# Patient Record
Sex: FEMALE | Race: BLACK OR AFRICAN AMERICAN | NOT HISPANIC OR LATINO | Employment: OTHER | URBAN - METROPOLITAN AREA
[De-identification: names, ages, dates, MRNs, and addresses within clinical notes are randomized per-mention and may not be internally consistent; named-entity substitution may affect disease eponyms.]

---

## 2019-01-14 ENCOUNTER — OFFICE VISIT CONVERTED (OUTPATIENT)
Dept: NEUROSURGERY | Facility: CLINIC | Age: 69
End: 2019-01-14
Attending: NEUROLOGICAL SURGERY

## 2019-01-22 ENCOUNTER — HOSPITAL ENCOUNTER (OUTPATIENT)
Dept: RADIATION ONCOLOGY | Facility: HOSPITAL | Age: 69
Setting detail: RECURRING SERIES
Discharge: HOME | End: 2019-01-31
Attending: RADIOLOGY

## 2019-02-01 ENCOUNTER — HOSPITAL ENCOUNTER (OUTPATIENT)
Dept: RADIATION ONCOLOGY | Facility: HOSPITAL | Age: 69
Setting detail: RECURRING SERIES
Discharge: HOME | End: 2019-02-28
Attending: RADIOLOGY

## 2019-02-08 ENCOUNTER — HOSPITAL ENCOUNTER (OUTPATIENT)
Dept: MRI IMAGING | Facility: HOSPITAL | Age: 69
Discharge: HOME | End: 2019-02-08
Attending: RADIOLOGY

## 2019-02-13 ENCOUNTER — OFFICE VISIT CONVERTED (OUTPATIENT)
Dept: NEUROSURGERY | Facility: CLINIC | Age: 69
End: 2019-02-13
Attending: NEUROLOGICAL SURGERY

## 2019-02-20 ENCOUNTER — OFFICE VISIT CONVERTED (OUTPATIENT)
Dept: NEUROSURGERY | Facility: CLINIC | Age: 69
End: 2019-02-20
Attending: NEUROLOGICAL SURGERY

## 2019-03-01 ENCOUNTER — HOSPITAL ENCOUNTER (OUTPATIENT)
Dept: PET IMAGING | Facility: HOSPITAL | Age: 69
Discharge: HOME | End: 2019-03-01
Attending: INTERNAL MEDICINE

## 2019-03-04 ENCOUNTER — HOSPITAL ENCOUNTER (OUTPATIENT)
Dept: GENERAL RADIOLOGY | Facility: HOSPITAL | Age: 69
Discharge: HOME | End: 2019-03-04
Attending: NEUROLOGICAL SURGERY

## 2019-03-20 ENCOUNTER — OFFICE VISIT CONVERTED (OUTPATIENT)
Dept: NEUROSURGERY | Facility: CLINIC | Age: 69
End: 2019-03-20
Attending: NEUROLOGICAL SURGERY

## 2019-03-20 ENCOUNTER — CONVERSION ENCOUNTER (OUTPATIENT)
Dept: NEUROLOGY | Facility: CLINIC | Age: 69
End: 2019-03-20

## 2019-03-22 ENCOUNTER — OFFICE VISIT CONVERTED (OUTPATIENT)
Dept: OTOLARYNGOLOGY | Facility: CLINIC | Age: 69
End: 2019-03-22
Attending: OTOLARYNGOLOGY

## 2019-03-29 ENCOUNTER — HOSPITAL ENCOUNTER (OUTPATIENT)
Dept: CT IMAGING | Facility: HOSPITAL | Age: 69
Discharge: HOME | End: 2019-03-29
Attending: NEUROLOGICAL SURGERY

## 2019-03-29 LAB
CREAT BLD-MCNC: 0.4 MG/DL (ref 0.6–1.4)
GFR SERPLBLD BASED ON 1.73 SQ M-ARVRAT: >60 ML/MIN/{1.73_M2}

## 2019-04-03 ENCOUNTER — OFFICE VISIT CONVERTED (OUTPATIENT)
Dept: NEUROSURGERY | Facility: CLINIC | Age: 69
End: 2019-04-03
Attending: NEUROLOGICAL SURGERY

## 2019-04-03 ENCOUNTER — CONVERSION ENCOUNTER (OUTPATIENT)
Dept: NEUROLOGY | Facility: CLINIC | Age: 69
End: 2019-04-03

## 2019-05-08 ENCOUNTER — OFFICE VISIT CONVERTED (OUTPATIENT)
Dept: NEUROSURGERY | Facility: CLINIC | Age: 69
End: 2019-05-08
Attending: NEUROLOGICAL SURGERY

## 2019-06-10 ENCOUNTER — HOSPITAL ENCOUNTER (OUTPATIENT)
Dept: CT IMAGING | Facility: HOSPITAL | Age: 69
Discharge: HOME | End: 2019-06-10
Attending: NEUROLOGICAL SURGERY

## 2019-09-11 ENCOUNTER — HOSPITAL ENCOUNTER (OUTPATIENT)
Dept: RADIATION ONCOLOGY | Facility: HOSPITAL | Age: 69
Discharge: HOME | End: 2019-09-11
Attending: RADIOLOGY

## 2020-03-02 ENCOUNTER — HOSPITAL ENCOUNTER (OUTPATIENT)
Dept: MRI IMAGING | Facility: HOSPITAL | Age: 70
Discharge: HOME | End: 2020-03-02
Attending: INTERNAL MEDICINE

## 2020-06-02 ENCOUNTER — OFFICE VISIT CONVERTED (OUTPATIENT)
Dept: SURGERY | Facility: CLINIC | Age: 70
End: 2020-06-02
Attending: SURGERY

## 2020-06-02 ENCOUNTER — CONVERSION ENCOUNTER (OUTPATIENT)
Dept: SURGERY | Facility: CLINIC | Age: 70
End: 2020-06-02

## 2020-06-10 ENCOUNTER — HOSPITAL ENCOUNTER (OUTPATIENT)
Dept: RADIATION ONCOLOGY | Facility: HOSPITAL | Age: 70
Discharge: HOME | End: 2020-06-10
Attending: RADIOLOGY

## 2020-07-13 ENCOUNTER — HOSPITAL ENCOUNTER (OUTPATIENT)
Dept: GENERAL RADIOLOGY | Facility: HOSPITAL | Age: 70
Discharge: HOME | End: 2020-07-13
Attending: NEUROLOGICAL SURGERY

## 2020-08-05 ENCOUNTER — HOSPITAL ENCOUNTER (OUTPATIENT)
Dept: MRI IMAGING | Facility: HOSPITAL | Age: 70
Discharge: HOME | End: 2020-08-05
Attending: INTERNAL MEDICINE

## 2020-08-18 ENCOUNTER — OFFICE VISIT CONVERTED (OUTPATIENT)
Dept: NEUROSURGERY | Facility: CLINIC | Age: 70
End: 2020-08-18
Attending: NEUROLOGICAL SURGERY

## 2020-10-01 ENCOUNTER — OFFICE VISIT CONVERTED (OUTPATIENT)
Dept: NEUROLOGY | Facility: CLINIC | Age: 70
End: 2020-10-01
Attending: PSYCHIATRY & NEUROLOGY

## 2020-10-13 ENCOUNTER — OFFICE VISIT CONVERTED (OUTPATIENT)
Dept: NEUROSURGERY | Facility: CLINIC | Age: 70
End: 2020-10-13
Attending: NEUROLOGICAL SURGERY

## 2021-03-09 ENCOUNTER — HOSPITAL ENCOUNTER (OUTPATIENT)
Dept: VACCINE CLINIC | Facility: HOSPITAL | Age: 71
Discharge: HOME | End: 2021-03-09
Attending: INTERNAL MEDICINE

## 2021-03-30 ENCOUNTER — HOSPITAL ENCOUNTER (OUTPATIENT)
Dept: VACCINE CLINIC | Facility: HOSPITAL | Age: 71
Discharge: HOME | End: 2021-03-30
Attending: INTERNAL MEDICINE

## 2021-05-10 NOTE — H&P
History and Physical      Patient Name: Evangelina Sutton   Patient ID: [id number]   Sex: Female   YOB: 1950    Primary Care Provider: Gilberto Coleman MD    Visit Date: October 1, 2020    Provider: Phoenix Ivory MD   Location: [facility]   Location Address: [address] [place] [address]  [id number]   Location Phone: [phone]          Chief Complaint     BLE numbness tingling pain and weakness       History Of Present Illness  Evangelina Sutton is a 70 year old /Black female who presents today to [facility] today referred from REFERRING CARE PROVIDER NAME.      70-year-old woman evaluated for nerve conduction study.  She states that she has been having numbness and tingling from the knees below for the last 2 months.  She has a history of multiple myeloma and is taking Revlimid for the past year.  She states that she is being followed by a hematologist/oncologist for multiple myeloma.  She has no history of diabetes.  She does not drink alcohol.  She has tingling and numbness in her legs and in the last 2 months she has been using a walker because she has difficulty in feeling the floor.  She is off balance.       Past Medical History  Arthritis; Cervical radiculopathy; Cervicalgia; Degeneration of lumbar intervertebral disc; Lumbar Spinal Stenosis; Pathologic fracture of vertebrae         Past Surgical History  Back surgery; Back Surgery, Lumbar; Cholecystecomy; Cholecystectomy; Hysterectomy         Medication List  acyclovir 400 mg oral tablet; Ambien 5 mg oral tablet; Aspir-81 81 mg oral tablet,delayed release (DR/EC); Cymbalta 30 mg oral capsule,delayed release(DR/EC); gabapentin 300 mg oral capsule; morphine 30 mg oral capsule, ER multiphase 24 hr; ondansetron HCl 8 mg oral tablet; pantoprazole 20 mg oral tablet,delayed release (DR/EC); potassium chloride 20 mEq oral tablet extended release; Revlimid 25 mg oral capsule  "        Allergy List  NO KNOWN DRUG ALLERGIES         Family Medical History  Cancer, Unspecified; Family history of cancer         Social History  Alcohol (Never); lives with children; Retired; Tobacco (Never);          Review of Systems  · Constitutional  o Denies  o : chills, excessive sweating, fatigue, fever, sycope/passing out, weight gain, weight loss  · Eyes  o Denies  o : changes in vision, blurry vision, double vision  · HENT  o Denies  o : loss of hearing, ringing in the ears, ear aches, sore throat, nasal congestion, sinus pain, nose bleeds, seasonal allergies  · Cardiovascular  o Admits  o : swollen legs  o Denies  o : blood clots, anemia, easy burising or bleeding, transfusions  · Respiratory  o Denies  o : shortness of breath, dry cough, productive cough, pneumonia, COPD  · Gastrointestinal  o Denies  o : difficulty swallowing, reflux  · Genitourinary  o Denies  o : incontinence  · Neurologic  o Admits  o : numbness/tingling/paresthesia   o Denies  o : headache, seizure, stroke, tremor, loss of balance, falls, dizziness/vertigo, difficulty with sleep, difficulty with coordination, difficulty with dexterity, weakness  · Musculoskeletal  o Admits  o : pain radiating in leg  o Denies  o : neck stiffness/pain, swollen lymph nodes, muscle aches, joint pain, weakness, spasms, sciatica, pain radiating in arm, low back pain  · Endocrine  o Denies  o : diabetes, thyroid disorder  · Psychiatric  o Denies  o : anxiety, depression      Vitals  Date Time BP Position Site L\R Cuff Size HR RR TEMP (F) WT  HT  BMI kg/m2 BSA m2 O2 Sat FR L/min FiO2        10/01/2020 01:15 /48 Sitting    110 - R  96.7 135lbs 0oz 5'  5\" 22.46 1.68             Physical Examination     He is alert, fluent, phasic, follows commands well.  There is no weakness of the upper or lower extremities individual muscle testing proximally and distally.  Sensation is decreased to pinprick in a stocking distribution to the mid leg.  " Cold sensation is decreased to the mid leg.  Vibration is decreased higher than the ankles.  She has difficulty getting up on the examining table and getting up on the footstool.  Station and gait she has difficulty keeping her balance and has to use a rolling walker.           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  · Peripheral neuropathy     356.9/G62.9  She may have drug-induced peripheral neuropathy from Revlimid which is known to cause 10% neuropathy. It also can cause paresthesias. She also has multiple myeloma which can cause neuropathy as well. I told her to follow-up with her hematologist oncologist to discuss these issues.    20 minutes was spent for the straightforward complexity encounter more than half the time was spent face-to-face with the patient examination, counseling, planning and recommendations.      Plan  · Orders  o Nerve conduction studies; 9-10 studies (01173) - 356.9/G62.9, 782.0/R20.0, 782.0/R20.2 - 10/01/2020  · Medications  o Medications have been Reconciled  o Transition of Care or Provider Policy  · Instructions  o Encouraged to follow-up with Primary Care Provider for preventative care.            Electronically Signed by: Phoenix Ivory MD -Author on October 1, 2020 02:54:44 PM

## 2021-05-13 NOTE — PROGRESS NOTES
Progress Note      Patient Name: Evangelina Sutton   Patient ID: [id number]   Sex: Female   YOB: 1950    Primary Care Provider: Gilberto Coleman MD    Visit Date: October 13, 2020    Provider: Mundo Aldana MD   Location: [facility]   Location Address: [address] [place] [address]  [id number]   Location Phone: [phone]          Chief Complaint     Here to discuss EMG results.       History Of Present Illness     She has severe polyneuropathy on EMG.       Past Medical History  Arthritis; Cervical radiculopathy; Cervicalgia; Degeneration of lumbar intervertebral disc; Lumbar Spinal Stenosis; Pathologic fracture of vertebrae         Past Surgical History  Back surgery; Back Surgery, Lumbar; Cholecystecomy; Cholecystectomy; Hysterectomy         Medication List  acyclovir 400 mg oral tablet; Ambien 5 mg oral tablet; Aspir-81 81 mg oral tablet,delayed release (DR/EC); Cymbalta 30 mg oral capsule,delayed release(DR/EC); gabapentin 300 mg oral capsule; morphine 30 mg oral capsule, ER multiphase 24 hr; ondansetron HCl 8 mg oral tablet; pantoprazole 20 mg oral tablet,delayed release (DR/EC); potassium chloride 20 mEq oral tablet extended release; Revlimid 25 mg oral capsule         Allergy List  NO KNOWN DRUG ALLERGIES       Allergies Reconciled  Family Medical History  Cancer, Unspecified; Family history of cancer         Social History  Alcohol (Never); lives with children; Retired; Tobacco (Never);          Review of Systems  · Constitutional  o Denies  o : chills, excessive sweating, fatigue, fever, sycope/passing out, weight gain, weight loss  · Eyes  o Denies  o : changes in vision, blurry vision, double vision  · HENT  o Denies  o : loss of hearing, ringing in the ears, ear aches, sore throat, nasal congestion, sinus pain, nose bleeds, seasonal allergies  · Cardiovascular  o Denies  o : blood clots, swollen legs, anemia, easy burising or bleeding,  "transfusions  · Respiratory  o Denies  o : shortness of breath, dry cough, productive cough, pneumonia, COPD  · Gastrointestinal  o Denies  o : difficulty swallowing, reflux  · Genitourinary  o Denies  o : incontinence  · Neurologic  o Admits  o : weakness  o Denies  o : headache, seizure, stroke, tremor, loss of balance, falls, dizziness/vertigo, difficulty with sleep, numbness/tingling/paresthesia , difficulty with coordination, difficulty with dexterity  · Musculoskeletal  o Admits  o : neck stiffness/pain, weakness  o Denies  o : swollen lymph nodes, muscle aches, joint pain, spasms, sciatica, pain radiating in arm, pain radiating in leg, low back pain  · Endocrine  o Denies  o : diabetes, thyroid disorder  · Psychiatric  o Denies  o : anxiety, depression  · All Others Negative      Vitals  Date Time BP Position Site L\R Cuff Size HR RR TEMP (F) WT  HT  BMI kg/m2 BSA m2 O2 Sat FR L/min FiO2 HC       10/13/2020 02:32 PM        96.9 135lbs 0oz 5'  5\" 22.46 1.68                     Assessment  · Lumbar spinal stenosis     724.02/M48.061  Moderate at L3-4 and L4-5  · Multiple myeloma     203.00/C90.00  With multiple lumbar compression fractures  · Neuropathy     355.9/G62.9  Severe      Plan  · Medications  o Medications have been Reconciled  o Transition of Care or Provider Policy  · Instructions  o Surgery is unlikely to help as she has severe polyneuropathy.             Electronically Signed by: Mundo Aldana MD -Author on October 13, 2020 03:07:27 PM  "

## 2021-05-13 NOTE — PROGRESS NOTES
Progress Note      Patient Name: Evangelina Sutton   Patient ID: [id number]   Sex: Female   YOB: 1950    Primary Care Provider: Gilberto Coleman MD    Visit Date: August 18, 2020    Provider: Mundo Aldana MD   Location: [facility]   Location Address: [address] [place] [address]  [id number]   Location Phone: [phone]          Chief Complaint     Here for follow up.       History Of Present Illness     She has pain in the legs and right arm. She has multiple myeloma. She had a C7 corpectomy about a year ago. She has more leg than back pain. The leg pain is present all the time. She walks short distances with a walker. She has been taking gabapentin with no notable relief. She does have a history of lumbar laminectomy by Dr. St several years ago (L3-4 and L4-5 for foraminal stenosis) in 2013.       Past Medical History  Arthritis; Cervical radiculopathy; Cervicalgia; Degeneration of lumbar intervertebral disc; Lumbar Spinal Stenosis; Pathologic fracture of vertebrae         Past Surgical History  Back surgery; Back Surgery, Lumbar; Cholecystecomy; Cholecystectomy; Hysterectomy         Medication List  acyclovir 400 mg oral tablet; Ambien 5 mg oral tablet; Aspir-81 81 mg oral tablet,delayed release (DR/EC); gabapentin 300 mg oral capsule; morphine 30 mg oral capsule, ER multiphase 24 hr; ondansetron HCl 8 mg oral tablet; pantoprazole 20 mg oral tablet,delayed release (DR/EC); potassium chloride 20 mEq oral tablet extended release; Revlimid 25 mg oral capsule         Allergy List  NO KNOWN DRUG ALLERGIES       Allergies Reconciled  Family Medical History  Cancer, Unspecified; Family history of cancer         Social History  Alcohol (Never); lives with children; Retired; Tobacco (Never);          Review of Systems  · Constitutional  o Denies  o : chills, excessive sweating, fatigue, fever, sycope/passing out, weight gain, weight loss  · Eyes  o Denies  o : changes in  "vision, blurry vision, double vision  · HENT  o Denies  o : loss of hearing, ringing in the ears, ear aches, sore throat, nasal congestion, sinus pain, nose bleeds, seasonal allergies  · Cardiovascular  o Denies  o : blood clots, swollen legs, anemia, easy burising or bleeding, transfusions  · Respiratory  o Denies  o : shortness of breath, dry cough, productive cough, pneumonia, COPD  · Gastrointestinal  o Denies  o : difficulty swallowing, reflux  · Genitourinary  o Denies  o : incontinence  · Neurologic  o Denies  o : headache, seizure, stroke, tremor, loss of balance, falls, dizziness/vertigo, difficulty with sleep, numbness/tingling/paresthesia , difficulty with coordination, difficulty with dexterity, weakness  · Musculoskeletal  o Denies  o : neck stiffness/pain, swollen lymph nodes, muscle aches, joint pain, weakness, spasms, sciatica, pain radiating in arm, pain radiating in leg, low back pain  · Endocrine  o Denies  o : diabetes, thyroid disorder  · Psychiatric  o Denies  o : anxiety, depression  · All Others Negative      Vitals  Date Time BP Position Site L\R Cuff Size HR RR TEMP (F) WT  HT  BMI kg/m2 BSA m2 O2 Sat HC       08/18/2020 02:29 PM        97.1  5'  5\"   99 %          Physical Examination     She has a longer midline lumbar and a small right lumbar incision   Decreased sensation to the knee bilaterally  Strength distally fairly good/symmetric               Assessment  · Lumbar spinal stenosis     724.02/M48.061  Moderate at L3-4 and L4-5  · Multiple myeloma     203.00/C90.00  With multiple lumbar compression fractures  · Neuropathy     355.9/G62.9      Plan  · Orders  o EMG/NCV of Lower Extremities Bilaterally (67391) - - 08/18/2020  · Medications  o Cymbalta 30 mg oral capsule,delayed release(DR/EC)   SIG: take 1 capsule (30 mg) by oral route once daily   DISP: (30) capsules with 0 refills  Prescribed on 08/18/2020     o Medications have been Reconciled  o Transition of Care or Provider " Policy  · Instructions  o If the leg numbness/pain is from neuropathy, surgery would be unlikely to help.   o We will obtain an EMG/NCV to evaluate and f/u after.             Electronically Signed by: Mundo Aldana MD -Author on August 18, 2020 03:30:36 PM

## 2021-05-13 NOTE — PROGRESS NOTES
"   Progress Note      Patient Name: Evangelina Sutton   Patient ID: [id number]   Sex: Female   YOB: 1950    Primary Care Provider: Mauro Ocampo MD   Referring Provider: Tu Caro MD    Visit Date: June 2, 2020    Provider: Peter Eng MD   Location: [facility]   Location Address: [address] [place] [address]  [id number]   Location Phone: [phone]          Chief Complaint  · Status Post Surgery      History Of Present Illness     Ms. Sutton is seen in follow-up status post laparoscopic cholecystectomy.       Past Medical History  Arthritis; Cervical radiculopathy; Cervicalgia; Degeneration of lumbar intervertebral disc; Lumbar Spinal Stenosis; Pathologic fracture of vertebrae         Past Surgical History  Back surgery; Back Surgery, Lumbar; Cholecystectomy; Hysterectomy         Medication List  Neurontin 300 mg oral capsule         Allergy List  NO KNOWN DRUG ALLERGIES       Allergies Reconciled  Family Medical History  Cancer, Unspecified         Social History  Alcohol (Never); lives with children; Retired; Tobacco (Former);          Review of Systems  · Cardiovascular  o Denies  o : chest pain on exertion, shortness of breath, lower extremity swelling  · Respiratory  o Denies  o : wheezing, chronic cough, coughing up blood  · Gastrointestinal  o Denies  o : diarrhea, chronic abdominal pain, reflux symptoms      Vitals  Date Time BP Position Site L\R Cuff Size HR RR TEMP (F) WT  HT  BMI kg/m2 BSA m2 O2 Sat HC       06/02/2020 10:08 AM       12  140lbs 0oz 5'  5\" 23.3 1.71           Physical Examination     Her incisions  are healing. Her sutures were removed. She appears to be doing well.           Assessment  · Postoperative Exam Following Surgery     V67.00  · Status post cholecystectomy     V45.79/Z90.49      Plan  · Medications  o Medications have been Reconciled  o Transition of Care or Provider Policy  · Instructions  o PLAN:  o F/U PRN            Electronically " Signed by: Oanh Caro-, -Author on June 4, 2020 10:44:33 AM  Electronically Co-signed by: Peter Eng MD -Reviewer on Teetee 15, 2020 09:58:19 AM

## 2021-05-14 VITALS
HEART RATE: 110 BPM | BODY MASS INDEX: 22.49 KG/M2 | SYSTOLIC BLOOD PRESSURE: 131 MMHG | HEIGHT: 65 IN | TEMPERATURE: 96.7 F | DIASTOLIC BLOOD PRESSURE: 48 MMHG | WEIGHT: 135 LBS

## 2021-05-14 VITALS — HEIGHT: 65 IN | WEIGHT: 135 LBS | TEMPERATURE: 96.9 F | BODY MASS INDEX: 22.49 KG/M2

## 2021-05-15 VITALS
HEIGHT: 65 IN | WEIGHT: 186.44 LBS | BODY MASS INDEX: 31.06 KG/M2 | DIASTOLIC BLOOD PRESSURE: 90 MMHG | SYSTOLIC BLOOD PRESSURE: 142 MMHG

## 2021-05-15 VITALS
BODY MASS INDEX: 31.03 KG/M2 | TEMPERATURE: 98.7 F | HEIGHT: 65 IN | SYSTOLIC BLOOD PRESSURE: 132 MMHG | RESPIRATION RATE: 20 BRPM | DIASTOLIC BLOOD PRESSURE: 74 MMHG | WEIGHT: 186.25 LBS | HEART RATE: 99 BPM | OXYGEN SATURATION: 97 %

## 2021-05-15 VITALS — WEIGHT: 140 LBS | RESPIRATION RATE: 12 BRPM | BODY MASS INDEX: 23.32 KG/M2 | HEIGHT: 65 IN

## 2021-05-15 VITALS
BODY MASS INDEX: 31.05 KG/M2 | WEIGHT: 186.37 LBS | SYSTOLIC BLOOD PRESSURE: 142 MMHG | DIASTOLIC BLOOD PRESSURE: 87 MMHG | HEIGHT: 65 IN

## 2021-05-15 VITALS
HEIGHT: 65 IN | DIASTOLIC BLOOD PRESSURE: 91 MMHG | SYSTOLIC BLOOD PRESSURE: 182 MMHG | BODY MASS INDEX: 31.01 KG/M2 | WEIGHT: 186.12 LBS

## 2021-05-15 VITALS — HEIGHT: 65 IN | TEMPERATURE: 97.1 F | OXYGEN SATURATION: 99 %

## 2021-05-16 VITALS
BODY MASS INDEX: 33.07 KG/M2 | WEIGHT: 198.5 LBS | DIASTOLIC BLOOD PRESSURE: 90 MMHG | HEIGHT: 65 IN | SYSTOLIC BLOOD PRESSURE: 160 MMHG

## 2021-05-16 VITALS
BODY MASS INDEX: 28.75 KG/M2 | DIASTOLIC BLOOD PRESSURE: 87 MMHG | WEIGHT: 172.56 LBS | HEIGHT: 65 IN | SYSTOLIC BLOOD PRESSURE: 123 MMHG

## 2021-05-16 VITALS
HEIGHT: 65 IN | SYSTOLIC BLOOD PRESSURE: 116 MMHG | DIASTOLIC BLOOD PRESSURE: 63 MMHG | WEIGHT: 177.06 LBS | BODY MASS INDEX: 29.5 KG/M2

## 2021-09-05 ENCOUNTER — APPOINTMENT (OUTPATIENT)
Dept: GENERAL RADIOLOGY | Facility: HOSPITAL | Age: 71
End: 2021-09-05

## 2021-09-05 ENCOUNTER — HOSPITAL ENCOUNTER (INPATIENT)
Facility: HOSPITAL | Age: 71
LOS: 2 days | Discharge: HOME | End: 2021-09-07
Attending: EMERGENCY MEDICINE | Admitting: INTERNAL MEDICINE

## 2021-09-05 DIAGNOSIS — E86.0 DEHYDRATION: ICD-10-CM

## 2021-09-05 DIAGNOSIS — R11.2 NAUSEA AND VOMITING, INTRACTABILITY OF VOMITING NOT SPECIFIED, UNSPECIFIED VOMITING TYPE: Primary | ICD-10-CM

## 2021-09-05 LAB
ALBUMIN SERPL-MCNC: 4.4 G/DL (ref 3.5–5.2)
ALBUMIN/GLOB SERPL: 1.2 G/DL
ALP SERPL-CCNC: 51 U/L (ref 39–117)
ALT SERPL W P-5'-P-CCNC: 7 U/L (ref 1–33)
ANION GAP SERPL CALCULATED.3IONS-SCNC: 13.5 MMOL/L (ref 5–15)
AST SERPL-CCNC: 21 U/L (ref 1–32)
BACTERIA UR QL AUTO: ABNORMAL /HPF
BASOPHILS # BLD AUTO: 0.05 10*3/MM3 (ref 0–0.2)
BASOPHILS NFR BLD AUTO: 1.7 % (ref 0–1.5)
BILIRUB SERPL-MCNC: 0.6 MG/DL (ref 0–1.2)
BILIRUB UR QL STRIP: NEGATIVE
BUN SERPL-MCNC: 7 MG/DL (ref 8–23)
BUN/CREAT SERPL: 14 (ref 7–25)
CALCIUM SPEC-SCNC: 9.5 MG/DL (ref 8.6–10.5)
CHLORIDE SERPL-SCNC: 96 MMOL/L (ref 98–107)
CLARITY UR: CLEAR
CO2 SERPL-SCNC: 25.5 MMOL/L (ref 22–29)
COLOR UR: YELLOW
CREAT SERPL-MCNC: 0.5 MG/DL (ref 0.57–1)
DEPRECATED RDW RBC AUTO: 51.9 FL (ref 37–54)
EOSINOPHIL # BLD AUTO: 0 10*3/MM3 (ref 0–0.4)
EOSINOPHIL NFR BLD AUTO: 0 % (ref 0.3–6.2)
ERYTHROCYTE [DISTWIDTH] IN BLOOD BY AUTOMATED COUNT: 17.4 % (ref 12.3–15.4)
GFR SERPL CREATININE-BSD FRML MDRD: 147 ML/MIN/1.73
GLOBULIN UR ELPH-MCNC: 3.6 GM/DL
GLUCOSE SERPL-MCNC: 129 MG/DL (ref 65–99)
GLUCOSE UR STRIP-MCNC: NEGATIVE MG/DL
HCT VFR BLD AUTO: 40.7 % (ref 34–46.6)
HGB BLD-MCNC: 13 G/DL (ref 12–15.9)
HGB UR QL STRIP.AUTO: ABNORMAL
HOLD SPECIMEN: NORMAL
HOLD SPECIMEN: NORMAL
HYALINE CASTS UR QL AUTO: ABNORMAL /LPF
IMM GRANULOCYTES # BLD AUTO: 0 10*3/MM3 (ref 0–0.05)
IMM GRANULOCYTES NFR BLD AUTO: 0 % (ref 0–0.5)
KETONES UR QL STRIP: ABNORMAL
LEUKOCYTE ESTERASE UR QL STRIP.AUTO: NEGATIVE
LIPASE SERPL-CCNC: 12 U/L (ref 13–60)
LYMPHOCYTES # BLD AUTO: 0.59 10*3/MM3 (ref 0.7–3.1)
LYMPHOCYTES NFR BLD AUTO: 20.4 % (ref 19.6–45.3)
MAGNESIUM SERPL-MCNC: 1.7 MG/DL (ref 1.6–2.4)
MAGNESIUM SERPL-MCNC: 1.9 MG/DL (ref 1.6–2.4)
MCH RBC QN AUTO: 26.2 PG (ref 26.6–33)
MCHC RBC AUTO-ENTMCNC: 31.9 G/DL (ref 31.5–35.7)
MCV RBC AUTO: 82.1 FL (ref 79–97)
MONOCYTES # BLD AUTO: 0.29 10*3/MM3 (ref 0.1–0.9)
MONOCYTES NFR BLD AUTO: 10 % (ref 5–12)
NEUTROPHILS NFR BLD AUTO: 1.96 10*3/MM3 (ref 1.7–7)
NEUTROPHILS NFR BLD AUTO: 67.9 % (ref 42.7–76)
NITRITE UR QL STRIP: NEGATIVE
NRBC BLD AUTO-RTO: 0 /100 WBC (ref 0–0.2)
PH UR STRIP.AUTO: 7.5 [PH] (ref 5–8)
PLATELET # BLD AUTO: 252 10*3/MM3 (ref 140–450)
PMV BLD AUTO: 9.3 FL (ref 6–12)
POTASSIUM SERPL-SCNC: 3 MMOL/L (ref 3.5–5.2)
PROT SERPL-MCNC: 8 G/DL (ref 6–8.5)
PROT UR QL STRIP: ABNORMAL
RBC # BLD AUTO: 4.96 10*6/MM3 (ref 3.77–5.28)
RBC # UR: ABNORMAL /HPF
REF LAB TEST METHOD: ABNORMAL
SODIUM SERPL-SCNC: 135 MMOL/L (ref 136–145)
SP GR UR STRIP: 1.02 (ref 1–1.03)
SQUAMOUS #/AREA URNS HPF: ABNORMAL /HPF
UROBILINOGEN UR QL STRIP: ABNORMAL
WBC # BLD AUTO: 2.89 10*3/MM3 (ref 3.4–10.8)
WBC UR QL AUTO: ABNORMAL /HPF
WHOLE BLOOD HOLD SPECIMEN: NORMAL
WHOLE BLOOD HOLD SPECIMEN: NORMAL

## 2021-09-05 PROCEDURE — 74019 RADEX ABDOMEN 2 VIEWS: CPT

## 2021-09-05 PROCEDURE — 83735 ASSAY OF MAGNESIUM: CPT | Performed by: EMERGENCY MEDICINE

## 2021-09-05 PROCEDURE — 87086 URINE CULTURE/COLONY COUNT: CPT | Performed by: INTERNAL MEDICINE

## 2021-09-05 PROCEDURE — 80053 COMPREHEN METABOLIC PANEL: CPT | Performed by: INTERNAL MEDICINE

## 2021-09-05 PROCEDURE — 99285 EMERGENCY DEPT VISIT HI MDM: CPT

## 2021-09-05 PROCEDURE — 25010000002 ONDANSETRON PER 1 MG: Performed by: EMERGENCY MEDICINE

## 2021-09-05 PROCEDURE — 25010000002 HYDROMORPHONE 1 MG/ML SOLUTION: Performed by: EMERGENCY MEDICINE

## 2021-09-05 PROCEDURE — 85025 COMPLETE CBC W/AUTO DIFF WBC: CPT | Performed by: EMERGENCY MEDICINE

## 2021-09-05 PROCEDURE — 87040 BLOOD CULTURE FOR BACTERIA: CPT | Performed by: INTERNAL MEDICINE

## 2021-09-05 PROCEDURE — 83690 ASSAY OF LIPASE: CPT | Performed by: EMERGENCY MEDICINE

## 2021-09-05 PROCEDURE — 36415 COLL VENOUS BLD VENIPUNCTURE: CPT | Performed by: INTERNAL MEDICINE

## 2021-09-05 PROCEDURE — 81001 URINALYSIS AUTO W/SCOPE: CPT | Performed by: EMERGENCY MEDICINE

## 2021-09-05 PROCEDURE — 80053 COMPREHEN METABOLIC PANEL: CPT | Performed by: EMERGENCY MEDICINE

## 2021-09-05 PROCEDURE — 71045 X-RAY EXAM CHEST 1 VIEW: CPT

## 2021-09-05 RX ORDER — SODIUM CHLORIDE 0.9 % (FLUSH) 0.9 %
10 SYRINGE (ML) INJECTION EVERY 12 HOURS SCHEDULED
Status: DISCONTINUED | OUTPATIENT
Start: 2021-09-05 | End: 2021-09-07 | Stop reason: HOSPADM

## 2021-09-05 RX ORDER — SODIUM CHLORIDE 0.9 % (FLUSH) 0.9 %
10 SYRINGE (ML) INJECTION AS NEEDED
Status: DISCONTINUED | OUTPATIENT
Start: 2021-09-05 | End: 2021-09-07 | Stop reason: HOSPADM

## 2021-09-05 RX ORDER — MORPHINE SULFATE 30 MG/1
30 CAPSULE, EXTENDED RELEASE ORAL DAILY
Status: ON HOLD | COMMUNITY
End: 2021-09-05

## 2021-09-05 RX ORDER — MORPHINE SULFATE 100 MG/1
100 TABLET ORAL EVERY 12 HOURS SCHEDULED
Status: DISCONTINUED | OUTPATIENT
Start: 2021-09-05 | End: 2021-09-05

## 2021-09-05 RX ORDER — LENALIDOMIDE 25 MG/1
15 CAPSULE ORAL
Status: ON HOLD | COMMUNITY
End: 2021-09-05

## 2021-09-05 RX ORDER — SPIRONOLACTONE 25 MG/1
25 TABLET ORAL DAILY
COMMUNITY

## 2021-09-05 RX ORDER — ZOLPIDEM TARTRATE 5 MG/1
5 TABLET ORAL NIGHTLY
Status: DISCONTINUED | OUTPATIENT
Start: 2021-09-05 | End: 2021-09-07 | Stop reason: HOSPADM

## 2021-09-05 RX ORDER — OXYCODONE AND ACETAMINOPHEN 10; 325 MG/1; MG/1
1 TABLET ORAL EVERY 6 HOURS PRN
Status: DISCONTINUED | OUTPATIENT
Start: 2021-09-05 | End: 2021-09-07 | Stop reason: HOSPADM

## 2021-09-05 RX ORDER — SPIRONOLACTONE 25 MG/1
25 TABLET ORAL DAILY
Status: DISCONTINUED | OUTPATIENT
Start: 2021-09-06 | End: 2021-09-07 | Stop reason: HOSPADM

## 2021-09-05 RX ORDER — ACYCLOVIR 800 MG/1
400 TABLET ORAL
Status: DISCONTINUED | OUTPATIENT
Start: 2021-09-06 | End: 2021-09-07 | Stop reason: HOSPADM

## 2021-09-05 RX ORDER — ACETAMINOPHEN 325 MG/1
650 TABLET ORAL EVERY 4 HOURS PRN
Status: DISCONTINUED | OUTPATIENT
Start: 2021-09-05 | End: 2021-09-07 | Stop reason: HOSPADM

## 2021-09-05 RX ORDER — LOPERAMIDE HYDROCHLORIDE 2 MG/1
4 CAPSULE ORAL 4 TIMES DAILY PRN
Status: DISCONTINUED | OUTPATIENT
Start: 2021-09-05 | End: 2021-09-07 | Stop reason: HOSPADM

## 2021-09-05 RX ORDER — MORPHINE SULFATE 100 MG/1
100 TABLET ORAL EVERY 12 HOURS SCHEDULED
Status: DISCONTINUED | OUTPATIENT
Start: 2021-09-05 | End: 2021-09-07 | Stop reason: HOSPADM

## 2021-09-05 RX ORDER — POTASSIUM CHLORIDE 750 MG/1
10 TABLET, FILM COATED, EXTENDED RELEASE ORAL 2 TIMES DAILY
COMMUNITY
End: 2023-03-24 | Stop reason: HOSPADM

## 2021-09-05 RX ORDER — POTASSIUM CHLORIDE 750 MG/1
20 CAPSULE, EXTENDED RELEASE ORAL 2 TIMES DAILY WITH MEALS
Status: DISCONTINUED | OUTPATIENT
Start: 2021-09-05 | End: 2021-09-07 | Stop reason: HOSPADM

## 2021-09-05 RX ORDER — PREGABALIN 100 MG/1
150 CAPSULE ORAL 2 TIMES DAILY
COMMUNITY
End: 2023-02-21

## 2021-09-05 RX ORDER — ASPIRIN 81 MG/1
81 TABLET ORAL DAILY
Status: DISCONTINUED | OUTPATIENT
Start: 2021-09-06 | End: 2021-09-07 | Stop reason: HOSPADM

## 2021-09-05 RX ORDER — ONDANSETRON 2 MG/ML
4 INJECTION INTRAMUSCULAR; INTRAVENOUS ONCE
Status: COMPLETED | OUTPATIENT
Start: 2021-09-05 | End: 2021-09-05

## 2021-09-05 RX ORDER — OXYCODONE AND ACETAMINOPHEN 10; 325 MG/1; MG/1
1 TABLET ORAL EVERY 6 HOURS PRN
Status: ON HOLD | COMMUNITY
End: 2022-04-25

## 2021-09-05 RX ORDER — POTASSIUM CHLORIDE 20 MEQ/1
TABLET, EXTENDED RELEASE ORAL
Status: ON HOLD | COMMUNITY
End: 2021-09-05

## 2021-09-05 RX ORDER — ASPIRIN 81 MG/1
81 TABLET ORAL EVERY MORNING
COMMUNITY
End: 2022-04-26 | Stop reason: HOSPADM

## 2021-09-05 RX ORDER — FAMOTIDINE 10 MG/ML
20 INJECTION, SOLUTION INTRAVENOUS ONCE
Status: COMPLETED | OUTPATIENT
Start: 2021-09-05 | End: 2021-09-05

## 2021-09-05 RX ORDER — PREGABALIN 75 MG/1
150 CAPSULE ORAL 2 TIMES DAILY
Status: DISCONTINUED | OUTPATIENT
Start: 2021-09-05 | End: 2021-09-07 | Stop reason: HOSPADM

## 2021-09-05 RX ORDER — ACYCLOVIR 400 MG/1
400 TABLET ORAL 2 TIMES DAILY
COMMUNITY

## 2021-09-05 RX ORDER — OXYCODONE HYDROCHLORIDE 5 MG/1
10 TABLET ORAL EVERY 6 HOURS PRN
Status: DISCONTINUED | OUTPATIENT
Start: 2021-09-05 | End: 2021-09-07 | Stop reason: HOSPADM

## 2021-09-05 RX ORDER — ONDANSETRON 2 MG/ML
4 INJECTION INTRAMUSCULAR; INTRAVENOUS EVERY 6 HOURS PRN
Status: DISCONTINUED | OUTPATIENT
Start: 2021-09-05 | End: 2021-09-07 | Stop reason: HOSPADM

## 2021-09-05 RX ORDER — ZOLPIDEM TARTRATE 5 MG/1
5 TABLET ORAL NIGHTLY PRN
Status: DISCONTINUED | OUTPATIENT
Start: 2021-09-05 | End: 2021-09-07 | Stop reason: HOSPADM

## 2021-09-05 RX ORDER — MORPHINE SULFATE 100 MG/1
100 TABLET ORAL 2 TIMES DAILY
COMMUNITY
End: 2023-03-24 | Stop reason: HOSPADM

## 2021-09-05 RX ORDER — ALUMINA, MAGNESIA, AND SIMETHICONE 2400; 2400; 240 MG/30ML; MG/30ML; MG/30ML
15 SUSPENSION ORAL EVERY 6 HOURS PRN
Status: DISCONTINUED | OUTPATIENT
Start: 2021-09-05 | End: 2021-09-07 | Stop reason: HOSPADM

## 2021-09-05 RX ORDER — PREGABALIN 75 MG/1
150 CAPSULE ORAL EVERY 12 HOURS SCHEDULED
Status: DISCONTINUED | OUTPATIENT
Start: 2021-09-05 | End: 2021-09-05

## 2021-09-05 RX ORDER — ZOLPIDEM TARTRATE 5 MG/1
5 TABLET ORAL NIGHTLY PRN
COMMUNITY

## 2021-09-05 RX ORDER — SODIUM CHLORIDE AND POTASSIUM CHLORIDE 150; 900 MG/100ML; MG/100ML
100 INJECTION, SOLUTION INTRAVENOUS CONTINUOUS
Status: DISCONTINUED | OUTPATIENT
Start: 2021-09-05 | End: 2021-09-07 | Stop reason: HOSPADM

## 2021-09-05 RX ADMIN — SODIUM CHLORIDE 1000 ML: 9 INJECTION, SOLUTION INTRAVENOUS at 16:22

## 2021-09-05 RX ADMIN — SODIUM CHLORIDE, PRESERVATIVE FREE 10 ML: 5 INJECTION INTRAVENOUS at 23:05

## 2021-09-05 RX ADMIN — POTASSIUM CHLORIDE 20 MEQ: 10 CAPSULE, COATED, EXTENDED RELEASE ORAL at 23:04

## 2021-09-05 RX ADMIN — ONDANSETRON 4 MG: 2 INJECTION INTRAMUSCULAR; INTRAVENOUS at 16:22

## 2021-09-05 RX ADMIN — PREGABALIN 150 MG: 75 CAPSULE ORAL at 23:04

## 2021-09-05 RX ADMIN — SODIUM CHLORIDE 500 ML: 9 INJECTION, SOLUTION INTRAVENOUS at 23:04

## 2021-09-05 RX ADMIN — SODIUM CHLORIDE, PRESERVATIVE FREE 10 ML: 5 INJECTION INTRAVENOUS at 23:06

## 2021-09-05 RX ADMIN — SODIUM CHLORIDE, PRESERVATIVE FREE 10 ML: 5 INJECTION INTRAVENOUS at 17:26

## 2021-09-05 RX ADMIN — FAMOTIDINE 20 MG: 10 INJECTION INTRAVENOUS at 16:22

## 2021-09-05 RX ADMIN — HYDROMORPHONE HYDROCHLORIDE 1 MG: 1 INJECTION, SOLUTION INTRAMUSCULAR; INTRAVENOUS; SUBCUTANEOUS at 17:26

## 2021-09-05 RX ADMIN — ZOLPIDEM TARTRATE 5 MG: 5 TABLET ORAL at 23:06

## 2021-09-05 NOTE — ED PROVIDER NOTES
"Time: 2:56 PM EDT  Arrived by: ambulance  Chief Complaint: ABDOMINAL PAIN   History provided by: pt  History is limited by: N/A     History of Present Illness:  Patient is a 71 y.o.  female that presents to the emergency department with ABDOMINAL PAIN. This started yesterday and is still present and constant. It is moderate in severity. Nothing improves or worsens symptoms.     The abdominal pain is located to the epigastrium and is described as \"pain\". It does not radiate. Pt c/o nausea, vomiting, and diarrhea.     Pt is a CA pt.     History provided by:  Patient  Abdominal Pain  Pain location:  Epigastric  Pain quality comment:  \"pain\"  Pain radiates to:  Does not radiate  Pain severity:  Moderate  Duration:  1 day  Timing:  Constant  Progression:  Unchanged  Relieved by:  Nothing  Worsened by:  Nothing  Associated symptoms: diarrhea, nausea and vomiting    Associated symptoms: no chest pain, no chills, no cough, no dysuria, no fever, no shortness of breath and no sore throat        Similar Symptoms Previously: no  Recently seen: Pt was last evaluated in this ED on 4/5/21 for abdominal pain.     Patient Care Team  Primary Care Provider: Josey Pierre Known    Past Medical History:     No Known Allergies  Past Medical History:   Diagnosis Date   • Cancer (CMS/HCC)    • Neuropathy      Past Surgical History:   Procedure Laterality Date   • CHOLECYSTECTOMY     • HYSTERECTOMY       History reviewed. No pertinent family history.    Home Medications:  Prior to Admission medications    Medication Sig Start Date End Date Taking? Authorizing Provider   oxyCODONE-acetaminophen (PERCOCET)  MG per tablet Take 1 tablet by mouth Every 6 (Six) Hours As Needed for Moderate Pain .   Yes Heather Pierre MD   pregabalin (LYRICA) 100 MG capsule Take 150 mg by mouth 2 (Two) Times a Day.   Yes Heather Pierre MD   spironolactone (ALDACTONE) 25 MG tablet Take 25 mg by mouth Daily.   Yes Heather Pierre MD "   acyclovir (ZOVIRAX) 400 MG tablet acyclovir 400 mg oral tablet take 1 tablet (400 mg) by oral route 2 times per day   Active    ProviderHeather MD   aspirin (aspirin) 81 MG EC tablet Aspir-81 81 mg oral tablet,delayed release (DR/EC) take 1 tablet (81 mg) by oral route once daily   Active    ProviderHeather MD   lenalidomide (Revlimid) 25 MG capsule Revlimid 25 mg oral capsule take 1 capsule (25 mg) by oral route once daily on days 1 through 21 of a 28 day treatment cycle   Active    Provider, MD Heather   Morphine (AVINza) 30 MG 24 hr capsule morphine 30 mg oral capsule, ER multiphase 24 hr take 1 capsule (30 mg) by oral route once daily   Active    ProviderHeather MD   potassium chloride (K-DUR,KLOR-CON) 20 MEQ CR tablet potassium chloride 20 mEq oral tablet extended release take 1 tablet (20 meq) by oral route once daily with food   Active    ProviderHeather MD   zolpidem (Ambien) 5 MG tablet Ambien 5 mg oral tablet take 1 tablet (5 mg) by oral route once daily at bedtime   Active    Provider, MD Heather        Social History:   Social History     Tobacco Use   • Smoking status: Former Smoker   Substance Use Topics   • Alcohol use: Never   • Drug use: Not on file     Recent travel: no     Review of Systems:  Review of Systems   Constitutional: Negative for chills and fever.   HENT: Negative for congestion, rhinorrhea and sore throat.    Eyes: Negative for pain and visual disturbance.   Respiratory: Negative for apnea, cough, chest tightness and shortness of breath.    Cardiovascular: Negative for chest pain and palpitations.   Gastrointestinal: Positive for abdominal pain, diarrhea, nausea and vomiting.   Genitourinary: Negative for difficulty urinating and dysuria.   Musculoskeletal: Negative for joint swelling and myalgias.   Skin: Negative for color change.   Neurological: Negative for seizures and headaches.   Psychiatric/Behavioral: Negative.    All other systems reviewed  "and are negative.       Physical Exam:  /99   Pulse 82   Temp 99.4 °F (37.4 °C) (Oral)   Resp 16   Ht 172.7 cm (68\")   Wt 68 kg (149 lb 14.6 oz)   SpO2 90%   Breastfeeding No   BMI 22.79 kg/m²     Physical Exam  Vitals and nursing note reviewed.   Constitutional:       General: She is not in acute distress.     Appearance: Normal appearance. She is not toxic-appearing.   HENT:      Head: Normocephalic and atraumatic.      Jaw: There is normal jaw occlusion.   Eyes:      General: Lids are normal.      Extraocular Movements: Extraocular movements intact.      Conjunctiva/sclera: Conjunctivae normal.      Pupils: Pupils are equal, round, and reactive to light.   Cardiovascular:      Rate and Rhythm: Normal rate and regular rhythm.      Pulses: Normal pulses.      Heart sounds: Normal heart sounds.   Pulmonary:      Effort: Pulmonary effort is normal. No respiratory distress.      Breath sounds: Normal breath sounds. No wheezing or rhonchi.   Abdominal:      General: Abdomen is flat.      Palpations: Abdomen is soft.      Tenderness: There is abdominal tenderness (mild) in the epigastric area. There is no guarding or rebound.   Musculoskeletal:         General: Normal range of motion.      Cervical back: Normal range of motion and neck supple.      Right lower leg: No edema.      Left lower leg: No edema.   Skin:     General: Skin is warm and dry.   Neurological:      Mental Status: She is alert and oriented to person, place, and time. Mental status is at baseline.   Psychiatric:         Mood and Affect: Mood normal.                Medications in the Emergency Department:  Medications   sodium chloride 0.9 % flush 10 mL (10 mL Intravenous Given 9/5/21 1726)   sodium chloride 0.9 % bolus 1,000 mL (0 mL Intravenous Stopped 9/5/21 1727)   famotidine (PEPCID) injection 20 mg (20 mg Intravenous Given 9/5/21 1622)   ondansetron (ZOFRAN) injection 4 mg (4 mg Intravenous Given 9/5/21 1622)   HYDROmorphone " (DILAUDID) injection 1 mg (1 mg Intravenous Given 9/5/21 1726)        Labs  Lab Results (last 24 hours)     Procedure Component Value Units Date/Time    CBC & Differential [[id number]]  (Abnormal) Collected: 09/05/21 1508    Specimen: Blood Updated: 09/05/21 1519    Narrative:      The following orders were created for panel order CBC & Differential.  Procedure                               Abnormality         Status                     ---------                               -----------         ------                     CBC Auto Differential[[id number]]        Abnormal            Final result                 Please view results for these tests on the individual orders.    Comprehensive Metabolic Panel [[id number]]  (Abnormal) Collected: 09/05/21 1508    Specimen: Blood Updated: 09/05/21 1550     Glucose 129 mg/dL      BUN 7 mg/dL      Creatinine 0.50 mg/dL      Sodium 135 mmol/L      Potassium 3.0 mmol/L      Chloride 96 mmol/L      CO2 25.5 mmol/L      Calcium 9.5 mg/dL      Total Protein 8.0 g/dL      Albumin 4.40 g/dL      ALT (SGPT) 7 U/L      AST (SGOT) 21 U/L      Alkaline Phosphatase 51 U/L      Total Bilirubin 0.6 mg/dL      eGFR  African Amer 147 mL/min/1.73      Globulin 3.6 gm/dL      A/G Ratio 1.2 g/dL      BUN/Creatinine Ratio 14.0     Anion Gap 13.5 mmol/L     Narrative:      GFR Normal >60  Chronic Kidney Disease <60  Kidney Failure <15      Lipase [[id number]]  (Abnormal) Collected: 09/05/21 1508    Specimen: Blood Updated: 09/05/21 1550     Lipase 12 U/L     CBC Auto Differential [[id number]]  (Abnormal) Collected: 09/05/21 1508    Specimen: Blood Updated: 09/05/21 1519     WBC 2.89 10*3/mm3      RBC 4.96 10*6/mm3      Hemoglobin 13.0 g/dL      Hematocrit 40.7 %      MCV 82.1 fL      MCH 26.2 pg      MCHC 31.9 g/dL      RDW 17.4 %      RDW-SD 51.9 fl      MPV 9.3 fL      Platelets 252 10*3/mm3      Neutrophil % 67.9 %      Lymphocyte % 20.4 %      Monocyte % 10.0 %      Eosinophil % 0.0 %      Basophil  % 1.7 %      Immature Grans % 0.0 %      Neutrophils, Absolute 1.96 10*3/mm3      Lymphocytes, Absolute 0.59 10*3/mm3      Monocytes, Absolute 0.29 10*3/mm3      Eosinophils, Absolute 0.00 10*3/mm3      Basophils, Absolute 0.05 10*3/mm3      Immature Grans, Absolute 0.00 10*3/mm3      nRBC 0.0 /100 WBC     Urinalysis With Microscopic If Indicated (No Culture) - Urine, Clean Catch [[id number]]  (Abnormal) Collected: 09/05/21 1606    Specimen: Urine, Clean Catch Updated: 09/05/21 1626     Color, UA Yellow     Appearance, UA Clear     pH, UA 7.5     Specific Gravity, UA 1.019     Glucose, UA Negative     Ketones, UA 15 mg/dL (1+)     Bilirubin, UA Negative     Blood, UA Small (1+)     Protein, UA 30 mg/dL (1+)     Leuk Esterase, UA Negative     Nitrite, UA Negative     Urobilinogen, UA 1.0 E.U./dL    Urinalysis, Microscopic Only - Urine, Clean Catch [[id number]]  (Abnormal) Collected: 09/05/21 1606    Specimen: Urine, Clean Catch Updated: 09/05/21 1626     RBC, UA 21-30 /HPF      WBC, UA 0-2 /HPF      Bacteria, UA None Seen /HPF      Squamous Epithelial Cells, UA 3-6 /HPF      Hyaline Casts, UA 3-6 /LPF      Methodology Manual Light Microscopy           Imaging:  XR Abdomen Flat & Upright    Result Date: 9/5/2021  PROCEDURE: XR ABDOMEN FLAT AND UPRIGHT  COMPARISON: None  INDICATIONS: EPIGASTRIC ABDOMINAL PAIN, NAUSEA WITH VOMITING.  FINDINGS:  Visualized lung bases are grossly clear.  No free intraperitoneal air identified.  The abdominal bowel gas pattern is within normal limits.  No abnormal calcifications seen overlying the abdomen or pelvis.  There are surgical clips over the right upper quadrant from prior cholecystectomy.  There are multiple old bilateral rib fractures.  Bones overall have a somewhat stippled appearance compatible the patient's history of multiple myeloma.  No definite acute fracture identified.  CONCLUSION:  1. 1. No definite free intraperitoneal air.  No evidence of bowel obstruction.  2.  Multiple old rib fractures and abnormal appearance of the bony structures compatible the patient's history of multiple myeloma.     JOCELYN MCKEON MD       Electronically Signed and Approved By: JOCELYN MCKEON MD on 9/05/2021 at 18:16               Procedures:  Procedures    Progress                            Medical Decision Making:  MDM  Number of Diagnoses or Management Options  Dehydration  Nausea and vomiting, intractability of vomiting not specified, unspecified vomiting type  Diagnosis management comments: The patient´s CBC was reviewed and shows no abnormalities of critical concern. Of note, there is no anemia requiring a blood transfusion and the platelet count is acceptable.  The patient´s CMP was reviewed and shows no abnormalities of critical concern. Of note, the patient´s sodium and potassium are acceptable. The patient´s liver enzymes are unremarkable. The patient´s renal function (creatinine) is preserved. The patient has a normal anion gap.  Urinalysis shows 15 ketonuria.  X-ray is negative for air-fluid levels and dilated bowel.  Patient was given Zofran in the emergency department patient was also given 1 L normal saline bolus.  Case was discussed with Dr. Murray who agrees with admission as the patient has significant weakness and does not feel like she can go home.       Amount and/or Complexity of Data Reviewed  Clinical lab tests: reviewed  Tests in the radiology section of CPT®: reviewed    Risk of Complications, Morbidity, and/or Mortality  Presenting problems: moderate  Management options: moderate    Patient Progress  Patient progress: stable       Final diagnoses:   Nausea and vomiting, intractability of vomiting not specified, unspecified vomiting type   Dehydration        Disposition:  ED Disposition     ED Disposition Condition Comment    Decision to Admit            This medical record created using voice recognition software and may contain unintended errors.    Documentation  assistance provided by Jenna Aldana acting as scribe for Tiffanie Howard MD. Information recorded by the scribe was done at my direction and has been verified and validated by me.         Jenna Aldana  09/05/21 1502       Tiffanie Howard MD  09/05/21 5038       Tiffanie Howard MD  09/05/21 1902

## 2021-09-05 NOTE — ED NOTES
Spoke with lab who stated they will add on magnesium to existing sample.     Hoda Obregon, RN  09/05/21 2563

## 2021-09-05 NOTE — ED NOTES
Pt is currently under going cancer treatment and has been having weakness and loss of appetite for the last 2 days     Hoda bOregon, GERTRUDE  09/05/21 9298

## 2021-09-06 LAB
ALBUMIN SERPL-MCNC: 3.8 G/DL (ref 3.5–5.2)
ALBUMIN/GLOB SERPL: 0.9 G/DL
ALP SERPL-CCNC: 44 U/L (ref 39–117)
ALT SERPL W P-5'-P-CCNC: 6 U/L (ref 1–33)
ANION GAP SERPL CALCULATED.3IONS-SCNC: 15.6 MMOL/L (ref 5–15)
AST SERPL-CCNC: 24 U/L (ref 1–32)
BILIRUB SERPL-MCNC: 0.6 MG/DL (ref 0–1.2)
BUN SERPL-MCNC: 6 MG/DL (ref 8–23)
BUN/CREAT SERPL: 12.5 (ref 7–25)
CALCIUM SPEC-SCNC: 9.2 MG/DL (ref 8.6–10.5)
CHLORIDE SERPL-SCNC: 96 MMOL/L (ref 98–107)
CO2 SERPL-SCNC: 20.4 MMOL/L (ref 22–29)
CREAT SERPL-MCNC: 0.48 MG/DL (ref 0.57–1)
DEPRECATED RDW RBC AUTO: 52.2 FL (ref 37–54)
ERYTHROCYTE [DISTWIDTH] IN BLOOD BY AUTOMATED COUNT: 17.2 % (ref 12.3–15.4)
GFR SERPL CREATININE-BSD FRML MDRD: >150 ML/MIN/1.73
GLOBULIN UR ELPH-MCNC: 4.1 GM/DL
GLUCOSE SERPL-MCNC: 135 MG/DL (ref 65–99)
HCT VFR BLD AUTO: 36.6 % (ref 34–46.6)
HGB BLD-MCNC: 11.7 G/DL (ref 12–15.9)
INR PPP: 1.04 (ref 2–3)
MCH RBC QN AUTO: 26.3 PG (ref 26.6–33)
MCHC RBC AUTO-ENTMCNC: 32 G/DL (ref 31.5–35.7)
MCV RBC AUTO: 82.2 FL (ref 79–97)
PLATELET # BLD AUTO: 217 10*3/MM3 (ref 140–450)
PMV BLD AUTO: 9.7 FL (ref 6–12)
POTASSIUM SERPL-SCNC: 3.3 MMOL/L (ref 3.5–5.2)
PROT SERPL-MCNC: 7.9 G/DL (ref 6–8.5)
PROTHROMBIN TIME: 11.4 SECONDS (ref 9.4–12)
RBC # BLD AUTO: 4.45 10*6/MM3 (ref 3.77–5.28)
SODIUM SERPL-SCNC: 132 MMOL/L (ref 136–145)
WBC # BLD AUTO: 3.78 10*3/MM3 (ref 3.4–10.8)

## 2021-09-06 PROCEDURE — 85027 COMPLETE CBC AUTOMATED: CPT | Performed by: INTERNAL MEDICINE

## 2021-09-06 PROCEDURE — 25010000003 POTASSIUM CHLORIDE 10 MEQ/100ML SOLUTION: Performed by: INTERNAL MEDICINE

## 2021-09-06 PROCEDURE — 25010000002 SODIUM CHLORIDE 0.9 % WITH KCL 20 MEQ 20-0.9 MEQ/L-% SOLUTION: Performed by: INTERNAL MEDICINE

## 2021-09-06 PROCEDURE — 94799 UNLISTED PULMONARY SVC/PX: CPT

## 2021-09-06 PROCEDURE — 25010000002 ONDANSETRON PER 1 MG: Performed by: INTERNAL MEDICINE

## 2021-09-06 PROCEDURE — 85610 PROTHROMBIN TIME: CPT | Performed by: INTERNAL MEDICINE

## 2021-09-06 RX ORDER — POTASSIUM CHLORIDE 7.45 MG/ML
10 INJECTION INTRAVENOUS
Status: COMPLETED | OUTPATIENT
Start: 2021-09-06 | End: 2021-09-06

## 2021-09-06 RX ADMIN — SPIRONOLACTONE 25 MG: 25 TABLET ORAL at 08:17

## 2021-09-06 RX ADMIN — POTASSIUM CHLORIDE 10 MEQ: 7.46 INJECTION, SOLUTION INTRAVENOUS at 13:35

## 2021-09-06 RX ADMIN — MORPHINE SULFATE 100 MG: 100 TABLET, FILM COATED, EXTENDED RELEASE ORAL at 00:16

## 2021-09-06 RX ADMIN — MORPHINE SULFATE 100 MG: 100 TABLET, FILM COATED, EXTENDED RELEASE ORAL at 08:17

## 2021-09-06 RX ADMIN — ONDANSETRON 4 MG: 2 INJECTION INTRAMUSCULAR; INTRAVENOUS at 22:08

## 2021-09-06 RX ADMIN — ZOLPIDEM TARTRATE 5 MG: 5 TABLET ORAL at 22:02

## 2021-09-06 RX ADMIN — POTASSIUM CHLORIDE AND SODIUM CHLORIDE 100 ML/HR: 900; 150 INJECTION, SOLUTION INTRAVENOUS at 18:30

## 2021-09-06 RX ADMIN — SODIUM CHLORIDE, PRESERVATIVE FREE 10 ML: 5 INJECTION INTRAVENOUS at 08:16

## 2021-09-06 RX ADMIN — ACYCLOVIR 400 MG: 800 TABLET ORAL at 08:41

## 2021-09-06 RX ADMIN — POTASSIUM CHLORIDE AND SODIUM CHLORIDE 100 ML/HR: 900; 150 INJECTION, SOLUTION INTRAVENOUS at 00:01

## 2021-09-06 RX ADMIN — PREGABALIN 150 MG: 75 CAPSULE ORAL at 08:17

## 2021-09-06 RX ADMIN — POTASSIUM CHLORIDE 10 MEQ: 7.46 INJECTION, SOLUTION INTRAVENOUS at 12:29

## 2021-09-06 RX ADMIN — POTASSIUM CHLORIDE AND SODIUM CHLORIDE 100 ML/HR: 900; 150 INJECTION, SOLUTION INTRAVENOUS at 08:20

## 2021-09-06 RX ADMIN — ASPIRIN 81 MG: 81 TABLET, COATED ORAL at 08:17

## 2021-09-06 RX ADMIN — POTASSIUM CHLORIDE 10 MEQ: 7.46 INJECTION, SOLUTION INTRAVENOUS at 11:07

## 2021-09-06 RX ADMIN — PREGABALIN 150 MG: 75 CAPSULE ORAL at 22:02

## 2021-09-06 RX ADMIN — POTASSIUM CHLORIDE 20 MEQ: 10 CAPSULE, COATED, EXTENDED RELEASE ORAL at 17:09

## 2021-09-06 RX ADMIN — SODIUM CHLORIDE, PRESERVATIVE FREE 10 ML: 5 INJECTION INTRAVENOUS at 22:03

## 2021-09-06 RX ADMIN — POTASSIUM CHLORIDE 20 MEQ: 10 CAPSULE, COATED, EXTENDED RELEASE ORAL at 08:17

## 2021-09-06 RX ADMIN — MORPHINE SULFATE 100 MG: 100 TABLET, FILM COATED, EXTENDED RELEASE ORAL at 22:01

## 2021-09-06 NOTE — H&P
[facility]   HISTORY AND PHYSICAL    Patient Name: Evangelina Sutton  : 1950  MRN: [id number]  Primary Care Physician:  Provider, No Known  Date of admission: 2021    Subjective   Subjective     Chief Complaint: nausea vomitting diarrhea multiple myeloma      Evangelina Sutton is a 71 y.o. female  C/o diarrhea nausea vomittin pt has multiple myeloma    Review of Systems   All systems were reviewed and negative except for: revieved    Personal History     Past Medical History:   Diagnosis Date   • Cancer (CMS/HCC)    • Neuropathy        Past Surgical History:   Procedure Laterality Date   • BACK SURGERY     • CHOLECYSTECTOMY     • HYSTERECTOMY         Family History: family history is not on file. Otherwise pertinent FHx was reviewed and not pertinent to current issue.    Social History:  reports that she has quit smoking. She has never used smokeless tobacco. She reports that she does not drink alcohol and does not use drugs.    Home Medications:  Morphine, acyclovir, aspirin, lenalidomide, oxyCODONE-acetaminophen, potassium chloride, pregabalin, spironolactone, and zolpidem      Allergies:  No Known Allergies    Objective   Objective     Vitals:   Temp:  [99.4 °F (37.4 °C)-100 °F (37.8 °C)] 100 °F (37.8 °C)  Heart Rate:  [64-82] 66  Resp:  [13-16] 16  BP: (151-168)/() 164/82  Physical Exam    Constitutional: Awake, alert   Eyes: PERRLA, sclerae anicteric, no conjunctival injection   HENT: NCAT, mucous membranes moist   Neck: Supple, no thyromegaly, no lymphadenopathy, trachea midline   Respiratory: Clear to auscultation bilaterally, nonlabored respirations    Cardiovascular: RRR, no murmurs, rubs, or gallops, palpable pedal pulses bilaterally   Gastrointestinal: Positive bowel sounds, soft, nontender, nondistended   Musculoskeletal: No bilateral ankle edema, no clubbing or cyanosis to extremities   Psychiatric: Appropriate affect, cooperative   Neurologic: Oriented x 3, strength symmetric in  all extremities, Cranial Nerves grossly intact to confrontation, speech clear   Skin: No rashes   Pt not examined discussed with pt and er physician  Result Review    Result Review:  I have personally reviewed the results from the time of this admission to 9/5/2021 22:32 EDT and agree with these findings:  [x]  Laboratory  []  Microbiology  []  Radiology  []  EKG/Telemetry   []  Cardiology/Vascular   []  Pathology  []  Old records  []  Other:revievedMost notable findings include: anemia    Assessment/Plan   Assessment / Plan     Brief Patient Summary:  Evangelina Sutton is a 71 y.o. female who diarrhea nausea vomitting    Active Hospital Problems:  Active Hospital Problems    Diagnosis    • Nausea and vomiting        Plan:   Iv hydration chech c diff pain management    DVT prophylaxis:  Mechanical DVT prophylaxis orders are present.    CODE STATUS:    Level Of Support Discussed With: Patient  Code Status: CPR  Medical Interventions (Level of Support Prior to Arrest): Full      Admission Status:  I believe this patient meets in pt status.    Electronically signed by Abbe Zamudio MD, 09/05/21, 10:32 PM EDT.

## 2021-09-06 NOTE — PROGRESS NOTES
[facility]     Progress Note    Patient Name: Evangelina Sutton  : 1950  MRN: [id number]  Primary Care Physician:  Provider, No Known  Date of admission: 2021    Subjective   Subjective     Chief Complaint: Patient admitted with nausea vomiting has hypokalemia is known to have multiple myeloma for which she has been taking Revlimid maintenance treatment currently in remission patient states that she ate something outside and it probably caused her to have gastroenteritis we will continue the management with antiemetics antidiarrhea medications will also give her potassium supplements because of 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 magnesium levels are normal    HPI:  Patient Reports came in because of abdominal discomfort nausea vomiting she is better but she also had low-grade temperature and we have done cultures we are waiting for the results    Review of Systems   All systems were reviewed and negative except for: Reviewed    Objective   Objective     Vitals:   Temp:  [98.7 °F (37.1 °C)-100 °F (37.8 °C)] 98.7 °F (37.1 °C)  Heart Rate:  [64-91] 81  Resp:  [13-16] 16  BP: (107-168)/() 107/55    Physical Exam    Constitutional: Awake, alert   Eyes: PERRLA, sclerae anicteric, no conjunctival injection   HENT: NCAT, mucous membranes moist   Neck: Supple, no thyromegaly, no lymphadenopathy, trachea midline   Respiratory: Clear to auscultation bilaterally, nonlabored respirations    Cardiovascular: RRR, no murmurs, rubs, or gallops, palpable pedal pulses bilaterally   Gastrointestinal: Positive bowel sounds, soft, nontender, nondistended   Musculoskeletal: No bilateral ankle edema, no clubbing or cyanosis to extremities   Psychiatric: Appropriate affect, cooperative   Neurologic: Oriented x 3, strength symmetric in all extremities, Cranial Nerves grossly intact to confrontation, speech clear   Skin: No rashes     Result Review    Result Review:  I have personally reviewed the results from the time of this  admission to 9/6/2021 10:14 EDT and agree with these findings:  [x]  Laboratory  []  Microbiology  [x]  Radiology  []  EKG/Telemetry   []  Cardiology/Vascular   []  Pathology  []  Old records  []  Other: Reviewed  Most notable findings include: Hypokalemia    Assessment/Plan   Assessment / Plan     Brief Patient Summary:  Evangelina Sutton is a 71 y.o. female who patient has nausea vomiting abdominal discomfort with history of multiple myeloma    Active Hospital Problems:  Active Hospital Problems    Diagnosis    • Nausea and vomiting        Plan:   Continue the IV hydration we will give the potassium supplement    DVT prophylaxis:  Mechanical DVT prophylaxis orders are present.    CODE STATUS:   Level Of Support Discussed With: Patient  Code Status: CPR  Medical Interventions (Level of Support Prior to Arrest): Full    Disposition:  I expect patient to be discharged when patient is stable.    Electronically signed by Abbe Zamuido MD, 09/06/21, 10:14 AM EDT.

## 2021-09-06 NOTE — PLAN OF CARE
Goal Outcome Evaluation:  Plan of Care Reviewed With: patient        Progress: improving  Outcome Summary: No compaints of nausea/vomiting. Pt states pain is tolerable. VSS. Will continue to monitor  SG MCGEE RN     breastfeeding exclusively

## 2021-09-06 NOTE — SIGNIFICANT NOTE
09/06/21 1050   Plan   Plan Patient reports lives with her brother Noel.  Son, Aaliyah, provides transportation to [facility].  Patient reports plans to return home with no needs.

## 2021-09-07 ENCOUNTER — READMISSION MANAGEMENT (OUTPATIENT)
Dept: CALL CENTER | Facility: HOSPITAL | Age: 71
End: 2021-09-07

## 2021-09-07 VITALS
RESPIRATION RATE: 16 BRPM | WEIGHT: 143.52 LBS | HEIGHT: 68 IN | HEART RATE: 69 BPM | DIASTOLIC BLOOD PRESSURE: 51 MMHG | SYSTOLIC BLOOD PRESSURE: 103 MMHG | OXYGEN SATURATION: 96 % | TEMPERATURE: 97.9 F | BODY MASS INDEX: 21.75 KG/M2

## 2021-09-07 LAB
ANION GAP SERPL CALCULATED.3IONS-SCNC: 4.8 MMOL/L (ref 5–15)
BACTERIA SPEC AEROBE CULT: NORMAL
BUN SERPL-MCNC: 11 MG/DL (ref 8–23)
BUN/CREAT SERPL: 22 (ref 7–25)
CALCIUM SPEC-SCNC: 8.1 MG/DL (ref 8.6–10.5)
CHLORIDE SERPL-SCNC: 110 MMOL/L (ref 98–107)
CO2 SERPL-SCNC: 23.2 MMOL/L (ref 22–29)
CREAT SERPL-MCNC: 0.5 MG/DL (ref 0.57–1)
GFR SERPL CREATININE-BSD FRML MDRD: 147 ML/MIN/1.73
GLUCOSE SERPL-MCNC: 93 MG/DL (ref 65–99)
POTASSIUM SERPL-SCNC: 4.5 MMOL/L (ref 3.5–5.2)
SODIUM SERPL-SCNC: 138 MMOL/L (ref 136–145)

## 2021-09-07 PROCEDURE — 25010000002 SODIUM CHLORIDE 0.9 % WITH KCL 20 MEQ 20-0.9 MEQ/L-% SOLUTION: Performed by: INTERNAL MEDICINE

## 2021-09-07 PROCEDURE — 80048 BASIC METABOLIC PNL TOTAL CA: CPT | Performed by: INTERNAL MEDICINE

## 2021-09-07 RX ADMIN — POTASSIUM CHLORIDE AND SODIUM CHLORIDE 100 ML/HR: 900; 150 INJECTION, SOLUTION INTRAVENOUS at 04:40

## 2021-09-07 RX ADMIN — SODIUM CHLORIDE, PRESERVATIVE FREE 10 ML: 5 INJECTION INTRAVENOUS at 08:59

## 2021-09-07 RX ADMIN — ASPIRIN 81 MG: 81 TABLET, COATED ORAL at 08:57

## 2021-09-07 RX ADMIN — ACYCLOVIR 400 MG: 800 TABLET ORAL at 08:57

## 2021-09-07 RX ADMIN — SPIRONOLACTONE 25 MG: 25 TABLET ORAL at 08:56

## 2021-09-07 RX ADMIN — MORPHINE SULFATE 100 MG: 100 TABLET, FILM COATED, EXTENDED RELEASE ORAL at 08:56

## 2021-09-07 RX ADMIN — POTASSIUM CHLORIDE 20 MEQ: 10 CAPSULE, COATED, EXTENDED RELEASE ORAL at 08:57

## 2021-09-07 RX ADMIN — PREGABALIN 150 MG: 75 CAPSULE ORAL at 08:56

## 2021-09-07 NOTE — PLAN OF CARE
Goal Outcome Evaluation:           Progress: improving  Outcome Summary: ONLY ONE EPISODE OF SLIGHT NAUSEA, NO NEW COMPLAINTS, PT EAGER TO GO HOME.Kaylee Sharma RN

## 2021-09-07 NOTE — DISCHARGE SUMMARY
[facility]         DISCHARGE SUMMARY    Patient Name: Evangelina Sutton  : 1950  MRN: [id number]    Date of Admission: 2021  Date of Discharge: 2021  Primary Care Physician: Gilberto Coleman MD    Consults     No orders found from 2021 to 2021.          Presenting Problem:   Dehydration [E86.0]  Nausea and vomiting, intractability of vomiting not specified, unspecified vomiting type [R11.2]    Active and Resolved Hospital Problems:  Active Hospital Problems    Diagnosis POA   • Nausea and vomiting [R11.2] Yes      Resolved Hospital Problems   No resolved problems to display.         Hospital Course     Hospital Course:  Evangelina Sutton is a 71 y.o. female patient was admitted with severe nausea vomiting and dehydration is noted to have multiple myeloma she stated she ate somewhere outside and had developed nausea vomiting and was found to be dehydrated patient also had hypokalemia which has been corrected she was given IV fluids and IV hydration and is feeling much better she has not had any diarrhea nausea or vomiting at this time      DISCHARGE Follow Up Recommendations for labs and diagnostics: Nausea vomiting with acute gastroenteritis patient with history of multiple myeloma      Pertinent  and/or Most Recent Results     LAB RESULTS:      Lab 21  05021  1508   WBC 3.78 2.89*   HEMOGLOBIN 11.7* 13.0   HEMATOCRIT 36.6 40.7   PLATELETS 217 252   NEUTROS ABS  --  1.96   IMMATURE GRANS (ABS)  --  0.00   LYMPHS ABS  --  0.59*   MONOS ABS  --  0.29   EOS ABS  --  0.00   MCV 82.2 82.1   PROTIME 11.4  --          Lab 21  0507 21  1508   SODIUM 138 132* 135*   POTASSIUM 4.5 3.3* 3.0*   CHLORIDE 110* 96* 96*   CO2 23.2 20.4* 25.5   ANION GAP 4.8* 15.6* 13.5   BUN 11 6* 7*   CREATININE 0.50* 0.48* 0.50*   GLUCOSE 93 135* 129*   CALCIUM 8.1* 9.2 9.5   MAGNESIUM  --  1.7 1.9         Lab 21  1508   TOTAL PROTEIN 7.9 8.0    ALBUMIN 3.80 4.40   GLOBULIN 4.1 3.6   ALT (SGPT) 6 7   AST (SGOT) 24 21   BILIRUBIN 0.6 0.6   ALK PHOS 44 51   LIPASE  --  12*         Lab 09/06/21  0500   PROTIME 11.4   INR 1.04*                 Brief Urine Lab Results  (Last result in the past 365 days)      Color   Clarity   Blood   Leuk Est   Nitrite   Protein   CREAT   Urine HCG        09/05/21 1606 Yellow Clear Small (1+) Negative Negative 30 mg/dL (1+)             Microbiology Results (last 10 days)     Procedure Component Value - Date/Time    Blood Culture - Blood, Arm, Right [[id number]] Collected: 09/05/21 2322    Lab Status: Preliminary result Specimen: Blood from Arm, Right Updated: 09/06/21 2331     Blood Culture No growth at 24 hours    Blood Culture - Blood, Arm, Left [[id number]] Collected: 09/05/21 2322    Lab Status: Preliminary result Specimen: Blood from Arm, Left Updated: 09/06/21 2331     Blood Culture No growth at 24 hours                           Labs Pending at Discharge:  Pending Labs     Order Current Status    Urine Culture - Urine, Urine, Clean Catch In process    Blood Culture - Blood, Arm, Left Preliminary result    Blood Culture - Blood, Arm, Right Preliminary result            Discharge Details        Discharge Medications      Continue These Medications      Instructions Start Date   acyclovir 400 MG tablet  Commonly known as: ZOVIRAX   acyclovir 400 mg oral tablet take 1 tablet (400 mg) by oral route 2 times per day   Active      Ambien 5 MG tablet  Generic drug: zolpidem   5 mg, Oral, Nightly PRN      aspirin 81 MG EC tablet   Aspir-81 81 mg oral tablet,delayed release (DR/EC) take 1 tablet (81 mg) by oral route once daily   Active      Morphine 100 MG 12 hr tablet  Commonly known as: MS CONTIN   100 mg, Oral, 2 Times Daily      oxyCODONE-acetaminophen  MG per tablet  Commonly known as: PERCOCET   1 tablet, Oral, Every 6 Hours PRN      potassium chloride 10 MEQ CR tablet   10 mEq, Oral, 3 Times Daily      pregabalin 100  MG capsule  Commonly known as: LYRICA   150 mg, Oral, 2 Times Daily      spironolactone 25 MG tablet  Commonly known as: ALDACTONE   25 mg, Oral, Daily             No Known Allergies      Discharge Disposition:  Home or Self Care    Diet:  Hospital:  Diet Order   Procedures   • Diet Regular         Discharge Activity:   Activity Instructions     Activity as Tolerated              CODE STATUS:  Code Status and Medical Interventions:   Ordered at: 09/05/21 2682     Level Of Support Discussed With:    Patient     Code Status:    CPR     Medical Interventions (Level of Support Prior to Arrest):    Full         No future appointments.    Additional Instructions for the Follow-ups that You Need to Schedule     Discharge Follow-up with Specified Provider: Dr. Zamudio; 2 Weeks   As directed      To: Dr. Zamudio    Follow Up: 2 Weeks               Time spent on Discharge including face to face service:  40 minutes    Electronically signed by Abbe Zamudio MD, 09/07/21, 8:16 AM EDT.

## 2021-09-07 NOTE — PLAN OF CARE
Goal Outcome Evaluation:  Plan of Care Reviewed With: patient        Progress: improving  Outcome Summary: patient has no c/o nausea or vomiting, adequate for DC per MD

## 2021-09-07 NOTE — OUTREACH NOTE
Prep Survey      Responses   [facility] patient discharged from?  [facility]   Is LACE score < 7 ?  Yes   Emergency Room discharge w/ pulse ox?  No   Eligibility  Not Eligible   What are the reasons patient is not eligible?  Other   Does the patient have one of the following disease processes/diagnoses(primary or secondary)?  Other   Prep survey completed?  Yes          Maria Esther Curry RN

## 2021-09-10 LAB
BACTERIA SPEC AEROBE CULT: NORMAL
BACTERIA SPEC AEROBE CULT: NORMAL

## 2022-03-03 ENCOUNTER — DOCUMENTATION (OUTPATIENT)
Dept: SURGERY | Facility: CLINIC | Age: 72
End: 2022-03-03

## 2022-03-03 ENCOUNTER — TELEPHONE (OUTPATIENT)
Dept: SURGERY | Facility: CLINIC | Age: 72
End: 2022-03-03

## 2022-03-03 ENCOUNTER — PREP FOR SURGERY (OUTPATIENT)
Dept: OTHER | Facility: HOSPITAL | Age: 72
End: 2022-03-03

## 2022-03-03 DIAGNOSIS — C90.00 MULTIPLE MYELOMA: Primary | ICD-10-CM

## 2022-03-03 RX ORDER — CEFAZOLIN SODIUM 2 G/100ML
2 INJECTION, SOLUTION INTRAVENOUS ONCE
Status: CANCELLED | OUTPATIENT
Start: 2022-03-03 | End: 2022-03-03

## 2022-03-03 NOTE — H&P (VIEW-ONLY)
Chief Complaint:  No chief complaint on file.    Primary Care Provider: Gilberto Coleman MD    Referring Provider:  Dr. Zamudio    History of Present Illness  Evangelina Sutton is a 71 y.o. female referred by Dr. Zamudio to have a portacatheter placed to use for treatment for multiple myeloma.    Allergies: Patient has no known allergies.    Home Medicines:  Acyclovir 400 mg  Revlimid 25 mg  Potassium 10 MEQ  Pregabalin 100mg   Oxycodone 10mg  Morphine 100mg  Ambien 5mg  Vit D3 50,000 units  Aspirin 81mg    Past Medical History:   • Cancer (CMS/HCC)   • Neuropathy        Past Surgical History:   • BACK SURGERY   • CHOLECYSTECTOMY   • HYSTERECTOMY     Family History: No family history on file.     Social History:  Social History     Tobacco Use   • Smoking status: Former Smoker   • Smokeless tobacco: Never Used   Substance Use Topics   • Alcohol use: Never       Objective     Vital Signs:  There were no vitals taken for this visit.  • Respiratory:  breathing not labored, respiratory effort appears normal  • Cardiovascular:  heart regular rate  • Musculoskeletal: moving all extremities symmetrically and purposefully  • Neurologic:  no obvious motor or sensory deficits, alert & oriented x 3, speech clear  Psychiatric:  judgment and insight intact    Assessment:  Multiple myeloma    Plan:  Port-a-catheter placement    Discussion: Indications, options, risk, benefits, and expected outcomes of planned surgery were discussed with the patient and she agrees to proceed.    Cj Rossi MD  03/03/2022    Electronically signed by Cj Rossi MD, 03/03/22, 3:52 PM EST.

## 2022-03-03 NOTE — TELEPHONE ENCOUNTER
I called and spoke to pt son, Aaliyah Sutton. Phone number is [phone].  He said his mom has been receiving treatment for years. He said 3-17-22 (Thursday) was ok. She will not need a covid test, she is vaccinated. Pt son is going to call me back with the medication list.

## 2022-03-03 NOTE — TELEPHONE ENCOUNTER
Med List:  Acyclovir 400 mg  Revlimid 25 mg  Potassium 10 MEQ  Pregabalin 100mg   Oxycodone 10mg  Morphine 100mg  Ambien 5mg  Vit D3 50,000 units  Aspirin 81mg  Patient is being treated for Multiple Myeloma  NKDA

## 2022-03-04 ENCOUNTER — TRANSCRIBE ORDERS (OUTPATIENT)
Dept: ADMINISTRATIVE | Facility: HOSPITAL | Age: 72
End: 2022-03-04

## 2022-03-04 ENCOUNTER — HOSPITAL ENCOUNTER (OUTPATIENT)
Dept: GENERAL RADIOLOGY | Facility: HOSPITAL | Age: 72
Discharge: HOME | End: 2022-03-04
Admitting: INTERNAL MEDICINE

## 2022-03-04 DIAGNOSIS — E85.9 MYELOMA ASSOCIATED AMYLOIDOSIS: ICD-10-CM

## 2022-03-04 DIAGNOSIS — C90.00 MYELOMA ASSOCIATED AMYLOIDOSIS: ICD-10-CM

## 2022-03-04 DIAGNOSIS — C80.1 MALIGNANT NEOPLASM: Primary | ICD-10-CM

## 2022-03-04 DIAGNOSIS — C80.1 MALIGNANT NEOPLASM: ICD-10-CM

## 2022-03-04 PROCEDURE — 73521 X-RAY EXAM HIPS BI 2 VIEWS: CPT

## 2022-03-04 PROCEDURE — 72050 X-RAY EXAM NECK SPINE 4/5VWS: CPT

## 2022-03-11 ENCOUNTER — TRANSCRIBE ORDERS (OUTPATIENT)
Dept: ADMINISTRATIVE | Facility: HOSPITAL | Age: 72
End: 2022-03-11

## 2022-03-11 DIAGNOSIS — C90.00 MULTIPLE MYELOMA, REMISSION STATUS UNSPECIFIED: Primary | ICD-10-CM

## 2022-03-11 NOTE — PRE-PROCEDURE INSTRUCTIONS
PRE-OP INSTRUCTIONS REVIEWED WITH PATIENT: FASTING/BATHING/ARRIVAL PROCEDURES.  INSTRUCTED TO TAKE A.M. DAY OF SURGERY: ACYCLOVIR, ASA 81 MG, LYRICA, PRN: OXY/MS   UNDERSTANDING VERBALIZED.

## 2022-03-14 ENCOUNTER — HOSPITAL ENCOUNTER (OUTPATIENT)
Dept: PET IMAGING | Facility: HOSPITAL | Age: 72
Discharge: HOME | End: 2022-03-14

## 2022-03-14 DIAGNOSIS — C90.00 MULTIPLE MYELOMA, REMISSION STATUS UNSPECIFIED: ICD-10-CM

## 2022-03-14 PROCEDURE — 0 FLUDEOXYGLUCOSE F18 SOLUTION: Performed by: INTERNAL MEDICINE

## 2022-03-14 PROCEDURE — A9552 F18 FDG: HCPCS | Performed by: INTERNAL MEDICINE

## 2022-03-14 PROCEDURE — 78816 PET IMAGE W/CT FULL BODY: CPT

## 2022-03-14 RX ADMIN — FLUDEOXYGLUCOSE F18 1 DOSE: 300 INJECTION INTRAVENOUS at 15:10

## 2022-03-17 ENCOUNTER — APPOINTMENT (OUTPATIENT)
Dept: GENERAL RADIOLOGY | Facility: HOSPITAL | Age: 72
End: 2022-03-17

## 2022-03-17 ENCOUNTER — ANESTHESIA EVENT (OUTPATIENT)
Dept: PERIOP | Facility: HOSPITAL | Age: 72
End: 2022-03-17

## 2022-03-17 ENCOUNTER — ANESTHESIA (OUTPATIENT)
Dept: PERIOP | Facility: HOSPITAL | Age: 72
End: 2022-03-17

## 2022-03-17 ENCOUNTER — HOSPITAL ENCOUNTER (OUTPATIENT)
Facility: HOSPITAL | Age: 72
Setting detail: HOSPITAL OUTPATIENT SURGERY
Discharge: HOME | End: 2022-03-17
Attending: SURGERY | Admitting: SURGERY

## 2022-03-17 VITALS
BODY MASS INDEX: 23.91 KG/M2 | SYSTOLIC BLOOD PRESSURE: 136 MMHG | HEIGHT: 65 IN | OXYGEN SATURATION: 99 % | WEIGHT: 143.52 LBS | TEMPERATURE: 98.9 F | HEART RATE: 68 BPM | DIASTOLIC BLOOD PRESSURE: 63 MMHG | RESPIRATION RATE: 16 BRPM

## 2022-03-17 DIAGNOSIS — C90.00 MULTIPLE MYELOMA: ICD-10-CM

## 2022-03-17 LAB
BASOPHILS # BLD AUTO: 0.01 10*3/MM3 (ref 0–0.2)
BASOPHILS NFR BLD AUTO: 0.5 % (ref 0–1.5)
DEPRECATED RDW RBC AUTO: 59.9 FL (ref 37–54)
EOSINOPHIL # BLD AUTO: 0.29 10*3/MM3 (ref 0–0.4)
EOSINOPHIL NFR BLD AUTO: 13.7 % (ref 0.3–6.2)
ERYTHROCYTE [DISTWIDTH] IN BLOOD BY AUTOMATED COUNT: 18.5 % (ref 12.3–15.4)
HCT VFR BLD AUTO: 32.3 % (ref 34–46.6)
HGB BLD-MCNC: 10.2 G/DL (ref 12–15.9)
IMM GRANULOCYTES # BLD AUTO: 0.01 10*3/MM3 (ref 0–0.05)
IMM GRANULOCYTES NFR BLD AUTO: 0.5 % (ref 0–0.5)
LYMPHOCYTES # BLD AUTO: 0.3 10*3/MM3 (ref 0.7–3.1)
LYMPHOCYTES NFR BLD AUTO: 14.2 % (ref 19.6–45.3)
MCH RBC QN AUTO: 27.9 PG (ref 26.6–33)
MCHC RBC AUTO-ENTMCNC: 31.6 G/DL (ref 31.5–35.7)
MCV RBC AUTO: 88.5 FL (ref 79–97)
MONOCYTES # BLD AUTO: 0.27 10*3/MM3 (ref 0.1–0.9)
MONOCYTES NFR BLD AUTO: 12.8 % (ref 5–12)
NEUTROPHILS NFR BLD AUTO: 1.23 10*3/MM3 (ref 1.7–7)
NEUTROPHILS NFR BLD AUTO: 58.3 % (ref 42.7–76)
NRBC BLD AUTO-RTO: 0 /100 WBC (ref 0–0.2)
PLATELET # BLD AUTO: 238 10*3/MM3 (ref 140–450)
PMV BLD AUTO: 10.3 FL (ref 6–12)
RBC # BLD AUTO: 3.65 10*6/MM3 (ref 3.77–5.28)
WBC NRBC COR # BLD: 2.11 10*3/MM3 (ref 3.4–10.8)

## 2022-03-17 PROCEDURE — 25010000002 PROPOFOL 10 MG/ML EMULSION: Performed by: NURSE ANESTHETIST, CERTIFIED REGISTERED

## 2022-03-17 PROCEDURE — 36561 INSERT TUNNELED CV CATH: CPT | Performed by: SURGERY

## 2022-03-17 PROCEDURE — 25010000002 CEFAZOLIN IN DEXTROSE 2-4 GM/100ML-% SOLUTION: Performed by: SURGERY

## 2022-03-17 PROCEDURE — 25010000002 ONDANSETRON PER 1 MG: Performed by: NURSE ANESTHETIST, CERTIFIED REGISTERED

## 2022-03-17 PROCEDURE — C1788 PORT, INDWELLING, IMP: HCPCS | Performed by: SURGERY

## 2022-03-17 PROCEDURE — 85025 COMPLETE CBC W/AUTO DIFF WBC: CPT | Performed by: SURGERY

## 2022-03-17 PROCEDURE — 76000 FLUOROSCOPY <1 HR PHYS/QHP: CPT

## 2022-03-17 PROCEDURE — 77001 FLUOROGUIDE FOR VEIN DEVICE: CPT

## 2022-03-17 PROCEDURE — 76937 US GUIDE VASCULAR ACCESS: CPT | Performed by: SURGERY

## 2022-03-17 PROCEDURE — 71045 X-RAY EXAM CHEST 1 VIEW: CPT

## 2022-03-17 PROCEDURE — 25010000002 DEXAMETHASONE PER 1 MG: Performed by: NURSE ANESTHETIST, CERTIFIED REGISTERED

## 2022-03-17 PROCEDURE — 25010000002 MIDAZOLAM PER 1 MG: Performed by: ANESTHESIOLOGY

## 2022-03-17 PROCEDURE — 25010000002 HEPARIN (PORCINE) PER 1000 UNITS: Performed by: SURGERY

## 2022-03-17 PROCEDURE — 77001 FLUOROGUIDE FOR VEIN DEVICE: CPT | Performed by: SURGERY

## 2022-03-17 DEVICE — PRT INTRO VASC/INTERV VORTEX FILL/HL DETACH/POLYURET/CATH 8F: Type: IMPLANTABLE DEVICE | Site: CHEST | Status: FUNCTIONAL

## 2022-03-17 RX ORDER — BUPIVACAINE HYDROCHLORIDE 2.5 MG/ML
INJECTION, SOLUTION EPIDURAL; INFILTRATION; INTRACAUDAL AS NEEDED
Status: DISCONTINUED | OUTPATIENT
Start: 2022-03-17 | End: 2022-03-17 | Stop reason: HOSPADM

## 2022-03-17 RX ORDER — PROMETHAZINE HYDROCHLORIDE 25 MG/1
25 SUPPOSITORY RECTAL ONCE AS NEEDED
Status: DISCONTINUED | OUTPATIENT
Start: 2022-03-17 | End: 2022-03-17 | Stop reason: HOSPADM

## 2022-03-17 RX ORDER — DEXAMETHASONE SODIUM PHOSPHATE 4 MG/ML
INJECTION, SOLUTION INTRA-ARTICULAR; INTRALESIONAL; INTRAMUSCULAR; INTRAVENOUS; SOFT TISSUE AS NEEDED
Status: DISCONTINUED | OUTPATIENT
Start: 2022-03-17 | End: 2022-03-17 | Stop reason: SURG

## 2022-03-17 RX ORDER — PROPOFOL 10 MG/ML
VIAL (ML) INTRAVENOUS AS NEEDED
Status: DISCONTINUED | OUTPATIENT
Start: 2022-03-17 | End: 2022-03-17 | Stop reason: SURG

## 2022-03-17 RX ORDER — ONDANSETRON 2 MG/ML
4 INJECTION INTRAMUSCULAR; INTRAVENOUS ONCE AS NEEDED
Status: DISCONTINUED | OUTPATIENT
Start: 2022-03-17 | End: 2022-03-17 | Stop reason: HOSPADM

## 2022-03-17 RX ORDER — SODIUM CHLORIDE, SODIUM LACTATE, POTASSIUM CHLORIDE, CALCIUM CHLORIDE 600; 310; 30; 20 MG/100ML; MG/100ML; MG/100ML; MG/100ML
9 INJECTION, SOLUTION INTRAVENOUS CONTINUOUS PRN
Status: DISCONTINUED | OUTPATIENT
Start: 2022-03-17 | End: 2022-03-17 | Stop reason: HOSPADM

## 2022-03-17 RX ORDER — HYDROCODONE BITARTRATE AND ACETAMINOPHEN 5; 325 MG/1; MG/1
1-2 TABLET ORAL EVERY 4 HOURS PRN
Qty: 8 TABLET | Refills: 0 | Status: ON HOLD | OUTPATIENT
Start: 2022-03-17 | End: 2022-04-25

## 2022-03-17 RX ORDER — MAGNESIUM HYDROXIDE 1200 MG/15ML
LIQUID ORAL AS NEEDED
Status: DISCONTINUED | OUTPATIENT
Start: 2022-03-17 | End: 2022-03-17 | Stop reason: HOSPADM

## 2022-03-17 RX ORDER — PROMETHAZINE HYDROCHLORIDE 12.5 MG/1
25 TABLET ORAL ONCE AS NEEDED
Status: DISCONTINUED | OUTPATIENT
Start: 2022-03-17 | End: 2022-03-17 | Stop reason: HOSPADM

## 2022-03-17 RX ORDER — LIDOCAINE HYDROCHLORIDE 20 MG/ML
INJECTION, SOLUTION INFILTRATION; PERINEURAL AS NEEDED
Status: DISCONTINUED | OUTPATIENT
Start: 2022-03-17 | End: 2022-03-17 | Stop reason: SURG

## 2022-03-17 RX ORDER — ONDANSETRON 2 MG/ML
INJECTION INTRAMUSCULAR; INTRAVENOUS AS NEEDED
Status: DISCONTINUED | OUTPATIENT
Start: 2022-03-17 | End: 2022-03-17 | Stop reason: SURG

## 2022-03-17 RX ORDER — MIDAZOLAM HYDROCHLORIDE 1 MG/ML
1 INJECTION INTRAMUSCULAR; INTRAVENOUS ONCE
Status: COMPLETED | OUTPATIENT
Start: 2022-03-17 | End: 2022-03-17

## 2022-03-17 RX ORDER — MEPERIDINE HYDROCHLORIDE 25 MG/ML
12.5 INJECTION INTRAMUSCULAR; INTRAVENOUS; SUBCUTANEOUS
Status: DISCONTINUED | OUTPATIENT
Start: 2022-03-17 | End: 2022-03-17 | Stop reason: HOSPADM

## 2022-03-17 RX ORDER — CEFAZOLIN SODIUM 2 G/100ML
2 INJECTION, SOLUTION INTRAVENOUS ONCE
Status: COMPLETED | OUTPATIENT
Start: 2022-03-17 | End: 2022-03-17

## 2022-03-17 RX ORDER — OXYCODONE HYDROCHLORIDE 5 MG/1
5 TABLET ORAL
Status: DISCONTINUED | OUTPATIENT
Start: 2022-03-17 | End: 2022-03-17 | Stop reason: HOSPADM

## 2022-03-17 RX ADMIN — ONDANSETRON 4 MG: 2 INJECTION INTRAMUSCULAR; INTRAVENOUS at 10:47

## 2022-03-17 RX ADMIN — PROPOFOL 25 MCG/KG/MIN: 10 INJECTION, EMULSION INTRAVENOUS at 10:40

## 2022-03-17 RX ADMIN — DEXAMETHASONE SODIUM PHOSPHATE 4 MG: 4 INJECTION INTRA-ARTICULAR; INTRALESIONAL; INTRAMUSCULAR; INTRAVENOUS; SOFT TISSUE at 10:47

## 2022-03-17 RX ADMIN — CEFAZOLIN SODIUM 2 G: 2 INJECTION, SOLUTION INTRAVENOUS at 10:24

## 2022-03-17 RX ADMIN — LIDOCAINE HYDROCHLORIDE 100 MG: 20 INJECTION, SOLUTION INFILTRATION; PERINEURAL at 10:25

## 2022-03-17 RX ADMIN — MIDAZOLAM HYDROCHLORIDE 1 MG: 1 INJECTION, SOLUTION INTRAMUSCULAR; INTRAVENOUS at 10:15

## 2022-03-17 RX ADMIN — PROPOFOL 200 MG: 10 INJECTION, EMULSION INTRAVENOUS at 10:27

## 2022-03-17 RX ADMIN — SODIUM CHLORIDE, POTASSIUM CHLORIDE, SODIUM LACTATE AND CALCIUM CHLORIDE 9 ML/HR: 600; 310; 30; 20 INJECTION, SOLUTION INTRAVENOUS at 09:14

## 2022-03-17 NOTE — DISCHARGE INSTRUCTIONS
DISCHARGE INSTRUCTIONS  INSERTION OF   PORT-A-CATH      For your surgery you had:  General anesthesia (you may have a sore throat for the first 24 hours)  IV sedation  Local anesthesia  Monitored Anesthesia Care  You received a medicated patch for nausea prevention today (behind your ear). It is recommended that you remove it 24-48 hours post-operatively. It must be removed within 72 hours.   You received an anesthesia medication today that can cause hormonal forms of birth control to be ineffective. You should use a different form of birth control (to prevent pregnancy) for 7 days.   You may experience dizziness, drowsiness, or light-headedness for several hours following surgery/procedure.  Do not stay alone today or tonight.  Limit your activity for 24 hours.  You should not drive, operate machinery, drink alcohol, or sign legally binding documents for 24 hours or while you are taking pain medication.  Resume your diet slowly.  Follow whatever special dietary instructions you may have been given by your doctor.  NOTIFY YOUR DOCTOR IF YOU EXPERIENCE ANY OF THE FOLLOWING:  Temperature greater than 101 degrees Fahrenheit  Shaking Chills  Redness or excessive drainage from incision  Nausea, vomiting and/opr pain that is not controlled by prescribed medications  Increase in bleeding or bleeding that is excessive  Unable to urinate in 6 hours after surgery  If unable to reach your doctor, please go to the closest Emergency Room [facility]  You may remove your dressing on for 2 days.  You may shower  in two days.  Apply an ice pack intermittently for 20 minutes for a total of 24-48 hours.  Do not apply directly to the skin.       Port should be flushed/heparinized once a month (even when not being used).  Keep Port-A-Cath ID Card in your purse of wallet.  Medications per physician instructions as indicated on Discharge Medication Information Sheet.    SPECIAL INSTRUCTIONS:                           Last dose of pain  medication was given at:     .               Dr. Rossi's Instructions          DIET  Resume your regular diet.     ACTIVITY  Do not lift anything greater than 25 pounds with the arm on the same side the port was placed for one week.  After one week, you have no activity restrictions and may gradually increase your activities using common sense.     WOUND CARE & SHOWERING/BATHING  Remove the dressing in 2 days but leave the strips of tape (steri-strips) underneath the dressing in place.  Let the steri-strips fall off of your incision by themselves or gently pull them off if they haven´t fallen off by 10 days.  You have sutures in your incision but they are placed below the level of the skin and they will slowly dissolve (they do not need to be removed).  The skin around your incision will likely have some bruising.  This is normal.  You can shower beginning 2 days after your surgery but try not to get the steri-strips very wet for the first week after surgery.  Wait two weeks after your surgery before taking any tub baths.     HOME MEDICATIONS  Resume all your normal home medications.     PAIN CONTROL  You will receive a narcotic pain medicine prescription before you are discharged home.  Be sure to take the narcotic pain medication with some food so as not to upset your stomach.  Do not drive while you are taking the prescription pain medication.  Take Tylenol or Motrin for mild pain.     BOWEL MOVEMENTS  It is not unusual for narcotic pain medications to cause constipation.  Also, the medications and anesthesia you received for your surgery can have a constipating effect.  To help avoid constipation, drink at least four eight-ounce glasses of water each day and use over-the-counter laxatives/stool softeners (dulcolax, milk of magnesia, senokot, etc.).  I recommend drinking the aforementioned amount of water daily and taking 30 ml of milk of magnesia two times each day while you are using the prescribed narcotic  pain medication.  If your bowel movements become too loose or too frequent, then simply stop following these recommendations unless you start to feel constipated.     FOLLOW-UP VISIT & QUESTIONS/CONCERNS  Call Dr. Solis office at [phone] and schedule a follow-up appointment for 10 to 14 days after your surgery date.  However, if you are doing fine and don´t have any concerns then simply cancel the follow-up appointment.  Should you have any questions or concerns, have a temperature over 101 degrees, have increasing redness or swelling at the site of the incision, or have any other problems you think need medical attention, please call Dr. Solis office or go to the emergency room.

## 2022-03-17 NOTE — ANESTHESIA PREPROCEDURE EVALUATION
Anesthesia Evaluation     Patient summary reviewed and Nursing notes reviewed   NPO Solid Status: > 6 hours  NPO Liquid Status: > 4 hours           Airway   Mallampati: I  TM distance: >3 FB  Dental      Pulmonary - negative pulmonary ROS and normal exam   Cardiovascular - normal exam  Exercise tolerance: poor (<4 METS)        Neuro/Psych  GI/Hepatic/Renal/Endo - negative ROS     Musculoskeletal     Abdominal    Substance History      OB/GYN          Other                        Anesthesia Plan    ASA 3     general and MAC     intravenous induction     Anesthetic plan, all risks, benefits, and alternatives have been provided, discussed and informed consent has been obtained with: patient.        CODE STATUS:

## 2022-03-17 NOTE — OP NOTE
INSERTION OF PORTACATH  Procedure Report    Patient Name:  Evangelina Sutton  YOB: 1950    Date of Surgery:  3/17/2022     Pre-op Diagnosis:   Multiple myeloma (HCC) [C90.00]    Pre-Op Diagnosis Codes:     * Multiple myeloma (HCC) [C90.00]       Post-op Diagnosis:   Post-Op Diagnosis Codes:     * Multiple myeloma (HCC) [C90.00]    Procedure/CPT® Codes:   64041    Procedure(s):  INSERTION OF PORTACATH    Staff:  Surgeon(s):  Cj Rossi MD    Anesthesia: Monitored Anesthesia Care    Estimated Blood Loss: Minimal    Complications:  None    Drains:  None    Packing:  None    Implants:    Implant Name Type Inv. Item Serial No.  Lot No. LRB No. Used Action   PRT INTRO VASC/INTERV VORTEX FILL/HL DETACH/POLYURET/CATH 8F - [id number] Implant PRT INTRO VASC/INTERV VORTEX FILL/HL DETACH/POLYURET/CATH 8F  [employer] [id number] N/A 1 Implanted     Specimen: None    Indications:  Patient has multiple myeloma and needs have a portacatheter placed so she can receive intravenous therapy.     Findings:  [employer] Injectable Port placed via right internal jugular vein.    Description of Procedure: Patient was taken to the operating room placed supine on the operating table.  Timeout verification was performed. MAC anesthesia was administered.  Patient was prepped and draped in usual fashion.  Using ultrasound, the right internal jugular vein was identified and then accessed with a needle.  A guidewire was placed through the needle and the needle was withdrawn.  Fluoroscopy was used to confirm the guidewire was positioned appropriately in the superior vena cava.  A subcutaneous pocket was created at the right upper chest to accommodate the port.  The inner dilator was placed inside of the tear-away sheath and both were placed over the guidewire into the right internal jugular vein.  The inner guidewire and dilator were removed.  The catheter was placed through the tear-away sheath and the sheath  was withdrawn.  The tunneler trocar was used to tunnel the catheter to the subcutaneous pocket.  Then under direct visualization with fluoroscopy, the catheter was pulled back until the tip was positioned appropriately in the superior vena cava.  The catheter was cut to the appropriate length and the locking cap was placed onto the catheter.   The catheter was connected to the port, the locking cap was secured, and the port was placed within the subcutaneous pocket.  I accessed the port with a Alcantar needle.  I could easily aspirate venous blood.  The port was then flushed with heparinized solution.  The wound was closed appropriately with buried absorbable suture followed by appropriate dressings.  No complications.  Sponge needle and instrument counts were correct.  Patient was transported to the recovery area in stable condition.    Assistant: Zandre Jeter CSA was responsible for performing the following activities: Retraction, Suturing, Closing and Placing Dressing.  His skilled assistance was necessary for the success of this case.    Cj Rossi MD     Date: 3/17/2022  Time: 11:11 EDT

## 2022-03-28 ENCOUNTER — TELEPHONE (OUTPATIENT)
Dept: SURGERY | Facility: CLINIC | Age: 72
End: 2022-03-28

## 2022-03-29 ENCOUNTER — OFFICE VISIT (OUTPATIENT)
Dept: ORTHOPEDIC SURGERY | Facility: CLINIC | Age: 72
End: 2022-03-29

## 2022-03-29 VITALS — WEIGHT: 152 LBS | HEIGHT: 65 IN | BODY MASS INDEX: 25.33 KG/M2

## 2022-03-29 DIAGNOSIS — M25.552 LEFT HIP PAIN: Primary | ICD-10-CM

## 2022-03-29 PROCEDURE — 99203 OFFICE O/P NEW LOW 30 MIN: CPT | Performed by: ORTHOPAEDIC SURGERY

## 2022-03-29 NOTE — PROGRESS NOTES
"Chief Complaint  Initial Evaluation of the Left Hip     Subjective      Evangelina Sutton presents to [facility] for an evaluation of left hip. Patient is present today in a wheelchair for ambulation assistance. Patient has pain with raising her leg along the lateral aspect of the left hip. This has been ongoing for 3-4 weeks. She denies any falls. She has a history of multiple myeloma, she is being treated for this.     No Known Allergies     Social History     Socioeconomic History   • Marital status:    Tobacco Use   • Smoking status: Former Smoker   • Smokeless tobacco: Former User     Quit date: 1997   Vaping Use   • Vaping Use: Never used   Substance and Sexual Activity   • Alcohol use: Never   • Drug use: Never   • Sexual activity: Defer        Review of Systems     Objective   Vital Signs:   Ht 165.1 cm (65\")   Wt 68.9 kg (152 lb)   BMI 25.29 kg/m²       Physical Exam  Constitutional:       Appearance: Normal appearance. Patient is well-developed and normal weight.   HENT:      Head: Normocephalic.      Right Ear: Hearing and external ear normal.      Left Ear: Hearing and external ear normal.      Nose: Nose normal.   Eyes:      Conjunctiva/sclera: Conjunctivae normal.   Cardiovascular:      Rate and Rhythm: Normal rate.   Pulmonary:      Effort: Pulmonary effort is normal.      Breath sounds: No wheezing or rales.   Abdominal:      Palpations: Abdomen is soft.      Tenderness: There is no abdominal tenderness.   Musculoskeletal:      Cervical back: Normal range of motion.   Skin:     Findings: No rash.   Neurological:      Mental Status: Patient is alert and oriented to person, place, and time.   Psychiatric:         Mood and Affect: Mood and affect normal.         Judgment: Judgment normal.       Ortho Exam      LEFT HIP: Patient unable to perform leg raise. Pain with assisted leg raise. Ambulation in a wheelchair. Calf supple, non-tender, no signs of DVT. Tender hip " and pelvic muscles. Limited motion of the hip.     Procedures      Imaging Results (Most Recent)     None           Result Review :                XR Hips Bilateral With or Without Pelvis 2 View    Result Date: 3/4/2022  Narrative: PROCEDURE: XR HIPS BILATERAL W OR WO PELVIS 2 VIEW  COMPARISON: [facility], CT, ABDOMEN/PELVIS WITH CONTRAST, 8/27/2020, 19:06.  [facility], MR, LUMBAR SPINE W/WO CONTRAST, 8/05/2020, 14:28.  [facility], CT, ABDOMEN/PELVIS WITH CONTRAST, 4/05/2021, 13:21.  INDICATIONS: left hip pain, no injury  FINDINGS:  Diffuse osteopenia with superimposed scattered small lucencies throughout the osseous structures.  Linear sclerotic changes at each femoral head-neck junction could represent avascular necrosis or age-indeterminate nondisplaced fractures.  Chronic likely developmental bilateral hip dysplasia.  The hip joint spaces are fairly well maintained.  The pubic symphysis is unremarkable.  Mild bilateral SI joint DJD.  Partially imaged lumbosacral spondylosis.  Phleboliths in the pelvis.      Impression:   1. Diffuse osteopenia with scattered lucencies throughout the osseous structures that could reflect the prior history of multiple myeloma. 2. Linear sclerotic changes in each femoral head-neck junction, which could represent avascular necrosis or age-indeterminate nondisplaced fractures. 3. Chronic bilateral hip dysplasia.      NICKOLAS SANTACRUZ MD       Electronically Signed and Approved By: NICKOLAS SANTACRUZ MD on 3/04/2022 at 11:34                   PROCEDURE:  NM PET WHOLE BODY     COMPARISON:  [facility], PET, WHOLE BODY INITIAL, 3/01/2019, 15:30.  INDICATIONS:  C90.00     TECHNIQUE:    After obtaining the patient's consent, F-18 FDG was administered intravenously.  Whole   body PET/CT imaging was performed of the entire body, skull to feet, with multi-planar imaging   without oral or intravenous contrast material, using a dedicated integrated  PET/CT scanner.       RADIONUCLIDE:     11.45 MCI   F18 FDG- I.V.  LABS:                          Blood Glucose 105 mg/dl  UPTAKE TIME:        68 mins  MEDIASTINAL BACKGROUND UPTAKE:  Maximum SUV measured at 2.4 in the right hepatic lobe     FINDINGS:          HEAD/NECK:    Normal.  No pathological FDG activity.  There is bilateral and symmetric uptake   corresponding to the vocal cords likely secondary to phonation during acute may shin of the   radiopharmaceutical.  LUNGS:             Normal.  No pathological FDG activity.  No suspicious nodules on CT imaging.    MEDIASTINUM/FELICE:    Normal.  No pathological FDG activity.    CHEST WALL/AXILLA:  Normal.  No pathological FDG activity.    ABDOMEN:       Normal.  No pathological FDG activity.    PELVIS:             Normal.  No pathological FDG activity.    BONES:             Normal.  No pathological FDG activity.  No suspicious lesions on CT imaging.    LOWER EXTREMITIES:             Normal.  No pathological FDG activity.  No suspicious lesions on CT imaging.    OTHER:             Negative.       IMPRESSION:               No evidence of pathologic FDG uptake within the neck, chest, abdomen or pelvis.     Assessment and Plan     DX: Left hip pain    Discussed treatment plans and diagnosis with the patient. We will set up home therapy to work on her strengthening and motion. In the meantime an Mri is ordered for the left hip to evaluate for AVN vs Multiple Myeloma.     ADDENDUM: Patient's mobility limitation impairs ability to participate in all ADL's. Mobility limitation cannot be resolved by a cane or walker. Patient can safely and independently maneuver the wheelchair. A caregiver is available if patient does not have the strength to self-propel wheelchair.     Call or return if worsening symptoms.    Follow Up     Follow-up after Mri.       Patient was given instructions and counseling regarding her condition or for health maintenance advice. Please see specific  information pulled into the AVS if appropriate.     Scribed for Sridhar Coto MD by Shira Moya.  03/29/22   13:53 EDT    I have personally performed the services described in this document as scribed by the above individual and it is both accurate and complete. Sridhar Coto MD 03/31/22

## 2022-03-30 NOTE — TELEPHONE ENCOUNTER
CALLED PT TO RS APPT FROM 3/28/22, PT STATED PORT HAS BEEN USED W/ NO ISSUES, NO REDNESS OR SWELLING.

## 2022-04-01 DIAGNOSIS — M25.552 LEFT HIP PAIN: Primary | ICD-10-CM

## 2022-04-04 ENCOUNTER — HOSPITAL ENCOUNTER (EMERGENCY)
Facility: HOSPITAL | Age: 72
Discharge: LEFT BEFORE DISCHARGE | End: 2022-04-05

## 2022-04-04 VITALS
SYSTOLIC BLOOD PRESSURE: 125 MMHG | HEART RATE: 116 BPM | RESPIRATION RATE: 16 BRPM | DIASTOLIC BLOOD PRESSURE: 66 MMHG | TEMPERATURE: 98.5 F | OXYGEN SATURATION: 100 %

## 2022-04-04 PROCEDURE — 99211 OFF/OP EST MAY X REQ PHY/QHP: CPT

## 2022-04-05 LAB
ALBUMIN SERPL-MCNC: 4.3 G/DL (ref 3.5–5.2)
ALBUMIN/GLOB SERPL: 1.2 G/DL
ALP SERPL-CCNC: 113 U/L (ref 39–117)
ALT SERPL W P-5'-P-CCNC: 9 U/L (ref 1–33)
ANION GAP SERPL CALCULATED.3IONS-SCNC: 15.8 MMOL/L (ref 5–15)
AST SERPL-CCNC: 25 U/L (ref 1–32)
BILIRUB SERPL-MCNC: 0.5 MG/DL (ref 0–1.2)
BUN SERPL-MCNC: 14 MG/DL (ref 8–23)
BUN/CREAT SERPL: 20.3 (ref 7–25)
CALCIUM SPEC-SCNC: 9.7 MG/DL (ref 8.6–10.5)
CHLORIDE SERPL-SCNC: 93 MMOL/L (ref 98–107)
CO2 SERPL-SCNC: 27.2 MMOL/L (ref 22–29)
CREAT SERPL-MCNC: 0.69 MG/DL (ref 0.57–1)
EGFRCR SERPLBLD CKD-EPI 2021: 92.9 ML/MIN/1.73
GLOBULIN UR ELPH-MCNC: 3.6 GM/DL
GLUCOSE SERPL-MCNC: 106 MG/DL (ref 65–99)
HOLD SPECIMEN: NORMAL
HOLD SPECIMEN: NORMAL
LIPASE SERPL-CCNC: 20 U/L (ref 13–60)
POTASSIUM SERPL-SCNC: 2.9 MMOL/L (ref 3.5–5.2)
PROT SERPL-MCNC: 7.9 G/DL (ref 6–8.5)
SODIUM SERPL-SCNC: 136 MMOL/L (ref 136–145)
WHOLE BLOOD HOLD SPECIMEN: NORMAL
WHOLE BLOOD HOLD SPECIMEN: NORMAL

## 2022-04-05 PROCEDURE — 83690 ASSAY OF LIPASE: CPT

## 2022-04-05 PROCEDURE — 36415 COLL VENOUS BLD VENIPUNCTURE: CPT

## 2022-04-05 PROCEDURE — 80053 COMPREHEN METABOLIC PANEL: CPT

## 2022-04-11 DIAGNOSIS — M25.552 LEFT HIP PAIN: Primary | ICD-10-CM

## 2022-04-15 ENCOUNTER — TELEPHONE (OUTPATIENT)
Dept: ORTHOPEDIC SURGERY | Facility: CLINIC | Age: 72
End: 2022-04-15

## 2022-04-15 NOTE — TELEPHONE ENCOUNTER
Provider: ARAVIND SOUTH MD  Caller: CAMILO  Relationship to Patient: [facility]    Phone Number: [phone]  Reason for Call: PATIENT WILL NOT RECEIVE HOME HEALTH OT TODAY (Friday 04/15/22) BECAUSE THERAPIST IS GOING OUT OF TOWN AND NO OTHER THERAPIST CAN  APPOINTMENT.  PLAN WILL BE CONTINUED ON Monday 04/18/22   FOR YOUR INFORMATION ONLY, NO CALL BACK REQUIRED.  THANK YOU

## 2022-04-20 ENCOUNTER — HOSPITAL ENCOUNTER (OUTPATIENT)
Dept: MRI IMAGING | Facility: HOSPITAL | Age: 72
Discharge: HOME | End: 2022-04-20
Admitting: ORTHOPAEDIC SURGERY

## 2022-04-20 DIAGNOSIS — M25.552 LEFT HIP PAIN: ICD-10-CM

## 2022-04-20 PROCEDURE — 73721 MRI JNT OF LWR EXTRE W/O DYE: CPT

## 2022-04-22 ENCOUNTER — PREP FOR SURGERY (OUTPATIENT)
Dept: OTHER | Facility: HOSPITAL | Age: 72
End: 2022-04-22

## 2022-04-22 ENCOUNTER — OFFICE VISIT (OUTPATIENT)
Dept: ORTHOPEDIC SURGERY | Facility: CLINIC | Age: 72
End: 2022-04-22

## 2022-04-22 ENCOUNTER — TELEPHONE (OUTPATIENT)
Dept: ORTHOPEDIC SURGERY | Facility: CLINIC | Age: 72
End: 2022-04-22

## 2022-04-22 VITALS — BODY MASS INDEX: 25.33 KG/M2 | WEIGHT: 152 LBS | HEIGHT: 65 IN

## 2022-04-22 DIAGNOSIS — S72.002A LEFT DISPLACED FEMORAL NECK FRACTURE: Primary | ICD-10-CM

## 2022-04-22 PROCEDURE — 99214 OFFICE O/P EST MOD 30 MIN: CPT | Performed by: ORTHOPAEDIC SURGERY

## 2022-04-22 RX ORDER — ONDANSETRON 8 MG/1
8 TABLET, ORALLY DISINTEGRATING ORAL EVERY 8 HOURS PRN
COMMUNITY
Start: 2022-04-05

## 2022-04-22 RX ORDER — OXYCODONE HYDROCHLORIDE 10 MG/1
TABLET ORAL
COMMUNITY
Start: 2022-04-05 | End: 2022-04-26 | Stop reason: HOSPADM

## 2022-04-22 RX ORDER — LENALIDOMIDE 15 MG/1
CAPSULE ORAL
COMMUNITY
Start: 2022-01-26 | End: 2022-11-28

## 2022-04-22 RX ORDER — DARATUMUMAB AND HYALURONIDASE-FIHJ (HUMAN RECOMBINANT) 1800; 30000 MG/15ML; U/15ML
INJECTION SUBCUTANEOUS
COMMUNITY
Start: 2022-03-28 | End: 2022-11-28

## 2022-04-22 RX ORDER — PROCHLORPERAZINE 25 MG
25 SUPPOSITORY, RECTAL RECTAL EVERY 8 HOURS PRN
COMMUNITY
Start: 2022-04-11

## 2022-04-22 RX ORDER — DEXAMETHASONE 4 MG/1
TABLET ORAL
COMMUNITY
Start: 2022-04-19 | End: 2022-11-28

## 2022-04-22 NOTE — TELEPHONE ENCOUNTER
PER DR SOUTH, PATIENT IS TO HOLD HER ASPIRIN STARTING TOMORROW UNTIL  AFTER SURGERY.  PATIENT VOICES UNDERSTANDING.

## 2022-04-22 NOTE — PROGRESS NOTES
"Chief Complaint  Follow-up of the Left Hip     Subjective      Evangelina Sutton presents to [facility] for a follow-up of left hip. Patient is present today in a wheelchair for ambulation today. She has a history of multiple myeloma that is being treated by Dr. Zamudio. She has been having increasing pain in the hip for approximately 1 month. She denies any falls.     No Known Allergies     Social History     Socioeconomic History   • Marital status:    Tobacco Use   • Smoking status: Former Smoker   • Smokeless tobacco: Former User     Quit date: 1997   Vaping Use   • Vaping Use: Never used   Substance and Sexual Activity   • Alcohol use: Never   • Drug use: Never   • Sexual activity: Defer        Review of Systems     Objective   Vital Signs:   Ht 165.1 cm (65\")   Wt 68.9 kg (152 lb)   BMI 25.29 kg/m²       Physical Exam  Constitutional:       Appearance: Normal appearance. Patient is well-developed and normal weight.   HENT:      Head: Normocephalic.      Right Ear: Hearing and external ear normal.      Left Ear: Hearing and external ear normal.      Nose: Nose normal.   Eyes:      Conjunctiva/sclera: Conjunctivae normal.   Cardiovascular:      Rate and Rhythm: Normal rate.   Pulmonary:      Effort: Pulmonary effort is normal.      Breath sounds: No wheezing or rales.   Abdominal:      Palpations: Abdomen is soft.      Tenderness: There is no abdominal tenderness.   Musculoskeletal:      Cervical back: Normal range of motion.   Skin:     Findings: No rash.   Neurological:      Mental Status: Patient is alert and oriented to person, place, and time.   Psychiatric:         Mood and Affect: Mood and affect normal.         Judgment: Judgment normal.       Ortho Exam      LEFT HIP: Ambulation assistance with a wheelchair. Limited examination of the left hip due to pain. Calf soft. Tender hip and pelvics muscles. Sensation intact. Non-weight bearing.       Procedures      Imaging " Results (Most Recent)     None           Result Review :         MRI Hip Left Without Contrast    Result Date: 4/20/2022  Narrative: PROCEDURE: MRI HIP LEFT WO CONTRAST  COMPARISON:  [facility], CT, ABDOMEN/PELVIS WITH CONTRAST, 4/05/2021, 13:21.  [facility], CR, XR HIPS BILATERAL W OR WO PELVIS 2 VIEW, 3/04/2022, 10:15.  [facility], PET, NM PET/CT WHOLE BODY, 3/14/2022, 16:00. INDICATIONS: Left hip pain/ LIMITED STUDY  TECHNIQUE: A limited MRI of the pelvis was performed consisting of coronal T1, coronal STIR, and axial T1 sequences with localizers.  The patient could not tolerate the entirety of the procedure due to pain.  FINDINGS:  SOFT TISSUES:  Patchy soft tissue edema about the left hip and proximal left femur. No deep or superficial soft tissue mass. No abnormal bursal fluid collection.  Each sciatic nerve is unremarkable as imaged.  Colonic diverticula.  Status post hysterectomy.  BONES:  Mildly displaced impacted fracture of the left femoral neck with apex lateral angulation and superimposed marrow replacement indicating a pathologic fracture.  Serpiginous linear signal abnormality in the right femoral head involving nearly 100 percent of the subchondral bone, consistent with osteonecrosis without overlying subchondral fracture or cortical collapse.  Small T1 marrow replacing bone lesion in each iliac bone measuring up to 2.6 cm on the right and 1.2 cm on the left, consistent with history of multiple myeloma.  Marrow replacing lesions and endplate deformities partially imaged in the lumbar spine.  JOINTS:  Small left hip joint effusion.  The articular cartilage in each hip joint space is fairly well maintained given the wide field of view sequences utilized. No discrete intra-articular body. The pubic symphysis is unremarkable. The sacroiliac joints are unremarkable.  Partially imaged lumbosacral spondylosis..  MUSCLES AND TENDONS: The anterior muscles and tendons  are intact.  The gluteal tendons are intact.  Mild to moderate diffuse muscular fatty atrophy.  Mild edema in the muscles about the left hip, likely mild muscle strain.  Mild nonspecific edema at each proximal inferior gluteus jef muscle, which could represent mild muscle strain or pressure edema.  The proximal hamstring tendons are intact.  CONTINUED ON NEXT PAGE...       Impression:   1. Pathologic displaced impacted fracture of the left femoral neck with a superimposed marrow replacing lesion consistent with a history of multiple myeloma. 2. Additional abnormal bone marrow lesions in each iliac bone and the lumbar spine, consistent with the history of multiple myeloma. 3. Signal abnormality in right femoral head consistent with osteonecrosis without overlying subchondral fracture or cortical collapse.   Results were discussed over the phone with the ordering physician, Dr. Breana Coto, at 1610 hours on 4/20/2022.    NICKOLAS SANTACRUZ MD       Electronically Signed and Approved By: NICKOLAS SANTACRUZ MD on 4/20/2022 at 16:11                      Assessment and Plan     DX: Displaced impacted fracture of the left femoral neck     Patient has a displaced impacted fracture of the left femoral neck, discussed partial hip replacement risks, benefits and post-operative care with the patient. She understands and wishes to proceed.     Discussed surgery., Risks/benefits discussed with patient including, but not limited to: infection, bleeding, neurovascular damage, malunion, nonunion, aesthetic deformity, need for further surgery, and death., Discussed with patient the implant type being used during surgery and patient understands and desires to proceed. and Surgery pamphlet given.    Follow Up     Post-operatively       Patient was given instructions and counseling regarding her condition or for health maintenance advice. Please see specific information pulled into the AVS if appropriate.     Scribed for Sridhar Coto MD by  Shira Moya.  04/22/22   14:48 EDT    I have personally performed the services described in this document as scribed by the above individual and it is both accurate and complete. Sridhar Coto MD 04/23/22

## 2022-04-24 ENCOUNTER — ANESTHESIA EVENT (OUTPATIENT)
Dept: PERIOP | Facility: HOSPITAL | Age: 72
End: 2022-04-24

## 2022-04-25 ENCOUNTER — APPOINTMENT (OUTPATIENT)
Dept: GENERAL RADIOLOGY | Facility: HOSPITAL | Age: 72
End: 2022-04-25

## 2022-04-25 ENCOUNTER — PREP FOR SURGERY (OUTPATIENT)
Dept: OTHER | Facility: HOSPITAL | Age: 72
End: 2022-04-25

## 2022-04-25 ENCOUNTER — ANESTHESIA (OUTPATIENT)
Dept: PERIOP | Facility: HOSPITAL | Age: 72
End: 2022-04-25

## 2022-04-25 ENCOUNTER — HOSPITAL ENCOUNTER (INPATIENT)
Facility: HOSPITAL | Age: 72
LOS: 1 days | Discharge: HOME HEALTH | End: 2022-04-26
Attending: ORTHOPAEDIC SURGERY | Admitting: ORTHOPAEDIC SURGERY

## 2022-04-25 DIAGNOSIS — R26.2 DIFFICULTY IN WALKING: ICD-10-CM

## 2022-04-25 DIAGNOSIS — S72.002A LEFT DISPLACED FEMORAL NECK FRACTURE: ICD-10-CM

## 2022-04-25 DIAGNOSIS — Z78.9 DECREASED ACTIVITIES OF DAILY LIVING (ADL): Primary | ICD-10-CM

## 2022-04-25 DIAGNOSIS — S72.002A LEFT DISPLACED FEMORAL NECK FRACTURE: Primary | ICD-10-CM

## 2022-04-25 LAB
ALBUMIN SERPL-MCNC: 3.7 G/DL (ref 3.5–5.2)
ALBUMIN/GLOB SERPL: 1.2 G/DL
ALP SERPL-CCNC: 74 U/L (ref 39–117)
ALT SERPL W P-5'-P-CCNC: 13 U/L (ref 1–33)
ANION GAP SERPL CALCULATED.3IONS-SCNC: 11.4 MMOL/L (ref 5–15)
AST SERPL-CCNC: 27 U/L (ref 1–32)
BASOPHILS # BLD AUTO: 0.03 10*3/MM3 (ref 0–0.2)
BASOPHILS NFR BLD AUTO: 1.1 % (ref 0–1.5)
BILIRUB SERPL-MCNC: 0.4 MG/DL (ref 0–1.2)
BUN SERPL-MCNC: 8 MG/DL (ref 8–23)
BUN/CREAT SERPL: 15.7 (ref 7–25)
CALCIUM SPEC-SCNC: 9 MG/DL (ref 8.6–10.5)
CHLORIDE SERPL-SCNC: 100 MMOL/L (ref 98–107)
CO2 SERPL-SCNC: 28.6 MMOL/L (ref 22–29)
CREAT SERPL-MCNC: 0.51 MG/DL (ref 0.57–1)
DEPRECATED RDW RBC AUTO: 56.2 FL (ref 37–54)
EGFRCR SERPLBLD CKD-EPI 2021: 99.9 ML/MIN/1.73
EOSINOPHIL # BLD AUTO: 0.19 10*3/MM3 (ref 0–0.4)
EOSINOPHIL NFR BLD AUTO: 6.7 % (ref 0.3–6.2)
ERYTHROCYTE [DISTWIDTH] IN BLOOD BY AUTOMATED COUNT: 17.6 % (ref 12.3–15.4)
GLOBULIN UR ELPH-MCNC: 3.2 GM/DL
GLUCOSE SERPL-MCNC: 110 MG/DL (ref 65–99)
HCT VFR BLD AUTO: 34.1 % (ref 34–46.6)
HGB BLD-MCNC: 10.6 G/DL (ref 12–15.9)
IMM GRANULOCYTES # BLD AUTO: 0 10*3/MM3 (ref 0–0.05)
IMM GRANULOCYTES NFR BLD AUTO: 0 % (ref 0–0.5)
INR PPP: 1.05 (ref 0.86–1.15)
LYMPHOCYTES # BLD AUTO: 0.96 10*3/MM3 (ref 0.7–3.1)
LYMPHOCYTES NFR BLD AUTO: 33.7 % (ref 19.6–45.3)
MCH RBC QN AUTO: 27.5 PG (ref 26.6–33)
MCHC RBC AUTO-ENTMCNC: 31.1 G/DL (ref 31.5–35.7)
MCV RBC AUTO: 88.3 FL (ref 79–97)
MONOCYTES # BLD AUTO: 0.21 10*3/MM3 (ref 0.1–0.9)
MONOCYTES NFR BLD AUTO: 7.4 % (ref 5–12)
NEUTROPHILS NFR BLD AUTO: 1.46 10*3/MM3 (ref 1.7–7)
NEUTROPHILS NFR BLD AUTO: 51.1 % (ref 42.7–76)
NRBC BLD AUTO-RTO: 0 /100 WBC (ref 0–0.2)
PLATELET # BLD AUTO: 321 10*3/MM3 (ref 140–450)
PMV BLD AUTO: 11.4 FL (ref 6–12)
POTASSIUM SERPL-SCNC: 3.7 MMOL/L (ref 3.5–5.2)
PROT SERPL-MCNC: 6.9 G/DL (ref 6–8.5)
PROTHROMBIN TIME: 13.9 SECONDS (ref 11.8–14.9)
RBC # BLD AUTO: 3.86 10*6/MM3 (ref 3.77–5.28)
SODIUM SERPL-SCNC: 140 MMOL/L (ref 136–145)
WBC NRBC COR # BLD: 2.85 10*3/MM3 (ref 3.4–10.8)

## 2022-04-25 PROCEDURE — A9270 NON-COVERED ITEM OR SERVICE: HCPCS | Performed by: ORTHOPAEDIC SURGERY

## 2022-04-25 PROCEDURE — 63710000001 CELECOXIB 100 MG CAPSULE: Performed by: ANESTHESIOLOGY

## 2022-04-25 PROCEDURE — A9270 NON-COVERED ITEM OR SERVICE: HCPCS | Performed by: ANESTHESIOLOGY

## 2022-04-25 PROCEDURE — 25010000002 MORPHINE SULFATE (PF) 10 MG/ML SOLUTION: Performed by: ORTHOPAEDIC SURGERY

## 2022-04-25 PROCEDURE — 88311 DECALCIFY TISSUE: CPT | Performed by: ORTHOPAEDIC SURGERY

## 2022-04-25 PROCEDURE — 25010000002 CEFAZOLIN IN DEXTROSE 2-4 GM/100ML-% SOLUTION: Performed by: ORTHOPAEDIC SURGERY

## 2022-04-25 PROCEDURE — 73501 X-RAY EXAM HIP UNI 1 VIEW: CPT

## 2022-04-25 PROCEDURE — 85610 PROTHROMBIN TIME: CPT | Performed by: ORTHOPAEDIC SURGERY

## 2022-04-25 PROCEDURE — 63710000001 SENNOSIDES-DOCUSATE 8.6-50 MG TABLET: Performed by: ORTHOPAEDIC SURGERY

## 2022-04-25 PROCEDURE — 25010000002 HYDROMORPHONE 1 MG/ML SOLUTION: Performed by: NURSE ANESTHETIST, CERTIFIED REGISTERED

## 2022-04-25 PROCEDURE — 88341 IMHCHEM/IMCYTCHM EA ADD ANTB: CPT | Performed by: ORTHOPAEDIC SURGERY

## 2022-04-25 PROCEDURE — 63710000001 OXYCODONE 5 MG TABLET: Performed by: NURSE ANESTHETIST, CERTIFIED REGISTERED

## 2022-04-25 PROCEDURE — C1776 JOINT DEVICE (IMPLANTABLE): HCPCS | Performed by: ORTHOPAEDIC SURGERY

## 2022-04-25 PROCEDURE — 63710000001 OXYCODONE 5 MG TABLET: Performed by: HOSPITALIST

## 2022-04-25 PROCEDURE — 27236 TREAT THIGH FRACTURE: CPT | Performed by: ORTHOPAEDIC SURGERY

## 2022-04-25 PROCEDURE — 0SRS0J9 REPLACEMENT OF LEFT HIP JOINT, FEMORAL SURFACE WITH SYNTHETIC SUBSTITUTE, CEMENTED, OPEN APPROACH: ICD-10-PCS | Performed by: ORTHOPAEDIC SURGERY

## 2022-04-25 PROCEDURE — 25010000002 ROPIVACAINE PER 1 MG: Performed by: ORTHOPAEDIC SURGERY

## 2022-04-25 PROCEDURE — 63710000001 ACETAMINOPHEN 500 MG TABLET: Performed by: ANESTHESIOLOGY

## 2022-04-25 PROCEDURE — 76000 FLUOROSCOPY <1 HR PHYS/QHP: CPT

## 2022-04-25 PROCEDURE — 25010000002 KETOROLAC TROMETHAMINE PER 15 MG: Performed by: ORTHOPAEDIC SURGERY

## 2022-04-25 PROCEDURE — C1713 ANCHOR/SCREW BN/BN,TIS/BN: HCPCS | Performed by: ORTHOPAEDIC SURGERY

## 2022-04-25 PROCEDURE — 94799 UNLISTED PULMONARY SVC/PX: CPT

## 2022-04-25 PROCEDURE — 88304 TISSUE EXAM BY PATHOLOGIST: CPT | Performed by: ORTHOPAEDIC SURGERY

## 2022-04-25 PROCEDURE — 93010 ELECTROCARDIOGRAM REPORT: CPT | Performed by: INTERNAL MEDICINE

## 2022-04-25 PROCEDURE — 25010000002 EPINEPHRINE 1 MG/ML SOLUTION: Performed by: ORTHOPAEDIC SURGERY

## 2022-04-25 PROCEDURE — 99223 1ST HOSP IP/OBS HIGH 75: CPT | Performed by: HOSPITALIST

## 2022-04-25 PROCEDURE — A9270 NON-COVERED ITEM OR SERVICE: HCPCS | Performed by: NURSE ANESTHETIST, CERTIFIED REGISTERED

## 2022-04-25 PROCEDURE — A9270 NON-COVERED ITEM OR SERVICE: HCPCS | Performed by: HOSPITALIST

## 2022-04-25 PROCEDURE — 88342 IMHCHEM/IMCYTCHM 1ST ANTB: CPT | Performed by: ORTHOPAEDIC SURGERY

## 2022-04-25 PROCEDURE — 63710000001 ZOLPIDEM 5 MG TABLET: Performed by: HOSPITALIST

## 2022-04-25 PROCEDURE — 63710000001 ACETAMINOPHEN 500 MG TABLET: Performed by: ORTHOPAEDIC SURGERY

## 2022-04-25 PROCEDURE — 25010000002 MIDAZOLAM PER 1 MG: Performed by: ANESTHESIOLOGY

## 2022-04-25 PROCEDURE — 25010000002 ONDANSETRON PER 1 MG: Performed by: NURSE ANESTHETIST, CERTIFIED REGISTERED

## 2022-04-25 PROCEDURE — 80053 COMPREHEN METABOLIC PANEL: CPT | Performed by: ORTHOPAEDIC SURGERY

## 2022-04-25 PROCEDURE — 85025 COMPLETE CBC W/AUTO DIFF WBC: CPT | Performed by: ORTHOPAEDIC SURGERY

## 2022-04-25 PROCEDURE — 93005 ELECTROCARDIOGRAM TRACING: CPT | Performed by: ORTHOPAEDIC SURGERY

## 2022-04-25 PROCEDURE — 25010000002 DEXAMETHASONE PER 1 MG: Performed by: NURSE ANESTHETIST, CERTIFIED REGISTERED

## 2022-04-25 PROCEDURE — 25010000002 FENTANYL CITRATE (PF) 50 MCG/ML SOLUTION: Performed by: NURSE ANESTHETIST, CERTIFIED REGISTERED

## 2022-04-25 PROCEDURE — 25010000002 PROPOFOL 10 MG/ML EMULSION: Performed by: NURSE ANESTHETIST, CERTIFIED REGISTERED

## 2022-04-25 PROCEDURE — 73502 X-RAY EXAM HIP UNI 2-3 VIEWS: CPT

## 2022-04-25 DEVICE — AVENIR® STANDARD STEM CEMENTED 3
Type: IMPLANTABLE DEVICE | Site: HIP | Status: FUNCTIONAL
Brand: AVENIR®

## 2022-04-25 DEVICE — CUP ACET RINGLOC BIPOL 28MM 45MM: Type: IMPLANTABLE DEVICE | Site: HIP | Status: FUNCTIONAL

## 2022-04-25 DEVICE — CMT BONE PALACOS R HI/VISC 1X40: Type: IMPLANTABLE DEVICE | Site: HIP | Status: FUNCTIONAL

## 2022-04-25 DEVICE — CAP PRT HIP BIPOL: Type: IMPLANTABLE DEVICE | Site: HIP | Status: FUNCTIONAL

## 2022-04-25 DEVICE — BONE PREPARATION KIT
Type: IMPLANTABLE DEVICE | Site: HIP | Status: FUNCTIONAL
Brand: BIOPREP

## 2022-04-25 DEVICE — HD FEM/HIP UNIV COCR 12/14TPR 28MM MIN3.5: Type: IMPLANTABLE DEVICE | Site: HIP | Status: FUNCTIONAL

## 2022-04-25 RX ORDER — PROMETHAZINE HYDROCHLORIDE 12.5 MG/1
25 TABLET ORAL ONCE AS NEEDED
Status: DISCONTINUED | OUTPATIENT
Start: 2022-04-25 | End: 2022-04-25 | Stop reason: HOSPADM

## 2022-04-25 RX ORDER — CEFAZOLIN SODIUM IN 0.9 % NACL 3 G/100 ML
3 INTRAVENOUS SOLUTION, PIGGYBACK (ML) INTRAVENOUS ONCE
Status: CANCELLED | OUTPATIENT
Start: 2022-04-25 | End: 2022-04-25

## 2022-04-25 RX ORDER — GLYCOPYRROLATE 0.2 MG/ML
INJECTION INTRAMUSCULAR; INTRAVENOUS AS NEEDED
Status: DISCONTINUED | OUTPATIENT
Start: 2022-04-25 | End: 2022-04-25 | Stop reason: SURG

## 2022-04-25 RX ORDER — GLYCOPYRROLATE 0.2 MG/ML
0.2 INJECTION INTRAMUSCULAR; INTRAVENOUS
Status: COMPLETED | OUTPATIENT
Start: 2022-04-25 | End: 2022-04-25

## 2022-04-25 RX ORDER — PROPOFOL 10 MG/ML
VIAL (ML) INTRAVENOUS AS NEEDED
Status: DISCONTINUED | OUTPATIENT
Start: 2022-04-25 | End: 2022-04-25 | Stop reason: SURG

## 2022-04-25 RX ORDER — LIDOCAINE HYDROCHLORIDE 20 MG/ML
INJECTION, SOLUTION EPIDURAL; INFILTRATION; INTRACAUDAL; PERINEURAL AS NEEDED
Status: DISCONTINUED | OUTPATIENT
Start: 2022-04-25 | End: 2022-04-25 | Stop reason: SURG

## 2022-04-25 RX ORDER — ONDANSETRON 2 MG/ML
4 INJECTION INTRAMUSCULAR; INTRAVENOUS ONCE AS NEEDED
Status: DISCONTINUED | OUTPATIENT
Start: 2022-04-25 | End: 2022-04-25 | Stop reason: HOSPADM

## 2022-04-25 RX ORDER — NALOXONE HCL 0.4 MG/ML
0.4 VIAL (ML) INJECTION
Status: DISCONTINUED | OUTPATIENT
Start: 2022-04-25 | End: 2022-04-26 | Stop reason: HOSPADM

## 2022-04-25 RX ORDER — MAGNESIUM HYDROXIDE 1200 MG/15ML
LIQUID ORAL AS NEEDED
Status: DISCONTINUED | OUTPATIENT
Start: 2022-04-25 | End: 2022-04-25 | Stop reason: HOSPADM

## 2022-04-25 RX ORDER — ONDANSETRON 2 MG/ML
INJECTION INTRAMUSCULAR; INTRAVENOUS AS NEEDED
Status: DISCONTINUED | OUTPATIENT
Start: 2022-04-25 | End: 2022-04-25 | Stop reason: SURG

## 2022-04-25 RX ORDER — PROMETHAZINE HYDROCHLORIDE 25 MG/1
25 SUPPOSITORY RECTAL ONCE AS NEEDED
Status: DISCONTINUED | OUTPATIENT
Start: 2022-04-25 | End: 2022-04-25 | Stop reason: HOSPADM

## 2022-04-25 RX ORDER — ONDANSETRON 2 MG/ML
4 INJECTION INTRAMUSCULAR; INTRAVENOUS EVERY 6 HOURS PRN
Status: DISCONTINUED | OUTPATIENT
Start: 2022-04-25 | End: 2022-04-26 | Stop reason: HOSPADM

## 2022-04-25 RX ORDER — OXYCODONE HYDROCHLORIDE 5 MG/1
5 TABLET ORAL EVERY 6 HOURS PRN
Status: DISCONTINUED | OUTPATIENT
Start: 2022-04-25 | End: 2022-04-26 | Stop reason: HOSPADM

## 2022-04-25 RX ORDER — CEFAZOLIN SODIUM 2 G/100ML
2 INJECTION, SOLUTION INTRAVENOUS ONCE
Status: COMPLETED | OUTPATIENT
Start: 2022-04-25 | End: 2022-04-25

## 2022-04-25 RX ORDER — CEFAZOLIN SODIUM 2 G/100ML
2 INJECTION, SOLUTION INTRAVENOUS ONCE
Status: CANCELLED | OUTPATIENT
Start: 2022-04-25 | End: 2022-04-25

## 2022-04-25 RX ORDER — PROMETHAZINE HYDROCHLORIDE 12.5 MG/1
12.5 SUPPOSITORY RECTAL EVERY 6 HOURS PRN
Status: DISCONTINUED | OUTPATIENT
Start: 2022-04-25 | End: 2022-04-26 | Stop reason: HOSPADM

## 2022-04-25 RX ORDER — MORPHINE SULFATE 100 MG/1
100 TABLET ORAL EVERY 12 HOURS SCHEDULED
Status: DISCONTINUED | OUTPATIENT
Start: 2022-04-25 | End: 2022-04-26 | Stop reason: HOSPADM

## 2022-04-25 RX ORDER — PHENYLEPHRINE HCL IN 0.9% NACL 1 MG/10 ML
SYRINGE (ML) INTRAVENOUS AS NEEDED
Status: DISCONTINUED | OUTPATIENT
Start: 2022-04-25 | End: 2022-04-25 | Stop reason: SURG

## 2022-04-25 RX ORDER — AMOXICILLIN 250 MG
2 CAPSULE ORAL 2 TIMES DAILY
Status: DISCONTINUED | OUTPATIENT
Start: 2022-04-25 | End: 2022-04-26 | Stop reason: HOSPADM

## 2022-04-25 RX ORDER — ACETAMINOPHEN 500 MG
1000 TABLET ORAL EVERY 8 HOURS
Status: DISCONTINUED | OUTPATIENT
Start: 2022-04-25 | End: 2022-04-26 | Stop reason: HOSPADM

## 2022-04-25 RX ORDER — DEXMEDETOMIDINE HYDROCHLORIDE 100 UG/ML
INJECTION, SOLUTION INTRAVENOUS AS NEEDED
Status: DISCONTINUED | OUTPATIENT
Start: 2022-04-25 | End: 2022-04-25 | Stop reason: SURG

## 2022-04-25 RX ORDER — HYDROCODONE BITARTRATE AND ACETAMINOPHEN 7.5; 325 MG/1; MG/1
1 TABLET ORAL EVERY 4 HOURS PRN
Status: DISCONTINUED | OUTPATIENT
Start: 2022-04-25 | End: 2022-04-26 | Stop reason: HOSPADM

## 2022-04-25 RX ORDER — OXYCODONE HYDROCHLORIDE 5 MG/1
5 TABLET ORAL
Status: COMPLETED | OUTPATIENT
Start: 2022-04-25 | End: 2022-04-25

## 2022-04-25 RX ORDER — HYDROCODONE BITARTRATE AND ACETAMINOPHEN 7.5; 325 MG/1; MG/1
2 TABLET ORAL EVERY 4 HOURS PRN
Status: DISCONTINUED | OUTPATIENT
Start: 2022-04-25 | End: 2022-04-26 | Stop reason: HOSPADM

## 2022-04-25 RX ORDER — SODIUM CHLORIDE 0.9 % (FLUSH) 0.9 %
10 SYRINGE (ML) INJECTION AS NEEDED
Status: DISCONTINUED | OUTPATIENT
Start: 2022-04-25 | End: 2022-04-26 | Stop reason: HOSPADM

## 2022-04-25 RX ORDER — SODIUM CHLORIDE, SODIUM LACTATE, POTASSIUM CHLORIDE, CALCIUM CHLORIDE 600; 310; 30; 20 MG/100ML; MG/100ML; MG/100ML; MG/100ML
100 INJECTION, SOLUTION INTRAVENOUS CONTINUOUS
Status: DISCONTINUED | OUTPATIENT
Start: 2022-04-25 | End: 2022-04-26 | Stop reason: HOSPADM

## 2022-04-25 RX ORDER — KETAMINE HCL IN NACL, ISO-OSM 100MG/10ML
SYRINGE (ML) INJECTION AS NEEDED
Status: DISCONTINUED | OUTPATIENT
Start: 2022-04-25 | End: 2022-04-25 | Stop reason: SURG

## 2022-04-25 RX ORDER — PROMETHAZINE HYDROCHLORIDE 12.5 MG/1
12.5 TABLET ORAL EVERY 6 HOURS PRN
Status: DISCONTINUED | OUTPATIENT
Start: 2022-04-25 | End: 2022-04-26 | Stop reason: HOSPADM

## 2022-04-25 RX ORDER — ACYCLOVIR 800 MG/1
400 TABLET ORAL 2 TIMES DAILY
Status: DISCONTINUED | OUTPATIENT
Start: 2022-04-26 | End: 2022-04-26 | Stop reason: HOSPADM

## 2022-04-25 RX ORDER — ROCURONIUM BROMIDE 10 MG/ML
INJECTION, SOLUTION INTRAVENOUS AS NEEDED
Status: DISCONTINUED | OUTPATIENT
Start: 2022-04-25 | End: 2022-04-25 | Stop reason: SURG

## 2022-04-25 RX ORDER — SODIUM CHLORIDE 0.9 % (FLUSH) 0.9 %
10 SYRINGE (ML) INJECTION EVERY 12 HOURS SCHEDULED
Status: DISCONTINUED | OUTPATIENT
Start: 2022-04-25 | End: 2022-04-26 | Stop reason: HOSPADM

## 2022-04-25 RX ORDER — CEFAZOLIN SODIUM IN 0.9 % NACL 3 G/100 ML
3 INTRAVENOUS SOLUTION, PIGGYBACK (ML) INTRAVENOUS ONCE
Status: DISCONTINUED | OUTPATIENT
Start: 2022-04-25 | End: 2022-04-25 | Stop reason: ALTCHOICE

## 2022-04-25 RX ORDER — KETOROLAC TROMETHAMINE 15 MG/ML
15 INJECTION, SOLUTION INTRAMUSCULAR; INTRAVENOUS EVERY 6 HOURS SCHEDULED
Status: DISCONTINUED | OUTPATIENT
Start: 2022-04-26 | End: 2022-04-26 | Stop reason: HOSPADM

## 2022-04-25 RX ORDER — SODIUM CHLORIDE, SODIUM LACTATE, POTASSIUM CHLORIDE, CALCIUM CHLORIDE 600; 310; 30; 20 MG/100ML; MG/100ML; MG/100ML; MG/100ML
9 INJECTION, SOLUTION INTRAVENOUS CONTINUOUS PRN
Status: DISCONTINUED | OUTPATIENT
Start: 2022-04-25 | End: 2022-04-26 | Stop reason: HOSPADM

## 2022-04-25 RX ORDER — CEFAZOLIN SODIUM 2 G/100ML
2 INJECTION, SOLUTION INTRAVENOUS EVERY 8 HOURS
Status: COMPLETED | OUTPATIENT
Start: 2022-04-25 | End: 2022-04-26

## 2022-04-25 RX ORDER — CELECOXIB 100 MG/1
200 CAPSULE ORAL ONCE
Status: COMPLETED | OUTPATIENT
Start: 2022-04-25 | End: 2022-04-25

## 2022-04-25 RX ORDER — SODIUM CHLORIDE 0.9 % (FLUSH) 0.9 %
10 SYRINGE (ML) INJECTION AS NEEDED
Status: DISCONTINUED | OUTPATIENT
Start: 2022-04-25 | End: 2022-04-25 | Stop reason: HOSPADM

## 2022-04-25 RX ORDER — MEPERIDINE HYDROCHLORIDE 25 MG/ML
12.5 INJECTION INTRAMUSCULAR; INTRAVENOUS; SUBCUTANEOUS
Status: DISCONTINUED | OUTPATIENT
Start: 2022-04-25 | End: 2022-04-25 | Stop reason: HOSPADM

## 2022-04-25 RX ORDER — ONDANSETRON 4 MG/1
4 TABLET, FILM COATED ORAL EVERY 6 HOURS PRN
Status: DISCONTINUED | OUTPATIENT
Start: 2022-04-25 | End: 2022-04-26 | Stop reason: HOSPADM

## 2022-04-25 RX ORDER — BISACODYL 10 MG
10 SUPPOSITORY, RECTAL RECTAL DAILY PRN
Status: DISCONTINUED | OUTPATIENT
Start: 2022-04-25 | End: 2022-04-26 | Stop reason: HOSPADM

## 2022-04-25 RX ORDER — MIDAZOLAM HYDROCHLORIDE 1 MG/ML
2 INJECTION INTRAMUSCULAR; INTRAVENOUS ONCE
Status: COMPLETED | OUTPATIENT
Start: 2022-04-25 | End: 2022-04-25

## 2022-04-25 RX ORDER — ACETAMINOPHEN 500 MG
1000 TABLET ORAL ONCE
Status: COMPLETED | OUTPATIENT
Start: 2022-04-25 | End: 2022-04-25

## 2022-04-25 RX ORDER — ACETAMINOPHEN 325 MG/1
325 TABLET ORAL EVERY 4 HOURS PRN
Status: DISCONTINUED | OUTPATIENT
Start: 2022-04-25 | End: 2022-04-26 | Stop reason: HOSPADM

## 2022-04-25 RX ORDER — ZOLPIDEM TARTRATE 5 MG/1
5 TABLET ORAL NIGHTLY PRN
Status: DISCONTINUED | OUTPATIENT
Start: 2022-04-25 | End: 2022-04-26 | Stop reason: HOSPADM

## 2022-04-25 RX ORDER — FENTANYL CITRATE 50 UG/ML
INJECTION, SOLUTION INTRAMUSCULAR; INTRAVENOUS AS NEEDED
Status: DISCONTINUED | OUTPATIENT
Start: 2022-04-25 | End: 2022-04-25 | Stop reason: SURG

## 2022-04-25 RX ORDER — FAMOTIDINE 20 MG/1
40 TABLET, FILM COATED ORAL DAILY
Status: DISCONTINUED | OUTPATIENT
Start: 2022-04-26 | End: 2022-04-26 | Stop reason: HOSPADM

## 2022-04-25 RX ORDER — DEXAMETHASONE SODIUM PHOSPHATE 4 MG/ML
INJECTION, SOLUTION INTRA-ARTICULAR; INTRALESIONAL; INTRAMUSCULAR; INTRAVENOUS; SOFT TISSUE AS NEEDED
Status: DISCONTINUED | OUTPATIENT
Start: 2022-04-25 | End: 2022-04-25 | Stop reason: SURG

## 2022-04-25 RX ADMIN — ZOLPIDEM TARTRATE 5 MG: 5 TABLET ORAL at 22:39

## 2022-04-25 RX ADMIN — OXYCODONE HYDROCHLORIDE 5 MG: 5 TABLET ORAL at 19:43

## 2022-04-25 RX ADMIN — ACETAMINOPHEN 1000 MG: 500 TABLET ORAL at 21:41

## 2022-04-25 RX ADMIN — GLYCOPYRROLATE 0.2 MG: 0.2 INJECTION INTRAMUSCULAR; INTRAVENOUS at 16:12

## 2022-04-25 RX ADMIN — Medication 30 MG: at 16:42

## 2022-04-25 RX ADMIN — FENTANYL CITRATE 100 MCG: 50 INJECTION, SOLUTION INTRAMUSCULAR; INTRAVENOUS at 16:42

## 2022-04-25 RX ADMIN — Medication 100 MCG: at 16:29

## 2022-04-25 RX ADMIN — ROCURONIUM BROMIDE 20 MG: 10 INJECTION INTRAVENOUS at 17:25

## 2022-04-25 RX ADMIN — HYDROMORPHONE HYDROCHLORIDE 0.5 MG: 1 INJECTION, SOLUTION INTRAMUSCULAR; INTRAVENOUS; SUBCUTANEOUS at 18:06

## 2022-04-25 RX ADMIN — SENNOSIDES AND DOCUSATE SODIUM 2 TABLET: 50; 8.6 TABLET ORAL at 21:41

## 2022-04-25 RX ADMIN — SODIUM CHLORIDE, POTASSIUM CHLORIDE, SODIUM LACTATE AND CALCIUM CHLORIDE 9 ML/HR: 600; 310; 30; 20 INJECTION, SOLUTION INTRAVENOUS at 21:41

## 2022-04-25 RX ADMIN — SODIUM CHLORIDE, POTASSIUM CHLORIDE, SODIUM LACTATE AND CALCIUM CHLORIDE 9 ML/HR: 600; 310; 30; 20 INJECTION, SOLUTION INTRAVENOUS at 14:07

## 2022-04-25 RX ADMIN — MIDAZOLAM HYDROCHLORIDE 2 MG: 1 INJECTION, SOLUTION INTRAMUSCULAR; INTRAVENOUS at 15:33

## 2022-04-25 RX ADMIN — ROCURONIUM BROMIDE 50 MG: 10 INJECTION INTRAVENOUS at 16:15

## 2022-04-25 RX ADMIN — CEFAZOLIN SODIUM 2 G: 2 INJECTION, SOLUTION INTRAVENOUS at 16:22

## 2022-04-25 RX ADMIN — Medication 20 MG: at 16:12

## 2022-04-25 RX ADMIN — OXYCODONE HYDROCHLORIDE 5 MG: 5 TABLET ORAL at 21:41

## 2022-04-25 RX ADMIN — ACETAMINOPHEN 1000 MG: 500 TABLET ORAL at 14:07

## 2022-04-25 RX ADMIN — ROCURONIUM BROMIDE 20 MG: 10 INJECTION INTRAVENOUS at 16:58

## 2022-04-25 RX ADMIN — OXYCODONE HYDROCHLORIDE 5 MG: 5 TABLET ORAL at 18:23

## 2022-04-25 RX ADMIN — PROPOFOL 100 MG: 10 INJECTION, EMULSION INTRAVENOUS at 16:15

## 2022-04-25 RX ADMIN — ONDANSETRON 4 MG: 2 INJECTION INTRAMUSCULAR; INTRAVENOUS at 16:18

## 2022-04-25 RX ADMIN — DEXMEDETOMIDINE HYDROCHLORIDE 20 MCG: 100 INJECTION, SOLUTION, CONCENTRATE INTRAVENOUS at 16:12

## 2022-04-25 RX ADMIN — CEFAZOLIN SODIUM 2 G: 2 INJECTION, SOLUTION INTRAVENOUS at 21:41

## 2022-04-25 RX ADMIN — GLYCOPYRROLATE 0.2 MG: 0.2 INJECTION INTRAMUSCULAR; INTRAVENOUS at 15:33

## 2022-04-25 RX ADMIN — DEXAMETHASONE SODIUM PHOSPHATE 8 MG: 4 INJECTION, SOLUTION INTRA-ARTICULAR; INTRALESIONAL; INTRAMUSCULAR; INTRAVENOUS; SOFT TISSUE at 16:18

## 2022-04-25 RX ADMIN — LIDOCAINE HYDROCHLORIDE 100 MG: 20 INJECTION, SOLUTION EPIDURAL; INFILTRATION; INTRACAUDAL; PERINEURAL at 16:15

## 2022-04-25 RX ADMIN — SUGAMMADEX 300 MG: 100 INJECTION, SOLUTION INTRAVENOUS at 17:44

## 2022-04-25 RX ADMIN — CELECOXIB 200 MG: 100 CAPSULE ORAL at 14:07

## 2022-04-25 RX ADMIN — SODIUM CHLORIDE, POTASSIUM CHLORIDE, SODIUM LACTATE AND CALCIUM CHLORIDE 100 ML/HR: 600; 310; 30; 20 INJECTION, SOLUTION INTRAVENOUS at 21:41

## 2022-04-25 NOTE — ANESTHESIA PREPROCEDURE EVALUATION
Anesthesia Evaluation     Patient summary reviewed and Nursing notes reviewed   NPO Solid Status: > 6 hours  NPO Liquid Status: > 6 hours           Airway   Mallampati: II  TM distance: >3 FB  Dental      Pulmonary - negative pulmonary ROS and normal exam   Cardiovascular - negative cardio ROS and normal exam  Exercise tolerance: good (4-7 METS)        Neuro/Psych  GI/Hepatic/Renal/Endo - negative ROS     Musculoskeletal     Abdominal    Substance History      OB/GYN          Other                        Anesthesia Plan    ASA 3     general and ERAS Protocol   (Pt declines SAB for procedure)  intravenous induction     Anesthetic plan, all risks, benefits, and alternatives have been provided, discussed and informed consent has been obtained with: patient.        CODE STATUS:

## 2022-04-25 NOTE — ANESTHESIA POSTPROCEDURE EVALUATION
Patient: Evangelina Sutton    Procedure Summary     Date: 04/25/22 Room / Location: [facility] OR 03 / [facility] MAIN OR    Anesthesia Start: 1612 Anesthesia Stop: 1802    Procedure: LEFT HIP ARTHROPLASTY ANTERIOR (Left Hip) Diagnosis:       Left displaced femoral neck fracture (HCC)      (Left displaced femoral neck fracture (HCC) [S72.002A])    Surgeons: Sridhar Coto MD Provider: Mitch Roth MD    Anesthesia Type: general, ERAS Protocol ASA Status: 3          Anesthesia Type: general, ERAS Protocol    Vitals  Vitals Value Taken Time   /65 04/25/22 1807   Temp     Pulse 100 04/25/22 1809   Resp     SpO2 96 % 04/25/22 1809   Vitals shown include unvalidated device data.        Post Anesthesia Care and Evaluation    Patient location during evaluation: bedside  Patient participation: complete - patient participated  Level of consciousness: awake and alert  Pain management: adequate  Airway patency: patent  Anesthetic complications: No anesthetic complications  PONV Status: none  Cardiovascular status: acceptable  Respiratory status: acceptable  Hydration status: acceptable    Comments: An Anesthesiologist personally participated in the most demanding procedures (including induction and emergence if applicable) in the anesthesia plan, monitored the course of anesthesia administration at frequent intervals and remained physically present and available for immediate diagnosis and treatment of emergencies.

## 2022-04-26 VITALS
TEMPERATURE: 98.9 F | HEIGHT: 65 IN | RESPIRATION RATE: 18 BRPM | WEIGHT: 151 LBS | BODY MASS INDEX: 25.16 KG/M2 | SYSTOLIC BLOOD PRESSURE: 124 MMHG | OXYGEN SATURATION: 95 % | DIASTOLIC BLOOD PRESSURE: 67 MMHG | HEART RATE: 80 BPM

## 2022-04-26 PROBLEM — Z78.9 DECREASED ACTIVITIES OF DAILY LIVING (ADL): Status: ACTIVE | Noted: 2022-04-26

## 2022-04-26 LAB
ANION GAP SERPL CALCULATED.3IONS-SCNC: 8.3 MMOL/L (ref 5–15)
BUN SERPL-MCNC: 10 MG/DL (ref 8–23)
BUN/CREAT SERPL: 18.2 (ref 7–25)
CALCIUM SPEC-SCNC: 8.9 MG/DL (ref 8.6–10.5)
CHLORIDE SERPL-SCNC: 100 MMOL/L (ref 98–107)
CO2 SERPL-SCNC: 29.7 MMOL/L (ref 22–29)
CREAT SERPL-MCNC: 0.55 MG/DL (ref 0.57–1)
EGFRCR SERPLBLD CKD-EPI 2021: 98.1 ML/MIN/1.73
GLUCOSE SERPL-MCNC: 118 MG/DL (ref 65–99)
HCT VFR BLD AUTO: 30.8 % (ref 34–46.6)
HGB BLD-MCNC: 9.3 G/DL (ref 12–15.9)
POTASSIUM SERPL-SCNC: 4.3 MMOL/L (ref 3.5–5.2)
QT INTERVAL: 370 MS
SODIUM SERPL-SCNC: 138 MMOL/L (ref 136–145)

## 2022-04-26 PROCEDURE — 25010000002 HEPARIN LOCK FLUSH PER 10 UNITS: Performed by: INTERNAL MEDICINE

## 2022-04-26 PROCEDURE — A9270 NON-COVERED ITEM OR SERVICE: HCPCS | Performed by: HOSPITALIST

## 2022-04-26 PROCEDURE — 63710000001 HYDROCODONE-ACETAMINOPHEN 7.5-325 MG TABLET: Performed by: ORTHOPAEDIC SURGERY

## 2022-04-26 PROCEDURE — 99239 HOSP IP/OBS DSCHRG MGMT >30: CPT | Performed by: INTERNAL MEDICINE

## 2022-04-26 PROCEDURE — A9270 NON-COVERED ITEM OR SERVICE: HCPCS | Performed by: ORTHOPAEDIC SURGERY

## 2022-04-26 PROCEDURE — 63710000001: Performed by: HOSPITALIST

## 2022-04-26 PROCEDURE — 25010000002 ENOXAPARIN PER 10 MG: Performed by: ORTHOPAEDIC SURGERY

## 2022-04-26 PROCEDURE — 63710000001 FAMOTIDINE 20 MG TABLET: Performed by: ORTHOPAEDIC SURGERY

## 2022-04-26 PROCEDURE — 25010000002 KETOROLAC TROMETHAMINE PER 15 MG: Performed by: ORTHOPAEDIC SURGERY

## 2022-04-26 PROCEDURE — 63710000001 PREGABALIN 75 MG CAPSULE: Performed by: INTERNAL MEDICINE

## 2022-04-26 PROCEDURE — 80048 BASIC METABOLIC PNL TOTAL CA: CPT | Performed by: ORTHOPAEDIC SURGERY

## 2022-04-26 PROCEDURE — 97150 GROUP THERAPEUTIC PROCEDURES: CPT

## 2022-04-26 PROCEDURE — 97535 SELF CARE MNGMENT TRAINING: CPT

## 2022-04-26 PROCEDURE — 63710000001 ACYCLOVIR 800 MG TABLET: Performed by: HOSPITALIST

## 2022-04-26 PROCEDURE — 97530 THERAPEUTIC ACTIVITIES: CPT

## 2022-04-26 PROCEDURE — 97116 GAIT TRAINING THERAPY: CPT

## 2022-04-26 PROCEDURE — 85018 HEMOGLOBIN: CPT | Performed by: ORTHOPAEDIC SURGERY

## 2022-04-26 PROCEDURE — 97165 OT EVAL LOW COMPLEX 30 MIN: CPT

## 2022-04-26 PROCEDURE — 97161 PT EVAL LOW COMPLEX 20 MIN: CPT

## 2022-04-26 PROCEDURE — 25010000002 CEFAZOLIN IN DEXTROSE 2-4 GM/100ML-% SOLUTION: Performed by: ORTHOPAEDIC SURGERY

## 2022-04-26 PROCEDURE — 85014 HEMATOCRIT: CPT | Performed by: ORTHOPAEDIC SURGERY

## 2022-04-26 PROCEDURE — A9270 NON-COVERED ITEM OR SERVICE: HCPCS | Performed by: INTERNAL MEDICINE

## 2022-04-26 RX ORDER — OXYCODONE AND ACETAMINOPHEN 7.5; 325 MG/1; MG/1
1 TABLET ORAL EVERY 4 HOURS PRN
Qty: 30 TABLET | Refills: 0 | Status: SHIPPED | OUTPATIENT
Start: 2022-04-26 | End: 2022-11-28

## 2022-04-26 RX ORDER — HEPARIN SODIUM (PORCINE) LOCK FLUSH IV SOLN 100 UNIT/ML 100 UNIT/ML
500 SOLUTION INTRAVENOUS ONCE
Status: COMPLETED | OUTPATIENT
Start: 2022-04-26 | End: 2022-04-26

## 2022-04-26 RX ORDER — AMOXICILLIN 250 MG
2 CAPSULE ORAL 2 TIMES DAILY
Qty: 60 TABLET | Refills: 0 | Status: SHIPPED | OUTPATIENT
Start: 2022-04-26

## 2022-04-26 RX ORDER — PREGABALIN 75 MG/1
150 CAPSULE ORAL 2 TIMES DAILY
Status: DISCONTINUED | OUTPATIENT
Start: 2022-04-26 | End: 2022-04-26 | Stop reason: HOSPADM

## 2022-04-26 RX ADMIN — ACYCLOVIR 400 MG: 800 TABLET ORAL at 08:50

## 2022-04-26 RX ADMIN — ENOXAPARIN SODIUM 30 MG: 100 INJECTION SUBCUTANEOUS at 08:50

## 2022-04-26 RX ADMIN — HYDROCODONE BITARTRATE AND ACETAMINOPHEN 2 TABLET: 7.5; 325 TABLET ORAL at 06:06

## 2022-04-26 RX ADMIN — KETOROLAC TROMETHAMINE 15 MG: 15 INJECTION, SOLUTION INTRAMUSCULAR; INTRAVENOUS at 06:06

## 2022-04-26 RX ADMIN — KETOROLAC TROMETHAMINE 15 MG: 15 INJECTION, SOLUTION INTRAMUSCULAR; INTRAVENOUS at 01:25

## 2022-04-26 RX ADMIN — KETOROLAC TROMETHAMINE 15 MG: 15 INJECTION, SOLUTION INTRAMUSCULAR; INTRAVENOUS at 11:47

## 2022-04-26 RX ADMIN — Medication 10 ML: at 08:50

## 2022-04-26 RX ADMIN — FAMOTIDINE 40 MG: 20 TABLET ORAL at 08:50

## 2022-04-26 RX ADMIN — PREGABALIN 150 MG: 75 CAPSULE ORAL at 08:49

## 2022-04-26 RX ADMIN — CEFAZOLIN SODIUM 2 G: 2 INJECTION, SOLUTION INTRAVENOUS at 06:36

## 2022-04-26 RX ADMIN — MORPHINE SULFATE 100 MG: 100 TABLET, FILM COATED, EXTENDED RELEASE ORAL at 08:50

## 2022-04-26 RX ADMIN — HEPARIN SODIUM (PORCINE) LOCK FLUSH IV SOLN 100 UNIT/ML 500 UNITS: 100 SOLUTION at 15:10

## 2022-04-27 ENCOUNTER — READMISSION MANAGEMENT (OUTPATIENT)
Dept: CALL CENTER | Facility: HOSPITAL | Age: 72
End: 2022-04-27

## 2022-04-27 NOTE — OUTREACH NOTE
Prep Survey    Flowsheet Row Responses   [facility] [place], operated by [facility] facility patient discharged from? [facility]   Is LACE score < 7 ? Yes   Emergency Room discharge w/ pulse ox? No   Eligibility Not Eligible   What are the reasons patient is not eligible? Other  [Low risk for readmission]   Does the patient have one of the following disease processes/diagnoses(primary or secondary)? Other   Prep survey completed? Yes          MIKE TOM - Registered Nurse

## 2022-04-29 ENCOUNTER — TELEPHONE (OUTPATIENT)
Dept: ORTHOPEDIC SURGERY | Facility: CLINIC | Age: 72
End: 2022-04-29

## 2022-04-29 LAB
CYTO UR: NORMAL
LAB AP CASE REPORT: NORMAL
LAB AP CLINICAL INFORMATION: NORMAL
LAB AP SPECIAL STAINS: NORMAL
PATH REPORT.FINAL DX SPEC: NORMAL
PATH REPORT.GROSS SPEC: NORMAL

## 2022-04-29 NOTE — TELEPHONE ENCOUNTER
[facility] WILL BE SEEING PATIENT FOR PT 2 TIMES A WEEK FOR 4 WEEKS AND ALSO OT CONSULT

## 2022-05-02 ENCOUNTER — TELEPHONE (OUTPATIENT)
Dept: ORTHOPEDIC SURGERY | Facility: CLINIC | Age: 72
End: 2022-05-02

## 2022-05-02 NOTE — TELEPHONE ENCOUNTER
RECEIVED CALL FROM NOEMÍ URRUTIA FOR [facility] THAT THEY WILL SEE KYLE FOR 2 TIMES A WEEK FOR 4 WEEKS.

## 2022-05-10 ENCOUNTER — OFFICE VISIT (OUTPATIENT)
Dept: ORTHOPEDIC SURGERY | Facility: CLINIC | Age: 72
End: 2022-05-10

## 2022-05-10 VITALS — HEART RATE: 95 BPM | WEIGHT: 151 LBS | BODY MASS INDEX: 25.16 KG/M2 | HEIGHT: 65 IN | OXYGEN SATURATION: 94 %

## 2022-05-10 DIAGNOSIS — M25.552 LEFT HIP PAIN: Primary | ICD-10-CM

## 2022-05-10 DIAGNOSIS — Z47.89 AFTERCARE FOLLOWING SURGERY OF THE MUSCULOSKELETAL SYSTEM: ICD-10-CM

## 2022-05-10 PROCEDURE — 99024 POSTOP FOLLOW-UP VISIT: CPT | Performed by: PHYSICIAN ASSISTANT

## 2022-05-10 NOTE — PATIENT INSTRUCTIONS
Keep the incision clean and dry. Leave open to air. Remove steri-strips after 7 to 10 days. Continue use of ice and elevation for associated swelling. Continue physical therapy, progress weightbearing status with PT. Call with any changes or concerns.          Follow up in 4 weeks, no imaging at this time.

## 2022-05-10 NOTE — PROGRESS NOTES
"Chief Complaint  Follow-up of the Left Hip    Subjective          Evangelina Sutton presents to [facility] for s/p left hip hemiarthroplasty anterior approach performed on 04/25/2022 with Dr. Coto.  Patient states home health physical therapy is coming to work with her twice weekly.  She is here today using walker for ambulation assistance, but states she used a walker prior to injuring her left hip.  She states pain has been manageable.  She denies fever, chills, numbness or tingling.    Objective   No Known Allergies    Vital Signs:   Pulse 95   Ht 165.1 cm (65\")   Wt 68.5 kg (151 lb)   SpO2 94%   BMI 25.13 kg/m²       Physical Exam  Constitutional:       Appearance: Normal appearance. Patient is well-developed and normal weight.   HENT:      Head: Normocephalic.      Right Ear: Hearing and external ear normal.      Left Ear: Hearing and external ear normal.      Nose: Nose normal.   Eyes:      Conjunctiva/sclera: Conjunctivae normal.   Cardiovascular:      Rate and Rhythm: Normal rate.   Pulmonary:      Effort: Pulmonary effort is normal.      Breath sounds: No wheezing or rales.   Abdominal:      Palpations: Abdomen is soft.      Tenderness: There is no abdominal tenderness.   Musculoskeletal:      Cervical back: Normal range of motion.   Skin:     Findings: No rash.   Neurological:      Mental Status: Patient is alert and oriented to person, place, and time.   Psychiatric:         Mood and Affect: Mood and affect normal.         Judgment: Judgment normal.     Ortho Exam  Left hip: Surgical incision is well-healing, staples removed, no surrounding erythema or dehiscence.  Skin dry and intact.  No discoloration.  Active hip flexion in the seated position near full.  Full knee flexion and extension.  Full plantarflexion and dorsiflexion of the ankle.  Patient uses walker for ambulation assistance.  Sensation and pulses intact. Neurovascular intact distally.     Result Review : "   The following data was reviewed by: SHUBHAM Mora on 05/10/2022:         Imaging Results (Most Recent)     Procedure Component Value Units Date/Time    XR Hip With or Without Pelvis 2 - 3 View Left [[id number]] Resulted: 05/10/22 1338     Updated: 05/10/22 1339    Narrative:      X-Ray Report:  Study: X-rays ordered, taken in the office, and reviewed today  Site: left hip Xray  Indication: left hip hemiarthroplasty   View: AP and Lateral view(s)  Findings: Left hip hemiarthroplasty is intact. No evidence of hardware   complication.   Prior studies available for comparison: yes                   Assessment and Plan    Problem List Items Addressed This Visit        Musculoskeletal and Injuries    Aftercare following left hip hemiarthroplasty       Other Visit Diagnoses     Left hip pain    -  Primary    Relevant Orders    XR Hip With or Without Pelvis 2 - 3 View Left (Completed)          Follow Up   Return in about 4 weeks (around 6/7/2022).  Patient Instructions   Keep the incision clean and dry. Leave open to air. Remove steri-strips after 7 to 10 days. Continue use of ice and elevation for associated swelling. Continue physical therapy, progress weightbearing status with PT. Call with any changes or concerns.          Follow up in 4 weeks, no imaging at this time.     Patient was given instructions and counseling regarding her condition or for health maintenance advice. Please see specific information pulled into the AVS if appropriate.

## 2022-06-07 ENCOUNTER — OFFICE VISIT (OUTPATIENT)
Dept: ORTHOPEDIC SURGERY | Facility: CLINIC | Age: 72
End: 2022-06-07

## 2022-06-07 VITALS — OXYGEN SATURATION: 97 % | BODY MASS INDEX: 25.16 KG/M2 | HEIGHT: 65 IN | WEIGHT: 151 LBS | HEART RATE: 94 BPM

## 2022-06-07 DIAGNOSIS — Z47.89 AFTERCARE FOLLOWING SURGERY OF THE MUSCULOSKELETAL SYSTEM: Primary | ICD-10-CM

## 2022-06-07 PROCEDURE — 99024 POSTOP FOLLOW-UP VISIT: CPT | Performed by: PHYSICIAN ASSISTANT

## 2022-06-07 NOTE — PATIENT INSTRUCTIONS
Continue with therapy to progress hip motion, strength and progress weightbearing status. Recommend compression hose to help with swelling.   Call with any changes or concerns.       Follow up in 6 weeks. Repeat x-ray.

## 2022-06-07 NOTE — PROGRESS NOTES
"Chief Complaint  Follow-up and Pain of the Left Hip    Subjective          Evangelina Sutton presents to [facility] for s/p left hip hemiarthroplasty anterior approach performed on 04/25/2022 by Dr. Coto.  Patient states she has home health physical therapy coming to work with her twice weekly.  She is here today using walker for ambulation assistance, but states she is now using a cane at her residence.  She states she is making progress with range of motion.  She has no other additional complaints today.  She is accompanied today by her son.    Objective   No Known Allergies    Vital Signs:   Pulse 94   Ht 165.1 cm (65\")   Wt 68.5 kg (151 lb)   SpO2 97%   BMI 25.13 kg/m²       Physical Exam  Constitutional:       Appearance: Normal appearance. Patient is well-developed and normal weight.   HENT:      Head: Normocephalic.      Right Ear: Hearing and external ear normal.      Left Ear: Hearing and external ear normal.      Nose: Nose normal.   Eyes:      Conjunctiva/sclera: Conjunctivae normal.   Cardiovascular:      Rate and Rhythm: Normal rate.   Pulmonary:      Effort: Pulmonary effort is normal.      Breath sounds: No wheezing or rales.   Abdominal:      Palpations: Abdomen is soft.      Tenderness: There is no abdominal tenderness.   Musculoskeletal:      Cervical back: Normal range of motion.   Skin:     Findings: No rash.   Neurological:      Mental Status: Patient is alert and oriented to person, place, and time.   Psychiatric:         Mood and Affect: Mood and affect normal.         Judgment: Judgment normal.     Ortho Exam  Left hip: Well-healed surgical incision, skin dry and intact without tenderness to palpation or swelling.  Good passive IR/ER of the hip in the seated position without groin pain elicited.  Good strength of the hip flexors, extensors, abductors and adductor's.  Good motion of the knee and the ankle.  Patient using walker for ambulation assistance.  " Sensation and pulses are intact.  Neurovascular intact distally.    Result Review :   The following data was reviewed by: SHUBHAM Mora on 06/07/2022:         Imaging Results (Most Recent)     None                Assessment and Plan    Problem List Items Addressed This Visit        Musculoskeletal and Injuries    Aftercare following left hip hemiarthroplasty  - Primary          Follow Up   Return in about 6 weeks (around 7/19/2022).  Patient Instructions   Continue with therapy to progress hip motion, strength and progress weightbearing status. Recommend compression hose to help with swelling.   Call with any changes or concerns.       Follow up in 6 weeks. Repeat x-ray.     Patient was given instructions and counseling regarding her condition or for health maintenance advice. Please see specific information pulled into the AVS if appropriate.

## 2022-07-19 ENCOUNTER — OFFICE VISIT (OUTPATIENT)
Dept: ORTHOPEDIC SURGERY | Facility: CLINIC | Age: 72
End: 2022-07-19

## 2022-07-19 VITALS — BODY MASS INDEX: 25.16 KG/M2 | OXYGEN SATURATION: 95 % | WEIGHT: 151 LBS | HEIGHT: 65 IN | HEART RATE: 85 BPM

## 2022-07-19 DIAGNOSIS — Z47.89 AFTERCARE FOLLOWING SURGERY OF THE MUSCULOSKELETAL SYSTEM: ICD-10-CM

## 2022-07-19 DIAGNOSIS — M25.552 LEFT HIP PAIN: Primary | ICD-10-CM

## 2022-07-19 PROCEDURE — 99024 POSTOP FOLLOW-UP VISIT: CPT | Performed by: PHYSICIAN ASSISTANT

## 2022-07-19 NOTE — PATIENT INSTRUCTIONS
Continue HEP to progress strength and weightbearing status.     Follow up in 9 months. X-rays needed.

## 2022-07-19 NOTE — PROGRESS NOTES
"Chief Complaint  Pain and Follow-up of the Left Hip    Subjective           Evangelina Sutton presents to [facility] for s/p left hip hemiarthroplasty anterior approach performed on 04/25/2022 by Dr. Coto.  Patient states she was discharged from home health physical therapy as of 1 month ago.  She states she is doing well and denies having any pain of the hip.  She is here today using walker for ambulation assistance.  She states she uses a cane at her home.  She has no other new complaints.  She is accompanied by son today.    Objective   No Known Allergies    Vital Signs:   Pulse 85   Ht 165.1 cm (65\")   Wt 68.5 kg (151 lb)   SpO2 95%   BMI 25.13 kg/m²       Physical Exam  Constitutional:       Appearance: Normal appearance. Patient is well-developed and normal weight.   HENT:      Head: Normocephalic.      Right Ear: Hearing and external ear normal.      Left Ear: Hearing and external ear normal.      Nose: Nose normal.   Eyes:      Conjunctiva/sclera: Conjunctivae normal.   Cardiovascular:      Rate and Rhythm: Normal rate.   Pulmonary:      Effort: Pulmonary effort is normal.      Breath sounds: No wheezing or rales.   Abdominal:      Palpations: Abdomen is soft.      Tenderness: There is no abdominal tenderness.   Musculoskeletal:      Cervical back: Normal range of motion.   Skin:     Findings: No rash.   Neurological:      Mental Status: Patient is alert and oriented to person, place, and time.   Psychiatric:         Mood and Affect: Mood and affect normal.         Judgment: Judgment normal.     Ortho Exam  Left hip: Well-healed surgical incision.  No swelling or discoloration.  Skin dry and intact.  Negative logroll.  Good muscle tone of the hip flexors, extensors, abductors and adductor's.  Sensation and pulses are intact.  Neurovascular intact distally.    Result Review :            Imaging Results (Most Recent)     Procedure Component Value Units Date/Time    XR Hip With " or Without Pelvis 2 - 3 View Left [[id number]] Resulted: 07/19/22 1339     Updated: 07/19/22 1341    Narrative:      X-Ray Report:  Study: X-rays ordered, taken in the office, and reviewed today  Site: left hip/pelvis Xray  Indication: left hip hemiarthroplasty  View: AP and Lateral view(s)  Findings: Evidence of intact left hip hemiarthroplasty without hardware   malfunction or loosening.   Prior studies available for comparison: yes                   Assessment and Plan    Problem List Items Addressed This Visit        Musculoskeletal and Injuries    Aftercare following left hip hemiarthroplasty       Other Visit Diagnoses     Left hip pain    -  Primary    Relevant Orders    XR Hip With or Without Pelvis 2 - 3 View Left (Completed)          Follow Up   Return in about 9 months (around 4/19/2023).  Patient Instructions   Continue HEP to progress strength and weightbearing status.     Follow up in 9 months. X-rays needed.     Patient was given instructions and counseling regarding her condition or for health maintenance advice. Please see specific information pulled into the AVS if appropriate.

## 2022-11-22 ENCOUNTER — OFFICE VISIT (OUTPATIENT)
Dept: ORTHOPEDIC SURGERY | Facility: CLINIC | Age: 72
End: 2022-11-22

## 2022-11-22 VITALS — OXYGEN SATURATION: 93 % | BODY MASS INDEX: 25.16 KG/M2 | WEIGHT: 151 LBS | HEIGHT: 65 IN | HEART RATE: 109 BPM

## 2022-11-22 DIAGNOSIS — Z47.89 AFTERCARE FOLLOWING SURGERY OF THE MUSCULOSKELETAL SYSTEM: ICD-10-CM

## 2022-11-22 DIAGNOSIS — M25.552 LEFT HIP PAIN: Primary | ICD-10-CM

## 2022-11-22 PROCEDURE — 99213 OFFICE O/P EST LOW 20 MIN: CPT | Performed by: PHYSICIAN ASSISTANT

## 2022-11-22 NOTE — PATIENT INSTRUCTIONS
x-rays taken and reviewed with Dr. Coto today, recommend continuing using AD-walker preferred. Continue with current pain management regimen. If symptoms regress or fail to improve return sooner than follow up scheduled for April 2023.

## 2022-11-22 NOTE — PROGRESS NOTES
"Chief Complaint  Pain and Follow-up of the Left Hip    Subjective      Evangelina Sutton presents to [facility] for s/p left hip hemiarthroplasty anterior approach performed on 04/25/2022 by Dr. Coto.  Patient is present with son today.  She reports pain of the left hip and in the groin over the last 3 weeks.  Denies fall or injury.  She is present in wheelchair today but states that she uses walker for ambulation assistance.  She reports having multiple myeloma that she seeks treatment for.  Current pain management regimen is oxycodone with morphine.    Objective   No Known Allergies    Vital Signs:   Pulse 109   Ht 165.1 cm (65\")   Wt 68.5 kg (151 lb)   SpO2 93%   BMI 25.13 kg/m²       Physical Exam    Constitutional: Awake, alert. Well nourished appearance.    Integumentary: Warm, dry, intact. No obvious rashes.    HENT: Atraumatic, normocephalic.   Respiratory: Non labored respirations .   Cardiovascular: Intact peripheral pulses.    Psychiatric: Normal mood and affect. A&O X3    Ortho Exam  Left hip: Tenderness at the greater trochanter, skin dry and intact, nontender pelvic or gluteal muscles, nontender at the sacrum.  Good muscle tone of the hip flexors, extensors, abductors and adductor's.  Able to forward flex the hip in the seated position.  Full plantarflexion and dorsiflexion of the ankle.  Sensation is intact.  Neurovascular intact distally.    Imaging Results (Most Recent)     Procedure Component Value Units Date/Time    XR Hip With or Without Pelvis 2 - 3 View Left [[id number]] Resulted: 11/22/22 1030     Updated: 11/22/22 1030    Narrative:      X-Ray Report:  Study: X-rays ordered, taken in the office, and reviewed today  Site: left hip Xray  Indication: Filipe arthroplasty, pain x 3 weeks  View: AP/Lateral view(s)  Findings: Left hip filipe-arthroplasty is intact, no evidence of acute   fracture.   Prior studies available for comparison: yes                    "   Assessment and Plan   Problem List Items Addressed This Visit        Musculoskeletal and Injuries    Aftercare following left hip hemiarthroplasty    Other Visit Diagnoses     Left hip pain    -  Primary      x-rays taken and reviewed with Dr. Coto today, recommend continuing using AD-walker preferred. Continue with current pain management regimen. If symptoms regress or fail to improve return sooner than follow up scheduled for April 2023.     Follow Up   Return if symptoms worsen or fail to improve.      Patient Instructions   x-rays taken and reviewed with Dr. Coto today, recommend continuing using AD-walker preferred. Continue with current pain management regimen. If symptoms regress or fail to improve return sooner than follow up scheduled for April 2023.     Patient was given instructions and counseling regarding her condition or for health maintenance advice. Please see specific information pulled into the AVS if appropriate.

## 2022-11-28 ENCOUNTER — APPOINTMENT (OUTPATIENT)
Dept: GENERAL RADIOLOGY | Facility: HOSPITAL | Age: 72
End: 2022-11-28

## 2022-11-28 ENCOUNTER — HOSPITAL ENCOUNTER (INPATIENT)
Facility: HOSPITAL | Age: 72
LOS: 6 days | Discharge: HOME | End: 2022-12-04
Attending: EMERGENCY MEDICINE | Admitting: INTERNAL MEDICINE

## 2022-11-28 DIAGNOSIS — R26.2 DIFFICULTY WALKING: ICD-10-CM

## 2022-11-28 DIAGNOSIS — N39.0 ACUTE UTI: Primary | ICD-10-CM

## 2022-11-28 LAB
ALBUMIN SERPL-MCNC: 3.4 G/DL (ref 3.5–5.2)
ALBUMIN/GLOB SERPL: 0.7 G/DL
ALP SERPL-CCNC: 52 U/L (ref 39–117)
ALT SERPL W P-5'-P-CCNC: 11 U/L (ref 1–33)
ANION GAP SERPL CALCULATED.3IONS-SCNC: 13.4 MMOL/L (ref 5–15)
AST SERPL-CCNC: 39 U/L (ref 1–32)
BACTERIA UR QL AUTO: ABNORMAL /HPF
BASOPHILS # BLD AUTO: 0.01 10*3/MM3 (ref 0–0.2)
BASOPHILS NFR BLD AUTO: 0.3 % (ref 0–1.5)
BILIRUB SERPL-MCNC: 0.9 MG/DL (ref 0–1.2)
BILIRUB UR QL STRIP: NEGATIVE
BUN SERPL-MCNC: 32 MG/DL (ref 8–23)
BUN/CREAT SERPL: 51.6 (ref 7–25)
CALCIUM SPEC-SCNC: 9.5 MG/DL (ref 8.6–10.5)
CHLORIDE SERPL-SCNC: 100 MMOL/L (ref 98–107)
CLARITY UR: CLEAR
CO2 SERPL-SCNC: 25.6 MMOL/L (ref 22–29)
COLOR UR: YELLOW
CREAT SERPL-MCNC: 0.62 MG/DL (ref 0.57–1)
D-LACTATE SERPL-SCNC: 1 MMOL/L (ref 0.5–2)
DEPRECATED RDW RBC AUTO: 65.2 FL (ref 37–54)
EGFRCR SERPLBLD CKD-EPI 2021: 94.8 ML/MIN/1.73
EOSINOPHIL # BLD AUTO: 0 10*3/MM3 (ref 0–0.4)
EOSINOPHIL NFR BLD AUTO: 0 % (ref 0.3–6.2)
ERYTHROCYTE [DISTWIDTH] IN BLOOD BY AUTOMATED COUNT: 21.5 % (ref 12.3–15.4)
FLUAV AG NPH QL: NEGATIVE
FLUBV AG NPH QL IA: NEGATIVE
GLOBULIN UR ELPH-MCNC: 4.8 GM/DL
GLUCOSE SERPL-MCNC: 123 MG/DL (ref 65–99)
GLUCOSE UR STRIP-MCNC: NEGATIVE MG/DL
HCT VFR BLD AUTO: 31.7 % (ref 34–46.6)
HGB BLD-MCNC: 9.8 G/DL (ref 12–15.9)
HGB UR QL STRIP.AUTO: ABNORMAL
HOLD SPECIMEN: NORMAL
HOLD SPECIMEN: NORMAL
HYALINE CASTS UR QL AUTO: ABNORMAL /LPF
IMM GRANULOCYTES # BLD AUTO: 0.02 10*3/MM3 (ref 0–0.05)
IMM GRANULOCYTES NFR BLD AUTO: 0.7 % (ref 0–0.5)
KETONES UR QL STRIP: ABNORMAL
LEUKOCYTE ESTERASE UR QL STRIP.AUTO: ABNORMAL
LYMPHOCYTES # BLD AUTO: 0.5 10*3/MM3 (ref 0.7–3.1)
LYMPHOCYTES NFR BLD AUTO: 16.7 % (ref 19.6–45.3)
MAGNESIUM SERPL-MCNC: 2.2 MG/DL (ref 1.6–2.4)
MCH RBC QN AUTO: 26 PG (ref 26.6–33)
MCHC RBC AUTO-ENTMCNC: 30.9 G/DL (ref 31.5–35.7)
MCV RBC AUTO: 84.1 FL (ref 79–97)
MONOCYTES # BLD AUTO: 0.47 10*3/MM3 (ref 0.1–0.9)
MONOCYTES NFR BLD AUTO: 15.7 % (ref 5–12)
NEUTROPHILS NFR BLD AUTO: 2 10*3/MM3 (ref 1.7–7)
NEUTROPHILS NFR BLD AUTO: 66.6 % (ref 42.7–76)
NITRITE UR QL STRIP: NEGATIVE
NRBC BLD AUTO-RTO: 0 /100 WBC (ref 0–0.2)
PH UR STRIP.AUTO: 7 [PH] (ref 5–8)
PLATELET # BLD AUTO: 189 10*3/MM3 (ref 140–450)
PMV BLD AUTO: 10.1 FL (ref 6–12)
POTASSIUM SERPL-SCNC: 3.3 MMOL/L (ref 3.5–5.2)
PROT SERPL-MCNC: 8.2 G/DL (ref 6–8.5)
PROT UR QL STRIP: ABNORMAL
RBC # BLD AUTO: 3.77 10*6/MM3 (ref 3.77–5.28)
RBC # UR STRIP: ABNORMAL /HPF
REF LAB TEST METHOD: ABNORMAL
SARS-COV-2 RNA PNL SPEC NAA+PROBE: NOT DETECTED
SODIUM SERPL-SCNC: 139 MMOL/L (ref 136–145)
SP GR UR STRIP: 1.02 (ref 1–1.03)
SQUAMOUS #/AREA URNS HPF: ABNORMAL /HPF
TROPONIN T SERPL-MCNC: 0.01 NG/ML (ref 0–0.03)
UROBILINOGEN UR QL STRIP: ABNORMAL
WBC # UR STRIP: ABNORMAL /HPF
WBC NRBC COR # BLD: 3 10*3/MM3 (ref 3.4–10.8)
WHOLE BLOOD HOLD COAG: NORMAL
WHOLE BLOOD HOLD SPECIMEN: NORMAL
YEAST URNS QL MICRO: ABNORMAL /HPF

## 2022-11-28 PROCEDURE — 83605 ASSAY OF LACTIC ACID: CPT | Performed by: EMERGENCY MEDICINE

## 2022-11-28 PROCEDURE — 25010000002 HALOPERIDOL LACTATE PER 5 MG: Performed by: EMERGENCY MEDICINE

## 2022-11-28 PROCEDURE — U0005 INFEC AGEN DETEC AMPLI PROBE: HCPCS

## 2022-11-28 PROCEDURE — 81001 URINALYSIS AUTO W/SCOPE: CPT | Performed by: EMERGENCY MEDICINE

## 2022-11-28 PROCEDURE — 87040 BLOOD CULTURE FOR BACTERIA: CPT | Performed by: EMERGENCY MEDICINE

## 2022-11-28 PROCEDURE — 87804 INFLUENZA ASSAY W/OPTIC: CPT

## 2022-11-28 PROCEDURE — 87147 CULTURE TYPE IMMUNOLOGIC: CPT | Performed by: EMERGENCY MEDICINE

## 2022-11-28 PROCEDURE — 87186 SC STD MICRODIL/AGAR DIL: CPT | Performed by: EMERGENCY MEDICINE

## 2022-11-28 PROCEDURE — 80053 COMPREHEN METABOLIC PANEL: CPT

## 2022-11-28 PROCEDURE — 93010 ELECTROCARDIOGRAM REPORT: CPT | Performed by: INTERNAL MEDICINE

## 2022-11-28 PROCEDURE — 71045 X-RAY EXAM CHEST 1 VIEW: CPT

## 2022-11-28 PROCEDURE — 99284 EMERGENCY DEPT VISIT MOD MDM: CPT

## 2022-11-28 PROCEDURE — 87150 DNA/RNA AMPLIFIED PROBE: CPT | Performed by: EMERGENCY MEDICINE

## 2022-11-28 PROCEDURE — 25010000002 HYDROMORPHONE 1 MG/ML SOLUTION: Performed by: EMERGENCY MEDICINE

## 2022-11-28 PROCEDURE — 88312 SPECIAL STAINS GROUP 1: CPT | Performed by: EMERGENCY MEDICINE

## 2022-11-28 PROCEDURE — U0004 COV-19 TEST NON-CDC HGH THRU: HCPCS

## 2022-11-28 PROCEDURE — 93005 ELECTROCARDIOGRAM TRACING: CPT | Performed by: EMERGENCY MEDICINE

## 2022-11-28 PROCEDURE — 83735 ASSAY OF MAGNESIUM: CPT

## 2022-11-28 PROCEDURE — 93005 ELECTROCARDIOGRAM TRACING: CPT

## 2022-11-28 PROCEDURE — 84484 ASSAY OF TROPONIN QUANT: CPT

## 2022-11-28 PROCEDURE — 85025 COMPLETE CBC W/AUTO DIFF WBC: CPT

## 2022-11-28 PROCEDURE — 25010000002 KETOROLAC TROMETHAMINE PER 15 MG: Performed by: EMERGENCY MEDICINE

## 2022-11-28 RX ORDER — OXYCODONE AND ACETAMINOPHEN 7.5; 325 MG/1; MG/1
1 TABLET ORAL EVERY 4 HOURS PRN
Status: DISCONTINUED | OUTPATIENT
Start: 2022-11-28 | End: 2022-12-04 | Stop reason: HOSPADM

## 2022-11-28 RX ORDER — ZOLPIDEM TARTRATE 5 MG/1
5 TABLET ORAL NIGHTLY PRN
Status: DISCONTINUED | OUTPATIENT
Start: 2022-11-28 | End: 2022-12-04 | Stop reason: HOSPADM

## 2022-11-28 RX ORDER — PROCHLORPERAZINE 25 MG
25 SUPPOSITORY, RECTAL RECTAL EVERY 12 HOURS PRN
Status: DISCONTINUED | OUTPATIENT
Start: 2022-11-28 | End: 2022-12-04 | Stop reason: HOSPADM

## 2022-11-28 RX ORDER — POTASSIUM CHLORIDE 750 MG/1
20 CAPSULE, EXTENDED RELEASE ORAL 2 TIMES DAILY WITH MEALS
Status: DISCONTINUED | OUTPATIENT
Start: 2022-11-28 | End: 2022-12-04 | Stop reason: HOSPADM

## 2022-11-28 RX ORDER — KETOROLAC TROMETHAMINE 15 MG/ML
15 INJECTION, SOLUTION INTRAMUSCULAR; INTRAVENOUS ONCE
Status: COMPLETED | OUTPATIENT
Start: 2022-11-28 | End: 2022-11-28

## 2022-11-28 RX ORDER — TRAMADOL HYDROCHLORIDE 50 MG/1
25 TABLET ORAL ONCE
Status: COMPLETED | OUTPATIENT
Start: 2022-11-28 | End: 2022-11-28

## 2022-11-28 RX ORDER — HALOPERIDOL 5 MG/ML
1 INJECTION INTRAMUSCULAR ONCE
Status: COMPLETED | OUTPATIENT
Start: 2022-11-28 | End: 2022-11-28

## 2022-11-28 RX ORDER — CEFTRIAXONE SODIUM 1 G/50ML
1 INJECTION, SOLUTION INTRAVENOUS EVERY 24 HOURS
Status: DISCONTINUED | OUTPATIENT
Start: 2022-11-28 | End: 2022-11-29

## 2022-11-28 RX ORDER — ACYCLOVIR 800 MG/1
400 TABLET ORAL 2 TIMES DAILY
Status: DISCONTINUED | OUTPATIENT
Start: 2022-11-28 | End: 2022-12-04 | Stop reason: HOSPADM

## 2022-11-28 RX ORDER — ONDANSETRON 4 MG/1
8 TABLET, ORALLY DISINTEGRATING ORAL EVERY 6 HOURS PRN
Status: DISCONTINUED | OUTPATIENT
Start: 2022-11-28 | End: 2022-12-04 | Stop reason: HOSPADM

## 2022-11-28 RX ORDER — CEFTRIAXONE 1 G/1
1 INJECTION, POWDER, FOR SOLUTION INTRAMUSCULAR; INTRAVENOUS EVERY 24 HOURS
Status: DISCONTINUED | OUTPATIENT
Start: 2022-11-28 | End: 2022-11-28

## 2022-11-28 RX ORDER — SODIUM CHLORIDE 0.9 % (FLUSH) 0.9 %
10 SYRINGE (ML) INJECTION AS NEEDED
Status: DISCONTINUED | OUTPATIENT
Start: 2022-11-28 | End: 2022-12-04 | Stop reason: HOSPADM

## 2022-11-28 RX ORDER — CEFTRIAXONE SODIUM 1 G/50ML
1 INJECTION, SOLUTION INTRAVENOUS ONCE
Status: DISCONTINUED | OUTPATIENT
Start: 2022-11-28 | End: 2022-11-28

## 2022-11-28 RX ORDER — POMALIDOMIDE 4 MG/1
4 CAPSULE ORAL
COMMUNITY
Start: 2022-11-08 | End: 2022-12-04 | Stop reason: HOSPADM

## 2022-11-28 RX ORDER — PREGABALIN 75 MG/1
150 CAPSULE ORAL 2 TIMES DAILY
Status: DISCONTINUED | OUTPATIENT
Start: 2022-11-28 | End: 2022-12-04 | Stop reason: HOSPADM

## 2022-11-28 RX ORDER — MORPHINE SULFATE 100 MG/1
100 TABLET ORAL EVERY 12 HOURS SCHEDULED
Status: COMPLETED | OUTPATIENT
Start: 2022-11-28 | End: 2022-12-03

## 2022-11-28 RX ORDER — OXYCODONE HCL 10 MG/1
10 TABLET, FILM COATED, EXTENDED RELEASE ORAL EVERY 6 HOURS PRN
COMMUNITY
End: 2023-02-21

## 2022-11-28 RX ORDER — AMOXICILLIN 250 MG
2 CAPSULE ORAL 2 TIMES DAILY
Status: DISCONTINUED | OUTPATIENT
Start: 2022-11-28 | End: 2022-12-04 | Stop reason: HOSPADM

## 2022-11-28 RX ADMIN — KETOROLAC TROMETHAMINE 15 MG: 15 INJECTION, SOLUTION INTRAMUSCULAR; INTRAVENOUS at 19:56

## 2022-11-28 RX ADMIN — HYDROMORPHONE HYDROCHLORIDE 1 MG: 1 INJECTION, SOLUTION INTRAMUSCULAR; INTRAVENOUS; SUBCUTANEOUS at 19:56

## 2022-11-28 RX ADMIN — TRAMADOL HYDROCHLORIDE 25 MG: 50 TABLET ORAL at 13:47

## 2022-11-28 RX ADMIN — POTASSIUM CHLORIDE 20 MEQ: 10 CAPSULE, COATED, EXTENDED RELEASE ORAL at 23:59

## 2022-11-28 RX ADMIN — HALOPERIDOL LACTATE 1 MG: 5 INJECTION, SOLUTION INTRAMUSCULAR at 20:44

## 2022-11-28 RX ADMIN — MORPHINE SULFATE 100 MG: 100 TABLET, FILM COATED, EXTENDED RELEASE ORAL at 23:59

## 2022-11-29 LAB
ALBUMIN SERPL-MCNC: 2.8 G/DL (ref 3.5–5.2)
ALBUMIN/GLOB SERPL: 0.7 G/DL
ALP SERPL-CCNC: 43 U/L (ref 39–117)
ALT SERPL W P-5'-P-CCNC: 9 U/L (ref 1–33)
ANION GAP SERPL CALCULATED.3IONS-SCNC: 11.9 MMOL/L (ref 5–15)
AST SERPL-CCNC: 28 U/L (ref 1–32)
BACTERIA BLD CULT: ABNORMAL
BASOPHILS # BLD AUTO: 0 10*3/MM3 (ref 0–0.2)
BASOPHILS NFR BLD AUTO: 0 % (ref 0–1.5)
BILIRUB SERPL-MCNC: 0.5 MG/DL (ref 0–1.2)
BUN SERPL-MCNC: 23 MG/DL (ref 8–23)
BUN/CREAT SERPL: 39 (ref 7–25)
CALCIUM SPEC-SCNC: 8.8 MG/DL (ref 8.6–10.5)
CHLORIDE SERPL-SCNC: 103 MMOL/L (ref 98–107)
CO2 SERPL-SCNC: 25.1 MMOL/L (ref 22–29)
CREAT SERPL-MCNC: 0.59 MG/DL (ref 0.57–1)
DEPRECATED RDW RBC AUTO: 65.1 FL (ref 37–54)
EGFRCR SERPLBLD CKD-EPI 2021: 95.9 ML/MIN/1.73
EOSINOPHIL # BLD AUTO: 0.01 10*3/MM3 (ref 0–0.4)
EOSINOPHIL NFR BLD AUTO: 0.4 % (ref 0.3–6.2)
ERYTHROCYTE [DISTWIDTH] IN BLOOD BY AUTOMATED COUNT: 21 % (ref 12.3–15.4)
GLOBULIN UR ELPH-MCNC: 4.2 GM/DL
GLUCOSE SERPL-MCNC: 84 MG/DL (ref 65–99)
HCT VFR BLD AUTO: 29 % (ref 34–46.6)
HGB BLD-MCNC: 9.1 G/DL (ref 12–15.9)
IMM GRANULOCYTES # BLD AUTO: 0.01 10*3/MM3 (ref 0–0.05)
IMM GRANULOCYTES NFR BLD AUTO: 0.4 % (ref 0–0.5)
LYMPHOCYTES # BLD AUTO: 0.73 10*3/MM3 (ref 0.7–3.1)
LYMPHOCYTES NFR BLD AUTO: 32.3 % (ref 19.6–45.3)
MCH RBC QN AUTO: 26.6 PG (ref 26.6–33)
MCHC RBC AUTO-ENTMCNC: 31.4 G/DL (ref 31.5–35.7)
MCV RBC AUTO: 84.8 FL (ref 79–97)
MONOCYTES # BLD AUTO: 0.43 10*3/MM3 (ref 0.1–0.9)
MONOCYTES NFR BLD AUTO: 19 % (ref 5–12)
NEUTROPHILS NFR BLD AUTO: 1.08 10*3/MM3 (ref 1.7–7)
NEUTROPHILS NFR BLD AUTO: 47.9 % (ref 42.7–76)
NRBC BLD AUTO-RTO: 0 /100 WBC (ref 0–0.2)
PLATELET # BLD AUTO: 156 10*3/MM3 (ref 140–450)
PMV BLD AUTO: 9.9 FL (ref 6–12)
POTASSIUM SERPL-SCNC: 3 MMOL/L (ref 3.5–5.2)
PROT SERPL-MCNC: 7 G/DL (ref 6–8.5)
RBC # BLD AUTO: 3.42 10*6/MM3 (ref 3.77–5.28)
SODIUM SERPL-SCNC: 140 MMOL/L (ref 136–145)
WBC NRBC COR # BLD: 2.26 10*3/MM3 (ref 3.4–10.8)

## 2022-11-29 PROCEDURE — 94760 N-INVAS EAR/PLS OXIMETRY 1: CPT

## 2022-11-29 PROCEDURE — 25010000002 CEFTRIAXONE PER 250 MG: Performed by: INTERNAL MEDICINE

## 2022-11-29 PROCEDURE — 87077 CULTURE AEROBIC IDENTIFY: CPT | Performed by: INTERNAL MEDICINE

## 2022-11-29 PROCEDURE — 94799 UNLISTED PULMONARY SVC/PX: CPT

## 2022-11-29 PROCEDURE — 25010000002 AZITHROMYCIN PER 500 MG: Performed by: EMERGENCY MEDICINE

## 2022-11-29 PROCEDURE — 97161 PT EVAL LOW COMPLEX 20 MIN: CPT

## 2022-11-29 PROCEDURE — 86738 MYCOPLASMA ANTIBODY: CPT | Performed by: INTERNAL MEDICINE

## 2022-11-29 PROCEDURE — 80053 COMPREHEN METABOLIC PANEL: CPT | Performed by: INTERNAL MEDICINE

## 2022-11-29 PROCEDURE — 87205 SMEAR GRAM STAIN: CPT | Performed by: INTERNAL MEDICINE

## 2022-11-29 PROCEDURE — 25010000002 CEFTRIAXONE PER 250 MG: Performed by: STUDENT IN AN ORGANIZED HEALTH CARE EDUCATION/TRAINING PROGRAM

## 2022-11-29 PROCEDURE — 36415 COLL VENOUS BLD VENIPUNCTURE: CPT | Performed by: INTERNAL MEDICINE

## 2022-11-29 PROCEDURE — 87070 CULTURE OTHR SPECIMN AEROBIC: CPT | Performed by: INTERNAL MEDICINE

## 2022-11-29 PROCEDURE — 87185 SC STD ENZYME DETCJ PER NZM: CPT | Performed by: INTERNAL MEDICINE

## 2022-11-29 PROCEDURE — 85025 COMPLETE CBC W/AUTO DIFF WBC: CPT | Performed by: INTERNAL MEDICINE

## 2022-11-29 RX ORDER — CEFTRIAXONE SODIUM 1 G/50ML
1 INJECTION, SOLUTION INTRAVENOUS ONCE
Status: COMPLETED | OUTPATIENT
Start: 2022-11-29 | End: 2022-11-29

## 2022-11-29 RX ORDER — ONDANSETRON 2 MG/ML
4 INJECTION INTRAMUSCULAR; INTRAVENOUS EVERY 6 HOURS PRN
Status: DISCONTINUED | OUTPATIENT
Start: 2022-11-29 | End: 2022-12-04 | Stop reason: HOSPADM

## 2022-11-29 RX ORDER — ALUMINA, MAGNESIA, AND SIMETHICONE 2400; 2400; 240 MG/30ML; MG/30ML; MG/30ML
15 SUSPENSION ORAL EVERY 6 HOURS PRN
Status: DISCONTINUED | OUTPATIENT
Start: 2022-11-29 | End: 2022-12-04 | Stop reason: HOSPADM

## 2022-11-29 RX ORDER — CEFTRIAXONE SODIUM 2 G/50ML
2 INJECTION, SOLUTION INTRAVENOUS EVERY 24 HOURS
Status: DISCONTINUED | OUTPATIENT
Start: 2022-11-30 | End: 2022-12-02

## 2022-11-29 RX ORDER — SODIUM CHLORIDE 9 MG/ML
40 INJECTION, SOLUTION INTRAVENOUS AS NEEDED
Status: DISCONTINUED | OUTPATIENT
Start: 2022-11-29 | End: 2022-12-04 | Stop reason: HOSPADM

## 2022-11-29 RX ORDER — SODIUM CHLORIDE 0.9 % (FLUSH) 0.9 %
10 SYRINGE (ML) INJECTION EVERY 12 HOURS SCHEDULED
Status: DISCONTINUED | OUTPATIENT
Start: 2022-11-29 | End: 2022-12-04 | Stop reason: HOSPADM

## 2022-11-29 RX ORDER — SODIUM CHLORIDE 0.9 % (FLUSH) 0.9 %
10 SYRINGE (ML) INJECTION AS NEEDED
Status: DISCONTINUED | OUTPATIENT
Start: 2022-11-29 | End: 2022-12-04 | Stop reason: HOSPADM

## 2022-11-29 RX ORDER — CEFTRIAXONE SODIUM 1 G/50ML
1 INJECTION, SOLUTION INTRAVENOUS EVERY 24 HOURS
Status: DISCONTINUED | OUTPATIENT
Start: 2022-11-29 | End: 2022-11-29 | Stop reason: DRUGHIGH

## 2022-11-29 RX ORDER — ACETAMINOPHEN 325 MG/1
650 TABLET ORAL EVERY 4 HOURS PRN
Status: DISCONTINUED | OUTPATIENT
Start: 2022-11-29 | End: 2022-12-04 | Stop reason: HOSPADM

## 2022-11-29 RX ADMIN — ACYCLOVIR 400 MG: 800 TABLET ORAL at 00:00

## 2022-11-29 RX ADMIN — ZOLPIDEM TARTRATE 5 MG: 5 TABLET ORAL at 00:18

## 2022-11-29 RX ADMIN — Medication 10 ML: at 20:54

## 2022-11-29 RX ADMIN — AZITHROMYCIN 500 MG: 500 INJECTION, POWDER, LYOPHILIZED, FOR SOLUTION INTRAVENOUS at 00:00

## 2022-11-29 RX ADMIN — PREGABALIN 150 MG: 75 CAPSULE ORAL at 20:54

## 2022-11-29 RX ADMIN — APIXABAN 2.5 MG: 2.5 TABLET, FILM COATED ORAL at 00:00

## 2022-11-29 RX ADMIN — PREGABALIN 150 MG: 75 CAPSULE ORAL at 00:00

## 2022-11-29 RX ADMIN — APIXABAN 2.5 MG: 2.5 TABLET, FILM COATED ORAL at 09:40

## 2022-11-29 RX ADMIN — MORPHINE SULFATE 100 MG: 100 TABLET, FILM COATED, EXTENDED RELEASE ORAL at 09:39

## 2022-11-29 RX ADMIN — CEFTRIAXONE SODIUM 1 G: 1 INJECTION, SOLUTION INTRAVENOUS at 20:59

## 2022-11-29 RX ADMIN — SENNOSIDES AND DOCUSATE SODIUM 2 TABLET: 8.6; 5 TABLET ORAL at 20:53

## 2022-11-29 RX ADMIN — MORPHINE SULFATE 100 MG: 100 TABLET, FILM COATED, EXTENDED RELEASE ORAL at 20:54

## 2022-11-29 RX ADMIN — PREGABALIN 150 MG: 75 CAPSULE ORAL at 09:40

## 2022-11-29 RX ADMIN — ACYCLOVIR 400 MG: 800 TABLET ORAL at 20:53

## 2022-11-29 RX ADMIN — POTASSIUM CHLORIDE 20 MEQ: 10 CAPSULE, COATED, EXTENDED RELEASE ORAL at 09:39

## 2022-11-29 RX ADMIN — ACYCLOVIR 400 MG: 800 TABLET ORAL at 09:39

## 2022-11-29 RX ADMIN — POTASSIUM CHLORIDE 20 MEQ: 10 CAPSULE, COATED, EXTENDED RELEASE ORAL at 17:14

## 2022-11-29 RX ADMIN — APIXABAN 2.5 MG: 2.5 TABLET, FILM COATED ORAL at 20:54

## 2022-11-29 RX ADMIN — CEFTRIAXONE SODIUM 1 G: 1 INJECTION, SOLUTION INTRAVENOUS at 00:00

## 2022-11-29 RX ADMIN — CEFTRIAXONE SODIUM 1 G: 1 INJECTION, SOLUTION INTRAVENOUS at 17:14

## 2022-11-29 RX ADMIN — Medication 10 ML: at 09:39

## 2022-11-30 LAB
ANION GAP SERPL CALCULATED.3IONS-SCNC: 5.9 MMOL/L (ref 5–15)
BUN SERPL-MCNC: 15 MG/DL (ref 8–23)
BUN/CREAT SERPL: 27.8 (ref 7–25)
CALCIUM SPEC-SCNC: 8.6 MG/DL (ref 8.6–10.5)
CHLORIDE SERPL-SCNC: 106 MMOL/L (ref 98–107)
CO2 SERPL-SCNC: 29.1 MMOL/L (ref 22–29)
CREAT SERPL-MCNC: 0.54 MG/DL (ref 0.57–1)
EGFRCR SERPLBLD CKD-EPI 2021: 98 ML/MIN/1.73
GLUCOSE SERPL-MCNC: 90 MG/DL (ref 65–99)
M PNEUMO IGG SER IA-ACNC: <100 U/ML (ref 0–99)
M PNEUMO IGM SER IA-ACNC: <770 U/ML (ref 0–769)
POTASSIUM SERPL-SCNC: 3.7 MMOL/L (ref 3.5–5.2)
SODIUM SERPL-SCNC: 141 MMOL/L (ref 136–145)
WHOLE BLOOD HOLD SPECIMEN: 11

## 2022-11-30 PROCEDURE — 25010000002 AZITHROMYCIN PER 500 MG: Performed by: INTERNAL MEDICINE

## 2022-11-30 PROCEDURE — 97530 THERAPEUTIC ACTIVITIES: CPT

## 2022-11-30 PROCEDURE — 25010000002 VANCOMYCIN 5 G RECONSTITUTED SOLUTION: Performed by: INTERNAL MEDICINE

## 2022-11-30 PROCEDURE — 97110 THERAPEUTIC EXERCISES: CPT

## 2022-11-30 PROCEDURE — 0 CEFTRIAXONE PER 250 MG: Performed by: STUDENT IN AN ORGANIZED HEALTH CARE EDUCATION/TRAINING PROGRAM

## 2022-11-30 PROCEDURE — 94799 UNLISTED PULMONARY SVC/PX: CPT

## 2022-11-30 PROCEDURE — 87040 BLOOD CULTURE FOR BACTERIA: CPT | Performed by: INTERNAL MEDICINE

## 2022-11-30 PROCEDURE — 80048 BASIC METABOLIC PNL TOTAL CA: CPT | Performed by: INTERNAL MEDICINE

## 2022-11-30 RX ORDER — AZITHROMYCIN 250 MG/1
500 TABLET, FILM COATED ORAL EVERY 24 HOURS
Status: DISCONTINUED | OUTPATIENT
Start: 2022-11-30 | End: 2022-12-02

## 2022-11-30 RX ORDER — AZITHROMYCIN 250 MG/1
500 TABLET, FILM COATED ORAL EVERY 24 HOURS
Status: DISCONTINUED | OUTPATIENT
Start: 2022-11-30 | End: 2022-11-30

## 2022-11-30 RX ADMIN — AZITHROMYCIN 500 MG: 500 INJECTION, POWDER, LYOPHILIZED, FOR SOLUTION INTRAVENOUS at 00:34

## 2022-11-30 RX ADMIN — POTASSIUM CHLORIDE 20 MEQ: 10 CAPSULE, COATED, EXTENDED RELEASE ORAL at 17:16

## 2022-11-30 RX ADMIN — APIXABAN 2.5 MG: 2.5 TABLET, FILM COATED ORAL at 08:09

## 2022-11-30 RX ADMIN — MORPHINE SULFATE 100 MG: 100 TABLET, FILM COATED, EXTENDED RELEASE ORAL at 20:15

## 2022-11-30 RX ADMIN — ACYCLOVIR 400 MG: 800 TABLET ORAL at 20:16

## 2022-11-30 RX ADMIN — VANCOMYCIN HYDROCHLORIDE 1000 MG: 5 INJECTION, POWDER, LYOPHILIZED, FOR SOLUTION INTRAVENOUS at 13:47

## 2022-11-30 RX ADMIN — MORPHINE SULFATE 100 MG: 100 TABLET, FILM COATED, EXTENDED RELEASE ORAL at 08:09

## 2022-11-30 RX ADMIN — POTASSIUM CHLORIDE 20 MEQ: 10 CAPSULE, COATED, EXTENDED RELEASE ORAL at 08:09

## 2022-11-30 RX ADMIN — APIXABAN 2.5 MG: 2.5 TABLET, FILM COATED ORAL at 20:15

## 2022-11-30 RX ADMIN — CEFTRIAXONE SODIUM 2 G: 2 INJECTION, SOLUTION INTRAVENOUS at 20:14

## 2022-11-30 RX ADMIN — PREGABALIN 150 MG: 75 CAPSULE ORAL at 20:15

## 2022-11-30 RX ADMIN — ACYCLOVIR 400 MG: 800 TABLET ORAL at 08:09

## 2022-11-30 RX ADMIN — PREGABALIN 150 MG: 75 CAPSULE ORAL at 08:09

## 2022-11-30 RX ADMIN — Medication 10 ML: at 08:09

## 2022-11-30 RX ADMIN — Medication 10 ML: at 20:16

## 2022-11-30 RX ADMIN — AZITHROMYCIN MONOHYDRATE 500 MG: 250 TABLET ORAL at 20:15

## 2022-12-01 LAB
ANION GAP SERPL CALCULATED.3IONS-SCNC: 4.6 MMOL/L (ref 5–15)
BACTERIA SPEC RESP CULT: ABNORMAL
BACTERIA SPEC RESP CULT: ABNORMAL
BUN SERPL-MCNC: 12 MG/DL (ref 8–23)
BUN/CREAT SERPL: 23.1 (ref 7–25)
CALCIUM SPEC-SCNC: 8.8 MG/DL (ref 8.6–10.5)
CHLORIDE SERPL-SCNC: 107 MMOL/L (ref 98–107)
CO2 SERPL-SCNC: 30.4 MMOL/L (ref 22–29)
CREAT SERPL-MCNC: 0.52 MG/DL (ref 0.57–1)
EGFRCR SERPLBLD CKD-EPI 2021: 98.9 ML/MIN/1.73
GLUCOSE SERPL-MCNC: 103 MG/DL (ref 65–99)
GRAM STN SPEC: ABNORMAL
POTASSIUM SERPL-SCNC: 4 MMOL/L (ref 3.5–5.2)
QT INTERVAL: 372 MS
SODIUM SERPL-SCNC: 142 MMOL/L (ref 136–145)
VANCOMYCIN TROUGH SERPL-MCNC: 13.56 MCG/ML (ref 5–20)

## 2022-12-01 PROCEDURE — 80202 ASSAY OF VANCOMYCIN: CPT | Performed by: INTERNAL MEDICINE

## 2022-12-01 PROCEDURE — 99232 SBSQ HOSP IP/OBS MODERATE 35: CPT | Performed by: ORTHOPAEDIC SURGERY

## 2022-12-01 PROCEDURE — 94799 UNLISTED PULMONARY SVC/PX: CPT

## 2022-12-01 PROCEDURE — 80048 BASIC METABOLIC PNL TOTAL CA: CPT | Performed by: INTERNAL MEDICINE

## 2022-12-01 PROCEDURE — 97116 GAIT TRAINING THERAPY: CPT

## 2022-12-01 PROCEDURE — 97110 THERAPEUTIC EXERCISES: CPT

## 2022-12-01 PROCEDURE — 25010000002 VANCOMYCIN 5 G RECONSTITUTED SOLUTION: Performed by: INTERNAL MEDICINE

## 2022-12-01 PROCEDURE — 0 CEFTRIAXONE PER 250 MG: Performed by: STUDENT IN AN ORGANIZED HEALTH CARE EDUCATION/TRAINING PROGRAM

## 2022-12-01 RX ADMIN — POTASSIUM CHLORIDE 20 MEQ: 10 CAPSULE, COATED, EXTENDED RELEASE ORAL at 17:38

## 2022-12-01 RX ADMIN — PREGABALIN 150 MG: 75 CAPSULE ORAL at 20:45

## 2022-12-01 RX ADMIN — POTASSIUM CHLORIDE 20 MEQ: 10 CAPSULE, COATED, EXTENDED RELEASE ORAL at 09:29

## 2022-12-01 RX ADMIN — AZITHROMYCIN MONOHYDRATE 500 MG: 250 TABLET ORAL at 20:44

## 2022-12-01 RX ADMIN — MORPHINE SULFATE 100 MG: 100 TABLET, FILM COATED, EXTENDED RELEASE ORAL at 20:45

## 2022-12-01 RX ADMIN — CEFTRIAXONE SODIUM 2 G: 2 INJECTION, SOLUTION INTRAVENOUS at 21:10

## 2022-12-01 RX ADMIN — APIXABAN 2.5 MG: 2.5 TABLET, FILM COATED ORAL at 09:28

## 2022-12-01 RX ADMIN — ACYCLOVIR 400 MG: 800 TABLET ORAL at 20:45

## 2022-12-01 RX ADMIN — VANCOMYCIN HYDROCHLORIDE 1000 MG: 5 INJECTION, POWDER, LYOPHILIZED, FOR SOLUTION INTRAVENOUS at 14:11

## 2022-12-01 RX ADMIN — VANCOMYCIN HYDROCHLORIDE 1000 MG: 5 INJECTION, POWDER, LYOPHILIZED, FOR SOLUTION INTRAVENOUS at 02:31

## 2022-12-01 RX ADMIN — ACYCLOVIR 400 MG: 800 TABLET ORAL at 09:29

## 2022-12-01 RX ADMIN — MORPHINE SULFATE 100 MG: 100 TABLET, FILM COATED, EXTENDED RELEASE ORAL at 09:29

## 2022-12-01 RX ADMIN — APIXABAN 2.5 MG: 2.5 TABLET, FILM COATED ORAL at 20:45

## 2022-12-01 RX ADMIN — PREGABALIN 150 MG: 75 CAPSULE ORAL at 09:28

## 2022-12-01 RX ADMIN — Medication 10 ML: at 20:45

## 2022-12-01 RX ADMIN — Medication 10 ML: at 09:29

## 2022-12-01 NOTE — PLAN OF CARE
Goal Outcome Evaluation:              Outcome Evaluation: Aox4. Up to bsc and in chair throughout the day, x1 assist. Denies any pain or discomfort. Educated patient on importance of turning and repositioning.

## 2022-12-01 NOTE — CONSULTS
[facility]   Consult Note    Patient Name: Evangelina Sutton  : 1950  MRN: [id number]  Primary Care Physician:  Gilberto Coleman MD  Referring Physician: No ref. provider found  Date of admission: 2022    Consults  Subjective   Subjective     Reason for Consult/ Chief Complaint: Admitted for UTI, some concerns about her hip    History of Present Illness  Evangelina Sutton is a 72 y.o. female who had a hip hemiarthroplasty approximately 7 months ago.  She does have a history of multiple myeloma.  Upon visiting with her yesterday, she is with her son and is up in a chair eating lunch.  She denies any significant hip pain at this time.  She has been participating with physical therapy    Review of Systems     Personal History     Past Medical History:   Diagnosis Date   • Cancer (HCC)     BONE CANCER/TAKING CHEMO SHOT WEEKLY   • DDD (degenerative disc disease), cervical    • Neuropathy     LEGS AND ARMS   • PONV (postoperative nausea and vomiting)        Past Surgical History:   Procedure Laterality Date   • BACK SURGERY      DISCECTOMY W/FUSION, NECK ? LEVEL   • CHOLECYSTECTOMY     • HYSTERECTOMY     • PORTACATH PLACEMENT N/A 3/17/2022    Procedure: INSERTION OF PORTACATH;  Surgeon: Cj Rossi MD;  Location: [facility];  Service: General;  Laterality: N/A;   • TOTAL HIP ARTHROPLASTY Left 2022    Procedure: LEFT HIP ARTHROPLASTY ANTERIOR;  Surgeon: Sridhar Coto MD;  Location: [facility];  Service: Orthopedics;  Laterality: Left;       Family History: Her family history is not on file.     Social History: She  reports that she quit smoking about 25 years ago. Her smoking use included cigarettes. She smoked an average of 1 pack per day. She has never used smokeless tobacco. She reports that she does not drink alcohol and does not use drugs.    Home Medications:  Morphine, Vitamin D3, acyclovir, ondansetron ODT, oxyCODONE, pomalidomide, potassium chloride, pregabalin,  prochlorperazine, sennosides-docusate, spironolactone, and zolpidem    Allergies:  She has No Known Allergies.    Objective    Objective     Vitals:    Temp:  [97.7 °F (36.5 °C)-98.8 °F (37.1 °C)] 98.2 °F (36.8 °C)  Heart Rate:  [] 105  Resp:  [16-18] 18  BP: ()/(50-69) 105/53  Flow (L/min):  [2] 2    Physical Exam  Awake and alert, denies significant hip pain, incisions healed with no redness no drainage, no significant swelling around her hip.  Result Review    Result Review:  I have personally reviewed the results from the time of this admission to 12/1/2022 07:48 EST and agree with these findings:  [x]  Laboratory list / accordion  [x]  Microbiology   [x]  Radiology  []  EKG/Telemetry   []  Cardiology/Vascular   []  Pathology  []  Old records  []  Other:  Most notable findings include: Working well physical therapy      Assessment & Plan   Assessment / Plan     Brief Patient Summary:  Evangelina Sutton is a 72 y.o. female previous hip fracture with hemiarthroplasty about 7 months ago    Active Hospital Problems:  Active Hospital Problems    Diagnosis    • **Acute UTI        Plan:   Continue physical and occupational therapy, weight-bear as tolerated, fall precautions.    Sridhar Coto MD

## 2022-12-01 NOTE — THERAPY TREATMENT NOTE
Acute Care - Physical Therapy Treatment Note  [facility]     Patient Name: Evangelina Sutton  : 1950  MRN: [id number]  Today's Date: 2022      Visit Dx:     ICD-10-CM ICD-9-CM   1. Acute UTI  N39.0 599.0   2. Difficulty walking  R26.2 719.7     Patient Active Problem List   Diagnosis   • Nausea and vomiting   • Multiple myeloma (HCC)   • Left displaced femoral neck fracture (HCC)   • Decreased activities of daily living (ADL)   • Aftercare following left hip hemiarthroplasty    • Acute UTI     Past Medical History:   Diagnosis Date   • Cancer (HCC)     BONE CANCER/TAKING CHEMO SHOT WEEKLY   • DDD (degenerative disc disease), cervical    • Neuropathy     LEGS AND ARMS   • PONV (postoperative nausea and vomiting)      Past Surgical History:   Procedure Laterality Date   • BACK SURGERY      DISCECTOMY W/FUSION, NECK ? LEVEL   • CHOLECYSTECTOMY     • HYSTERECTOMY     • PORTACATH PLACEMENT N/A 3/17/2022    Procedure: INSERTION OF PORTACATH;  Surgeon: Cj Rossi MD;  Location: [facility] MAIN OR;  Service: General;  Laterality: N/A;   • TOTAL HIP ARTHROPLASTY Left 2022    Procedure: LEFT HIP ARTHROPLASTY ANTERIOR;  Surgeon: Sridhar Coto MD;  Location: [facility] MAIN OR;  Service: Orthopedics;  Laterality: Left;     PT Assessment (last 12 hours)     PT Evaluation and Treatment     Row Name 22 0849          Physical Therapy Time and Intention    Subjective Information no complaints  -DK     Document Type therapy note (daily note)  -DK     Mode of Treatment individual therapy;physical therapy  -DK     Patient Effort good  -DK     Symptoms Noted During/After Treatment none  -DK     Row Name 22 0849          Pain    Pretreatment Pain Rating 0/10 - no pain  -DK     Posttreatment Pain Rating 0/10 - no pain  -DK     Row Name 22 0849          Cognition    Affect/Mental Status (Cognition) WFL  -DK     Orientation Status (Cognition) oriented x 4  -DK     Follows Commands (Cognition) WFL  -DK      Cognitive Function WFL  -DK     Personal Safety Interventions gait belt;nonskid shoes/slippers when out of bed;supervised activity  -DK     Row Name 12/01/22 0849          Bed Mobility    Bed Mobility supine-sit  -DK     All Activities, [place] (Bed Mobility) standby assist  -DK     Supine-Sit [place] (Bed Mobility) standby assist  -DK     Assistive Device (Bed Mobility) bed rails  -DK     Row Name 12/01/22 0849          Transfers    Transfers sit-stand transfer;stand-sit transfer  -     Row Name 12/01/22 0849          Sit-Stand Transfer    Sit-Stand [place] (Transfers) standby assist;contact guard;1 person assist  -DK     Assistive Device (Sit-Stand Transfers) walker, front-wheeled  -DK     Row Name 12/01/22 0849          Stand-Sit Transfer    Stand-Sit [place] (Transfers) standby assist;contact guard;1 person assist  -DK     Assistive Device (Stand-Sit Transfers) walker, front-wheeled  -DK     Row Name 12/01/22 0849          Gait/Stairs (Locomotion)    Gait/Stairs Locomotion gait/ambulation independence;gait/ambulation assistive device;distance ambulated;gait pattern  -DK     [place] Level (Gait) standby assist;contact guard;1 person assist  -DK     Assistive Device (Gait) walker, front-wheeled  -DK     Distance in Feet (Gait) 65  -DK     Pattern (Gait) step-through  -DK     Deviations/Abnormal Patterns (Gait) festinating/shuffling  -DK     Bilateral Gait Deviations forward flexed posture  -DK     Comment, (Gait/Stairs) Pt ambulated with O2 and a rolling walker.  She was left in the recliner on alert post treatment.  -     Row Name 12/01/22 0849          Safety Issues, Functional Mobility    Impairments Affecting Function (Mobility) endurance/activity tolerance;strength;shortness of breath  -     Row Name 12/01/22 0849          Balance    Balance Assessment sitting static balance;sitting dynamic balance;standing static balance;standing dynamic balance  -     Static Sitting Balance  standby assist  -DK     Dynamic Sitting Balance standby assist  -DK     Position, Sitting Balance unsupported;sitting edge of bed;sitting in chair  -DK     Static Standing Balance standby assist;contact guard;1-person assist  -DK     Dynamic Standing Balance standby assist;contact guard;1-person assist  -DK     Position/Device Used, Standing Balance walker, front-wheeled  -DK     Balance Interventions standing;dynamic;tandem gait  -DK     Row Name 12/01/22 0849          Motor Skills    Motor Skills --  therapeutic exercises  -DK     Therapeutic Exercise hip;knee;ankle  -     Row Name 12/01/22 0849          Hip (Therapeutic Exercise)    Hip (Therapeutic Exercise) AAROM (active assistive range of motion)  -DK     Hip AAROM (Therapeutic Exercise) bilateral;flexion;extension;aBduction;aDduction;supine;10 repetitions;2 sets  -     Row Name 12/01/22 0849          Knee (Therapeutic Exercise)    Knee (Therapeutic Exercise) AAROM (active assistive range of motion)  -DK     Knee AAROM (Therapeutic Exercise) bilateral;flexion;extension;supine;10 repetitions;2 sets  -     Row Name 12/01/22 0849          Ankle (Therapeutic Exercise)    Ankle (Therapeutic Exercise) AAROM (active assistive range of motion)  -DK     Ankle AAROM (Therapeutic Exercise) bilateral;dorsiflexion;plantarflexion;supine;10 repetitions;2 sets  -     Row Name 12/01/22 0849          Plan of Care Review    Plan of Care Reviewed With patient  -DK     Progress improving  -     Row Name 12/01/22 0849          Positioning and Restraints    Pre-Treatment Position in bed  -DK     Post Treatment Position chair  -DK     In Chair reclined;call light within reach;encouraged to call for assist;exit alarm on;legs elevated;heels elevated  -     Row Name 12/01/22 0849          Therapy Assessment/Plan (PT)    Rehab Potential (PT) good, to achieve stated therapy goals  -DK     Criteria for Skilled Interventions Met (PT) skilled treatment is necessary  -DK      Therapy Frequency (PT) daily  -DK     Problem List (PT) problems related to;balance;mobility;strength  breathing issues  -DK     Activity Limitations Related to Problem List (PT) unable to ambulate safely  -DK     Row Name 12/01/22 0849          Progress Summary (PT)    Progress Toward Functional Goals (PT) progress toward functional goals is good  -DK           User Key  (r) = Recorded By, (t) = Taken By, (c) = Cosigned By    Initials Name Provider Type    DK Tavia Mckeon PTA Physical Therapist Assistant                Physical Therapy Education     Title: PT OT SLP Therapies (Done)     Topic: Physical Therapy (Done)     Point: Mobility training (Done)     Learning Progress Summary           Patient Acceptance, E, VU,DU by  at 11/30/2022 0815    Acceptance, E, VU by [facility] at 11/29/2022 1023                               User Key     Initials Effective Dates Name Provider Type Discipline     01/19/22 -  Willard Lopez, RN Registered Nurse Nurse     10/18/22 -  Winston Portillo, DIANE Student PT Student PT              PT Recommendation and Plan  Planned Therapy Interventions (PT): balance training, bed mobility training, gait training, strengthening, transfer training  Therapy Frequency (PT): daily  Progress Summary (PT)  Progress Toward Functional Goals (PT): progress toward functional goals is good  Plan of Care Reviewed With: patient  Progress: improving   Outcome Measures     Row Name 12/01/22 0849 11/30/22 0908 11/29/22 1000       How much help from another person do you currently need...    Turning from your back to your side while in flat bed without using bedrails? 4  -DK 4  -DK 4  -AV (r) JG (t) AV (c)    Moving from lying on back to sitting on the side of a flat bed without bedrails? 4  -DK 4  -DK 4  -AV (r) JG (t) AV (c)    Moving to and from a bed to a chair (including a wheelchair)? 3  -DK 3  -DK 3  -AV (r) JG (t) AV (c)    Standing up from a chair using your arms (e.g., wheelchair, bedside chair)? 3  -DK  3  -DK 3  -AV (r) JG (t) AV (c)    Climbing 3-5 steps with a railing? 3  -DK 3  -DK 3  -AV (r) JG (t) AV (c)    To walk in hospital room? 3  -DK 3  -DK 3  -AV (r) JG (t) AV (c)    AM-PAC 6 Clicks Score (PT) 20  -DK 20  -DK 20  -AV (r) JG (t)       Functional Assessment    Outcome Measure Options AM-PAC 6 Clicks Basic Mobility (PT)  -DK AM-PAC 6 Clicks Basic Mobility (PT)  -DK AM-PAC 6 Clicks Basic Mobility (PT)  -AV (r) JG (t) AV (c)          User Key  (r) = Recorded By, (t) = Taken By, (c) = Cosigned By    Initials Name Provider Type    Tavia Fairbanks PTA Physical Therapist Assistant    AV Hector Ortega, PT Physical Therapist    Winston Pepe, PT Student PT Student                 Time Calculation:    PT Charges     Row Name 12/01/22 0852             Time Calculation    PT Received On 12/01/22  -DK      PT Goal Re-Cert Due Date 12/08/22  -DK         Timed Charges    36637 - PT Therapeutic Exercise Minutes 14  -DK      14101 - Gait Training Minutes  6  -DK      79866 - PT Therapeutic Activity Minutes 6  -DK         Total Minutes    Timed Charges Total Minutes 26  -DK       Total Minutes 26  -DK            User Key  (r) = Recorded By, (t) = Taken By, (c) = Cosigned By    Initials Name Provider Type    Tavia Fairbanks PTA Physical Therapist Assistant              Therapy Charges for Today     Code Description Service Date Service Provider Modifiers Qty    27536600956 HC PT THER PROC EA 15 MIN 11/30/2022 Tavia Mckeon, PTA GP 1    03094340338 HC PT THERAPEUTIC ACT EA 15 MIN 11/30/2022 Tavia Mckeon, PTA GP 1    51823977141 HC PT THER PROC EA 15 MIN 12/1/2022 Tavia Mckeon, PTA GP 1    83506506453 HC GAIT TRAINING EA 15 MIN 12/1/2022 Tavia Mckeon, PTA GP 1          PT G-Codes  Outcome Measure Options: AM-PAC 6 Clicks Basic Mobility (PT)  AM-PAC 6 Clicks Score (PT): 20    Tavia Mckeon PTA  12/1/2022

## 2022-12-01 NOTE — PLAN OF CARE
Goal Outcome Evaluation:  Plan of Care Reviewed With: patient        Progress: improving  Outcome Evaluation: AOx4. Up to BSC X1 assist. VS WDL. Denies pain or discomfort. No changes in patient status at this time.Will continue to monitor.

## 2022-12-01 NOTE — PROGRESS NOTES
"[facility] Clinical Pharmacy Services: Vancomycin Monitoring Note    Evangelina Sutton is a 72 y.o. female who is on day  of pharmacy to dose vancomycin for Bacteremia.    Previous Vancomycin Dose:   1000 mg IV every  12  hours  Imaging Reviewed?: Yes  Updated Cultures and Sensitivities:    Blood cultures- coag negative staph x2   respiratory culture- heavy growth coccobacillus   repeat blood cultures- no growth @ 24 hours    Vitals/Labs  Ht: 165.1 cm (65\"); Wt: 65.4 kg (144 lb 2.9 oz)   Temp (24hrs), Av.3 °F (36.8 °C), Min:97.7 °F (36.5 °C), Max:98.6 °F (37 °C)   Estimated Creatinine Clearance: 101 mL/min (A) (by C-G formula based on SCr of 0.52 mg/dL (L)).       Results from last 7 days   Lab Units 22  1125 22  0524 22  0516 22  0602 22  1333   VANCOMYCIN TR mcg/mL 13.56  --   --   --   --    CREATININE mg/dL  --  0.52* 0.54* 0.59 0.62   WBC 10*3/mm3  --   --   --  2.26* 3.00*     Assessment/Plan    Current Vancomycin Dose:  1000 mg IV every 12 hours; which provides the following predicted parameters:  AUC24,ss: 554 mg/L.hr  PAUC*: 94 %  Ctrough,ss: 16.8 mg/L  Pconc*: 28 %  Tox.: 12 %  Random level providing AUC in goal range.  Next Vanc Trough ordered for  at 1300  We will continue to monitor patient changes and renal function     Thank you for involving pharmacy in this patient's care. Please contact pharmacy with any questions or concerns.    Radha Lares  Clinical Pharmacist    "

## 2022-12-01 NOTE — PROGRESS NOTES
[facility]     Progress Note    Patient Name: Evangelina Sutton  : 1950  MRN: [id number]  Primary Care Physician:  Gilberto Coleman MD  Date of admission: 2022    Subjective   Subjective     Chief Complaint: Stable and doing well we will continue the current management continue with antibiotics have discussed the case with pharmacist we will continue ceftriaxone and vancomycin    HPI:  Patient reports patient with fever as well as staph epidermidis we do not know for sure if it is actual pathogen or if there is anything to do with just a contaminant but with fever and immunocompromise status we will continue IV antibiotic    Review of Systems   All systems were reviewed and negative except for: Reviewed    Objective   Objective     Vitals:   Temp:  [97.7 °F (36.5 °C)-98.8 °F (37.1 °C)] 98.6 °F (37 °C)  Heart Rate:  [] 92  Resp:  [16-18] 18  BP: ()/(50-69) 102/53  Flow (L/min):  [2] 2    Physical Exam    Constitutional: Awake, alert   Eyes: PERRLA, sclerae anicteric, no conjunctival injection   HENT: NCAT, mucous membranes moist   Neck: Supple, no thyromegaly, no lymphadenopathy, trachea midline   Respiratory: Clear to auscultation bilaterally, nonlabored respirations    Cardiovascular: RRR, no murmurs, rubs, or gallops, palpable pedal pulses bilaterally   Gastrointestinal: Positive bowel sounds, soft, nontender, nondistended   Musculoskeletal: No bilateral ankle edema, no clubbing or cyanosis to extremities   Psychiatric: Appropriate affect, cooperative   Neurologic: Oriented x 3, strength symmetric in all extremities, Cranial Nerves grossly intact to confrontation, speech clear   Skin: No rashes     Result Review    Result Review:  I have personally reviewed the results from the time of this admission to 2022 08:34 EST and agree with these findings:  [x]  Laboratory anemia multiple myeloma  []  Microbiology  []  Radiology  []  EKG/Telemetry   []  Cardiology/Vascular   []   Pathology  []  Old records  []  Other:  Most notable findings include: Anemia multiple myeloma    Assessment & Plan   Assessment / Plan     Brief Patient Summary:  Evangelina Sutton is a 72 y.o. female who anemia and multiple myeloma continue antibiotics    Active Hospital Problems:  Active Hospital Problems    Diagnosis    • **Acute UTI        Plan:   Continue antibiotic    DVT prophylaxis:  Medical and mechanical DVT prophylaxis orders are present.    CODE STATUS:   Level Of Support Discussed With: Patient  Code Status (Patient has no pulse and is not breathing): CPR (Attempt to Resuscitate)  Medical Interventions (Patient has pulse or is breathing): Full Support    Disposition:  I expect patient to be discharged after the patient has been stabilized.    Electronically signed by Abbe Zamudio MD, 12/01/22, 8:34 AM EST.      Part of this note may be an electronic transcription/translation of spoken language to printed text using the Dragon Dictation System.

## 2022-12-02 LAB
ANION GAP SERPL CALCULATED.3IONS-SCNC: 6.2 MMOL/L (ref 5–15)
BASOPHILS # BLD AUTO: 0.03 10*3/MM3 (ref 0–0.2)
BASOPHILS NFR BLD AUTO: 0.8 % (ref 0–1.5)
BUN SERPL-MCNC: 10 MG/DL (ref 8–23)
BUN/CREAT SERPL: 20 (ref 7–25)
CALCIUM SPEC-SCNC: 9.2 MG/DL (ref 8.6–10.5)
CHLORIDE SERPL-SCNC: 107 MMOL/L (ref 98–107)
CO2 SERPL-SCNC: 28.8 MMOL/L (ref 22–29)
CREAT SERPL-MCNC: 0.5 MG/DL (ref 0.57–1)
DEPRECATED RDW RBC AUTO: 70 FL (ref 37–54)
EGFRCR SERPLBLD CKD-EPI 2021: 99.8 ML/MIN/1.73
EOSINOPHIL # BLD AUTO: 0.26 10*3/MM3 (ref 0–0.4)
EOSINOPHIL NFR BLD AUTO: 6.5 % (ref 0.3–6.2)
ERYTHROCYTE [DISTWIDTH] IN BLOOD BY AUTOMATED COUNT: 21.4 % (ref 12.3–15.4)
GLUCOSE SERPL-MCNC: 100 MG/DL (ref 65–99)
HCT VFR BLD AUTO: 29.3 % (ref 34–46.6)
HGB BLD-MCNC: 8.8 G/DL (ref 12–15.9)
IMM GRANULOCYTES # BLD AUTO: 0.01 10*3/MM3 (ref 0–0.05)
IMM GRANULOCYTES NFR BLD AUTO: 0.3 % (ref 0–0.5)
LYMPHOCYTES # BLD AUTO: 1.89 10*3/MM3 (ref 0.7–3.1)
LYMPHOCYTES NFR BLD AUTO: 47.5 % (ref 19.6–45.3)
MCH RBC QN AUTO: 26.6 PG (ref 26.6–33)
MCHC RBC AUTO-ENTMCNC: 30 G/DL (ref 31.5–35.7)
MCV RBC AUTO: 88.5 FL (ref 79–97)
MONOCYTES # BLD AUTO: 0.65 10*3/MM3 (ref 0.1–0.9)
MONOCYTES NFR BLD AUTO: 16.3 % (ref 5–12)
NEUTROPHILS NFR BLD AUTO: 1.14 10*3/MM3 (ref 1.7–7)
NEUTROPHILS NFR BLD AUTO: 28.6 % (ref 42.7–76)
NRBC BLD AUTO-RTO: 0 /100 WBC (ref 0–0.2)
PLATELET # BLD AUTO: 230 10*3/MM3 (ref 140–450)
PMV BLD AUTO: 10.4 FL (ref 6–12)
POTASSIUM SERPL-SCNC: 4.1 MMOL/L (ref 3.5–5.2)
RBC # BLD AUTO: 3.31 10*6/MM3 (ref 3.77–5.28)
SODIUM SERPL-SCNC: 142 MMOL/L (ref 136–145)
VANCOMYCIN TROUGH SERPL-MCNC: 17.3 MCG/ML (ref 5–20)
WBC NRBC COR # BLD: 3.98 10*3/MM3 (ref 3.4–10.8)

## 2022-12-02 PROCEDURE — 80202 ASSAY OF VANCOMYCIN: CPT | Performed by: INTERNAL MEDICINE

## 2022-12-02 PROCEDURE — 87040 BLOOD CULTURE FOR BACTERIA: CPT | Performed by: INTERNAL MEDICINE

## 2022-12-02 PROCEDURE — 25010000002 VANCOMYCIN 5 G RECONSTITUTED SOLUTION: Performed by: INTERNAL MEDICINE

## 2022-12-02 PROCEDURE — 97116 GAIT TRAINING THERAPY: CPT

## 2022-12-02 PROCEDURE — 25010000002 HYDROMORPHONE 1 MG/ML SOLUTION: Performed by: INTERNAL MEDICINE

## 2022-12-02 PROCEDURE — 80048 BASIC METABOLIC PNL TOTAL CA: CPT | Performed by: INTERNAL MEDICINE

## 2022-12-02 PROCEDURE — 94799 UNLISTED PULMONARY SVC/PX: CPT

## 2022-12-02 PROCEDURE — 85025 COMPLETE CBC W/AUTO DIFF WBC: CPT | Performed by: INTERNAL MEDICINE

## 2022-12-02 RX ORDER — AMOXICILLIN 500 MG/1
1000 CAPSULE ORAL EVERY 8 HOURS SCHEDULED
Status: DISCONTINUED | OUTPATIENT
Start: 2022-12-02 | End: 2022-12-04 | Stop reason: HOSPADM

## 2022-12-02 RX ADMIN — ACYCLOVIR 400 MG: 800 TABLET ORAL at 21:15

## 2022-12-02 RX ADMIN — HYDROMORPHONE HYDROCHLORIDE 0.5 MG: 1 INJECTION, SOLUTION INTRAMUSCULAR; INTRAVENOUS; SUBCUTANEOUS at 20:01

## 2022-12-02 RX ADMIN — ZOLPIDEM TARTRATE 5 MG: 5 TABLET ORAL at 21:16

## 2022-12-02 RX ADMIN — ACETAMINOPHEN 650 MG: 325 TABLET ORAL at 17:10

## 2022-12-02 RX ADMIN — VANCOMYCIN HYDROCHLORIDE 1000 MG: 5 INJECTION, POWDER, LYOPHILIZED, FOR SOLUTION INTRAVENOUS at 02:37

## 2022-12-02 RX ADMIN — POTASSIUM CHLORIDE 20 MEQ: 10 CAPSULE, COATED, EXTENDED RELEASE ORAL at 08:00

## 2022-12-02 RX ADMIN — Medication 10 ML: at 21:16

## 2022-12-02 RX ADMIN — MORPHINE SULFATE 100 MG: 100 TABLET, FILM COATED, EXTENDED RELEASE ORAL at 21:15

## 2022-12-02 RX ADMIN — ACYCLOVIR 400 MG: 800 TABLET ORAL at 08:00

## 2022-12-02 RX ADMIN — HYDROMORPHONE HYDROCHLORIDE 0.5 MG: 1 INJECTION, SOLUTION INTRAMUSCULAR; INTRAVENOUS; SUBCUTANEOUS at 18:13

## 2022-12-02 RX ADMIN — MORPHINE SULFATE 100 MG: 100 TABLET, FILM COATED, EXTENDED RELEASE ORAL at 08:01

## 2022-12-02 RX ADMIN — PREGABALIN 150 MG: 75 CAPSULE ORAL at 21:15

## 2022-12-02 RX ADMIN — AMOXICILLIN 1000 MG: 500 CAPSULE ORAL at 22:55

## 2022-12-02 RX ADMIN — VANCOMYCIN HYDROCHLORIDE 1000 MG: 5 INJECTION, POWDER, LYOPHILIZED, FOR SOLUTION INTRAVENOUS at 15:14

## 2022-12-02 RX ADMIN — HYDROMORPHONE HYDROCHLORIDE 0.5 MG: 1 INJECTION, SOLUTION INTRAMUSCULAR; INTRAVENOUS; SUBCUTANEOUS at 22:55

## 2022-12-02 RX ADMIN — Medication 10 ML: at 08:01

## 2022-12-02 RX ADMIN — POTASSIUM CHLORIDE 20 MEQ: 10 CAPSULE, COATED, EXTENDED RELEASE ORAL at 17:07

## 2022-12-02 RX ADMIN — AMOXICILLIN 1000 MG: 500 CAPSULE ORAL at 15:48

## 2022-12-02 RX ADMIN — APIXABAN 2.5 MG: 2.5 TABLET, FILM COATED ORAL at 08:01

## 2022-12-02 RX ADMIN — APIXABAN 2.5 MG: 2.5 TABLET, FILM COATED ORAL at 21:16

## 2022-12-02 RX ADMIN — PREGABALIN 150 MG: 75 CAPSULE ORAL at 08:00

## 2022-12-02 NOTE — PROGRESS NOTES
"[facility] Clinical Pharmacy Services: Vancomycin Monitoring Note    Evangelina Sutton is a 72 y.o. female who is on day 3/7 of pharmacy to dose vancomycin for Bacteremia.    Previous Vancomycin Dose: 1000 mg IV every  12  hours  Imaging Reviewed?: Yes  Updated Cultures and Sensitivities:    Blood cultures- coag negative staph x2   respiratory culture- Haemophilus influenzae   repeat blood cultures- no growth @ 24 hours   repeat blood cultures - in process   repeat respiratory cultures - Needs to be collected    Vitals/Labs  Ht: 165.1 cm (65\"); Wt: 65.4 kg (144 lb 2.9 oz)   Temp (24hrs), Av.6 °F (37 °C), Min:97.7 °F (36.5 °C), Max:99 °F (37.2 °C)   Estimated Creatinine Clearance: 105 mL/min (A) (by C-G formula based on SCr of 0.5 mg/dL (L)).       Results from last 7 days   Lab Units 22  1326 22  0614 22  1125 22  0524 22  0516 22  0602 22  1333   VANCOMYCIN TR mcg/mL 17.30  --  13.56  --   --   --   --    CREATININE mg/dL  --  0.50*  --  0.52* 0.54* 0.59 0.62   WBC 10*3/mm3  --  3.98  --   --   --  2.26* 3.00*     Assessment/Plan  Will continue current dosing regimen while closely watching kidney function and Vancomycin levels, given age and AUC being at the higher end of the goal range.      Current Vancomycin Dose:  1000 mg IV every 12 hours; which provides the following predicted parameters:  AUC24,ss: 581 mg/L.hr  PAUC*: 99 %  Ctrough,ss: 17.9 mg/L  Pconc*: 32 %  Tox.: 14 %  Random level providing AUC in goal range.  We will continue to monitor patient changes and renal function.      Thank you for involving pharmacy in this patient's care. Please contact pharmacy with any questions or concerns.    Yvan Hannon  Clinical Pharmacist      "

## 2022-12-02 NOTE — PROGRESS NOTES
[facility]     Progress Note    Patient Name: Evangelina Sutton  : 1950  MRN: [id number]  Primary Care Physician:  Gilberto Coleman MD  Date of admission: 2022    Subjective   Subjective     Chief Complaint: Stable and doing better we will repeat the sputum culture as well as the blood cultures again we will continue to take antibiotics have discussed at length with the pharmacist to make the changes of antibiotics patient comfortable and doing well    HPI:  Patient reports comfortable doing well afebrile but still coughing she said she will give her another sputum    Review of Systems   All systems were reviewed and negative except for: Reviewed    Objective   Objective     Vitals:   Temp:  [97.7 °F (36.5 °C)-99 °F (37.2 °C)] 99 °F (37.2 °C)  Heart Rate:  [] 92  Resp:  [16-20] 20  BP: (101-110)/(50-57) 110/55  Flow (L/min):  [1-2] 1    Physical Exam    Constitutional: Awake, alert   Eyes: PERRLA, sclerae anicteric, no conjunctival injection   HENT: NCAT, mucous membranes moist   Neck: Supple, no thyromegaly, no lymphadenopathy, trachea midline   Respiratory: Clear to auscultation bilaterally, nonlabored respirations    Cardiovascular: RRR, no murmurs, rubs, or gallops, palpable pedal pulses bilaterally   Gastrointestinal: Positive bowel sounds, soft, nontender, nondistended   Musculoskeletal: No bilateral ankle edema, no clubbing or cyanosis to extremities   Psychiatric: Appropriate affect, cooperative   Neurologic: Oriented x 3, strength symmetric in all extremities, Cranial Nerves grossly intact to confrontation, speech clear   Skin: No rashes   No change  Result Review    Result Review:  I have personally reviewed the results from the time of this admission to 2022 11:57 EST and agree with these findings:  [x]  Laboratory anemia  []  Microbiology  []  Radiology  []  EKG/Telemetry   []  Cardiology/Vascular   []  Pathology  []  Old records  []  Other:  Most notable findings include:  Anemia and multiple myeloma    Assessment & Plan   Assessment / Plan     Brief Patient Summary:  Evangelina Sutton is a 72 y.o. female who anemia and multiple myeloma admitted with fever at home had blood cultures positive    Active Hospital Problems:  Active Hospital Problems    Diagnosis    • **Acute UTI        Plan:   Renewed antibiotics as has been scheduled by pharmacist    DVT prophylaxis:  Medical and mechanical DVT prophylaxis orders are present.    CODE STATUS:   Level Of Support Discussed With: Patient  Code Status (Patient has no pulse and is not breathing): CPR (Attempt to Resuscitate)  Medical Interventions (Patient has pulse or is breathing): Full Support    Disposition:  I expect patient to be discharged the patient has stabilized.    Electronically signed by Abbe Zamudio MD, 12/02/22, 11:57 AM EST.      Part of this note may be an electronic transcription/translation of spoken language to printed text using the Dragon Dictation System.

## 2022-12-02 NOTE — SIGNIFICANT NOTE
Wound Eval / Progress Noted     Ángela     Patient Name: Evangelina Sutton  : 1950  MRN: [id number]  Today's Date: 2022                 Admit Date: 2022    Visit Dx:    ICD-10-CM ICD-9-CM   1. Acute UTI  N39.0 599.0   2. Difficulty walking  R26.2 719.7       Patient Active Problem List   Diagnosis   • Nausea and vomiting   • Multiple myeloma (HCC)   • Left displaced femoral neck fracture (HCC)   • Decreased activities of daily living (ADL)   • Aftercare following left hip hemiarthroplasty    • Acute UTI        Past Medical History:   Diagnosis Date   • Cancer (HCC)     BONE CANCER/TAKING CHEMO SHOT WEEKLY   • DDD (degenerative disc disease), cervical    • Neuropathy     LEGS AND ARMS   • PONV (postoperative nausea and vomiting)         Past Surgical History:   Procedure Laterality Date   • BACK SURGERY      DISCECTOMY W/FUSION, NECK ? LEVEL   • CHOLECYSTECTOMY     • HYSTERECTOMY     • PORTACATH PLACEMENT N/A 3/17/2022    Procedure: INSERTION OF PORTACATH;  Surgeon: Cj Rosis MD;  Location: [facility] MAIN OR;  Service: General;  Laterality: N/A;   • TOTAL HIP ARTHROPLASTY Left 2022    Procedure: LEFT HIP ARTHROPLASTY ANTERIOR;  Surgeon: Sridhar Coto MD;  Location: [facility] MAIN OR;  Service: Orthopedics;  Laterality: Left;         Physical Assessment:  Wound 22 0806 coccyx Pressure Injury (Active)   Wound Image   22 0808   Pressure Injury Stage 2 22   Dressing Appearance open to air 22   Closure None 22   Base moist;red;non-blanchable 22   Periwound pink;dry;intact 22   Periwound Temperature warm 2240   Periwound Skin Turgor soft 22   Edges open 2240   Wound Length (cm) 0.9 cm 2240   Wound Width (cm) 1 cm 22   Wound Surface Area (cm^2) 0.9 cm^2 2240   Drainage Amount none 2240   Care, Wound cleansed with;sterile normal saline 22   Dressing  Care dressing applied;hydrofiber;silver impregnated;hydrocolloid 12/02/22 7695     Wound Check / Follow-up:  Patient seen today for wound consult. Patient with stage II pressure injuries to bilateral gluteal aspects. Areas are red, moist, and non-blanchable. Cleansed with normal saline and gauze. Applied silver impregnated hydrofiber (aquacel AG) and secured with hydrocolloid dressing (Duoderm). Recommending every other day dressing changes. No other open wounds noted. Implement every two hour turn schedule, offload heels, keep patient free from moisture.      Impression: Stage II pressure injuries.      Short term goals:  Regain skin integrity, moisture prevention, pressure reduction, skin protection, dressing changes.      Betsy Muñoz RN    12/2/2022    13:56 EST

## 2022-12-02 NOTE — PLAN OF CARE
Goal Outcome Evaluation:              Outcome Evaluation: AOx4. Weaned to 1L NC. Continues with frequent productive cough, sputum culture ordered. WOC RN evaluated patient this shift. No c/o pain. Patient anxious and visibly upset, emotional support provided. Blood cultures repeated per MD. Up to bathroom and chair with x1 assist. Continuing plan of care.

## 2022-12-02 NOTE — THERAPY TREATMENT NOTE
Acute Care - Physical Therapy Treatment Note  [facility]     Patient Name: Evangelina Sutton  : 1950  MRN: [id number]  Today's Date: 2022      Visit Dx:     ICD-10-CM ICD-9-CM   1. Acute UTI  N39.0 599.0   2. Difficulty walking  R26.2 719.7     Patient Active Problem List   Diagnosis   • Nausea and vomiting   • Multiple myeloma (HCC)   • Left displaced femoral neck fracture (HCC)   • Decreased activities of daily living (ADL)   • Aftercare following left hip hemiarthroplasty    • Acute UTI     Past Medical History:   Diagnosis Date   • Cancer (HCC)     BONE CANCER/TAKING CHEMO SHOT WEEKLY   • DDD (degenerative disc disease), cervical    • Neuropathy     LEGS AND ARMS   • PONV (postoperative nausea and vomiting)      Past Surgical History:   Procedure Laterality Date   • BACK SURGERY      DISCECTOMY W/FUSION, NECK ? LEVEL   • CHOLECYSTECTOMY     • HYSTERECTOMY     • PORTACATH PLACEMENT N/A 3/17/2022    Procedure: INSERTION OF PORTACATH;  Surgeon: Cj Rossi MD;  Location: [facility] MAIN OR;  Service: General;  Laterality: N/A;   • TOTAL HIP ARTHROPLASTY Left 2022    Procedure: LEFT HIP ARTHROPLASTY ANTERIOR;  Surgeon: Sridhar Coto MD;  Location: [facility] MAIN OR;  Service: Orthopedics;  Laterality: Left;     PT Assessment (last 12 hours)     PT Evaluation and Treatment     Row Name 22 1100          Physical Therapy Time and Intention    Subjective Information no complaints (P)   -AD     Document Type therapy note (daily note) (P)   -AD     Mode of Treatment individual therapy;physical therapy (P)   -AD     Patient Effort good (P)   -AD     Symptoms Noted During/After Treatment none (P)   -AD     Row Name 22 1100          Transfers    Transfers sit-stand transfer;stand-sit transfer (P)   -AD     Row Name 22 1100          Sit-Stand Transfer    Sit-Stand [place] (Transfers) standby assist (P)   -AD     Assistive Device (Sit-Stand Transfers) walker, front-wheeled (P)   -AD      Row Name 12/02/22 1100          Stand-Sit Transfer    Stand-Sit [place] (Transfers) standby assist (P)   -AD     Assistive Device (Stand-Sit Transfers) walker, front-wheeled (P)   -AD     Row Name 12/02/22 1100          Gait/Stairs (Locomotion)    Gait/Stairs Locomotion gait/ambulation independence;gait/ambulation assistive device;distance ambulated;gait pattern (P)   -AD     [place] Level (Gait) standby assist (P)   -AD     Assistive Device (Gait) walker, front-wheeled (P)   -AD     Distance in Feet (Gait) 100 (P)   -AD     Pattern (Gait) step-through (P)   -AD     Row Name 12/02/22 1100          Balance    Balance Assessment standing dynamic balance (P)   -AD     Dynamic Standing Balance standby assist (P)   -AD     Row Name             Wound 12/02/22 0806 coccyx Pressure Injury    Wound - Properties Group Placement Date: 12/02/22  -RF Placement Time: 0806  -RF Present on Hospital Admission: N  -RF Location: coccyx  -RF Primary Wound Type: Pressure inj  -RF    Retired Wound - Properties Group Placement Date: 12/02/22  -RF Placement Time: 0806  -RF Present on Hospital Admission: N  -RF Location: coccyx  -RF Primary Wound Type: Pressure inj  -RF    Retired Wound - Properties Group Date first assessed: 12/02/22  -RF Time first assessed: 0806  -RF Present on Hospital Admission: N  -RF Location: coccyx  -RF Primary Wound Type: Pressure inj  -RF    Row Name             [REMOVED] Wound 04/25/22 1641 Left anterior hip Incision    Wound - Properties Group Placement Date: 04/25/22  -JG Placement Time: 1641  -JG Present on Hospital Admission: N  -JG Side: Left  -JG Orientation: anterior  -JG Location: hip  -JG Primary Wound Type: Incision  -JG Removal Date: 12/02/22  -RF Removal Time: 0814 -RF Wound Outcome: Healed  -RF    Retired Wound - Properties Group Placement Date: 04/25/22  -JG Placement Time: 1641  -JG Present on Hospital Admission: N  -JG Side: Left  -JG Orientation: anterior  -JG Location: hip  -JG  Primary Wound Type: Incision  -JG Removal Date: 12/02/22  -RF Removal Time: 0814 -RF Wound Outcome: Healed  -RF    Retired Wound - Properties Group Date first assessed: 04/25/22  -JG Time first assessed: 1641  -JG Present on Hospital Admission: N  -JG Side: Left  -JG Location: hip  -JG Primary Wound Type: Incision  -JG Resolution Date: 12/02/22  -RF Resolution Time: 0814 -RF Wound Outcome: Healed  -RF    Row Name 12/02/22 1100          Progress Summary (PT)    Progress Toward Functional Goals (PT) progress toward functional goals is good (P)   -AD     Daily Progress Summary (PT) Pt ambulated 100 with no significant increase in symptoms. Pt demonstrated saef/ effective transfers and efficient use of assisted device during ambulation. Progressing well. continue current treatment plan. (P)   -AD           User Key  (r) = Recorded By, (t) = Taken By, (c) = Cosigned By    Initials Name Provider Type    Susan Hanson RN Registered Nurse    Willard Bauman RN Registered Nurse    Roman Landa, PT Student PT Student                Physical Therapy Education     Title: PT OT SLP Therapies (Done)     Topic: Physical Therapy (Done)     Point: Mobility training (Done)     Learning Progress Summary           Patient Acceptance, E, VU,DU by  at 12/2/2022 0759    Acceptance, E, VU,DU by  at 12/1/2022 0929    Acceptance, E, VU,DU by  at 11/30/2022 0815    Acceptance, E, VU by  at 11/29/2022 1023                               User Key     Initials Effective Dates Name Provider Type [facility] 01/19/22 -  Willard Lopez, RN Registered Nurse Nurse     10/18/22 -  Winston Portillo, PT Student PT Student PT              PT Recommendation and Plan     Progress Summary (PT)  Progress Toward Functional Goals (PT): (P) progress toward functional goals is good  Daily Progress Summary (PT): (P) Pt ambulated 100 with no significant increase in symptoms. Pt demonstrated saef/ effective transfers and efficient use of  assisted device during ambulation. Progressing well. continue current treatment plan.   Outcome Measures     Row Name 12/02/22 1100 12/01/22 0849 11/30/22 0908       How much help from another person do you currently need...    Turning from your back to your side while in flat bed without using bedrails? 4 (P)   -AD 4  -DK 4  -DK    Moving from lying on back to sitting on the side of a flat bed without bedrails? 4 (P)   -AD 4  -DK 4  -DK    Moving to and from a bed to a chair (including a wheelchair)? 4 (P)   -AD 3  -DK 3  -DK    Standing up from a chair using your arms (e.g., wheelchair, bedside chair)? 4 (P)   -AD 3  -DK 3  -DK    Climbing 3-5 steps with a railing? 2 (P)   -AD 3  -DK 3  -DK    To walk in hospital room? 3 (P)   -AD 3  -DK 3  -DK    AM-PAC 6 Clicks Score (PT) 21 (P)   -AD 20  -DK 20  -DK       Functional Assessment    Outcome Measure Options AM-PAC 6 Clicks Basic Mobility (PT) (P)   -AD AM-PAC 6 Clicks Basic Mobility (PT)  -DK AM-PAC 6 Clicks Basic Mobility (PT)  -DK          User Key  (r) = Recorded By, (t) = Taken By, (c) = Cosigned By    Initials Name Provider Type    Tavia Fairbanks, PTA Physical Therapist Assistant    Roman Landa, PT Student PT Student                 Time Calculation:    PT Charges     Row Name 12/02/22 1124             Time Calculation    PT Received On 12/02/22 (P)   -AD         Timed Charges    14806 - Gait Training Minutes  10 (P)   -AD      39336 - PT Therapeutic Activity Minutes 5 (P)   -AD         Total Minutes    Timed Charges Total Minutes 15 (P)   -AD       Total Minutes 15 (P)   -AD            User Key  (r) = Recorded By, (t) = Taken By, (c) = Cosigned By    Initials Name Provider Type    Roman Landa, PT Student PT Student              Therapy Charges for Today     Code Description Service Date Service Provider Modifiers Qty    [id number] HC GAIT TRAINING EA 15 MIN 12/2/2022 Roman Umana, PT Student GP 1          PT G-Codes  Outcome Measure Options:  (P) AM-PAC 6 Clicks Basic Mobility (PT)  AM-PAC 6 Clicks Score (PT): (P) 21    Roman Umana, PT Student  12/2/2022

## 2022-12-02 NOTE — PLAN OF CARE
Goal Outcome Evaluation:  Plan of Care Reviewed With: patient        Progress: improving  Outcome Evaluation: AOx4. No complains of pain or nausea this shift. Continued infrequent productive cough with scant sputum. MD aware. No changes in patient's status.

## 2022-12-03 ENCOUNTER — APPOINTMENT (OUTPATIENT)
Dept: GENERAL RADIOLOGY | Facility: HOSPITAL | Age: 72
End: 2022-12-03

## 2022-12-03 LAB
ANION GAP SERPL CALCULATED.3IONS-SCNC: 5.4 MMOL/L (ref 5–15)
BACTERIA SPEC AEROBE CULT: ABNORMAL
BACTERIA UR QL AUTO: ABNORMAL /HPF
BASOPHILS # BLD AUTO: 0.02 10*3/MM3 (ref 0–0.2)
BASOPHILS NFR BLD AUTO: 0.7 % (ref 0–1.5)
BILIRUB UR QL STRIP: NEGATIVE
BUN SERPL-MCNC: 8 MG/DL (ref 8–23)
BUN/CREAT SERPL: 18.2 (ref 7–25)
CALCIUM SPEC-SCNC: 9.1 MG/DL (ref 8.6–10.5)
CHLORIDE SERPL-SCNC: 102 MMOL/L (ref 98–107)
CLARITY UR: CLEAR
CO2 SERPL-SCNC: 29.6 MMOL/L (ref 22–29)
COLOR UR: YELLOW
CREAT SERPL-MCNC: 0.44 MG/DL (ref 0.57–1)
DEPRECATED RDW RBC AUTO: 66 FL (ref 37–54)
EGFRCR SERPLBLD CKD-EPI 2021: 102.9 ML/MIN/1.73
EOSINOPHIL # BLD AUTO: 0.11 10*3/MM3 (ref 0–0.4)
EOSINOPHIL NFR BLD AUTO: 4 % (ref 0.3–6.2)
ERYTHROCYTE [DISTWIDTH] IN BLOOD BY AUTOMATED COUNT: 21 % (ref 12.3–15.4)
GLUCOSE SERPL-MCNC: 89 MG/DL (ref 65–99)
GLUCOSE UR STRIP-MCNC: NEGATIVE MG/DL
GRAM STN SPEC: ABNORMAL
HCT VFR BLD AUTO: 28.5 % (ref 34–46.6)
HGB BLD-MCNC: 8.9 G/DL (ref 12–15.9)
HGB UR QL STRIP.AUTO: NEGATIVE
HYALINE CASTS UR QL AUTO: ABNORMAL /LPF
IMM GRANULOCYTES # BLD AUTO: 0.02 10*3/MM3 (ref 0–0.05)
IMM GRANULOCYTES NFR BLD AUTO: 0.7 % (ref 0–0.5)
ISOLATED FROM: ABNORMAL
KETONES UR QL STRIP: NEGATIVE
LEUKOCYTE ESTERASE UR QL STRIP.AUTO: NEGATIVE
LYMPHOCYTES # BLD AUTO: 1.35 10*3/MM3 (ref 0.7–3.1)
LYMPHOCYTES NFR BLD AUTO: 49.1 % (ref 19.6–45.3)
MCH RBC QN AUTO: 26.6 PG (ref 26.6–33)
MCHC RBC AUTO-ENTMCNC: 31.2 G/DL (ref 31.5–35.7)
MCV RBC AUTO: 85.1 FL (ref 79–97)
MONOCYTES # BLD AUTO: 0.55 10*3/MM3 (ref 0.1–0.9)
MONOCYTES NFR BLD AUTO: 20 % (ref 5–12)
NEUTROPHILS NFR BLD AUTO: 0.7 10*3/MM3 (ref 1.7–7)
NEUTROPHILS NFR BLD AUTO: 25.5 % (ref 42.7–76)
NITRITE UR QL STRIP: NEGATIVE
NRBC BLD AUTO-RTO: 0 /100 WBC (ref 0–0.2)
PH UR STRIP.AUTO: 6 [PH] (ref 5–8)
PLATELET # BLD AUTO: 220 10*3/MM3 (ref 140–450)
PMV BLD AUTO: 9.9 FL (ref 6–12)
POTASSIUM SERPL-SCNC: 3.6 MMOL/L (ref 3.5–5.2)
PROT UR QL STRIP: ABNORMAL
RBC # BLD AUTO: 3.35 10*6/MM3 (ref 3.77–5.28)
RBC # UR STRIP: ABNORMAL /HPF
REF LAB TEST METHOD: ABNORMAL
SODIUM SERPL-SCNC: 137 MMOL/L (ref 136–145)
SP GR UR STRIP: 1.02 (ref 1–1.03)
SQUAMOUS #/AREA URNS HPF: ABNORMAL /HPF
UROBILINOGEN UR QL STRIP: ABNORMAL
WBC # UR STRIP: ABNORMAL /HPF
WBC NRBC COR # BLD: 2.75 10*3/MM3 (ref 3.4–10.8)
YEAST URNS QL MICRO: ABNORMAL /HPF

## 2022-12-03 PROCEDURE — 81001 URINALYSIS AUTO W/SCOPE: CPT | Performed by: INTERNAL MEDICINE

## 2022-12-03 PROCEDURE — 94640 AIRWAY INHALATION TREATMENT: CPT

## 2022-12-03 PROCEDURE — 25010000002 HYDROMORPHONE 1 MG/ML SOLUTION: Performed by: INTERNAL MEDICINE

## 2022-12-03 PROCEDURE — 25010000002 VANCOMYCIN 5 G RECONSTITUTED SOLUTION: Performed by: INTERNAL MEDICINE

## 2022-12-03 PROCEDURE — 94799 UNLISTED PULMONARY SVC/PX: CPT

## 2022-12-03 PROCEDURE — 94760 N-INVAS EAR/PLS OXIMETRY 1: CPT

## 2022-12-03 PROCEDURE — 80048 BASIC METABOLIC PNL TOTAL CA: CPT | Performed by: INTERNAL MEDICINE

## 2022-12-03 PROCEDURE — 85025 COMPLETE CBC W/AUTO DIFF WBC: CPT | Performed by: INTERNAL MEDICINE

## 2022-12-03 PROCEDURE — 71045 X-RAY EXAM CHEST 1 VIEW: CPT

## 2022-12-03 RX ORDER — IPRATROPIUM BROMIDE AND ALBUTEROL SULFATE 2.5; .5 MG/3ML; MG/3ML
3 SOLUTION RESPIRATORY (INHALATION)
Status: DISCONTINUED | OUTPATIENT
Start: 2022-12-03 | End: 2022-12-04 | Stop reason: HOSPADM

## 2022-12-03 RX ORDER — GUAIFENESIN 200 MG/10ML
200 LIQUID ORAL EVERY 4 HOURS PRN
Status: DISCONTINUED | OUTPATIENT
Start: 2022-12-03 | End: 2022-12-04 | Stop reason: HOSPADM

## 2022-12-03 RX ADMIN — ACYCLOVIR 400 MG: 800 TABLET ORAL at 20:46

## 2022-12-03 RX ADMIN — IPRATROPIUM BROMIDE AND ALBUTEROL SULFATE 3 ML: .5; 3 SOLUTION RESPIRATORY (INHALATION) at 21:30

## 2022-12-03 RX ADMIN — Medication 10 ML: at 08:44

## 2022-12-03 RX ADMIN — OXYCODONE HYDROCHLORIDE AND ACETAMINOPHEN 1 TABLET: 7.5; 325 TABLET ORAL at 20:58

## 2022-12-03 RX ADMIN — HYDROMORPHONE HYDROCHLORIDE 0.5 MG: 1 INJECTION, SOLUTION INTRAMUSCULAR; INTRAVENOUS; SUBCUTANEOUS at 10:37

## 2022-12-03 RX ADMIN — MORPHINE SULFATE 100 MG: 100 TABLET, FILM COATED, EXTENDED RELEASE ORAL at 08:43

## 2022-12-03 RX ADMIN — GUAIFENESIN 200 MG: 200 SOLUTION ORAL at 18:47

## 2022-12-03 RX ADMIN — POTASSIUM CHLORIDE 20 MEQ: 10 CAPSULE, COATED, EXTENDED RELEASE ORAL at 08:44

## 2022-12-03 RX ADMIN — AMOXICILLIN 1000 MG: 500 CAPSULE ORAL at 05:01

## 2022-12-03 RX ADMIN — PREGABALIN 150 MG: 75 CAPSULE ORAL at 08:44

## 2022-12-03 RX ADMIN — IPRATROPIUM BROMIDE AND ALBUTEROL SULFATE 3 ML: .5; 3 SOLUTION RESPIRATORY (INHALATION) at 12:02

## 2022-12-03 RX ADMIN — ZOLPIDEM TARTRATE 5 MG: 5 TABLET ORAL at 23:42

## 2022-12-03 RX ADMIN — VANCOMYCIN HYDROCHLORIDE 1000 MG: 5 INJECTION, POWDER, LYOPHILIZED, FOR SOLUTION INTRAVENOUS at 02:42

## 2022-12-03 RX ADMIN — AMOXICILLIN 1000 MG: 500 CAPSULE ORAL at 20:48

## 2022-12-03 RX ADMIN — HYDROMORPHONE HYDROCHLORIDE 0.5 MG: 1 INJECTION, SOLUTION INTRAMUSCULAR; INTRAVENOUS; SUBCUTANEOUS at 05:01

## 2022-12-03 RX ADMIN — APIXABAN 2.5 MG: 2.5 TABLET, FILM COATED ORAL at 08:43

## 2022-12-03 RX ADMIN — APIXABAN 2.5 MG: 2.5 TABLET, FILM COATED ORAL at 20:47

## 2022-12-03 RX ADMIN — GUAIFENESIN 200 MG: 200 SOLUTION ORAL at 13:42

## 2022-12-03 RX ADMIN — Medication 10 ML: at 20:47

## 2022-12-03 RX ADMIN — PREGABALIN 150 MG: 75 CAPSULE ORAL at 20:47

## 2022-12-03 RX ADMIN — ACYCLOVIR 400 MG: 800 TABLET ORAL at 08:43

## 2022-12-03 RX ADMIN — AMOXICILLIN 1000 MG: 500 CAPSULE ORAL at 13:44

## 2022-12-03 RX ADMIN — POTASSIUM CHLORIDE 20 MEQ: 10 CAPSULE, COATED, EXTENDED RELEASE ORAL at 17:21

## 2022-12-03 RX ADMIN — HYDROMORPHONE HYDROCHLORIDE 0.5 MG: 1 INJECTION, SOLUTION INTRAMUSCULAR; INTRAVENOUS; SUBCUTANEOUS at 17:21

## 2022-12-03 NOTE — PROGRESS NOTES
[facility]     Progress Note    Patient Name: Evangelina Sutton  : 1950  MRN: [id number]  Primary Care Physician:  Gilberto Coleman MD  Date of admission: 2022    Subjective   Subjective     Chief Complaint: Complaining of some cough and congestion we will repeat the chest x-ray and will try to get the sputum culture we will continue antibiotics we will stop vancomycin because patient is afebrile and the cultures repeat have been negative she does have pulmonary infiltrates which are improving  HPI:  Patient reports she with multiple myeloma status postchemotherapy    Review of Systems   All systems were reviewed and negative except for: Reviewed    Objective   Objective     Vitals:   Temp:  [98.2 °F (36.8 °C)-99.1 °F (37.3 °C)] 98.6 °F (37 °C)  Heart Rate:  [87-99] 90  Resp:  [16-20] 18  BP: (110-130)/(55-70) 111/62  Flow (L/min):  [1] 1    Physical Exam    Constitutional: Awake, alert   Eyes: PERRLA, sclerae anicteric, no conjunctival injection   HENT: NCAT, mucous membranes moist   Neck: Supple, no thyromegaly, no lymphadenopathy, trachea midline   Respiratory: Clear to auscultation bilaterally, nonlabored respirations    Cardiovascular: RRR, no murmurs, rubs, or gallops, palpable pedal pulses bilaterally   Gastrointestinal: Positive bowel sounds, soft, nontender, nondistended   Musculoskeletal: No bilateral ankle edema, no clubbing or cyanosis to extremities   Psychiatric: Appropriate affect, cooperative   Neurologic: Oriented x 3, strength symmetric in all extremities, Cranial Nerves grossly intact to confrontation, speech clear   Skin: No rashes   No change  Result Review    Result Review:  I have personally reviewed the results from the time of this admission to 12/3/2022 11:00 EST and agree with these findings:  [x]  Laboratory anemia and neutropenia postchemotherapy for multiple myeloma  []  Microbiology  []  Radiology  []  EKG/Telemetry   []  Cardiology/Vascular   []  Pathology  []  Old  records  []  Other:  Most notable findings include: Anemia and neutropenia from multiple mild    Assessment & Plan   Assessment / Plan     Brief Patient Summary:  Evangelina Sutton is a 72 y.o. female who getting antibiotics has scheduled    Active Hospital Problems:  Active Hospital Problems    Diagnosis    • **Acute UTI        Plan:   The antibiotics to be continued and repeat the culture    DVT prophylaxis:  Medical and mechanical DVT prophylaxis orders are present.    CODE STATUS:   Level Of Support Discussed With: Patient  Code Status (Patient has no pulse and is not breathing): CPR (Attempt to Resuscitate)  Medical Interventions (Patient has pulse or is breathing): Full Support    Disposition:  I expect patient to be discharged stable discharge tomorrow if patient comfortable.    Electronically signed by Abbe Zamudio MD, 12/03/22, 11:00 AM EST.      Part of this note may be an electronic transcription/translation of spoken language to printed text using the Dragon Dictation System.

## 2022-12-03 NOTE — CONSULTS
"Nutrition Services    Patient Name: Evangelina Sutton  YOB: 1950  MRN: [id number]  Admission date: 11/28/2022      CLINICAL NUTRITION ASSESSMENT      Reason for Assessment  Nonhealing wound or pressure ulcer     H&P:    Past Medical History:   Diagnosis Date   • Cancer (HCC)     BONE CANCER/TAKING CHEMO SHOT WEEKLY   • DDD (degenerative disc disease), cervical    • Neuropathy     LEGS AND ARMS   • PONV (postoperative nausea and vomiting)         Current Problems:   Active Hospital Problems    Diagnosis    • **Acute UTI         Nutrition/Diet History         Narrative     Patient followed for pressure injury. Stage 2 on coccyx not indicated for [place]. Patient is consuming adequate protein while eating % of meals provided on current diet. No nutrition intervention indicated at this time. RD will CTM per protocol.      Anthropometrics        Current Height, Weight Height: 165.1 cm (65\")  Weight: 65.4 kg (144 lb 2.9 oz)   Current BMI Body mass index is 23.99 kg/m².       Weight Hx  Wt Readings from Last 30 Encounters:   11/28/22 2241 65.4 kg (144 lb 2.9 oz)   11/28/22 1212 68.5 kg (151 lb 0.2 oz)   11/22/22 0936 68.5 kg (151 lb)   07/19/22 1325 68.5 kg (151 lb)   06/07/22 1409 68.5 kg (151 lb)   05/10/22 1251 68.5 kg (151 lb)   04/25/22 2115 68.5 kg (151 lb)   04/25/22 1217 66.1 kg (145 lb 11.6 oz)   04/22/22 1428 68.9 kg (152 lb)   03/29/22 1333 68.9 kg (152 lb)   03/17/22 0837 65.1 kg (143 lb 8.3 oz)   03/11/22 1328 68 kg (150 lb)   09/05/21 2227 65.1 kg (143 lb 8.3 oz)   09/05/21 2133 65.1 kg (143 lb 8.3 oz)   09/05/21 1444 68 kg (149 lb 14.6 oz)   10/13/20 0000 61.2 kg (135 lb)   10/01/20 0000 61.2 kg (135 lb)   06/02/20 0000 63.5 kg (140 lb)   05/08/19 0000 84.4 kg (186 lb 2 oz)   04/03/19 0000 84.5 kg (186 lb 6 oz)   03/22/19 0000 84.5 kg (186 lb 4 oz)   03/20/19 0000 84.6 kg (186 lb 7 oz)   02/20/19 0000 80.3 kg (177 lb 1 oz)   02/13/19 0000 78.3 kg (172 lb 9 oz)   01/14/19 0000 90 kg (198 lb " 8 oz)            Wt Change Observation Wt stable x 1 year     Estimated/Assessed Needs       Energy Requirements 25-30 kcal/kg    EST Needs (kcal/day) 1635-1962       Protein Requirements 1.2-1.5 g/kg    EST Daily Needs (g/day) 78-98       Fluid Requirements 1 ml/kcal     Estimated Needs (mL/day) 1635-1962     Labs/Medications         Pertinent Labs Reviewed.   Results from last 7 days   Lab Units 12/03/22  0501 12/02/22  0614 12/01/22  0524 11/30/22  0516 11/29/22  0602 11/28/22  1333   SODIUM mmol/L 137 142 142   < > 140 139   POTASSIUM mmol/L 3.6 4.1 4.0   < > 3.0* 3.3*   CHLORIDE mmol/L 102 107 107   < > 103 100   CO2 mmol/L 29.6* 28.8 30.4*   < > 25.1 25.6   BUN mg/dL 8 10 12   < > 23 32*   CREATININE mg/dL 0.44* 0.50* 0.52*   < > 0.59 0.62   CALCIUM mg/dL 9.1 9.2 8.8   < > 8.8 9.5   BILIRUBIN mg/dL  --   --   --   --  0.5 0.9   ALK PHOS U/L  --   --   --   --  43 52   ALT (SGPT) U/L  --   --   --   --  9 11   AST (SGOT) U/L  --   --   --   --  28 39*   GLUCOSE mg/dL 89 100* 103*   < > 84 123*    < > = values in this interval not displayed.     Results from last 7 days   Lab Units 12/03/22  0501 11/29/22  0602 11/28/22  1333   MAGNESIUM mg/dL  --   --  2.2   HEMOGLOBIN g/dL 8.9*   < > 9.8*   HEMATOCRIT % 28.5*   < > 31.7*    < > = values in this interval not displayed.     COVID19   Date Value Ref Range Status   11/28/2022 Not Detected Not Detected - Ref. Range Final     No results found for: HGBA1C      Pertinent Medications Reviewed.     Current Nutrition Orders & Evaluation of Intake       Oral Nutrition     Current PO Diet Diet: Regular/House Diet; Texture: Regular Texture (IDDSI 7); Fluid Consistency: Thin (IDDSI 0)   Supplement No active supplement orders       Malnutrition Severity Assessment                Nutrition Diagnosis         Nutrition Dx Problem 1 Nutrition appropriate for condition at this time.       Nutrition Intervention         No nutrition intervention indicated at this time.      Medical  Nutrition Therapy/Nutrition Education          Learner     Readiness Patient  Education not indicated at this time     Method     Response N/A  N/A     Monitor/Evaluation        Monitor Per protocol, PO intake, Skin status       Nutrition Discharge Plan         To be determined       Electronically signed by:  Mehran Cardenas RD  12/03/22 10:39 EST

## 2022-12-04 VITALS
TEMPERATURE: 99.5 F | HEIGHT: 65 IN | SYSTOLIC BLOOD PRESSURE: 132 MMHG | DIASTOLIC BLOOD PRESSURE: 65 MMHG | BODY MASS INDEX: 24.02 KG/M2 | OXYGEN SATURATION: 95 % | WEIGHT: 144.18 LBS | RESPIRATION RATE: 20 BRPM | HEART RATE: 101 BPM

## 2022-12-04 PROCEDURE — 87205 SMEAR GRAM STAIN: CPT | Performed by: INTERNAL MEDICINE

## 2022-12-04 PROCEDURE — 94799 UNLISTED PULMONARY SVC/PX: CPT

## 2022-12-04 PROCEDURE — 25010000002 HEPARIN LOCK FLUSH PER 10 UNITS: Performed by: INTERNAL MEDICINE

## 2022-12-04 PROCEDURE — 25010000002 HYDROMORPHONE 1 MG/ML SOLUTION: Performed by: INTERNAL MEDICINE

## 2022-12-04 PROCEDURE — 94760 N-INVAS EAR/PLS OXIMETRY 1: CPT

## 2022-12-04 PROCEDURE — 87070 CULTURE OTHR SPECIMN AEROBIC: CPT | Performed by: INTERNAL MEDICINE

## 2022-12-04 PROCEDURE — 97110 THERAPEUTIC EXERCISES: CPT

## 2022-12-04 RX ORDER — HEPARIN SODIUM (PORCINE) LOCK FLUSH IV SOLN 100 UNIT/ML 100 UNIT/ML
5 SOLUTION INTRAVENOUS AS NEEDED
Status: DISCONTINUED | OUTPATIENT
Start: 2022-12-04 | End: 2022-12-04 | Stop reason: HOSPADM

## 2022-12-04 RX ORDER — ALBUTEROL SULFATE 90 UG/1
2 AEROSOL, METERED RESPIRATORY (INHALATION) EVERY 4 HOURS PRN
Status: DISCONTINUED | OUTPATIENT
Start: 2022-12-04 | End: 2022-12-04 | Stop reason: HOSPADM

## 2022-12-04 RX ORDER — SODIUM CHLORIDE 0.9 % (FLUSH) 0.9 %
20 SYRINGE (ML) INJECTION AS NEEDED
Status: DISCONTINUED | OUTPATIENT
Start: 2022-12-04 | End: 2022-12-04 | Stop reason: HOSPADM

## 2022-12-04 RX ORDER — AMOXICILLIN 500 MG/1
1000 CAPSULE ORAL EVERY 8 HOURS SCHEDULED
Qty: 6 CAPSULE | Refills: 0 | Status: SHIPPED | OUTPATIENT
Start: 2022-12-04 | End: 2022-12-05

## 2022-12-04 RX ORDER — MORPHINE SULFATE 100 MG/1
100 TABLET ORAL EVERY 12 HOURS SCHEDULED
Status: DISCONTINUED | OUTPATIENT
Start: 2022-12-04 | End: 2022-12-04 | Stop reason: HOSPADM

## 2022-12-04 RX ORDER — GUAIFENESIN 200 MG/10ML
200 LIQUID ORAL EVERY 4 HOURS PRN
Qty: 20 ML | Refills: 1 | Status: SHIPPED | OUTPATIENT
Start: 2022-12-04 | End: 2022-12-18

## 2022-12-04 RX ORDER — SODIUM CHLORIDE 0.9 % (FLUSH) 0.9 %
10 SYRINGE (ML) INJECTION AS NEEDED
Status: DISCONTINUED | OUTPATIENT
Start: 2022-12-04 | End: 2022-12-04 | Stop reason: HOSPADM

## 2022-12-04 RX ORDER — SODIUM CHLORIDE 0.9 % (FLUSH) 0.9 %
10 SYRINGE (ML) INJECTION EVERY 12 HOURS SCHEDULED
Status: DISCONTINUED | OUTPATIENT
Start: 2022-12-04 | End: 2022-12-04 | Stop reason: HOSPADM

## 2022-12-04 RX ADMIN — HYDROMORPHONE HYDROCHLORIDE 0.5 MG: 1 INJECTION, SOLUTION INTRAMUSCULAR; INTRAVENOUS; SUBCUTANEOUS at 08:56

## 2022-12-04 RX ADMIN — PREGABALIN 150 MG: 75 CAPSULE ORAL at 08:56

## 2022-12-04 RX ADMIN — MORPHINE SULFATE 100 MG: 100 TABLET, FILM COATED, EXTENDED RELEASE ORAL at 12:30

## 2022-12-04 RX ADMIN — ACYCLOVIR 400 MG: 800 TABLET ORAL at 08:56

## 2022-12-04 RX ADMIN — AMOXICILLIN 1000 MG: 500 CAPSULE ORAL at 05:22

## 2022-12-04 RX ADMIN — Medication 10 ML: at 08:56

## 2022-12-04 RX ADMIN — HEPARIN 500 UNITS: 100 SYRINGE at 14:45

## 2022-12-04 RX ADMIN — ACETAMINOPHEN 650 MG: 325 TABLET ORAL at 12:29

## 2022-12-04 RX ADMIN — POTASSIUM CHLORIDE 20 MEQ: 10 CAPSULE, COATED, EXTENDED RELEASE ORAL at 08:55

## 2022-12-04 RX ADMIN — AMOXICILLIN 1000 MG: 500 CAPSULE ORAL at 14:45

## 2022-12-04 RX ADMIN — APIXABAN 2.5 MG: 2.5 TABLET, FILM COATED ORAL at 08:56

## 2022-12-04 RX ADMIN — IPRATROPIUM BROMIDE AND ALBUTEROL SULFATE 3 ML: .5; 3 SOLUTION RESPIRATORY (INHALATION) at 07:39

## 2022-12-04 RX ADMIN — IPRATROPIUM BROMIDE AND ALBUTEROL SULFATE 3 ML: .5; 3 SOLUTION RESPIRATORY (INHALATION) at 12:32

## 2022-12-04 NOTE — PROGRESS NOTES
[facility]     Progress Note    Patient Name: Evangelina Sutton  : 1950  MRN: [id number]  Primary Care Physician:  Gilberto Coleman MD  Date of admission: 2022    Subjective   Subjective     Chief Complaint: Patient wants to go home she was still having some coughing and chills but she is adamant about leaving the hospital we did explain to her that she needs to be here for another 24 hours but we will get a sputum culture repeated she did have H. influenzae which has been treated with IV and oral antibiotic    HPI:  Patient reports patient with H. influenzae pneumonia with multiple myeloma but adamant on going home so be able to discharge    Review of Systems   All systems were reviewed and negative except for: Reviewed    Objective   Objective     Vitals:   Temp:  [98.2 °F (36.8 °C)-100 °F (37.8 °C)] 99.7 °F (37.6 °C)  Heart Rate:  [] 109  Resp:  [16-22] 18  BP: (100-143)/(50-81) 132/65    Physical Exam    Constitutional: Awake, alert   Eyes: PERRLA, sclerae anicteric, no conjunctival injection   HENT: NCAT, mucous membranes moist   Neck: Supple, no thyromegaly, no lymphadenopathy, trachea midline   Respiratory: Clear to auscultation bilaterally, nonlabored respirations    Cardiovascular: RRR, no murmurs, rubs, or gallops, palpable pedal pulses bilaterally   Gastrointestinal: Positive bowel sounds, soft, nontender, nondistended   Musculoskeletal: No bilateral ankle edema, no clubbing or cyanosis to extremities   Psychiatric: Appropriate affect, cooperative   Neurologic: Oriented x 3, strength symmetric in all extremities, Cranial Nerves grossly intact to confrontation, speech clear   Skin: No rashes   No change  Result Review    Result Review:  I have personally reviewed the results from the time of this admission to 2022 12:00 EST and agree with these findings:  [x]  Laboratory anemia  []  Microbiology  []  Radiology  []  EKG/Telemetry   []  Cardiology/Vascular   []  Pathology  []   Old records  []  Other:  Most notable findings include: Anemia with multiple myeloma    Assessment & Plan   Assessment / Plan     Brief Patient Summary:  Evangelina Sutton is a 72 y.o. female who patient with anemia multiple myeloma and H. influenzae pneumonia has recovered but still appears to be having some cough but wants to go home    Active Hospital Problems:  Active Hospital Problems    Diagnosis    • **Acute UTI        Plan:   Discharge as per her wishes and will see her follow-up in 1 week    DVT prophylaxis:  Medical and mechanical DVT prophylaxis orders are present.    CODE STATUS:   Level Of Support Discussed With: Patient  Code Status (Patient has no pulse and is not breathing): CPR (Attempt to Resuscitate)  Medical Interventions (Patient has pulse or is breathing): Full Support    Disposition:  I expect patient to be discharged today as patient does not want to stay in the hospital.    Electronically signed by Abbe Zamudio MD, 12/04/22, 12:00 PM EST.      Part of this note may be an electronic transcription/translation of spoken language to printed text using the Dragon Dictation System.

## 2022-12-04 NOTE — PLAN OF CARE
Goal Outcome Evaluation:           Progress: no change  Outcome Evaluation: alert and oriented x 4. medicated for pain per orders. pt up in the chair some today. pt up with x 1 assistance/walker. sputum specimen obtained prior to discharge. pt eager for discharge.

## 2022-12-04 NOTE — DISCHARGE SUMMARY
[facility]         DISCHARGE SUMMARY    Patient Name: Evangelina Sutton  : 1950  MRN: [id number]    Date of Admission: 2022  Date of Discharge: 2022  Primary Care Physician: Gilberto Coleman MD    Consults     Date and Time Order Name Status Description    2022  9:01 AM Inpatient Orthopedic Surgery Consult Completed     2022  8:08 PM General MD Inpatient Consult            Presenting Problem:   Acute UTI [N39.0]    Active and Resolved Hospital Problems:  Active Hospital Problems    Diagnosis POA   • **Acute UTI [N39.0] Yes      Resolved Hospital Problems   No resolved problems to display.         Hospital Course     Hospital Course:  Evangelina Sutton is a 72 y.o. female was admitted with story of multiple myeloma with very severe pain cough and had H. influenzae pneumonia she also had positive blood cultures but that was staph coag negative to most likely contaminant she has remained afebrile sputum cultures grew H. influenzae has been treated with amoxicillin is received adequate IV and oral antibiotics she is adamant of going home although she was complaining of some chills today and cough chest x-ray has cleared we will discharge her as per her wishes and will follow her next week in the office      DISCHARGE Follow Up Recommendations for labs and diagnostics: Patient will be seen next week in the office she has sputum cultures which are still pending      Pertinent  and/or Most Recent Results     LAB RESULTS:      Lab 22  0501 22  0614 22  0602 22  2038 22  1333   WBC 2.75* 3.98 2.26*  --  3.00*   HEMOGLOBIN 8.9* 8.8* 9.1*  --  9.8*   HEMATOCRIT 28.5* 29.3* 29.0*  --  31.7*   PLATELETS 220 230 156  --  189   NEUTROS ABS 0.70* 1.14* 1.08*  --  2.00   IMMATURE GRANS (ABS) 0.02 0.01 0.01  --  0.02   LYMPHS ABS 1.35 1.89 0.73  --  0.50*   MONOS ABS 0.55 0.65 0.43  --  0.47   EOS ABS 0.11 0.26 0.01  --  0.00   MCV 85.1 88.5 84.8  --   84.1   LACTATE  --   --   --  1.0  --          Lab 12/03/22  0501 12/02/22  0614 12/01/22  0524 11/30/22  0516 11/29/22  0602 11/28/22  1333   SODIUM 137 142 142 141 140 139   POTASSIUM 3.6 4.1 4.0 3.7 3.0* 3.3*   CHLORIDE 102 107 107 106 103 100   CO2 29.6* 28.8 30.4* 29.1* 25.1 25.6   ANION GAP 5.4 6.2 4.6* 5.9 11.9 13.4   BUN 8 10 12 15 23 32*   CREATININE 0.44* 0.50* 0.52* 0.54* 0.59 0.62   EGFR 102.9 99.8 98.9 98.0 95.9 94.8   GLUCOSE 89 100* 103* 90 84 123*   CALCIUM 9.1 9.2 8.8 8.6 8.8 9.5   MAGNESIUM  --   --   --   --   --  2.2         Lab 11/29/22  0602 11/28/22  1333   TOTAL PROTEIN 7.0 8.2   ALBUMIN 2.80* 3.40*   GLOBULIN 4.2 4.8   ALT (SGPT) 9 11   AST (SGOT) 28 39*   BILIRUBIN 0.5 0.9   ALK PHOS 43 52         Lab 11/28/22  1333   TROPONIN T 0.015                 Brief Urine Lab Results  (Last result in the past 365 days)      Color   Clarity   Blood   Leuk Est   Nitrite   Protein   CREAT   Urine HCG        12/03/22 1514 Yellow   Clear   Negative   Negative   Negative   30 mg/dL (1+)               Microbiology Results (last 10 days)     Procedure Component Value - Date/Time    Blood Culture - Blood, Arm, Left [[id number]]  (Normal) Collected: 12/02/22 1444    Lab Status: Preliminary result Specimen: Blood from Arm, Left Updated: 12/03/22 1501     Blood Culture No growth at 24 hours    Blood Culture - Blood, Blood, Central Line [[id number]]  (Normal) Collected: 12/02/22 1444    Lab Status: Preliminary result Specimen: Blood, Central Line Updated: 12/03/22 1515     Blood Culture No growth at 24 hours    Blood Culture - Blood, Hand, Left [[id number]]  (Normal) Collected: 11/30/22 0916    Lab Status: Preliminary result Specimen: Blood from Hand, Left Updated: 12/04/22 0930     Blood Culture No growth at 4 days    Blood Culture - Blood, Blood, Central Line [[id number]]  (Normal) Collected: 11/30/22 0906    Lab Status: Preliminary result Specimen: Blood, Central Line Updated: 12/04/22 0930     Blood Culture No  growth at 4 days    Respiratory Culture - Sputum, Cough [[id number]]  (Abnormal) Collected: 11/29/22 0256    Lab Status: Final result Specimen: Sputum from Cough Updated: 12/01/22 1322     Respiratory Culture Heavy growth (4+) Haemophilus influenzae     Comment: This isolate is beta-lactamase NEGATIVE and are routinely susceptible to ampicillin and other beta-lactams.           Light growth (2+) Normal Respiratory Linda     Gram Stain Occasional Gram negative bacilli    Blood Culture - Blood, Arm, Left [[id number]]  (Abnormal) Collected: 11/28/22 2038    Lab Status: Final result Specimen: Blood from Arm, Left Updated: 12/03/22 0709     Blood Culture Staphylococcus hominis ssp hominis     Isolated from Aerobic and Anaerobic Bottles     Gram Stain Aerobic Bottle Gram positive cocci in clusters      Anaerobic Bottle Gram positive cocci in clusters    Narrative:      Refer to previous blood culture collected on 11/28/2022 2038 for MICs    Blood Culture - Blood, Arm, Left [[id number]]  (Abnormal)  (Susceptibility) Collected: 11/28/22 2038    Lab Status: Final result Specimen: Blood from Arm, Left Updated: 12/03/22 0709     Blood Culture Staphylococcus hominis ssp hominis     Isolated from Aerobic and Anaerobic Bottles     Blood Culture Staphylococcus capitis     Isolated from Aerobic and Anaerobic Bottles     Gram Stain Aerobic Bottle Gram positive cocci in clusters      Anaerobic Bottle Gram positive cocci in clusters    Narrative:      Staphylococcus capitis: Probable contaminant requires clinical correlation, susceptibility not performed unless requested by physician.      Susceptibility      Staphylococcus hominis ssp hominis      MILLA      Oxacillin Susceptible      Vancomycin Susceptible                       Susceptibility Comments     Staphylococcus hominis ssp hominis    This isolate does not demonstrate inducible clindamycin resistance in vitro.               Blood Culture ID, PCR - Blood, Arm, Left [[id number]]   (Abnormal) Collected: 11/28/22 2038    Lab Status: Final result Specimen: Blood from Arm, Left Updated: 11/29/22 1813     BCID, PCR Staph spp, not aureus or lugdunensis. Identification by BCID2 PCR.    Influenza Antigen, Rapid - Swab, Nasopharynx [[id number]]  (Normal) Collected: 11/28/22 1231    Lab Status: Final result Specimen: Swab from Nasopharynx Updated: 11/28/22 1323     Influenza A Ag, EIA Negative     Influenza B Ag, EIA Negative    COVID-19,APTIMA PANTHER(SRIRAM),[facility] JAMAR/[facility] JUAN JOSÉ, NP/OP SWAB IN UTM/VTM/SALINE TRANSPORT MEDIA,24 HR TAT - Swab, Nasopharynx [[id number]]  (Normal) Collected: 11/28/22 1231    Lab Status: Final result Specimen: Swab from Nasopharynx Updated: 11/28/22 1642     COVID19 Not Detected    Narrative:      Fact sheet for providers: https://www.fda.gov/media/550578/download     Fact sheet for patients: https://www.fda.gov/media/435265/download    Test performed by RT PCR.          XR Chest 1 View    Result Date: 11/28/2022  Impression:   1. Right basilar opacity has slightly improved, which could represent atelectasis or pneumonia. 2. Numerous lytic osseous lesions redemonstrated, compatible with known multiple myeloma.       JERRY JUAN MD       Electronically Signed and Approved By: JERRY JUAN MD on 11/28/2022 at 21:09                           Labs Pending at Discharge:  Pending Labs     Order Current Status    Respiratory Culture - Sputum, Cough In process    Blood Culture - Blood, Arm, Left Preliminary result    Blood Culture - Blood, Blood, Central Line Preliminary result    Blood Culture - Blood, Blood, Central Line Preliminary result    Blood Culture - Blood, Hand, Left Preliminary result            Discharge Details        Discharge Medications      New Medications      Instructions Start Date   amoxicillin 500 MG capsule  Commonly known as: AMOXIL   1,000 mg, Oral, Every 8 Hours Scheduled      guaifenesin 100 MG/5ML liquid  Commonly known as: ROBITUSSIN   200 mg, Oral,  Every 4 Hours PRN         Continue These Medications      Instructions Start Date   acyclovir 400 MG tablet  Commonly known as: ZOVIRAX   400 mg, Oral, 2 Times Daily, TAKES TO PREVENT SHINGLES      Morphine 100 MG 12 hr tablet  Commonly known as: MS CONTIN   100 mg, Oral, 2 Times Daily      ondansetron ODT 8 MG disintegrating tablet  Commonly known as: ZOFRAN-ODT   8 mg, Translingual, Every 8 Hours PRN      oxyCODONE 10 MG 12 hr tablet  Commonly known as: oxyCONTIN   10 mg, Oral, Every 6 Hours PRN      potassium chloride 10 MEQ CR tablet   10 mEq, Oral, 2 Times Daily, INST PER ANESTHESIA PROTOCOL      pregabalin 100 MG capsule  Commonly known as: LYRICA   150 mg, Oral, 2 Times Daily      prochlorperazine 25 MG suppository  Commonly known as: COMPAZINE   25 mg, Rectal, Every 8 Hours PRN      spironolactone 25 MG tablet  Commonly known as: ALDACTONE   25 mg, Oral, Daily, INST PER ANESTHESIA PROTOCOL      Stimulant Laxative 8.6-50 MG per tablet  Generic drug: sennosides-docusate   2 tablets, Oral, 2 Times Daily      Vitamin D3 1.25 MG (57161 UT) tablet   1 tablet/day, Oral, Every 7 Days, TAKES ON Monday. INST PER ANESTHESIA PROTOCOL      zolpidem 5 MG tablet  Commonly known as: AMBIEN   5 mg, Oral, Nightly PRN         Stop These Medications    Pomalyst 4 MG chemo capsule  Generic drug: pomalidomide            No Known Allergies      Discharge Disposition:  Home or Self Care    Diet:  Hospital:  Diet Order   Procedures   • Diet: Regular/House Diet; Texture: Regular Texture (IDDSI 7); Fluid Consistency: Thin (IDDSI 0)         Discharge Activity:         CODE STATUS:  Code Status and Medical Interventions:   Ordered at: 11/29/22 4943     Level Of Support Discussed With:    Patient     Code Status (Patient has no pulse and is not breathing):    CPR (Attempt to Resuscitate)     Medical Interventions (Patient has pulse or is breathing):    Full Support         No future appointments.        Time spent on Discharge including  face to face service: 45 minutes    Electronically signed by Abbe Zamudio MD, 12/04/22, 12:02 PM EST.    Part of this note may be an electronic transcription/translation of spoken language to printed text using the Dragon Dictation System.

## 2022-12-04 NOTE — THERAPY TREATMENT NOTE
Acute Care - Physical Therapy Treatment Note   [facility]     Patient Name: Evangelina Sutton  : 1950  MRN: [id number]  Today's Date: 2022      Visit Dx:     ICD-10-CM ICD-9-CM   1. Acute UTI  N39.0 599.0   2. Difficulty walking  R26.2 719.7     Patient Active Problem List   Diagnosis   • Nausea and vomiting   • Multiple myeloma (HCC)   • Left displaced femoral neck fracture (HCC)   • Decreased activities of daily living (ADL)   • Aftercare following left hip hemiarthroplasty    • Acute UTI     Past Medical History:   Diagnosis Date   • Cancer (HCC)     BONE CANCER/TAKING CHEMO SHOT WEEKLY   • DDD (degenerative disc disease), cervical    • Neuropathy     LEGS AND ARMS   • PONV (postoperative nausea and vomiting)      Past Surgical History:   Procedure Laterality Date   • BACK SURGERY      DISCECTOMY W/FUSION, NECK ? LEVEL   • CHOLECYSTECTOMY     • HYSTERECTOMY     • PORTACATH PLACEMENT N/A 3/17/2022    Procedure: INSERTION OF PORTACATH;  Surgeon: Cj Rossi MD;  Location: [facility] MAIN OR;  Service: General;  Laterality: N/A;   • TOTAL HIP ARTHROPLASTY Left 2022    Procedure: LEFT HIP ARTHROPLASTY ANTERIOR;  Surgeon: Sridhar Coto MD;  Location: [facility] MAIN OR;  Service: Orthopedics;  Laterality: Left;     PT Assessment (last 12 hours)     PT Evaluation and Treatment     Row Name 22          Physical Therapy Time and Intention    Subjective Information complains of;weakness;fatigue  -DK     Document Type therapy note (daily note)  -DK     Mode of Treatment individual therapy;physical therapy  -DK     Patient Effort good  -DK     Symptoms Noted During/After Treatment fatigue  -DK     Comment Pt reports not feeling well/tired.  -DK     Row Name 22          Pain    Pretreatment Pain Rating 0/10 - no pain  -DK     Posttreatment Pain Rating 0/10 - no pain  -DK     Row Name 22          Cognition    Affect/Mental Status (Cognition) WFL  -     Orientation Status  (Cognition) oriented x 4  -DK     Follows Commands (Cognition) WFL  -DK     Cognitive Function WFL  -DK     Personal Safety Interventions gait belt;nonskid shoes/slippers when out of bed;supervised activity  -DK     Row Name 12/04/22 0931          Bed Mobility    Bed Mobility supine-sit  -DK     All Activities, [place] (Bed Mobility) standby assist  -DK     Supine-Sit [place] (Bed Mobility) standby assist  -DK     Assistive Device (Bed Mobility) bed rails  -DK     Row Name 12/04/22 0931          Transfers    Transfers sit-stand transfer;stand-sit transfer  -     Row Name 12/04/22 0931          Sit-Stand Transfer    Sit-Stand [place] (Transfers) standby assist;contact guard;1 person assist  -DK     Assistive Device (Sit-Stand Transfers) walker, front-wheeled  -DK     Row Name 12/04/22 0931          Stand-Sit Transfer    Stand-Sit [place] (Transfers) standby assist;contact guard;1 person assist  -DK     Assistive Device (Stand-Sit Transfers) walker, front-wheeled  -DK     Row Name 12/04/22 0931          Gait/Stairs (Locomotion)    Gait/Stairs Locomotion gait/ambulation independence;gait/ambulation assistive device;distance ambulated;gait pattern  -DK     [place] Level (Gait) standby assist;contact guard;1 person assist  -DK     Assistive Device (Gait) walker, front-wheeled  -DK     Pattern (Gait) step-through  -DK     Deviations/Abnormal Patterns (Gait) festinating/shuffling  -DK     Bilateral Gait Deviations forward flexed posture  -     Row Name 12/04/22 0931          Safety Issues, Functional Mobility    Impairments Affecting Function (Mobility) endurance/activity tolerance;strength;shortness of breath  -     Row Name 12/04/22 0931          Motor Skills    Motor Skills --  therapeutic exercises  -     Therapeutic Exercise hip;knee;ankle  -     Additional Documentation --  Pt declined transfers this session.  -     Row Name 12/04/22 0931          Hip (Therapeutic Exercise)    Hip  (Therapeutic Exercise) AAROM (active assistive range of motion)  -DK     Hip AAROM (Therapeutic Exercise) bilateral;flexion;extension;aBduction;aDduction;supine;10 repetitions;2 sets  -DK     Row Name 12/04/22 0931          Knee (Therapeutic Exercise)    Knee (Therapeutic Exercise) AAROM (active assistive range of motion)  -DK     Knee AAROM (Therapeutic Exercise) bilateral;flexion;extension;supine;10 repetitions;2 sets  -DK     Row Name 12/04/22 0931          Ankle (Therapeutic Exercise)    Ankle (Therapeutic Exercise) AAROM (active assistive range of motion)  -DK     Ankle AAROM (Therapeutic Exercise) bilateral;dorsiflexion;plantarflexion;supine;10 repetitions;2 sets  -     Row Name             Wound 12/02/22 0806 coccyx Pressure Injury    Wound - Properties Group Placement Date: 12/02/22  -RF Placement Time: 0806  -RF Present on Hospital Admission: N  -RF Location: coccyx  -RF Primary Wound Type: Pressure inj  -RF    Retired Wound - Properties Group Placement Date: 12/02/22  -RF Placement Time: 0806  -RF Present on Hospital Admission: N  -RF Location: coccyx  -RF Primary Wound Type: Pressure inj  -RF    Retired Wound - Properties Group Date first assessed: 12/02/22  -RF Time first assessed: 0806  -RF Present on Hospital Admission: N  -RF Location: coccyx  -RF Primary Wound Type: Pressure inj  -RF    Row Name 12/04/22 0931          Plan of Care Review    Plan of Care Reviewed With patient  -DK     Progress no change  -     Row Name 12/04/22 0931          Positioning and Restraints    Pre-Treatment Position in bed  -DK     Post Treatment Position bed  -DK     In Bed supine;call light within reach;encouraged to call for assist;exit alarm on;side rails up x2;legs elevated;heels elevated  -     Row Name 12/04/22 0931          Therapy Assessment/Plan (PT)    Rehab Potential (PT) good, to achieve stated therapy goals  -DK     Criteria for Skilled Interventions Met (PT) skilled treatment is necessary  -JAILENE      Therapy Frequency (PT) daily  -DK     Problem List (PT) problems related to;balance;mobility;strength  breathing issues  -DK     Activity Limitations Related to Problem List (PT) unable to ambulate safely  -DK     Row Name 12/04/22 0931          Progress Summary (PT)    Progress Toward Functional Goals (PT) progress toward functional goals is good  -DK           User Key  (r) = Recorded By, (t) = Taken By, (c) = Cosigned By    Initials Name Provider Type    DK Tavia Mckeon, YENI Physical Therapist Assistant     Willard Lopez, RN Registered Nurse                Physical Therapy Education     Title: PT OT SLP Therapies (Done)     Topic: Physical Therapy (Done)     Point: Mobility training (Done)     Learning Progress Summary           Patient Acceptance, E,TB, VU,NR by  at 12/4/2022 0016    Acceptance, E, VU,DU by  at 12/2/2022 0759    Acceptance, E, VU,DU by  at 12/1/2022 0929    Acceptance, E, VU,DU by  at 11/30/2022 0815    Acceptance, E, VU by  at 11/29/2022 1023                               User Key     Initials Effective Dates Name Provider Type Discipline     06/16/21 -  Tosha Cortez, RN Registered Nurse Nurse     01/19/22 -  Willard Lopez, RN Registered Nurse Nurse    [facility] 10/18/22 -  Winston Portillo, DIANE Student PT Student PT              PT Recommendation and Plan  Planned Therapy Interventions (PT): balance training, bed mobility training, gait training, strengthening, transfer training  Therapy Frequency (PT): daily  Progress Summary (PT)  Progress Toward Functional Goals (PT): progress toward functional goals is good  Plan of Care Reviewed With: patient  Progress: no change   Outcome Measures     Row Name 12/04/22 0931 12/02/22 1100          How much help from another person do you currently need...    Turning from your back to your side while in flat bed without using bedrails? 4  -DK 4  -AV (r) AD (t) AV (c)     Moving from lying on back to sitting on the side of a flat bed without  bedrails? 4  -DK 4  -AV (r) AD (t) AV (c)     Moving to and from a bed to a chair (including a wheelchair)? 3  -DK 4  -AV (r) AD (t) AV (c)     Standing up from a chair using your arms (e.g., wheelchair, bedside chair)? 3  -DK 4  -AV (r) AD (t) AV (c)     Climbing 3-5 steps with a railing? 3  -DK 2  -AV (r) AD (t) AV (c)     To walk in hospital room? 3  -DK 3  -AV (r) AD (t) AV (c)     AM-PAC 6 Clicks Score (PT) 20  -DK 21  -AV (r) AD (t)        Functional Assessment    Outcome Measure Options AM-PAC 6 Clicks Basic Mobility (PT)  -DK AM-PAC 6 Clicks Basic Mobility (PT)  -AV (r) AD (t) AV (c)           User Key  (r) = Recorded By, (t) = Taken By, (c) = Cosigned By    Initials Name Provider Type    Tavia Fairbanks PTA Physical Therapist Assistant    Hector Olea, PT Physical Therapist    Roman Landa, PT Student PT Student                 Time Calculation:    PT Charges     Row Name 12/04/22 0934             Time Calculation    PT Received On 12/04/22  -DK      PT Goal Re-Cert Due Date 12/08/22  -DK         Timed Charges    09027 - PT Therapeutic Exercise Minutes 14  -DK      94047 - PT Therapeutic Activity Minutes 3  -DK         Total Minutes    Timed Charges Total Minutes 17  -DK       Total Minutes 17  -DK            User Key  (r) = Recorded By, (t) = Taken By, (c) = Cosigned By    Initials Name Provider Type    Tavia Fairbanks PTA Physical Therapist Assistant              Therapy Charges for Today     Code Description Service Date Service Provider Modifiers Qty    [id number] HC PT THER PROC EA 15 MIN 12/4/2022 Tavia Mckeon PTA GP 1          PT G-Codes  Outcome Measure Options: AM-PAC 6 Clicks Basic Mobility (PT)  AM-PAC 6 Clicks Score (PT): 20    Tavia Mckeon PTA  12/4/2022

## 2022-12-04 NOTE — PLAN OF CARE
Goal Outcome Evaluation:  Plan of Care Reviewed With: patient        Progress: no change  Outcome Evaluation: pt medicated x1 for pain with relief voiced. medicated x 1 for sleep with relief noted. skin care and repositioning done frequently. encouraged pt to stay off back as much as possible. no changes in pt's status.

## 2022-12-04 NOTE — PLAN OF CARE
Goal Outcome Evaluation:           Progress: no change  Outcome Evaluation: alert and oriented x 4. vitals stable throughout shift. pt on room air and tolerating well. medicated for pain per orders. medicated for cough per orders. pt up with x 1 assistance/walker to bathroom this shift. pt encouraged/educated to reposition self in bed. no other complaints at this time.

## 2022-12-05 ENCOUNTER — READMISSION MANAGEMENT (OUTPATIENT)
Dept: CALL CENTER | Facility: HOSPITAL | Age: 72
End: 2022-12-05

## 2022-12-05 LAB
BACTERIA SPEC AEROBE CULT: NORMAL
BACTERIA SPEC AEROBE CULT: NORMAL

## 2022-12-05 NOTE — OUTREACH NOTE
Prep Survey    Flowsheet Row Responses   [facility] facility patient discharged from? [facility]   Is LACE score < 7 ? No   Emergency Room discharge w/ pulse ox? No   Eligibility Readm Mgmt   Discharge diagnosis  H. influenzae pneumonia    Does the patient have one of the following disease processes/diagnoses(primary or secondary)? Pneumonia   Does the patient have Home health ordered? No   Is there a DME ordered? No   Prep survey completed? Yes          ANTOINE QUIROGA - Registered Nurse

## 2022-12-06 LAB
BACTERIA SPEC RESP CULT: NORMAL
GRAM STN SPEC: NORMAL

## 2022-12-07 LAB
BACTERIA SPEC AEROBE CULT: NORMAL
BACTERIA SPEC AEROBE CULT: NORMAL

## 2022-12-09 ENCOUNTER — READMISSION MANAGEMENT (OUTPATIENT)
Dept: CALL CENTER | Facility: HOSPITAL | Age: 72
End: 2022-12-09

## 2022-12-09 NOTE — OUTREACH NOTE
COPD/PN Week 1 Survey    Flowsheet Row Responses   [facility] patient discharged from? [facility]   Does the patient have one of the following disease processes/diagnoses(primary or secondary)? Pneumonia   Week 1 attempt successful? Yes   Call start time 1015   List who call center can speak with pt   Meds reviewed with patient/caregiver? Yes   Is the patient having any side effects they believe may be caused by any medication additions or changes? No   Does the patient have all medications ordered at discharge? Yes   Is the patient taking all medications as directed (includes completed medication regime)? Yes   Comments regarding appointments Pt has follow up appointments scheduled.   Does the patient have a primary care provider?  Yes   Has the patient kept scheduled appointments due by today? N/A   Has home health visited the patient within 72 hours of discharge? N/A   Pulse Ox monitoring None   Psychosocial issues? No   Did the patient receive a copy of their discharge instructions? Yes   Nursing interventions Reviewed instructions with patient   What is the patient's perception of their health status since discharge? Improving   Is the patient/caregiver able to teach back the hierarchy of who to call/visit for symptoms/problems? PCP, Specialist, Home health nurse, Urgent Care, ED, 911 Yes   Week 1 call completed? Yes   Wrap up additional comments Brief call. Pt reports feeling better. Pt has all medications. Pt has all follow up appointments scheduled.          KALEB TOM - Registered Nurse

## 2022-12-13 ENCOUNTER — TRANSCRIBE ORDERS (OUTPATIENT)
Dept: ADMINISTRATIVE | Facility: HOSPITAL | Age: 72
End: 2022-12-13

## 2022-12-13 DIAGNOSIS — Z87.01 HISTORY OF RECENT PNEUMONIA: Primary | ICD-10-CM

## 2023-02-21 ENCOUNTER — APPOINTMENT (OUTPATIENT)
Dept: CT IMAGING | Facility: HOSPITAL | Age: 73
DRG: 871 | End: 2023-02-21
Payer: MEDICARE

## 2023-02-21 ENCOUNTER — APPOINTMENT (OUTPATIENT)
Dept: GENERAL RADIOLOGY | Facility: HOSPITAL | Age: 73
DRG: 871 | End: 2023-02-21
Payer: MEDICARE

## 2023-02-21 ENCOUNTER — HOSPITAL ENCOUNTER (INPATIENT)
Facility: HOSPITAL | Age: 73
LOS: 5 days | Discharge: HOME HEALTH | DRG: 871 | End: 2023-02-26
Attending: EMERGENCY MEDICINE | Admitting: INTERNAL MEDICINE
Payer: MEDICARE

## 2023-02-21 DIAGNOSIS — J18.9 PNEUMONIA DUE TO INFECTIOUS ORGANISM, UNSPECIFIED LATERALITY, UNSPECIFIED PART OF LUNG: Primary | ICD-10-CM

## 2023-02-21 DIAGNOSIS — Z78.9 DECREASED ACTIVITIES OF DAILY LIVING (ADL): ICD-10-CM

## 2023-02-21 DIAGNOSIS — R26.2 DIFFICULTY WALKING: ICD-10-CM

## 2023-02-21 DIAGNOSIS — A41.9 SEPSIS, DUE TO UNSPECIFIED ORGANISM, UNSPECIFIED WHETHER ACUTE ORGAN DYSFUNCTION PRESENT: ICD-10-CM

## 2023-02-21 DIAGNOSIS — R13.12 DYSPHAGIA, OROPHARYNGEAL: ICD-10-CM

## 2023-02-21 LAB
ALBUMIN SERPL-MCNC: 3.4 G/DL (ref 3.5–5.2)
ALBUMIN/GLOB SERPL: 0.6 G/DL
ALP SERPL-CCNC: 47 U/L (ref 39–117)
ALT SERPL W P-5'-P-CCNC: 7 U/L (ref 1–33)
ANION GAP SERPL CALCULATED.3IONS-SCNC: 9.7 MMOL/L (ref 5–15)
AST SERPL-CCNC: 24 U/L (ref 1–32)
BACTERIA UR QL AUTO: ABNORMAL /HPF
BASOPHILS # BLD AUTO: 0 10*3/MM3 (ref 0–0.2)
BASOPHILS NFR BLD AUTO: 0 % (ref 0–1.5)
BILIRUB SERPL-MCNC: 0.5 MG/DL (ref 0–1.2)
BILIRUB UR QL STRIP: NEGATIVE
BUN SERPL-MCNC: 39 MG/DL (ref 8–23)
BUN/CREAT SERPL: 39.8 (ref 7–25)
CALCIUM SPEC-SCNC: 10.5 MG/DL (ref 8.6–10.5)
CHLORIDE SERPL-SCNC: 102 MMOL/L (ref 98–107)
CLARITY UR: ABNORMAL
CO2 SERPL-SCNC: 32.3 MMOL/L (ref 22–29)
COLOR UR: YELLOW
CREAT SERPL-MCNC: 0.98 MG/DL (ref 0.57–1)
D-LACTATE SERPL-SCNC: 1.7 MMOL/L (ref 0.5–2)
D-LACTATE SERPL-SCNC: 2.5 MMOL/L (ref 0.5–2)
D-LACTATE SERPL-SCNC: 3.2 MMOL/L (ref 0.5–2)
DEPRECATED RDW RBC AUTO: 64.3 FL (ref 37–54)
EGFRCR SERPLBLD CKD-EPI 2021: 61.5 ML/MIN/1.73
EOSINOPHIL # BLD AUTO: 0 10*3/MM3 (ref 0–0.4)
EOSINOPHIL NFR BLD AUTO: 0 % (ref 0.3–6.2)
ERYTHROCYTE [DISTWIDTH] IN BLOOD BY AUTOMATED COUNT: 19.9 % (ref 12.3–15.4)
FLUAV AG NPH QL: NEGATIVE
FLUBV AG NPH QL IA: NEGATIVE
GLOBULIN UR ELPH-MCNC: 5.9 GM/DL
GLUCOSE BLDC GLUCOMTR-MCNC: 168 MG/DL (ref 70–99)
GLUCOSE SERPL-MCNC: 188 MG/DL (ref 65–99)
GLUCOSE UR STRIP-MCNC: ABNORMAL MG/DL
HCT VFR BLD AUTO: 37.5 % (ref 34–46.6)
HGB BLD-MCNC: 11.3 G/DL (ref 12–15.9)
HGB UR QL STRIP.AUTO: NEGATIVE
HOLD SPECIMEN: NORMAL
HOLD SPECIMEN: NORMAL
HYALINE CASTS UR QL AUTO: ABNORMAL /LPF
IMM GRANULOCYTES # BLD AUTO: 0.01 10*3/MM3 (ref 0–0.05)
IMM GRANULOCYTES NFR BLD AUTO: 0.4 % (ref 0–0.5)
KETONES UR QL STRIP: NEGATIVE
LEUKOCYTE ESTERASE UR QL STRIP.AUTO: NEGATIVE
LYMPHOCYTES # BLD AUTO: 0.3 10*3/MM3 (ref 0.7–3.1)
LYMPHOCYTES NFR BLD AUTO: 10.7 % (ref 19.6–45.3)
MAGNESIUM SERPL-MCNC: 2 MG/DL (ref 1.6–2.4)
MCH RBC QN AUTO: 26.7 PG (ref 26.6–33)
MCHC RBC AUTO-ENTMCNC: 30.1 G/DL (ref 31.5–35.7)
MCV RBC AUTO: 88.4 FL (ref 79–97)
MONOCYTES # BLD AUTO: 0.36 10*3/MM3 (ref 0.1–0.9)
MONOCYTES NFR BLD AUTO: 12.8 % (ref 5–12)
NEUTROPHILS NFR BLD AUTO: 2.14 10*3/MM3 (ref 1.7–7)
NEUTROPHILS NFR BLD AUTO: 76.1 % (ref 42.7–76)
NITRITE UR QL STRIP: NEGATIVE
NRBC BLD AUTO-RTO: 0 /100 WBC (ref 0–0.2)
PH UR STRIP.AUTO: 6 [PH] (ref 5–8)
PLATELET # BLD AUTO: 272 10*3/MM3 (ref 140–450)
PMV BLD AUTO: 9.9 FL (ref 6–12)
POTASSIUM SERPL-SCNC: 3.7 MMOL/L (ref 3.5–5.2)
PROT SERPL-MCNC: 9.3 G/DL (ref 6–8.5)
PROT UR QL STRIP: ABNORMAL
RBC # BLD AUTO: 4.24 10*6/MM3 (ref 3.77–5.28)
RBC # UR STRIP: ABNORMAL /HPF
REF LAB TEST METHOD: ABNORMAL
SODIUM SERPL-SCNC: 144 MMOL/L (ref 136–145)
SP GR UR STRIP: 1.02 (ref 1–1.03)
SQUAMOUS #/AREA URNS HPF: ABNORMAL /HPF
TROPONIN T SERPL HS-MCNC: 126 NG/L
UROBILINOGEN UR QL STRIP: ABNORMAL
WBC # UR STRIP: ABNORMAL /HPF
WBC NRBC COR # BLD: 2.81 10*3/MM3 (ref 3.4–10.8)
WHOLE BLOOD HOLD COAG: NORMAL
WHOLE BLOOD HOLD SPECIMEN: NORMAL

## 2023-02-21 PROCEDURE — 87804 INFLUENZA ASSAY W/OPTIC: CPT | Performed by: EMERGENCY MEDICINE

## 2023-02-21 PROCEDURE — 99285 EMERGENCY DEPT VISIT HI MDM: CPT

## 2023-02-21 PROCEDURE — 85025 COMPLETE CBC W/AUTO DIFF WBC: CPT | Performed by: EMERGENCY MEDICINE

## 2023-02-21 PROCEDURE — 82962 GLUCOSE BLOOD TEST: CPT

## 2023-02-21 PROCEDURE — 83735 ASSAY OF MAGNESIUM: CPT | Performed by: EMERGENCY MEDICINE

## 2023-02-21 PROCEDURE — 36415 COLL VENOUS BLD VENIPUNCTURE: CPT | Performed by: EMERGENCY MEDICINE

## 2023-02-21 PROCEDURE — 70450 CT HEAD/BRAIN W/O DYE: CPT

## 2023-02-21 PROCEDURE — 25010000002 HYDROMORPHONE 1 MG/ML SOLUTION: Performed by: INTERNAL MEDICINE

## 2023-02-21 PROCEDURE — 80053 COMPREHEN METABOLIC PANEL: CPT | Performed by: EMERGENCY MEDICINE

## 2023-02-21 PROCEDURE — 71045 X-RAY EXAM CHEST 1 VIEW: CPT

## 2023-02-21 PROCEDURE — 83605 ASSAY OF LACTIC ACID: CPT | Performed by: EMERGENCY MEDICINE

## 2023-02-21 PROCEDURE — 84484 ASSAY OF TROPONIN QUANT: CPT | Performed by: EMERGENCY MEDICINE

## 2023-02-21 PROCEDURE — 25010000002 PIPERACILLIN SOD-TAZOBACTAM PER 1 G: Performed by: EMERGENCY MEDICINE

## 2023-02-21 PROCEDURE — P9612 CATHETERIZE FOR URINE SPEC: HCPCS

## 2023-02-21 PROCEDURE — U0004 COV-19 TEST NON-CDC HGH THRU: HCPCS | Performed by: EMERGENCY MEDICINE

## 2023-02-21 PROCEDURE — 87040 BLOOD CULTURE FOR BACTERIA: CPT | Performed by: EMERGENCY MEDICINE

## 2023-02-21 PROCEDURE — 93005 ELECTROCARDIOGRAM TRACING: CPT | Performed by: EMERGENCY MEDICINE

## 2023-02-21 PROCEDURE — 81001 URINALYSIS AUTO W/SCOPE: CPT | Performed by: EMERGENCY MEDICINE

## 2023-02-21 PROCEDURE — U0005 INFEC AGEN DETEC AMPLI PROBE: HCPCS | Performed by: EMERGENCY MEDICINE

## 2023-02-21 RX ORDER — ACETAMINOPHEN 650 MG/1
650 SUPPOSITORY RECTAL ONCE
Status: COMPLETED | OUTPATIENT
Start: 2023-02-21 | End: 2023-02-21

## 2023-02-21 RX ORDER — PREGABALIN 150 MG/1
1 CAPSULE ORAL 2 TIMES DAILY
COMMUNITY
Start: 2023-02-15 | End: 2023-03-24 | Stop reason: HOSPADM

## 2023-02-21 RX ORDER — SODIUM CHLORIDE 0.9 % (FLUSH) 0.9 %
10 SYRINGE (ML) INJECTION AS NEEDED
Status: DISCONTINUED | OUTPATIENT
Start: 2023-02-21 | End: 2023-02-26 | Stop reason: HOSPADM

## 2023-02-21 RX ORDER — POMALIDOMIDE 4 MG/1
4 CAPSULE ORAL TAKE AS DIRECTED
COMMUNITY
Start: 2023-02-11 | End: 2023-03-24 | Stop reason: HOSPADM

## 2023-02-21 RX ORDER — DARATUMUMAB AND HYALURONIDASE-FIHJ (HUMAN RECOMBINANT) 1800; 30000 MG/15ML; U/15ML
1800 INJECTION SUBCUTANEOUS ONCE
COMMUNITY
Start: 2023-02-20 | End: 2023-03-24 | Stop reason: HOSPADM

## 2023-02-21 RX ORDER — OXYCODONE HYDROCHLORIDE 5 MG/1
5 TABLET ORAL EVERY 6 HOURS
COMMUNITY
Start: 2023-02-21

## 2023-02-21 RX ADMIN — HYDROMORPHONE HYDROCHLORIDE 0.5 MG: 1 INJECTION, SOLUTION INTRAMUSCULAR; INTRAVENOUS; SUBCUTANEOUS at 22:08

## 2023-02-21 RX ADMIN — ACETAMINOPHEN 650 MG: 650 SUPPOSITORY RECTAL at 15:25

## 2023-02-21 RX ADMIN — TAZOBACTAM SODIUM AND PIPERACILLIN SODIUM 3.38 G: 375; 3 INJECTION, SOLUTION INTRAVENOUS at 15:16

## 2023-02-21 RX ADMIN — SODIUM CHLORIDE 1000 ML: 9 INJECTION, SOLUTION INTRAVENOUS at 15:15

## 2023-02-22 ENCOUNTER — APPOINTMENT (OUTPATIENT)
Dept: CARDIOLOGY | Facility: HOSPITAL | Age: 73
DRG: 871 | End: 2023-02-22
Payer: MEDICARE

## 2023-02-22 LAB
ALBUMIN SERPL-MCNC: 3.3 G/DL (ref 3.5–5.2)
ALBUMIN/GLOB SERPL: 0.6 G/DL
ALP SERPL-CCNC: 45 U/L (ref 39–117)
ALT SERPL W P-5'-P-CCNC: 6 U/L (ref 1–33)
ANION GAP SERPL CALCULATED.3IONS-SCNC: 8.4 MMOL/L (ref 5–15)
AST SERPL-CCNC: 23 U/L (ref 1–32)
BASOPHILS # BLD AUTO: 0.01 10*3/MM3 (ref 0–0.2)
BASOPHILS NFR BLD AUTO: 0.2 % (ref 0–1.5)
BILIRUB SERPL-MCNC: 0.6 MG/DL (ref 0–1.2)
BUN SERPL-MCNC: 37 MG/DL (ref 8–23)
BUN/CREAT SERPL: 43 (ref 7–25)
CALCIUM SPEC-SCNC: 9.9 MG/DL (ref 8.6–10.5)
CHLORIDE SERPL-SCNC: 105 MMOL/L (ref 98–107)
CO2 SERPL-SCNC: 33.6 MMOL/L (ref 22–29)
CREAT SERPL-MCNC: 0.86 MG/DL (ref 0.57–1)
DEPRECATED RDW RBC AUTO: 61.5 FL (ref 37–54)
EGFRCR SERPLBLD CKD-EPI 2021: 71.9 ML/MIN/1.73
EOSINOPHIL # BLD AUTO: 0 10*3/MM3 (ref 0–0.4)
EOSINOPHIL NFR BLD AUTO: 0 % (ref 0.3–6.2)
ERYTHROCYTE [DISTWIDTH] IN BLOOD BY AUTOMATED COUNT: 19.5 % (ref 12.3–15.4)
GLOBULIN UR ELPH-MCNC: 5.5 GM/DL
GLUCOSE SERPL-MCNC: 139 MG/DL (ref 65–99)
HCT VFR BLD AUTO: 32.6 % (ref 34–46.6)
HGB BLD-MCNC: 10.1 G/DL (ref 12–15.9)
IMM GRANULOCYTES # BLD AUTO: 0.01 10*3/MM3 (ref 0–0.05)
IMM GRANULOCYTES NFR BLD AUTO: 0.2 % (ref 0–0.5)
LYMPHOCYTES # BLD AUTO: 0.38 10*3/MM3 (ref 0.7–3.1)
LYMPHOCYTES NFR BLD AUTO: 8.9 % (ref 19.6–45.3)
MCH RBC QN AUTO: 26.7 PG (ref 26.6–33)
MCHC RBC AUTO-ENTMCNC: 31 G/DL (ref 31.5–35.7)
MCV RBC AUTO: 86.2 FL (ref 79–97)
MONOCYTES # BLD AUTO: 0.39 10*3/MM3 (ref 0.1–0.9)
MONOCYTES NFR BLD AUTO: 9.2 % (ref 5–12)
NEUTROPHILS NFR BLD AUTO: 3.47 10*3/MM3 (ref 1.7–7)
NEUTROPHILS NFR BLD AUTO: 81.5 % (ref 42.7–76)
NRBC BLD AUTO-RTO: 0 /100 WBC (ref 0–0.2)
PLATELET # BLD AUTO: 238 10*3/MM3 (ref 140–450)
PMV BLD AUTO: 9.9 FL (ref 6–12)
POTASSIUM SERPL-SCNC: 3.3 MMOL/L (ref 3.5–5.2)
PROT SERPL-MCNC: 8.8 G/DL (ref 6–8.5)
QT INTERVAL: 346 MS
RBC # BLD AUTO: 3.78 10*6/MM3 (ref 3.77–5.28)
SARS-COV-2 RNA RESP QL NAA+PROBE: NOT DETECTED
SODIUM SERPL-SCNC: 147 MMOL/L (ref 136–145)
TROPONIN T SERPL HS-MCNC: 87 NG/L
WBC NRBC COR # BLD: 4.26 10*3/MM3 (ref 3.4–10.8)

## 2023-02-22 PROCEDURE — 85025 COMPLETE CBC W/AUTO DIFF WBC: CPT | Performed by: INTERNAL MEDICINE

## 2023-02-22 PROCEDURE — 25010000002 PIPERACILLIN SOD-TAZOBACTAM PER 1 G: Performed by: INTERNAL MEDICINE

## 2023-02-22 PROCEDURE — 87040 BLOOD CULTURE FOR BACTERIA: CPT | Performed by: INTERNAL MEDICINE

## 2023-02-22 PROCEDURE — 84484 ASSAY OF TROPONIN QUANT: CPT | Performed by: INTERNAL MEDICINE

## 2023-02-22 PROCEDURE — 92610 EVALUATE SWALLOWING FUNCTION: CPT

## 2023-02-22 PROCEDURE — 25010000002 HYDROMORPHONE 1 MG/ML SOLUTION: Performed by: INTERNAL MEDICINE

## 2023-02-22 PROCEDURE — 80053 COMPREHEN METABOLIC PANEL: CPT | Performed by: INTERNAL MEDICINE

## 2023-02-22 PROCEDURE — 94799 UNLISTED PULMONARY SVC/PX: CPT

## 2023-02-22 RX ORDER — DEXTROSE AND SODIUM CHLORIDE 5; .45 G/100ML; G/100ML
80 INJECTION, SOLUTION INTRAVENOUS CONTINUOUS
Status: DISCONTINUED | OUTPATIENT
Start: 2023-02-22 | End: 2023-02-23

## 2023-02-22 RX ORDER — OXYCODONE HYDROCHLORIDE 5 MG/1
5 TABLET ORAL EVERY 6 HOURS
Status: DISCONTINUED | OUTPATIENT
Start: 2023-02-22 | End: 2023-02-26 | Stop reason: HOSPADM

## 2023-02-22 RX ORDER — AMOXICILLIN 250 MG
2 CAPSULE ORAL 2 TIMES DAILY
Status: DISCONTINUED | OUTPATIENT
Start: 2023-02-22 | End: 2023-02-26 | Stop reason: HOSPADM

## 2023-02-22 RX ORDER — ALUMINA, MAGNESIA, AND SIMETHICONE 2400; 2400; 240 MG/30ML; MG/30ML; MG/30ML
15 SUSPENSION ORAL EVERY 6 HOURS PRN
Status: DISCONTINUED | OUTPATIENT
Start: 2023-02-22 | End: 2023-02-26 | Stop reason: HOSPADM

## 2023-02-22 RX ORDER — NITROGLYCERIN 0.4 MG/1
0.4 TABLET SUBLINGUAL
Status: DISCONTINUED | OUTPATIENT
Start: 2023-02-22 | End: 2023-02-26 | Stop reason: HOSPADM

## 2023-02-22 RX ORDER — MORPHINE SULFATE 100 MG/1
100 TABLET ORAL EVERY 12 HOURS SCHEDULED
Status: DISCONTINUED | OUTPATIENT
Start: 2023-02-22 | End: 2023-02-26 | Stop reason: HOSPADM

## 2023-02-22 RX ORDER — ACYCLOVIR 800 MG/1
400 TABLET ORAL 2 TIMES DAILY
Status: DISPENSED | OUTPATIENT
Start: 2023-02-22 | End: 2023-02-25

## 2023-02-22 RX ORDER — ACETAMINOPHEN 325 MG/1
650 TABLET ORAL EVERY 4 HOURS PRN
Status: DISCONTINUED | OUTPATIENT
Start: 2023-02-22 | End: 2023-02-26 | Stop reason: HOSPADM

## 2023-02-22 RX ORDER — POTASSIUM CHLORIDE 750 MG/1
20 CAPSULE, EXTENDED RELEASE ORAL 2 TIMES DAILY WITH MEALS
Status: DISCONTINUED | OUTPATIENT
Start: 2023-02-22 | End: 2023-02-26 | Stop reason: HOSPADM

## 2023-02-22 RX ORDER — ONDANSETRON 4 MG/1
8 TABLET, ORALLY DISINTEGRATING ORAL EVERY 8 HOURS PRN
Status: DISCONTINUED | OUTPATIENT
Start: 2023-02-22 | End: 2023-02-26 | Stop reason: HOSPADM

## 2023-02-22 RX ORDER — PROCHLORPERAZINE 25 MG
25 SUPPOSITORY, RECTAL RECTAL EVERY 8 HOURS PRN
Status: DISCONTINUED | OUTPATIENT
Start: 2023-02-22 | End: 2023-02-26 | Stop reason: HOSPADM

## 2023-02-22 RX ORDER — ONDANSETRON 2 MG/ML
4 INJECTION INTRAMUSCULAR; INTRAVENOUS EVERY 6 HOURS PRN
Status: DISCONTINUED | OUTPATIENT
Start: 2023-02-22 | End: 2023-02-26 | Stop reason: HOSPADM

## 2023-02-22 RX ORDER — SPIRONOLACTONE 25 MG/1
25 TABLET ORAL DAILY
Status: DISCONTINUED | OUTPATIENT
Start: 2023-02-22 | End: 2023-02-25

## 2023-02-22 RX ORDER — PREGABALIN 75 MG/1
150 CAPSULE ORAL 2 TIMES DAILY
Status: DISCONTINUED | OUTPATIENT
Start: 2023-02-22 | End: 2023-02-26 | Stop reason: HOSPADM

## 2023-02-22 RX ORDER — ZOLPIDEM TARTRATE 5 MG/1
5 TABLET ORAL NIGHTLY PRN
Status: DISCONTINUED | OUTPATIENT
Start: 2023-02-22 | End: 2023-02-26 | Stop reason: HOSPADM

## 2023-02-22 RX ADMIN — TAZOBACTAM SODIUM AND PIPERACILLIN SODIUM 3.38 G: 375; 3 INJECTION, SOLUTION INTRAVENOUS at 20:01

## 2023-02-22 RX ADMIN — HYDROMORPHONE HYDROCHLORIDE 0.5 MG: 1 INJECTION, SOLUTION INTRAMUSCULAR; INTRAVENOUS; SUBCUTANEOUS at 02:16

## 2023-02-22 RX ADMIN — HYDROMORPHONE HYDROCHLORIDE 0.5 MG: 1 INJECTION, SOLUTION INTRAMUSCULAR; INTRAVENOUS; SUBCUTANEOUS at 11:57

## 2023-02-22 RX ADMIN — HYDROMORPHONE HYDROCHLORIDE 1 MG: 1 INJECTION, SOLUTION INTRAMUSCULAR; INTRAVENOUS; SUBCUTANEOUS at 17:07

## 2023-02-22 RX ADMIN — TAZOBACTAM SODIUM AND PIPERACILLIN SODIUM 3.38 G: 375; 3 INJECTION, SOLUTION INTRAVENOUS at 14:50

## 2023-02-22 RX ADMIN — HYDROMORPHONE HYDROCHLORIDE 1 MG: 1 INJECTION, SOLUTION INTRAMUSCULAR; INTRAVENOUS; SUBCUTANEOUS at 21:59

## 2023-02-22 RX ADMIN — HYDROMORPHONE HYDROCHLORIDE 1 MG: 1 INJECTION, SOLUTION INTRAMUSCULAR; INTRAVENOUS; SUBCUTANEOUS at 13:02

## 2023-02-22 RX ADMIN — DEXTROSE AND SODIUM CHLORIDE 80 ML/HR: 5; 450 INJECTION, SOLUTION INTRAVENOUS at 15:28

## 2023-02-22 RX ADMIN — METOPROLOL TARTRATE 5 MG: 1 INJECTION, SOLUTION INTRAVENOUS at 19:55

## 2023-02-22 RX ADMIN — HYDROMORPHONE HYDROCHLORIDE 0.5 MG: 1 INJECTION, SOLUTION INTRAMUSCULAR; INTRAVENOUS; SUBCUTANEOUS at 06:16

## 2023-02-23 ENCOUNTER — APPOINTMENT (OUTPATIENT)
Dept: CARDIOLOGY | Facility: HOSPITAL | Age: 73
DRG: 871 | End: 2023-02-23
Payer: MEDICARE

## 2023-02-23 LAB
ALBUMIN SERPL-MCNC: 2.9 G/DL (ref 3.5–5.2)
ALBUMIN/GLOB SERPL: 0.5 G/DL
ALP SERPL-CCNC: 42 U/L (ref 39–117)
ALT SERPL W P-5'-P-CCNC: 6 U/L (ref 1–33)
ANION GAP SERPL CALCULATED.3IONS-SCNC: 7.4 MMOL/L (ref 5–15)
AST SERPL-CCNC: 22 U/L (ref 1–32)
BASOPHILS # BLD AUTO: 0 10*3/MM3 (ref 0–0.2)
BASOPHILS NFR BLD AUTO: 0 % (ref 0–1.5)
BH CV ECHO MEAS - AO ROOT DIAM: 3 CM
BH CV ECHO MEAS - EDV(MOD-SP2): 77 ML
BH CV ECHO MEAS - EDV(MOD-SP4): 65 ML
BH CV ECHO MEAS - EF(MOD-BP): 41 %
BH CV ECHO MEAS - ESV(MOD-SP2): 42 ML
BH CV ECHO MEAS - ESV(MOD-SP4): 41 ML
BH CV ECHO MEAS - IVSD: 1 CM
BH CV ECHO MEAS - LA DIMENSION: 3.2 CM
BH CV ECHO MEAS - LAT PEAK E' VEL: 6.1 CM/SEC
BH CV ECHO MEAS - LVIDD: 4.3 CM
BH CV ECHO MEAS - LVIDS: 3.8 CM
BH CV ECHO MEAS - LVOT DIAM: 2 CM
BH CV ECHO MEAS - LVPWD: 1.1 CM
BH CV ECHO MEAS - MED PEAK E' VEL: 4.35 CM/SEC
BH CV ECHO MEAS - MV A MAX VEL: 108 CM/SEC
BH CV ECHO MEAS - MV DEC TIME: 288 MSEC
BH CV ECHO MEAS - MV E MAX VEL: 63 CM/SEC
BH CV ECHO MEAS - MV E/A: 0.6
BH CV ECHO MEAS - MV MAX PG: 6 MMHG
BH CV ECHO MEAS - MV MEAN PG: 2 MMHG
BH CV ECHO MEAS - MV P1/2T: 80 MSEC
BH CV ECHO MEAS - MV V2 VTI: 23 CM
BH CV ECHO MEAS - MVA(P1/2T): 2.75 CM2
BH CV ECHO MEAS - RVDD: 2.8 CM
BH CV ECHO MEASUREMENTS AVERAGE E/E' RATIO: 12.06
BILIRUB SERPL-MCNC: 0.8 MG/DL (ref 0–1.2)
BUN SERPL-MCNC: 38 MG/DL (ref 8–23)
BUN/CREAT SERPL: 38.8 (ref 7–25)
CALCIUM SPEC-SCNC: 9.7 MG/DL (ref 8.6–10.5)
CHLORIDE SERPL-SCNC: 108 MMOL/L (ref 98–107)
CO2 SERPL-SCNC: 32.6 MMOL/L (ref 22–29)
CREAT SERPL-MCNC: 0.98 MG/DL (ref 0.57–1)
DEPRECATED RDW RBC AUTO: 60.4 FL (ref 37–54)
EGFRCR SERPLBLD CKD-EPI 2021: 61.5 ML/MIN/1.73
EOSINOPHIL # BLD AUTO: 0 10*3/MM3 (ref 0–0.4)
EOSINOPHIL NFR BLD AUTO: 0 % (ref 0.3–6.2)
ERYTHROCYTE [DISTWIDTH] IN BLOOD BY AUTOMATED COUNT: 19.6 % (ref 12.3–15.4)
GLOBULIN UR ELPH-MCNC: 5.5 GM/DL
GLUCOSE SERPL-MCNC: 174 MG/DL (ref 65–99)
HCT VFR BLD AUTO: 31.8 % (ref 34–46.6)
HGB BLD-MCNC: 9.8 G/DL (ref 12–15.9)
IMM GRANULOCYTES # BLD AUTO: 0.02 10*3/MM3 (ref 0–0.05)
IMM GRANULOCYTES NFR BLD AUTO: 0.5 % (ref 0–0.5)
INR PPP: 1.21 (ref 0.86–1.15)
LEFT ATRIUM VOLUME INDEX: 44.2 ML/M2
LYMPHOCYTES # BLD AUTO: 0.45 10*3/MM3 (ref 0.7–3.1)
LYMPHOCYTES NFR BLD AUTO: 10.8 % (ref 19.6–45.3)
MAGNESIUM SERPL-MCNC: 1.7 MG/DL (ref 1.6–2.4)
MAXIMAL PREDICTED HEART RATE: 148 BPM
MCH RBC QN AUTO: 26.2 PG (ref 26.6–33)
MCHC RBC AUTO-ENTMCNC: 30.8 G/DL (ref 31.5–35.7)
MCV RBC AUTO: 85 FL (ref 79–97)
MONOCYTES # BLD AUTO: 0.32 10*3/MM3 (ref 0.1–0.9)
MONOCYTES NFR BLD AUTO: 7.7 % (ref 5–12)
NEUTROPHILS NFR BLD AUTO: 3.36 10*3/MM3 (ref 1.7–7)
NEUTROPHILS NFR BLD AUTO: 81 % (ref 42.7–76)
NRBC BLD AUTO-RTO: 0.5 /100 WBC (ref 0–0.2)
PLATELET # BLD AUTO: 197 10*3/MM3 (ref 140–450)
PMV BLD AUTO: 9.4 FL (ref 6–12)
POTASSIUM SERPL-SCNC: 2.8 MMOL/L (ref 3.5–5.2)
PROT SERPL-MCNC: 8.4 G/DL (ref 6–8.5)
PROTHROMBIN TIME: 15.5 SECONDS (ref 11.8–14.9)
RBC # BLD AUTO: 3.74 10*6/MM3 (ref 3.77–5.28)
SODIUM SERPL-SCNC: 148 MMOL/L (ref 136–145)
STRESS TARGET HR: 126 BPM
WBC NRBC COR # BLD: 4.15 10*3/MM3 (ref 3.4–10.8)

## 2023-02-23 PROCEDURE — 92526 ORAL FUNCTION THERAPY: CPT

## 2023-02-23 PROCEDURE — 80053 COMPREHEN METABOLIC PANEL: CPT | Performed by: INTERNAL MEDICINE

## 2023-02-23 PROCEDURE — 25010000002 HYDROMORPHONE 1 MG/ML SOLUTION: Performed by: INTERNAL MEDICINE

## 2023-02-23 PROCEDURE — 97166 OT EVAL MOD COMPLEX 45 MIN: CPT

## 2023-02-23 PROCEDURE — 86738 MYCOPLASMA ANTIBODY: CPT | Performed by: INTERNAL MEDICINE

## 2023-02-23 PROCEDURE — 97161 PT EVAL LOW COMPLEX 20 MIN: CPT

## 2023-02-23 PROCEDURE — 83735 ASSAY OF MAGNESIUM: CPT | Performed by: INTERNAL MEDICINE

## 2023-02-23 PROCEDURE — 85025 COMPLETE CBC W/AUTO DIFF WBC: CPT | Performed by: INTERNAL MEDICINE

## 2023-02-23 PROCEDURE — 25010000002 PIPERACILLIN SOD-TAZOBACTAM PER 1 G: Performed by: INTERNAL MEDICINE

## 2023-02-23 PROCEDURE — 94799 UNLISTED PULMONARY SVC/PX: CPT

## 2023-02-23 PROCEDURE — 93306 TTE W/DOPPLER COMPLETE: CPT

## 2023-02-23 PROCEDURE — 85610 PROTHROMBIN TIME: CPT | Performed by: INTERNAL MEDICINE

## 2023-02-23 RX ORDER — METOPROLOL SUCCINATE 25 MG/1
25 TABLET, EXTENDED RELEASE ORAL
Status: DISCONTINUED | OUTPATIENT
Start: 2023-02-23 | End: 2023-02-25

## 2023-02-23 RX ADMIN — TAZOBACTAM SODIUM AND PIPERACILLIN SODIUM 3.38 G: 375; 3 INJECTION, SOLUTION INTRAVENOUS at 05:04

## 2023-02-23 RX ADMIN — HYDROMORPHONE HYDROCHLORIDE 1 MG: 1 INJECTION, SOLUTION INTRAMUSCULAR; INTRAVENOUS; SUBCUTANEOUS at 02:28

## 2023-02-23 RX ADMIN — OXYCODONE 5 MG: 5 TABLET ORAL at 14:35

## 2023-02-23 RX ADMIN — DEXTROSE AND SODIUM CHLORIDE 80 ML/HR: 5; 450 INJECTION, SOLUTION INTRAVENOUS at 02:43

## 2023-02-23 RX ADMIN — TAZOBACTAM SODIUM AND PIPERACILLIN SODIUM 3.38 G: 375; 3 INJECTION, SOLUTION INTRAVENOUS at 21:53

## 2023-02-23 RX ADMIN — POTASSIUM CHLORIDE 20 MEQ: 10 CAPSULE, COATED, EXTENDED RELEASE ORAL at 08:19

## 2023-02-23 RX ADMIN — SPIRONOLACTONE 25 MG: 25 TABLET ORAL at 08:19

## 2023-02-23 RX ADMIN — PREGABALIN 150 MG: 75 CAPSULE ORAL at 21:53

## 2023-02-23 RX ADMIN — ACYCLOVIR 400 MG: 800 TABLET ORAL at 22:01

## 2023-02-23 RX ADMIN — TAZOBACTAM SODIUM AND PIPERACILLIN SODIUM 3.38 G: 375; 3 INJECTION, SOLUTION INTRAVENOUS at 11:17

## 2023-02-23 RX ADMIN — MORPHINE SULFATE 100 MG: 100 TABLET, FILM COATED, EXTENDED RELEASE ORAL at 21:53

## 2023-02-23 RX ADMIN — MORPHINE SULFATE 100 MG: 100 TABLET, FILM COATED, EXTENDED RELEASE ORAL at 08:19

## 2023-02-23 RX ADMIN — POTASSIUM CHLORIDE 20 MEQ: 10 CAPSULE, COATED, EXTENDED RELEASE ORAL at 17:09

## 2023-02-23 RX ADMIN — SENNOSIDES AND DOCUSATE SODIUM 2 TABLET: 8.6; 5 TABLET ORAL at 08:18

## 2023-02-23 RX ADMIN — PREGABALIN 150 MG: 75 CAPSULE ORAL at 08:19

## 2023-02-23 RX ADMIN — METOPROLOL SUCCINATE 25 MG: 25 TABLET, EXTENDED RELEASE ORAL at 11:17

## 2023-02-23 RX ADMIN — SENNOSIDES AND DOCUSATE SODIUM 2 TABLET: 8.6; 5 TABLET ORAL at 21:52

## 2023-02-23 RX ADMIN — OXYCODONE 5 MG: 5 TABLET ORAL at 21:53

## 2023-02-23 RX ADMIN — ACYCLOVIR 400 MG: 800 TABLET ORAL at 08:19

## 2023-02-23 RX ADMIN — METOPROLOL TARTRATE 5 MG: 1 INJECTION, SOLUTION INTRAVENOUS at 04:59

## 2023-02-23 RX ADMIN — HYDROMORPHONE HYDROCHLORIDE 1 MG: 1 INJECTION, SOLUTION INTRAMUSCULAR; INTRAVENOUS; SUBCUTANEOUS at 06:33

## 2023-02-23 RX ADMIN — OXYCODONE 5 MG: 5 TABLET ORAL at 08:30

## 2023-02-24 PROBLEM — E43 SEVERE MALNUTRITION (HCC): Status: ACTIVE | Noted: 2023-02-24

## 2023-02-24 LAB
M PNEUMO IGG SER IA-ACNC: <100 U/ML (ref 0–99)
M PNEUMO IGM SER IA-ACNC: <770 U/ML (ref 0–769)

## 2023-02-24 PROCEDURE — 25010000002 PIPERACILLIN SOD-TAZOBACTAM PER 1 G: Performed by: INTERNAL MEDICINE

## 2023-02-24 PROCEDURE — 94799 UNLISTED PULMONARY SVC/PX: CPT

## 2023-02-24 PROCEDURE — 92526 ORAL FUNCTION THERAPY: CPT

## 2023-02-24 PROCEDURE — 0 POTASSIUM CHLORIDE 10 MEQ/100ML SOLUTION: Performed by: INTERNAL MEDICINE

## 2023-02-24 RX ORDER — POTASSIUM CHLORIDE 7.45 MG/ML
10 INJECTION INTRAVENOUS
Status: COMPLETED | OUTPATIENT
Start: 2023-02-24 | End: 2023-02-24

## 2023-02-24 RX ORDER — DEXTROSE AND SODIUM CHLORIDE 5; .45 G/100ML; G/100ML
80 INJECTION, SOLUTION INTRAVENOUS CONTINUOUS
Status: DISCONTINUED | OUTPATIENT
Start: 2023-02-24 | End: 2023-02-26 | Stop reason: HOSPADM

## 2023-02-24 RX ADMIN — SENNOSIDES AND DOCUSATE SODIUM 2 TABLET: 8.6; 5 TABLET ORAL at 08:30

## 2023-02-24 RX ADMIN — POTASSIUM CHLORIDE 20 MEQ: 10 CAPSULE, COATED, EXTENDED RELEASE ORAL at 17:17

## 2023-02-24 RX ADMIN — TAZOBACTAM SODIUM AND PIPERACILLIN SODIUM 3.38 G: 375; 3 INJECTION, SOLUTION INTRAVENOUS at 13:46

## 2023-02-24 RX ADMIN — MORPHINE SULFATE 100 MG: 100 TABLET, FILM COATED, EXTENDED RELEASE ORAL at 21:57

## 2023-02-24 RX ADMIN — OXYCODONE 5 MG: 5 TABLET ORAL at 03:59

## 2023-02-24 RX ADMIN — PREGABALIN 150 MG: 75 CAPSULE ORAL at 08:30

## 2023-02-24 RX ADMIN — TAZOBACTAM SODIUM AND PIPERACILLIN SODIUM 3.38 G: 375; 3 INJECTION, SOLUTION INTRAVENOUS at 19:49

## 2023-02-24 RX ADMIN — TAZOBACTAM SODIUM AND PIPERACILLIN SODIUM 3.38 G: 375; 3 INJECTION, SOLUTION INTRAVENOUS at 03:59

## 2023-02-24 RX ADMIN — DEXTROSE AND SODIUM CHLORIDE 80 ML/HR: 5; 450 INJECTION, SOLUTION INTRAVENOUS at 19:31

## 2023-02-24 RX ADMIN — ACYCLOVIR 400 MG: 800 TABLET ORAL at 21:57

## 2023-02-24 RX ADMIN — POTASSIUM CHLORIDE 10 MEQ: 7.46 INJECTION, SOLUTION INTRAVENOUS at 11:55

## 2023-02-24 RX ADMIN — PREGABALIN 150 MG: 75 CAPSULE ORAL at 21:57

## 2023-02-24 RX ADMIN — POTASSIUM CHLORIDE 10 MEQ: 7.46 INJECTION, SOLUTION INTRAVENOUS at 10:28

## 2023-02-24 RX ADMIN — OXYCODONE 5 MG: 5 TABLET ORAL at 21:56

## 2023-02-24 RX ADMIN — POTASSIUM CHLORIDE 20 MEQ: 10 CAPSULE, COATED, EXTENDED RELEASE ORAL at 08:29

## 2023-02-24 RX ADMIN — ACYCLOVIR 400 MG: 800 TABLET ORAL at 08:30

## 2023-02-24 RX ADMIN — SPIRONOLACTONE 25 MG: 25 TABLET ORAL at 08:30

## 2023-02-25 ENCOUNTER — APPOINTMENT (OUTPATIENT)
Dept: GENERAL RADIOLOGY | Facility: HOSPITAL | Age: 73
DRG: 871 | End: 2023-02-25
Payer: MEDICARE

## 2023-02-25 LAB
ANION GAP SERPL CALCULATED.3IONS-SCNC: 9.4 MMOL/L (ref 5–15)
BASOPHILS # BLD AUTO: 0.01 10*3/MM3 (ref 0–0.2)
BASOPHILS NFR BLD AUTO: 0.2 % (ref 0–1.5)
BUN SERPL-MCNC: 43 MG/DL (ref 8–23)
BUN/CREAT SERPL: 22.9 (ref 7–25)
CALCIUM SPEC-SCNC: 8.6 MG/DL (ref 8.6–10.5)
CHLORIDE SERPL-SCNC: 102 MMOL/L (ref 98–107)
CO2 SERPL-SCNC: 26.6 MMOL/L (ref 22–29)
CREAT SERPL-MCNC: 1.88 MG/DL (ref 0.57–1)
DEPRECATED RDW RBC AUTO: 63.7 FL (ref 37–54)
EGFRCR SERPLBLD CKD-EPI 2021: 28.1 ML/MIN/1.73
EOSINOPHIL # BLD AUTO: 0.18 10*3/MM3 (ref 0–0.4)
EOSINOPHIL NFR BLD AUTO: 3.2 % (ref 0.3–6.2)
ERYTHROCYTE [DISTWIDTH] IN BLOOD BY AUTOMATED COUNT: 19.8 % (ref 12.3–15.4)
GLUCOSE SERPL-MCNC: 107 MG/DL (ref 65–99)
HCT VFR BLD AUTO: 31.1 % (ref 34–46.6)
HGB BLD-MCNC: 9.4 G/DL (ref 12–15.9)
IMM GRANULOCYTES # BLD AUTO: 0.04 10*3/MM3 (ref 0–0.05)
IMM GRANULOCYTES NFR BLD AUTO: 0.7 % (ref 0–0.5)
LYMPHOCYTES # BLD AUTO: 1.57 10*3/MM3 (ref 0.7–3.1)
LYMPHOCYTES NFR BLD AUTO: 27.5 % (ref 19.6–45.3)
MCH RBC QN AUTO: 26.4 PG (ref 26.6–33)
MCHC RBC AUTO-ENTMCNC: 30.2 G/DL (ref 31.5–35.7)
MCV RBC AUTO: 87.4 FL (ref 79–97)
MONOCYTES # BLD AUTO: 0.32 10*3/MM3 (ref 0.1–0.9)
MONOCYTES NFR BLD AUTO: 5.6 % (ref 5–12)
NEUTROPHILS NFR BLD AUTO: 3.58 10*3/MM3 (ref 1.7–7)
NEUTROPHILS NFR BLD AUTO: 62.8 % (ref 42.7–76)
NRBC BLD AUTO-RTO: 0 /100 WBC (ref 0–0.2)
PLATELET # BLD AUTO: 141 10*3/MM3 (ref 140–450)
PMV BLD AUTO: 10.3 FL (ref 6–12)
POTASSIUM SERPL-SCNC: 4.2 MMOL/L (ref 3.5–5.2)
RBC # BLD AUTO: 3.56 10*6/MM3 (ref 3.77–5.28)
SODIUM SERPL-SCNC: 138 MMOL/L (ref 136–145)
WBC NRBC COR # BLD: 5.7 10*3/MM3 (ref 3.4–10.8)

## 2023-02-25 PROCEDURE — 80048 BASIC METABOLIC PNL TOTAL CA: CPT | Performed by: INTERNAL MEDICINE

## 2023-02-25 PROCEDURE — 25010000002 PIPERACILLIN SOD-TAZOBACTAM PER 1 G: Performed by: INTERNAL MEDICINE

## 2023-02-25 PROCEDURE — 71045 X-RAY EXAM CHEST 1 VIEW: CPT

## 2023-02-25 PROCEDURE — 94799 UNLISTED PULMONARY SVC/PX: CPT

## 2023-02-25 PROCEDURE — 85025 COMPLETE CBC W/AUTO DIFF WBC: CPT | Performed by: INTERNAL MEDICINE

## 2023-02-25 RX ORDER — METOPROLOL SUCCINATE 25 MG/1
12.5 TABLET, EXTENDED RELEASE ORAL
Status: DISCONTINUED | OUTPATIENT
Start: 2023-02-26 | End: 2023-02-26 | Stop reason: HOSPADM

## 2023-02-25 RX ADMIN — POTASSIUM CHLORIDE 20 MEQ: 10 CAPSULE, COATED, EXTENDED RELEASE ORAL at 17:38

## 2023-02-25 RX ADMIN — OXYCODONE 5 MG: 5 TABLET ORAL at 20:58

## 2023-02-25 RX ADMIN — DEXTROSE AND SODIUM CHLORIDE 80 ML/HR: 5; 450 INJECTION, SOLUTION INTRAVENOUS at 21:01

## 2023-02-25 RX ADMIN — SENNOSIDES AND DOCUSATE SODIUM 2 TABLET: 8.6; 5 TABLET ORAL at 08:20

## 2023-02-25 RX ADMIN — PREGABALIN 150 MG: 75 CAPSULE ORAL at 20:59

## 2023-02-25 RX ADMIN — TAZOBACTAM SODIUM AND PIPERACILLIN SODIUM 3.38 G: 375; 3 INJECTION, SOLUTION INTRAVENOUS at 11:48

## 2023-02-25 RX ADMIN — PREGABALIN 150 MG: 75 CAPSULE ORAL at 08:21

## 2023-02-25 RX ADMIN — ACYCLOVIR 400 MG: 800 TABLET ORAL at 08:20

## 2023-02-25 RX ADMIN — TAZOBACTAM SODIUM AND PIPERACILLIN SODIUM 3.38 G: 375; 3 INJECTION, SOLUTION INTRAVENOUS at 04:04

## 2023-02-25 RX ADMIN — TAZOBACTAM SODIUM AND PIPERACILLIN SODIUM 3.38 G: 375; 3 INJECTION, SOLUTION INTRAVENOUS at 20:18

## 2023-02-25 RX ADMIN — OXYCODONE 5 MG: 5 TABLET ORAL at 04:02

## 2023-02-25 RX ADMIN — POTASSIUM CHLORIDE 20 MEQ: 10 CAPSULE, COATED, EXTENDED RELEASE ORAL at 08:20

## 2023-02-26 ENCOUNTER — READMISSION MANAGEMENT (OUTPATIENT)
Dept: CALL CENTER | Facility: HOSPITAL | Age: 73
End: 2023-02-26
Payer: MEDICARE

## 2023-02-26 VITALS
HEART RATE: 88 BPM | HEIGHT: 64 IN | WEIGHT: 117.95 LBS | OXYGEN SATURATION: 97 % | DIASTOLIC BLOOD PRESSURE: 70 MMHG | RESPIRATION RATE: 18 BRPM | TEMPERATURE: 98.6 F | BODY MASS INDEX: 20.14 KG/M2 | SYSTOLIC BLOOD PRESSURE: 112 MMHG

## 2023-02-26 LAB
ANION GAP SERPL CALCULATED.3IONS-SCNC: 8.8 MMOL/L (ref 5–15)
BACTERIA SPEC AEROBE CULT: NORMAL
BACTERIA SPEC AEROBE CULT: NORMAL
BASOPHILS # BLD AUTO: 0.01 10*3/MM3 (ref 0–0.2)
BASOPHILS NFR BLD AUTO: 0.2 % (ref 0–1.5)
BUN SERPL-MCNC: 41 MG/DL (ref 8–23)
BUN/CREAT SERPL: 19.1 (ref 7–25)
CALCIUM SPEC-SCNC: 8.2 MG/DL (ref 8.6–10.5)
CHLORIDE SERPL-SCNC: 103 MMOL/L (ref 98–107)
CO2 SERPL-SCNC: 23.2 MMOL/L (ref 22–29)
CREAT SERPL-MCNC: 2.15 MG/DL (ref 0.57–1)
DEPRECATED RDW RBC AUTO: 63.7 FL (ref 37–54)
EGFRCR SERPLBLD CKD-EPI 2021: 23.9 ML/MIN/1.73
EOSINOPHIL # BLD AUTO: 0.13 10*3/MM3 (ref 0–0.4)
EOSINOPHIL NFR BLD AUTO: 2.6 % (ref 0.3–6.2)
ERYTHROCYTE [DISTWIDTH] IN BLOOD BY AUTOMATED COUNT: 20.1 % (ref 12.3–15.4)
GLUCOSE SERPL-MCNC: 96 MG/DL (ref 65–99)
HCT VFR BLD AUTO: 26.6 % (ref 34–46.6)
HGB BLD-MCNC: 8.1 G/DL (ref 12–15.9)
IMM GRANULOCYTES # BLD AUTO: 0.03 10*3/MM3 (ref 0–0.05)
IMM GRANULOCYTES NFR BLD AUTO: 0.6 % (ref 0–0.5)
LYMPHOCYTES # BLD AUTO: 1.48 10*3/MM3 (ref 0.7–3.1)
LYMPHOCYTES NFR BLD AUTO: 29.5 % (ref 19.6–45.3)
MCH RBC QN AUTO: 26.4 PG (ref 26.6–33)
MCHC RBC AUTO-ENTMCNC: 30.5 G/DL (ref 31.5–35.7)
MCV RBC AUTO: 86.6 FL (ref 79–97)
MONOCYTES # BLD AUTO: 0.31 10*3/MM3 (ref 0.1–0.9)
MONOCYTES NFR BLD AUTO: 6.2 % (ref 5–12)
NEUTROPHILS NFR BLD AUTO: 3.05 10*3/MM3 (ref 1.7–7)
NEUTROPHILS NFR BLD AUTO: 60.9 % (ref 42.7–76)
NRBC BLD AUTO-RTO: 0 /100 WBC (ref 0–0.2)
PLATELET # BLD AUTO: 122 10*3/MM3 (ref 140–450)
PMV BLD AUTO: 10 FL (ref 6–12)
POTASSIUM SERPL-SCNC: 4.7 MMOL/L (ref 3.5–5.2)
RBC # BLD AUTO: 3.07 10*6/MM3 (ref 3.77–5.28)
SODIUM SERPL-SCNC: 135 MMOL/L (ref 136–145)
WBC NRBC COR # BLD: 5.01 10*3/MM3 (ref 3.4–10.8)

## 2023-02-26 PROCEDURE — 94799 UNLISTED PULMONARY SVC/PX: CPT

## 2023-02-26 PROCEDURE — 80048 BASIC METABOLIC PNL TOTAL CA: CPT | Performed by: INTERNAL MEDICINE

## 2023-02-26 PROCEDURE — 25010000002 PIPERACILLIN SOD-TAZOBACTAM PER 1 G: Performed by: INTERNAL MEDICINE

## 2023-02-26 PROCEDURE — 85025 COMPLETE CBC W/AUTO DIFF WBC: CPT | Performed by: INTERNAL MEDICINE

## 2023-02-26 PROCEDURE — 25010000002 HEPARIN LOCK FLUSH PER 10 UNITS: Performed by: INTERNAL MEDICINE

## 2023-02-26 RX ORDER — HEPARIN SODIUM (PORCINE) LOCK FLUSH IV SOLN 100 UNIT/ML 100 UNIT/ML
5 SOLUTION INTRAVENOUS AS NEEDED
Status: DISCONTINUED | OUTPATIENT
Start: 2023-02-26 | End: 2023-02-26 | Stop reason: HOSPADM

## 2023-02-26 RX ORDER — SODIUM CHLORIDE 0.9 % (FLUSH) 0.9 %
10 SYRINGE (ML) INJECTION EVERY 12 HOURS SCHEDULED
Status: DISCONTINUED | OUTPATIENT
Start: 2023-02-26 | End: 2023-02-26 | Stop reason: HOSPADM

## 2023-02-26 RX ORDER — SODIUM CHLORIDE 9 MG/ML
40 INJECTION, SOLUTION INTRAVENOUS AS NEEDED
Status: DISCONTINUED | OUTPATIENT
Start: 2023-02-26 | End: 2023-02-26 | Stop reason: HOSPADM

## 2023-02-26 RX ORDER — SODIUM CHLORIDE 0.9 % (FLUSH) 0.9 %
20 SYRINGE (ML) INJECTION AS NEEDED
Status: DISCONTINUED | OUTPATIENT
Start: 2023-02-26 | End: 2023-02-26 | Stop reason: HOSPADM

## 2023-02-26 RX ORDER — LEVOFLOXACIN 250 MG/1
250 TABLET ORAL DAILY
Qty: 5 TABLET | Refills: 0 | Status: ON HOLD | OUTPATIENT
Start: 2023-02-26 | End: 2023-03-15

## 2023-02-26 RX ORDER — SODIUM CHLORIDE 0.9 % (FLUSH) 0.9 %
10 SYRINGE (ML) INJECTION AS NEEDED
Status: DISCONTINUED | OUTPATIENT
Start: 2023-02-26 | End: 2023-02-26 | Stop reason: HOSPADM

## 2023-02-26 RX ADMIN — DEXTROSE AND SODIUM CHLORIDE 80 ML/HR: 5; 450 INJECTION, SOLUTION INTRAVENOUS at 09:16

## 2023-02-26 RX ADMIN — TAZOBACTAM SODIUM AND PIPERACILLIN SODIUM 3.38 G: 375; 3 INJECTION, SOLUTION INTRAVENOUS at 03:29

## 2023-02-26 RX ADMIN — MORPHINE SULFATE 100 MG: 100 TABLET, FILM COATED, EXTENDED RELEASE ORAL at 08:33

## 2023-02-26 RX ADMIN — METOPROLOL SUCCINATE 12.5 MG: 25 TABLET, EXTENDED RELEASE ORAL at 09:20

## 2023-02-26 RX ADMIN — HEPARIN 500 UNITS: 100 SYRINGE at 12:05

## 2023-02-26 RX ADMIN — TAZOBACTAM SODIUM AND PIPERACILLIN SODIUM 3.38 G: 375; 3 INJECTION, SOLUTION INTRAVENOUS at 11:07

## 2023-02-26 RX ADMIN — POTASSIUM CHLORIDE 20 MEQ: 10 CAPSULE, COATED, EXTENDED RELEASE ORAL at 09:17

## 2023-02-26 RX ADMIN — PREGABALIN 150 MG: 75 CAPSULE ORAL at 08:33

## 2023-02-26 RX ADMIN — OXYCODONE 5 MG: 5 TABLET ORAL at 03:27

## 2023-02-26 RX ADMIN — OXYCODONE 5 MG: 5 TABLET ORAL at 08:33

## 2023-02-27 LAB — BACTERIA SPEC AEROBE CULT: NORMAL

## 2023-02-27 NOTE — OUTREACH NOTE
Prep Survey    Flowsheet Row Responses   [facility] facility patient discharged from? [facility]   Is LACE score < 7 ? No   Eligibility Not Eligible   What are the reasons patient is not eligible? Hospice/Pallative Care   Does the patient have one of the following disease processes/diagnoses(primary or secondary)? Pneumonia   Prep survey completed? Yes          Shira LIVINGSTON - Registered Nurse

## 2023-03-07 NOTE — PROGRESS NOTES
Enter Query Response Below      Query Response: Unable to determine             If applicable, please update the problem list.     Patient: Evangelina Sutton        : 1950  Account: [id number]           Admit Date: 2023        How to Respond to this query:       a. Click New Note     b. Answer query within the yellow box.                c. Update the Problem List, if applicable.      If you have any questions about this query contact me at: [email or web address]    Dr. Zamudio:    Patient with history of multiple myeloma and receiving outpatient chemotherapy was admitted with Sepsis, UTI and Pneumonia.  Treatment included IV Zosyn for 5 days and Levaquin po at discharge.   Blood cultures negative.  Mycoplasma Pneu. IgG / IgM Antibodies within reference range.     Can the type of Pneumonia be further specified as-    > Gram Negative pneumonia (excluding H. Influenzae)  > Unspecified bacterial pneumonia  > Other ___________  > Unable to determine       By submitting this query, we are merely seeking further clarification of documentation to accurately reflect all conditions that you are monitoring, evaluating, treating or that extend the hospitalization or utilize additional resources of care. Please utilize your independent clinical judgment when addressing the question(s) above.     This query and your response, once completed, will be entered into the legal medical record.    Sincerely,  Karime Vuong RN CCDS  Clinical Documentation Integrity Program

## 2023-03-15 ENCOUNTER — PREP FOR SURGERY (OUTPATIENT)
Dept: OTHER | Facility: HOSPITAL | Age: 73
End: 2023-03-15
Payer: MEDICARE

## 2023-03-15 ENCOUNTER — HOSPITAL ENCOUNTER (INPATIENT)
Facility: HOSPITAL | Age: 73
LOS: 7 days | Discharge: HOME HEALTH | DRG: 841 | End: 2023-03-24
Attending: INTERNAL MEDICINE | Admitting: INTERNAL MEDICINE
Payer: MEDICARE

## 2023-03-15 DIAGNOSIS — C90.00 MULTIPLE MYELOMA, REMISSION STATUS UNSPECIFIED: ICD-10-CM

## 2023-03-15 DIAGNOSIS — R26.2 DIFFICULTY WALKING: Primary | ICD-10-CM

## 2023-03-15 PROBLEM — D64.9 ANEMIA: Status: ACTIVE | Noted: 2023-03-15

## 2023-03-15 LAB
ABO GROUP BLD: NORMAL
ABO GROUP BLD: NORMAL
ALBUMIN SERPL-MCNC: 3 G/DL (ref 3.5–5.2)
ALBUMIN/GLOB SERPL: 0.7 G/DL
ALP SERPL-CCNC: 51 U/L (ref 39–117)
ALT SERPL W P-5'-P-CCNC: 6 U/L (ref 1–33)
ANION GAP SERPL CALCULATED.3IONS-SCNC: 7.6 MMOL/L (ref 5–15)
AST SERPL-CCNC: 22 U/L (ref 1–32)
BILIRUB SERPL-MCNC: <0.2 MG/DL (ref 0–1.2)
BLD GP AB SCN SERPL QL: POSITIVE
BUN SERPL-MCNC: 31 MG/DL (ref 8–23)
BUN/CREAT SERPL: 20.1 (ref 7–25)
CALCIUM SPEC-SCNC: 9 MG/DL (ref 8.6–10.5)
CHLORIDE SERPL-SCNC: 108 MMOL/L (ref 98–107)
CO2 SERPL-SCNC: 26.4 MMOL/L (ref 22–29)
CREAT SERPL-MCNC: 1.54 MG/DL (ref 0.57–1)
DEPRECATED RDW RBC AUTO: 65.5 FL (ref 37–54)
EGFRCR SERPLBLD CKD-EPI 2021: 35.7 ML/MIN/1.73
ERYTHROCYTE [DISTWIDTH] IN BLOOD BY AUTOMATED COUNT: 20.4 % (ref 12.3–15.4)
GLOBULIN UR ELPH-MCNC: 4.4 GM/DL
GLUCOSE SERPL-MCNC: 108 MG/DL (ref 65–99)
HCT VFR BLD AUTO: 21 % (ref 34–46.6)
HGB BLD-MCNC: 6.3 G/DL (ref 12–15.9)
MCH RBC QN AUTO: 26.4 PG (ref 26.6–33)
MCHC RBC AUTO-ENTMCNC: 30 G/DL (ref 31.5–35.7)
MCV RBC AUTO: 87.9 FL (ref 79–97)
PLATELET # BLD AUTO: 179 10*3/MM3 (ref 140–450)
PMV BLD AUTO: 10.3 FL (ref 6–12)
POTASSIUM SERPL-SCNC: 4.2 MMOL/L (ref 3.5–5.2)
PROT SERPL-MCNC: 7.4 G/DL (ref 6–8.5)
RBC # BLD AUTO: 2.39 10*6/MM3 (ref 3.77–5.28)
RH BLD: POSITIVE
RH BLD: POSITIVE
SODIUM SERPL-SCNC: 142 MMOL/L (ref 136–145)
T&S EXPIRATION DATE: NORMAL
WBC NRBC COR # BLD: 1.97 10*3/MM3 (ref 3.4–10.8)

## 2023-03-15 PROCEDURE — 86900 BLOOD TYPING SEROLOGIC ABO: CPT

## 2023-03-15 PROCEDURE — 86870 RBC ANTIBODY IDENTIFICATION: CPT | Performed by: INTERNAL MEDICINE

## 2023-03-15 PROCEDURE — 94760 N-INVAS EAR/PLS OXIMETRY 1: CPT

## 2023-03-15 PROCEDURE — G0378 HOSPITAL OBSERVATION PER HR: HCPCS

## 2023-03-15 PROCEDURE — 86850 RBC ANTIBODY SCREEN: CPT | Performed by: INTERNAL MEDICINE

## 2023-03-15 PROCEDURE — 94799 UNLISTED PULMONARY SVC/PX: CPT

## 2023-03-15 PROCEDURE — 85027 COMPLETE CBC AUTOMATED: CPT | Performed by: INTERNAL MEDICINE

## 2023-03-15 PROCEDURE — 86901 BLOOD TYPING SEROLOGIC RH(D): CPT | Performed by: INTERNAL MEDICINE

## 2023-03-15 PROCEDURE — 86900 BLOOD TYPING SEROLOGIC ABO: CPT | Performed by: INTERNAL MEDICINE

## 2023-03-15 PROCEDURE — 86901 BLOOD TYPING SEROLOGIC RH(D): CPT

## 2023-03-15 PROCEDURE — 80053 COMPREHEN METABOLIC PANEL: CPT | Performed by: INTERNAL MEDICINE

## 2023-03-15 RX ORDER — ONDANSETRON 2 MG/ML
4 INJECTION INTRAMUSCULAR; INTRAVENOUS EVERY 6 HOURS PRN
Status: CANCELLED | OUTPATIENT
Start: 2023-03-15

## 2023-03-15 RX ORDER — ZOLPIDEM TARTRATE 5 MG/1
5 TABLET ORAL NIGHTLY PRN
Status: DISCONTINUED | OUTPATIENT
Start: 2023-03-15 | End: 2023-03-24 | Stop reason: HOSPADM

## 2023-03-15 RX ORDER — SODIUM CHLORIDE 0.9 % (FLUSH) 0.9 %
10 SYRINGE (ML) INJECTION AS NEEDED
Status: CANCELLED | OUTPATIENT
Start: 2023-03-15

## 2023-03-15 RX ORDER — PREGABALIN 75 MG/1
150 CAPSULE ORAL EVERY 12 HOURS SCHEDULED
Status: DISCONTINUED | OUTPATIENT
Start: 2023-03-15 | End: 2023-03-22

## 2023-03-15 RX ORDER — ACETAMINOPHEN 325 MG/1
650 TABLET ORAL EVERY 4 HOURS PRN
Status: CANCELLED | OUTPATIENT
Start: 2023-03-15

## 2023-03-15 RX ORDER — POTASSIUM CHLORIDE 750 MG/1
20 CAPSULE, EXTENDED RELEASE ORAL 2 TIMES DAILY WITH MEALS
Status: DISCONTINUED | OUTPATIENT
Start: 2023-03-15 | End: 2023-03-22

## 2023-03-15 RX ORDER — ALUMINA, MAGNESIA, AND SIMETHICONE 2400; 2400; 240 MG/30ML; MG/30ML; MG/30ML
15 SUSPENSION ORAL EVERY 6 HOURS PRN
Status: DISCONTINUED | OUTPATIENT
Start: 2023-03-15 | End: 2023-03-24 | Stop reason: HOSPADM

## 2023-03-15 RX ORDER — SODIUM CHLORIDE 0.9 % (FLUSH) 0.9 %
10 SYRINGE (ML) INJECTION AS NEEDED
Status: DISCONTINUED | OUTPATIENT
Start: 2023-03-15 | End: 2023-03-24 | Stop reason: HOSPADM

## 2023-03-15 RX ORDER — PROCHLORPERAZINE 25 MG
25 SUPPOSITORY, RECTAL RECTAL EVERY 8 HOURS PRN
Status: DISCONTINUED | OUTPATIENT
Start: 2023-03-15 | End: 2023-03-24 | Stop reason: HOSPADM

## 2023-03-15 RX ORDER — SODIUM CHLORIDE 0.9 % (FLUSH) 0.9 %
10 SYRINGE (ML) INJECTION EVERY 12 HOURS SCHEDULED
Status: CANCELLED | OUTPATIENT
Start: 2023-03-15

## 2023-03-15 RX ORDER — SODIUM CHLORIDE 0.9 % (FLUSH) 0.9 %
10 SYRINGE (ML) INJECTION EVERY 12 HOURS SCHEDULED
Status: DISCONTINUED | OUTPATIENT
Start: 2023-03-15 | End: 2023-03-24 | Stop reason: HOSPADM

## 2023-03-15 RX ORDER — DEXTROSE AND SODIUM CHLORIDE 5; .45 G/100ML; G/100ML
100 INJECTION, SOLUTION INTRAVENOUS CONTINUOUS
Status: DISCONTINUED | OUTPATIENT
Start: 2023-03-15 | End: 2023-03-16

## 2023-03-15 RX ORDER — SODIUM CHLORIDE 9 MG/ML
40 INJECTION, SOLUTION INTRAVENOUS AS NEEDED
Status: DISCONTINUED | OUTPATIENT
Start: 2023-03-15 | End: 2023-03-24 | Stop reason: HOSPADM

## 2023-03-15 RX ORDER — ACETAMINOPHEN 325 MG/1
650 TABLET ORAL EVERY 4 HOURS PRN
Status: DISCONTINUED | OUTPATIENT
Start: 2023-03-15 | End: 2023-03-24 | Stop reason: HOSPADM

## 2023-03-15 RX ORDER — DEXTROSE AND SODIUM CHLORIDE 5; .45 G/100ML; G/100ML
100 INJECTION, SOLUTION INTRAVENOUS CONTINUOUS
Status: CANCELLED | OUTPATIENT
Start: 2023-03-15

## 2023-03-15 RX ORDER — HEPARIN SODIUM (PORCINE) LOCK FLUSH IV SOLN 100 UNIT/ML 100 UNIT/ML
5 SOLUTION INTRAVENOUS AS NEEDED
Status: DISCONTINUED | OUTPATIENT
Start: 2023-03-15 | End: 2023-03-24 | Stop reason: HOSPADM

## 2023-03-15 RX ORDER — IXAZOMIB 4 MG/1
1 CAPSULE ORAL TAKE AS DIRECTED
COMMUNITY
Start: 2023-02-27 | End: 2023-03-24 | Stop reason: HOSPADM

## 2023-03-15 RX ORDER — SODIUM CHLORIDE 9 MG/ML
40 INJECTION, SOLUTION INTRAVENOUS AS NEEDED
Status: CANCELLED | OUTPATIENT
Start: 2023-03-15

## 2023-03-15 RX ORDER — AMOXICILLIN 250 MG
2 CAPSULE ORAL 2 TIMES DAILY
Status: DISCONTINUED | OUTPATIENT
Start: 2023-03-15 | End: 2023-03-24 | Stop reason: HOSPADM

## 2023-03-15 RX ORDER — ONDANSETRON 2 MG/ML
4 INJECTION INTRAMUSCULAR; INTRAVENOUS EVERY 6 HOURS PRN
Status: DISCONTINUED | OUTPATIENT
Start: 2023-03-15 | End: 2023-03-24 | Stop reason: HOSPADM

## 2023-03-15 RX ORDER — OXYCODONE HYDROCHLORIDE 5 MG/1
5 TABLET ORAL EVERY 6 HOURS
Status: DISCONTINUED | OUTPATIENT
Start: 2023-03-15 | End: 2023-03-17

## 2023-03-15 RX ORDER — ONDANSETRON 4 MG/1
8 TABLET, ORALLY DISINTEGRATING ORAL EVERY 8 HOURS PRN
Status: DISCONTINUED | OUTPATIENT
Start: 2023-03-15 | End: 2023-03-24 | Stop reason: HOSPADM

## 2023-03-15 RX ORDER — MORPHINE SULFATE 100 MG/1
100 TABLET ORAL EVERY 12 HOURS SCHEDULED
Status: DISCONTINUED | OUTPATIENT
Start: 2023-03-15 | End: 2023-03-17

## 2023-03-15 RX ORDER — ACYCLOVIR 800 MG/1
400 TABLET ORAL DAILY
Status: COMPLETED | OUTPATIENT
Start: 2023-03-15 | End: 2023-03-24

## 2023-03-15 RX ORDER — ALUMINA, MAGNESIA, AND SIMETHICONE 2400; 2400; 240 MG/30ML; MG/30ML; MG/30ML
15 SUSPENSION ORAL EVERY 6 HOURS PRN
Status: CANCELLED | OUTPATIENT
Start: 2023-03-15

## 2023-03-15 RX ORDER — SODIUM CHLORIDE 0.9 % (FLUSH) 0.9 %
20 SYRINGE (ML) INJECTION AS NEEDED
Status: DISCONTINUED | OUTPATIENT
Start: 2023-03-15 | End: 2023-03-24 | Stop reason: HOSPADM

## 2023-03-15 RX ADMIN — DEXTROSE AND SODIUM CHLORIDE 100 ML/HR: 5; 450 INJECTION, SOLUTION INTRAVENOUS at 15:06

## 2023-03-15 RX ADMIN — PREGABALIN 150 MG: 75 CAPSULE ORAL at 20:18

## 2023-03-15 RX ADMIN — OXYCODONE HYDROCHLORIDE 5 MG: 5 TABLET ORAL at 20:18

## 2023-03-15 RX ADMIN — SENNOSIDES AND DOCUSATE SODIUM 2 TABLET: 8.6; 5 TABLET ORAL at 20:18

## 2023-03-15 RX ADMIN — MORPHINE SULFATE 100 MG: 100 TABLET, FILM COATED, EXTENDED RELEASE ORAL at 22:16

## 2023-03-15 RX ADMIN — POTASSIUM CHLORIDE 20 MEQ: 10 CAPSULE, COATED, EXTENDED RELEASE ORAL at 20:18

## 2023-03-15 RX ADMIN — ACYCLOVIR 400 MG: 800 TABLET ORAL at 20:19

## 2023-03-15 NOTE — H&P
Psychiatric   HISTORY AND PHYSICAL    Patient Name: Evangelina Sutton  : 1950  MRN: [id number]  Primary Care Physician:  Gilberto Coleman MD  Date of admission: 3/15/2023    Subjective   Subjective     Chief Complaint: Patient with multiple myeloma has worsening edema feet as well as worsening renal function and pancytopenia probably chemo induced, which may be because of myeloma involvement patient has refused any bone marrow examination she has agreed to get the blood transfusions and we will get a nephrology consultation because of worsening renal function    HPI: Patient with multiple myeloma with worsening renal function and an extensive edema    Evangelina Sutton is a 72 y.o. female multiple myeloma with extensive edema and pancytopenia    Review of Systems   All systems were reviewed and negative except for: Reviewed    Personal History     Past Medical History:   Diagnosis Date   • Cancer (HCC)     BONE CANCER/TAKING CHEMO SHOT WEEKLY   • DDD (degenerative disc disease), cervical    • Neuropathy     LEGS AND ARMS   • PONV (postoperative nausea and vomiting)        Past Surgical History:   Procedure Laterality Date   • BACK SURGERY      DISCECTOMY W/FUSION, NECK ? LEVEL   • CHOLECYSTECTOMY     • HYSTERECTOMY     • PORTACATH PLACEMENT N/A 3/17/2022    Procedure: INSERTION OF PORTACATH;  Surgeon: Cj Rossi MD;  Location: [facility];  Service: General;  Laterality: N/A;   • TOTAL HIP ARTHROPLASTY Left 2022    Procedure: LEFT HIP ARTHROPLASTY ANTERIOR;  Surgeon: Sridhar Coto MD;  Location: [facility];  Service: Orthopedics;  Laterality: Left;       Family History: family history is not on file. Otherwise pertinent FHx was reviewed and not pertinent to current issue.    Social History:  reports that she quit smoking about 26 years ago. Her smoking use included cigarettes. She smoked an average of 1 pack per day. She has never used smokeless tobacco. She reports current drug use.  Frequency: 20.00 times per week. Drugs: Morphine and Oxycodone. She reports that she does not drink alcohol.    Home Medications:  Morphine, Vitamin D3, acyclovir, daratumumab-hyaluronidase-fihj, levoFLOXacin, ondansetron ODT, oxyCODONE, pomalidomide, potassium chloride, pregabalin, prochlorperazine, sennosides-docusate, spironolactone, and zolpidem      Allergies:  No Known Allergies    Objective   Objective     Vitals:   Temp:  [98.5 °F (36.9 °C)] 98.5 °F (36.9 °C)  Heart Rate:  [105] 105  Resp:  [18] 18  BP: (120)/(70) 120/70  Physical Exam    Constitutional: Alert oriented not in acute distress   Eyes: Pupils equal reacting to light and accommodation   HENT: Normal   Neck: Normal   Respiratory: No rales or rhonchi   Cardiovascular:  S1 or S2 normal no S3 or S4   Gastrointestinal: Normal   Musculoskeletal: Has been having some deformities weakness   Neurologic: Patient has weakness of extremity  Skin: No new fine    Result Review    Result Review:  I have personally reviewed the results from the time of this admission to 3/15/2023 19:13 EDT and agree with these findings:  [x]  Laboratory pancytopenia  []  Microbiology  []  Radiology  []  EKG/Telemetry   []  Cardiology/Vascular   []  Pathology  []  Old records  []  Other:  Most notable findings include: Triple myeloma post chemo    Assessment & Plan   Assessment / Plan     Brief Patient Summary:  Evangelina Sutton is a 72 y.o. female who increasing BUN/creatinine worsening pedal edema    Active Hospital Problems:  Active Hospital Problems    Diagnosis    • **Anemia        Plan:   Blood transfusions nephrology consulted    DVT prophylaxis:  Mechanical DVT prophylaxis orders are present.    CODE STATUS:    Medical Intervention Limits: NO intubation (DNI); NO cardioversion; NO dialysis; NO NIPPV (Non-Invasive Positive Pressure Ventilation); NO vasopressors  Level Of Support Discussed With: Patient  Code Status (Patient has no pulse and is not breathing): No CPR (Do  Not Attempt to Resuscitate)  Medical Interventions (Patient has pulse or is breathing): Limited Support  Comments: Multiple myeloma for treatment but no CPR  Release to patient: Routine Release      Admission Status:  I believe this patient meets observation status.    Electronically signed by Abbe Zamudio MD, 03/15/23, 7:13 PM EDT.      Part of this note may be an electronic transcription/translation of spoken language to printed text using the Dragon Dictation System.

## 2023-03-16 PROBLEM — I50.40 COMBINED CONGESTIVE SYSTOLIC AND DIASTOLIC HEART FAILURE: Status: ACTIVE | Noted: 2023-03-16

## 2023-03-16 PROBLEM — N17.9 ACUTE RENAL FAILURE (ARF): Status: ACTIVE | Noted: 2023-03-16

## 2023-03-16 PROBLEM — N08 NEPHROTIC SYNDROME ASSOCIATED WITH AMYLOIDOSIS (HCC): Status: ACTIVE | Noted: 2023-03-16

## 2023-03-16 PROBLEM — D61.810 PANCYTOPENIA DUE TO CHEMOTHERAPY: Status: ACTIVE | Noted: 2023-03-16

## 2023-03-16 PROBLEM — E85.4 NEPHROTIC SYNDROME ASSOCIATED WITH AMYLOIDOSIS: Status: ACTIVE | Noted: 2023-03-16

## 2023-03-16 LAB
ALBUMIN SERPL-MCNC: 2.8 G/DL (ref 3.5–5.2)
ALBUMIN/GLOB SERPL: 0.6 G/DL
ALP SERPL-CCNC: 55 U/L (ref 39–117)
ALT SERPL W P-5'-P-CCNC: 7 U/L (ref 1–33)
ANION GAP SERPL CALCULATED.3IONS-SCNC: 9 MMOL/L (ref 5–15)
AST SERPL-CCNC: 20 U/L (ref 1–32)
BASOPHILS # BLD AUTO: 0 10*3/MM3 (ref 0–0.2)
BASOPHILS NFR BLD AUTO: 0 % (ref 0–1.5)
BILIRUB SERPL-MCNC: 0.2 MG/DL (ref 0–1.2)
BUN SERPL-MCNC: 27 MG/DL (ref 8–23)
BUN/CREAT SERPL: 18.2 (ref 7–25)
CALCIUM SPEC-SCNC: 8.8 MG/DL (ref 8.6–10.5)
CHLORIDE SERPL-SCNC: 108 MMOL/L (ref 98–107)
CO2 SERPL-SCNC: 25 MMOL/L (ref 22–29)
CREAT SERPL-MCNC: 1.48 MG/DL (ref 0.57–1)
DEPRECATED RDW RBC AUTO: 66.9 FL (ref 37–54)
EGFRCR SERPLBLD CKD-EPI 2021: 37.5 ML/MIN/1.73
EOSINOPHIL # BLD AUTO: 0.11 10*3/MM3 (ref 0–0.4)
EOSINOPHIL NFR BLD AUTO: 5.2 % (ref 0.3–6.2)
ERYTHROCYTE [DISTWIDTH] IN BLOOD BY AUTOMATED COUNT: 20.7 % (ref 12.3–15.4)
GLOBULIN UR ELPH-MCNC: 4.5 GM/DL
GLUCOSE SERPL-MCNC: 109 MG/DL (ref 65–99)
HCT VFR BLD AUTO: 21 % (ref 34–46.6)
HGB BLD-MCNC: 6.2 G/DL (ref 12–15.9)
IMM GRANULOCYTES # BLD AUTO: 0 10*3/MM3 (ref 0–0.05)
IMM GRANULOCYTES NFR BLD AUTO: 0 % (ref 0–0.5)
INR PPP: 1.16 (ref 0.86–1.15)
LYMPHOCYTES # BLD AUTO: 0.76 10*3/MM3 (ref 0.7–3.1)
LYMPHOCYTES NFR BLD AUTO: 36 % (ref 19.6–45.3)
MCH RBC QN AUTO: 26.2 PG (ref 26.6–33)
MCHC RBC AUTO-ENTMCNC: 29.5 G/DL (ref 31.5–35.7)
MCV RBC AUTO: 88.6 FL (ref 79–97)
MONOCYTES # BLD AUTO: 0.19 10*3/MM3 (ref 0.1–0.9)
MONOCYTES NFR BLD AUTO: 9 % (ref 5–12)
NEUTROPHILS NFR BLD AUTO: 1.05 10*3/MM3 (ref 1.7–7)
NEUTROPHILS NFR BLD AUTO: 49.8 % (ref 42.7–76)
NRBC BLD AUTO-RTO: 0 /100 WBC (ref 0–0.2)
PASSIVE ANTI-CD-38: NORMAL
PLATELET # BLD AUTO: 162 10*3/MM3 (ref 140–450)
PMV BLD AUTO: 10 FL (ref 6–12)
POTASSIUM SERPL-SCNC: 4.5 MMOL/L (ref 3.5–5.2)
PROT SERPL-MCNC: 7.3 G/DL (ref 6–8.5)
PROTHROMBIN TIME: 14.9 SECONDS (ref 11.8–14.9)
RBC # BLD AUTO: 2.37 10*6/MM3 (ref 3.77–5.28)
SODIUM SERPL-SCNC: 142 MMOL/L (ref 136–145)
WBC NRBC COR # BLD: 2.11 10*3/MM3 (ref 3.4–10.8)

## 2023-03-16 PROCEDURE — 85025 COMPLETE CBC W/AUTO DIFF WBC: CPT | Performed by: INTERNAL MEDICINE

## 2023-03-16 PROCEDURE — G0378 HOSPITAL OBSERVATION PER HR: HCPCS

## 2023-03-16 PROCEDURE — 85610 PROTHROMBIN TIME: CPT | Performed by: INTERNAL MEDICINE

## 2023-03-16 PROCEDURE — 86921 COMPATIBILITY TEST INCUBATE: CPT

## 2023-03-16 PROCEDURE — 86870 RBC ANTIBODY IDENTIFICATION: CPT

## 2023-03-16 PROCEDURE — 86920 COMPATIBILITY TEST SPIN: CPT

## 2023-03-16 PROCEDURE — 36430 TRANSFUSION BLD/BLD COMPNT: CPT

## 2023-03-16 PROCEDURE — 94799 UNLISTED PULMONARY SVC/PX: CPT

## 2023-03-16 PROCEDURE — 25010000002 FILGRASTIM PER 480 MCG: Performed by: INTERNAL MEDICINE

## 2023-03-16 PROCEDURE — 97161 PT EVAL LOW COMPLEX 20 MIN: CPT

## 2023-03-16 PROCEDURE — 86922 COMPATIBILITY TEST ANTIGLOB: CPT

## 2023-03-16 PROCEDURE — 86900 BLOOD TYPING SEROLOGIC ABO: CPT

## 2023-03-16 PROCEDURE — 80053 COMPREHEN METABOLIC PANEL: CPT | Performed by: INTERNAL MEDICINE

## 2023-03-16 PROCEDURE — 86906 BLD TYPING SEROLOGIC RH PHNT: CPT

## 2023-03-16 PROCEDURE — P9040 RBC LEUKOREDUCED IRRADIATED: HCPCS

## 2023-03-16 PROCEDURE — 86860 RBC ANTIBODY ELUTION: CPT

## 2023-03-16 PROCEDURE — 86850 RBC ANTIBODY SCREEN: CPT

## 2023-03-16 PROCEDURE — 86880 COOMBS TEST DIRECT: CPT

## 2023-03-16 PROCEDURE — 86902 BLOOD TYPE ANTIGEN DONOR EA: CPT

## 2023-03-16 RX ADMIN — Medication 10 ML: at 21:11

## 2023-03-16 RX ADMIN — OXYCODONE HYDROCHLORIDE 5 MG: 5 TABLET ORAL at 20:19

## 2023-03-16 RX ADMIN — MORPHINE SULFATE 100 MG: 100 TABLET, FILM COATED, EXTENDED RELEASE ORAL at 21:10

## 2023-03-16 RX ADMIN — OXYCODONE HYDROCHLORIDE 5 MG: 5 TABLET ORAL at 09:38

## 2023-03-16 RX ADMIN — PREGABALIN 150 MG: 75 CAPSULE ORAL at 21:10

## 2023-03-16 RX ADMIN — SENNOSIDES AND DOCUSATE SODIUM 2 TABLET: 8.6; 5 TABLET ORAL at 09:38

## 2023-03-16 RX ADMIN — POTASSIUM CHLORIDE 20 MEQ: 10 CAPSULE, COATED, EXTENDED RELEASE ORAL at 18:26

## 2023-03-16 RX ADMIN — Medication 10 ML: at 09:39

## 2023-03-16 RX ADMIN — SENNOSIDES AND DOCUSATE SODIUM 2 TABLET: 8.6; 5 TABLET ORAL at 21:10

## 2023-03-16 RX ADMIN — ACYCLOVIR 400 MG: 800 TABLET ORAL at 11:13

## 2023-03-16 RX ADMIN — PREGABALIN 150 MG: 75 CAPSULE ORAL at 09:38

## 2023-03-16 RX ADMIN — POTASSIUM CHLORIDE 20 MEQ: 10 CAPSULE, COATED, EXTENDED RELEASE ORAL at 09:38

## 2023-03-16 RX ADMIN — FILGRASTIM 480 MCG: 480 INJECTION, SOLUTION INTRAVENOUS; SUBCUTANEOUS at 15:52

## 2023-03-16 RX ADMIN — Medication 10 ML: at 21:10

## 2023-03-16 NOTE — PROGRESS NOTES
[facility]     Progress Note    Patient Name: Evangelina Sutton  : 1950  MRN: [id number]  Primary Care Physician:  Gilberto Coleman MD  Date of admission: 3/15/2023    Subjective  Patient feels weak and tired  She is complaining of lower extremity swelling    Review of Systems  Constitutional:        Weakness tiredness fatigue  Eyes:                       No blurry vision, eye discharge, eye irritation, eye pain  HEENT:                   No acute hair loss, earache and discharge, nasal congestion or discharge, sore throat, postnasal drip  Respiratory:           No shortness of breath coughing sputum production wheezing hemoptysis pleuritic chest pain  Cardiovascular:     No chest pain, orthopnea, PND, dizziness, palpitation, lower extremity edema  Gastrointestinal:   No nausea vomiting diarrhea abdominal pain constipation  Genitourinary:       No urinary incontinence, hesitancy, frequency, urgency, dysuria  Hematologic:         No bruising, bleeding, pallor, lymphadenopathy  Endocrine:            No coldness, hot flashes, polyuria, abnormal hair growth  Musculoskeletal:    No body pains, aches, arthritic pains, muscle pain ,muscle wasting  Psychiatric:          No low or high mood, anxiety, hallucinations, delusions  Skin.                      No rash, ulcers, bruising, itching  Neurological:        No confusion, headache, focal weakness, numbness, dysphasia    Objective   Objective     Vitals:   Temp:  [97.5 °F (36.4 °C)-99.3 °F (37.4 °C)] 97.8 °F (36.6 °C)  Heart Rate:  [] 94  Resp:  [16-18] 16  BP: ()/(54-75) 114/71  Physical Exam    Constitutional: Awake, alert responsive, conversant, no obvious distress              Psychiatric:  Appropriate affect, cooperative   Neurologic:  Awake alert ,oriented x 3, strength symmetric in all extremities, Cranial Nerves grossly intact to confrontation, speech clear   Eyes:   PERRLA, sclerae anicteric, no conjunctival injection   HEENT:  Moist mucous  membranes, no nasal or eye discharge, no throat congestion   Neck:   Supple, no thyromegaly, no lymphadenopathy, trachea midline, no elevated JVD   Respiratory:  Clear to auscultation bilaterally, nonlabored respirations    Cardiovascular: RRR, no murmurs, rubs, or gallops, palpable pedal pulses bilaterally, No bilateral ankle edema   Gastrointestinal: Positive bowel sounds, soft, nontender, nondistended, no organomegaly   Musculoskeletal:  No clubbing or cyanosis to extremities,muscle wasting, joint swelling, muscle weakness             Skin:                      No rashes, bruising, skin ulcers, petechiae or ecchymosis    Result Review    Result Review:  I have personally reviewed the results from the time of this admission to 3/16/2023 17:09 EDT and agree with these findings:  []  Laboratory  []  Microbiology  []  Radiology  []  EKG/Telemetry   []  Cardiology/Vascular   []  Pathology  []  Old records  []  Other:    Assessment & Plan   Assessment / Plan       Active Hospital Problems:    Active Hospital Problems    Diagnosis  POA   • **Anemia [D64.9]  Yes   • Acute renal failure (ARF) ([facility]) [N17.9]  Unknown   • Pancytopenia due to chemotherapy ([facility]) [D61.810]  Unknown   • Nephrotic syndrome associated with amyloidosis ([facility]) [E85.4, N08]  Unknown     5.1 g proteinuria     • 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

## 2023-03-16 NOTE — THERAPY EVALUATION
Acute Care - Physical Therapy Initial Evaluation   Á[place]ela     Patient Name: Evangelina Sutton  : 1950  MRN: [id number]  Today's Date: 3/16/2023      Visit Dx:     ICD-10-CM ICD-9-CM   1. Difficulty walking  R26.2 719.7     Patient Active Problem List   Diagnosis   • Nausea and vomiting   • Multiple myeloma (HCC)   • Left displaced femoral neck fracture (HCC)   • Decreased activities of daily living (ADL)   • Aftercare following left hip hemiarthroplasty    • Acute UTI   • Pneumonia   • Severe malnutrition (HCC)   • Anemia     Past Medical History:   Diagnosis Date   • Cancer (HCC)     BONE CANCER/TAKING CHEMO SHOT WEEKLY   • DDD (degenerative disc disease), cervical    • Neuropathy     LEGS AND ARMS   • PONV (postoperative nausea and vomiting)      Past Surgical History:   Procedure Laterality Date   • BACK SURGERY      DISCECTOMY W/FUSION, NECK ? LEVEL   • CHOLECYSTECTOMY     • HYSTERECTOMY     • PORTACATH PLACEMENT N/A 3/17/2022    Procedure: INSERTION OF PORTACATH;  Surgeon: Cj Rossi MD;  Location: [facility] MAIN OR;  Service: General;  Laterality: N/A;   • TOTAL HIP ARTHROPLASTY Left 2022    Procedure: LEFT HIP ARTHROPLASTY ANTERIOR;  Surgeon: Sridhar Coto MD;  Location: [facility] MAIN OR;  Service: Orthopedics;  Laterality: Left;     PT Assessment (last 12 hours)     PT Evaluation and Treatment     Row Name 23 1000          Physical Therapy Time and Intention    Subjective Information complains of;fatigue;weakness  -CS     Document Type evaluation  -CS     Mode of Treatment individual therapy;physical therapy  -CS     Patient Effort fair  -CS     Symptoms Noted During/After Treatment fatigue;increased pain  -CS     Row Name 23 1000          General Information    Patient Profile Reviewed yes  -CS     Patient Observations alert;cooperative;agree to therapy  -CS     Prior Level of Function independent:;all household mobility;gait;transfer;bed mobility;ADL's  Patient has become more  weak and having difficulty with functional transfers and ambulation.  -CS     Equipment Currently Used at Home walker, rolling  -CS     Existing Precautions/Restrictions fall  -CS     Limitations/Impairments safety/cognitive  -CS     Barriers to Rehab none identified  -CS     Row Name 03/16/23 1000          Living Environment    Current Living Arrangements home  -CS     Home Accessibility stairs to enter home;stairs within home  -CS     People in Home sibling(s)  brother  -CS     Primary Care Provided by self  -CS     Row Name 03/16/23 1000          Home Main Entrance    Number of Stairs, Main Entrance none  -CS     Row Name 03/16/23 1000          Stairs Within Home, Primary    Number of Stairs, Within Home, Primary none  -CS     Row Name 03/16/23 1000          Pain    Additional Documentation Pain Scale: FACES Pre/Post-Treatment (Group)  -CS     Row Name 03/16/23 1000          Pain Scale: FACES Pre/Post-Treatment    Pain: FACES Scale, Pretreatment 2-->hurts little bit  -CS     Posttreatment Pain Rating 4-->hurts little more  -CS     Pain Location generalized  -CS     Pain Location - back  -CS     Row Name 03/16/23 1000          Cognition    Orientation Status (Cognition) oriented to;person;place  -CS     Follows Commands (Cognition) follows one-step commands;increased processing time needed  -CS     Row Name 03/16/23 1000          Range of Motion Comprehensive    General Range of Motion bilateral lower extremity ROM WFL  -CS     Comment, General Range of Motion Patient observed having increased shaking/tremors in UEs when attempting to eat and cut food  -CS     Row Name 03/16/23 1000          Strength Comprehensive (MMT)    General Manual Muscle Testing (MMT) Assessment lower extremity strength deficits identified  -CS     Comment, General Manual Muscle Testing (MMT) Assessment BLEs assessed at 3-/5 with significant swelling noted in lower legs/feet  -CS     Row Name 03/16/23 1000          Bed Mobility    Bed  Mobility bed mobility (all) activities  -CS     All Activities, [place] (Bed Mobility) verbal cues;moderate assist (50% patient effort)  -CS     Bed Mobility, Safety Issues decreased use of arms for pushing/pulling;decreased use of legs for bridging/pushing  -CS     Assistive Device (Bed Mobility) bed rails;draw sheet  -CS     Comment, (Bed Mobility) No further transfers completed due to patient reporting pain in her back. She asked to lay back in bed. Nursing notifed.  -CS     Row Name 03/16/23 1000          Safety Issues, Functional Mobility    Safety Issues Affecting Function (Mobility) ability to follow commands;awareness of need for assistance;insight into deficits/self-awareness  -CS     Impairments Affecting Function (Mobility) balance;cognition;endurance/activity tolerance;strength;pain  -CS     Cognitive Impairments, Mobility Safety/Performance insight into deficits/self-awareness;problem-solving/reasoning  -CS     Row Name             Wound 03/15/23 1724 Left gluteal Pressure Injury    Wound - Properties Group Placement Date: 03/15/23  -RF Placement Time: 1724  -RF Present on Hospital Admission: Y  -RF Side: Left  -RF Location: gluteal  -RF Primary Wound Type: Pressure inj  -RF    Retired Wound - Properties Group Placement Date: 03/15/23  -RF Placement Time: 1724  -RF Present on Hospital Admission: Y  -RF Side: Left  -RF Location: gluteal  -RF Primary Wound Type: Pressure inj  -RF    Retired Wound - Properties Group Date first assessed: 03/15/23  -RF Time first assessed: 1724  -RF Present on Hospital Admission: Y  -RF Side: Left  -RF Location: gluteal  -RF Primary Wound Type: Pressure inj  -RF    Row Name             Wound 03/15/23 1736 Right anterior hip Other (comment)    Wound - Properties Group Placement Date: 03/15/23  -RF Placement Time: 1736  -RF Side: Right  -RF Orientation: anterior  -RF Location: hip  -RF Primary Wound Type: Other  -RF, bruising     Retired Wound - Properties Group Placement  Date: 03/15/23  -RF Placement Time: 1736  -RF Side: Right  -RF Orientation: anterior  -RF Location: hip  -RF Primary Wound Type: Other  -RF, bruising     Retired Wound - Properties Group Date first assessed: 03/15/23  -RF Time first assessed: 1736  -RF Side: Right  -RF Location: hip  -RF Primary Wound Type: Other  -RF, bruising     Row Name 03/16/23 1000          Plan of Care Review    Plan of Care Reviewed With patient  -CS     Progress no change  -CS     Outcome Evaluation Pt presents with limitations that impede their ability to safely and independently transfer and ambulate. The skills of a therapist will be required to safely and effectively implement the following treatment plan to restore maximal level of function.  -CS     Row Name 03/16/23 1000          Therapy Assessment/Plan (PT)    Criteria for Skilled Interventions Met (PT) yes;skilled treatment is necessary  -CS     Therapy Frequency (PT) daily  -CS     Predicted Duration of Therapy Intervention (PT) 10 days  -CS     Problem List (PT) problems related to;balance;mobility;strength;cognition;pain  -CS     Activity Limitations Related to Problem List (PT) unable to ambulate safely;unable to transfer safely  -CS     Row Name 03/16/23 1000          PT Evaluation Complexity    History, PT Evaluation Complexity 1-2 personal factors and/or comorbidities  -CS     Examination of Body Systems (PT Eval Complexity) total of 4 or more elements  -CS     Clinical Presentation (PT Evaluation Complexity) stable  -CS     Clinical Decision Making (PT Evaluation Complexity) low complexity  -CS     Overall Complexity (PT Evaluation Complexity) low complexity  -CS     Row Name 03/16/23 1000          Therapy Plan Review/Discharge Plan (PT)    Therapy Plan Review (PT) evaluation/treatment results reviewed;patient  -CS     Row Name 03/16/23 1000          Physical Therapy Goals    Bed Mobility Goal Selection (PT) bed mobility, PT goal 1  -CS     Transfer Goal Selection (PT)  transfer, PT goal 1  -CS     Gait Training Goal Selection (PT) gait training, PT goal 1  -CS     Row Name 03/16/23 1000          Bed Mobility Goal 1 (PT)    Activity/Assistive Device (Bed Mobility Goal 1, PT) bed mobility activities, all  -CS     [place] Level/Cues Needed (Bed Mobility Goal 1, PT) modified independence  -CS     Time Frame (Bed Mobility Goal 1, PT) long term goal (LTG);10 days  -CS     Row Name 03/16/23 1000          Transfer Goal 1 (PT)    Activity/Assistive Device (Transfer Goal 1, PT) sit-to-stand/stand-to-sit;bed-to-chair/chair-to-bed;walker, rolling  -CS     [place] Level/Cues Needed (Transfer Goal 1, PT) contact guard required  -CS     Time Frame (Transfer Goal 1, PT) long term goal (LTG);10 days  -CS     Row Name 03/16/23 1000          Gait Training Goal 1 (PT)    Activity/Assistive Device (Gait Training Goal 1, PT) gait (walking locomotion);assistive device use;walker, rolling  -CS     [place] Level (Gait Training Goal 1, PT) contact guard required  -CS     Distance (Gait Training Goal 1, PT) 100  -CS     Time Frame (Gait Training Goal 1, PT) long term goal (LTG);10 days  -CS           User Key  (r) = Recorded By, (t) = Taken By, (c) = Cosigned By    Initials Name Provider Type    CS Haritha Ling PT Physical Therapist    RF Willard Lopez RN Registered Nurse                Physical Therapy Education     Title: PT OT SLP Therapies (In Progress)     Topic: Physical Therapy (In Progress)     Point: Mobility training (In Progress)     Learning Progress Summary           Patient Acceptance, E, NR by CS at 3/16/2023 1107                   Point: Home exercise program (Not Started)     Learner Progress:  Not documented in this visit.          Point: Body mechanics (In Progress)     Learning Progress Summary           Patient Acceptance, E, NR by CS at 3/16/2023 1107                   Point: Precautions (Not Started)     Learner Progress:  Not documented in this visit.                       User Key     Initials Effective Dates Name Provider Type Discipline     04/25/21 -  Haritha Ling PT Physical Therapist PT              PT Recommendation and Plan  Anticipated Discharge Disposition (PT): sub acute care setting, inpatient rehabilitation facility  Planned Therapy Interventions (PT): balance training, bed mobility training, gait training, transfer training, strengthening  Therapy Frequency (PT): daily  Plan of Care Reviewed With: patient  Progress: no change  Outcome Evaluation: Pt presents with limitations that impede their ability to safely and independently transfer and ambulate. The skills of a therapist will be required to safely and effectively implement the following treatment plan to restore maximal level of function.   Outcome Measures     Row Name 03/16/23 1000             How much help from another person do you currently need...    Turning from your back to your side while in flat bed without using bedrails? 2  -CS      Moving from lying on back to sitting on the side of a flat bed without bedrails? 2  -CS      Moving to and from a bed to a chair (including a wheelchair)? 2  -CS      Standing up from a chair using your arms (e.g., wheelchair, bedside chair)? 2  -CS      Climbing 3-5 steps with a railing? 2  -CS      To walk in hospital room? 2  -CS      AM-PAC 6 Clicks Score (PT) 12  -CS         Functional Assessment    Outcome Measure Options AM-PAC 6 Clicks Basic Mobility (PT)  -CS            User Key  (r) = Recorded By, (t) = Taken By, (c) = Cosigned By    Initials Name Provider Type    CS Haritha Ling PT Physical Therapist                 Time Calculation:    PT Charges     Row Name 03/16/23 1108             Time Calculation    PT Received On 03/16/23  -CS      PT Goal Re-Cert Due Date 03/25/23  -CS         Untimed Charges    PT Eval/Re-eval Minutes 32  -CS         Total Minutes    Untimed Charges Total Minutes 32  -CS       Total Minutes 32  -CS            User Key   (r) = Recorded By, (t) = Taken By, (c) = Cosigned By    Initials Name Provider Type    CS Haritha Ling, PT Physical Therapist              Therapy Charges for Today     Code Description Service Date Service Provider Modifiers Qty    [id number] HC PT EVAL LOW COMPLEXITY 3 3/16/2023 Haritha Ling, DIANE GP 1          PT G-Codes  Outcome Measure Options: AM-PAC 6 Clicks Basic Mobility (PT)  AM-PAC 6 Clicks Score (PT): 12    Haritha Ling PT  3/16/2023

## 2023-03-16 NOTE — NURSING NOTE
Patient rested well over night home pain medication was resumed, Patient is to get 2 units of blood, which is now ready, no call from the blood bank, will inform day shift nurse that blood is ready.

## 2023-03-16 NOTE — PROGRESS NOTES
[facility]     Progress Note    Patient Name: Evangelina Sutton  : 1950  MRN: [id number]  Primary Care Physician:  Gilberto Coleman MD  Date of admission: 3/15/2023    Subjective   Subjective     Chief Complaint: Patient still has low hemoglobin we will give her some more blood today also check stool for occult blood to make sure she is not losing have discussed the case with nephrology regarding the pedal edema diuretics and worsening renal function has multiple myeloma advanced stage he has refused a lot of treatments in the past but currently has been getting it looks like she is getting resistant to    HPI: Patient with advanced multiple myeloma on treatment renal failure worsening she has been on diuretics she has massive pedal edema and severe anemia white count is improved    Review of Systems   All systems were reviewed and negative except for: Reviewed    Objective   Objective     Vitals:   Temp:  [97.5 °F (36.4 °C)-98.6 °F (37 °C)] 98.6 °F (37 °C)  Heart Rate:  [] 99  Resp:  [18] 18  BP: ()/(54-70) 111/57    Physical Exam    Constitutional: Awake, alert   Eyes: PERRLA, sclerae anicteric, no conjunctival injection   HENT: NCAT, mucous membranes moist   Neck: Supple, no thyromegaly, no lymphadenopathy, trachea midline   Respiratory: Clear to auscultation bilaterally, nonlabored respirations    Cardiovascular: RRR, no murmurs, rubs, or gallops, palpable pedal pulses bilaterally   Gastrointestinal: Positive bowel sounds, soft, nontender, nondistended   Musculoskeletal: No bilateral ankle edema, no clubbing or cyanosis to extremities   Psychiatric: Appropriate affect, cooperative   Neurologic: Oriented x 3, strength symmetric in all extremities, Cranial Nerves grossly intact to confrontation, speech clear   Skin: No rashes   No change  Result Review    Result Review:  I have personally reviewed the results from the time of this admission to 3/16/2023 08:26 EDT and agree with these  findings:  [x]  Laboratory anemia and elevated BUN/creatinine  []  Microbiology  []  Radiology  []  EKG/Telemetry   []  Cardiology/Vascular   []  Pathology  []  Old records  []  Other:  Most notable findings include: Transfusions IV fluid    Assessment & Plan   Assessment / Plan     Brief Patient Summary:  Evangelina Sutton is a 72 y.o. female who transfusions and IV fluid    Active Hospital Problems:  Active Hospital Problems    Diagnosis    • **Anemia        Plan:   Continue the transfusion IV fluids    DVT prophylaxis:  Mechanical DVT prophylaxis orders are present.    CODE STATUS:   Medical Intervention Limits: NO intubation (DNI); NO cardioversion; NO dialysis; NO NIPPV (Non-Invasive Positive Pressure Ventilation); NO vasopressors  Level Of Support Discussed With: Patient  Code Status (Patient has no pulse and is not breathing): No CPR (Do Not Attempt to Resuscitate)  Medical Interventions (Patient has pulse or is breathing): Limited Support  Comments: Multiple myeloma for treatment but no CPR  Release to patient: Routine Release    Disposition:  I expect patient to be discharged after the patient has been stabilized.    Electronically signed by Abbe Zamudio MD, 03/16/23, 8:26 AM EDT.      Part of this note may be an electronic transcription/translation of spoken language to printed text using the Dragon Dictation System.

## 2023-03-16 NOTE — CONSULTS
[facility]   Consult Note    Patient Name: Evangelina Sutton  : 1950  MRN: [id number]  Primary Care Physician:  Gilberto Coleman MD  Referring Physician: Abbe Zamudio MD  Date of admission: 3/15/2023    Subjective   Subjective     Reason for Consult/ Chief Complaint: Acute renal failure    HPI:  Evangelina Sutton is a 72 y.o. female With past medical history significant for very advanced multiple myeloma, pancytopenia secondary to chemo,Nephrotic range proteinuria 5.1 g was admitted because of worsening lower extremity swelling.Patient was diuresed and creatinine went up to 2.5 and because of that reason I was consulted.  Patient has multiple lytic lesions because of multiple myeloma and she feels weak tired and rundown.Patient's hemoglobin is down to 6.2.  Patient has 24-hour urine collection last month which showed 5.1 g of proteinuria and serum albumin is 2.7.Patient has ejection fraction of 40 to 45% with LVH.    Review of Systems  Constitutional:        Weakness tiredness fatigue  Eyes:                       No blurry vision, eye discharge, eye irritation, eye pain  HEENT:                   No acute hair loss, earache and discharge, nasal congestion or discharge, sore throat, postnasal drip  Respiratory:           No shortness of breath coughing sputum production wheezing hemoptysis pleuritic chest pain  Cardiovascular:     No chest pain, orthopnea, PND, dizziness, palpitation, lower extremity edema  Gastrointestinal:   No nausea vomiting diarrhea abdominal pain constipation  Genitourinary:       No urinary incontinence, hesitancy, frequency, urgency, dysuria  Neurological:        No confusion, headache, focal weakness, numbness, dysphasia  Hematologic:         No bruising, bleeding, pallor, lymphadenopathy  Endocrine:            No coldness, hot flashes, polyuria, abnormal hair growth  Musculoskeletal:  No body pains, aches, arthritic pains, muscle pain ,muscle wasting  Psychiatric:           No low or high mood, anxiety, hallucinations, delusions  Skin.                      No rash, ulcers, bruising, itching    Personal History     Past Medical History:   Diagnosis Date   • Cancer (HCC)     BONE CANCER/TAKING CHEMO SHOT WEEKLY   • DDD (degenerative disc disease), cervical    • Neuropathy     LEGS AND ARMS   • PONV (postoperative nausea and vomiting)        Past Surgical History:   Procedure Laterality Date   • BACK SURGERY      DISCECTOMY W/FUSION, NECK ? LEVEL   • CHOLECYSTECTOMY     • HYSTERECTOMY     • PORTACATH PLACEMENT N/A 3/17/2022    Procedure: INSERTION OF PORTACATH;  Surgeon: Cj Rossi MD;  Location: [facility];  Service: General;  Laterality: N/A;   • TOTAL HIP ARTHROPLASTY Left 4/25/2022    Procedure: LEFT HIP ARTHROPLASTY ANTERIOR;  Surgeon: Sridhar Coto MD;  Location[facility];  Service: Orthopedics;  Laterality: Left;       Family History: family history is not on file. Otherwise pertinent FHx was reviewed and not pertinent to current issue.    Social History:  reports that she quit smoking about 26 years ago. Her smoking use included cigarettes. She smoked an average of 1 pack per day. She has never used smokeless tobacco. She reports current drug use. Frequency: 20.00 times per week. Drugs: Morphine and Oxycodone. She reports that she does not drink alcohol.    Home Medications:  Ixazomib Citrate, Morphine, Vitamin D3, acyclovir, daratumumab-hyaluronidase-fihj, ondansetron ODT, oxyCODONE, pomalidomide, potassium chloride, pregabalin, prochlorperazine, sennosides-docusate, spironolactone, and zolpidem    Allergies:  No Known Allergies    Objective    Objective     Vitals:   Temp:  [97.5 °F (36.4 °C)-99.3 °F (37.4 °C)] 97.8 °F (36.6 °C)  Heart Rate:  [] 94  Resp:  [16-18] 16  BP: ()/(54-75) 114/71    Physical Exam:             Constitutional:         Awake, alert responsive, conversant, no obvious distress   Eyes:                       PERRLA, sclerae  anicteric, no conjunctival injection   HEENT:                   Moist mucous membranes, no nasal or eye discharge, no throat congestion   Neck:                      Supple, no thyromegaly, no lymphadenopathy, trachea midline, no elevated JVD   Respiratory:           Clear to auscultation bilaterally, nonlabored respirations    Cardiovascular:     RRR, no murmurs, rubs, or gallops, palpable pedal pulses bilaterally,bilateral ankle edema   Gastrointestinal:   Positive bowel sounds, soft, nontender, non-distended, no organomegaly   Musculoskeletal:  No clubbing or cyanosis to extremities, muscle wasting, joint swelling, muscle weakness   Psychiatric:              Appropriate affect, cooperative   Neurologic:            Awake alert, oriented x 3, strength symmetric in all extremities, Cranial Nerves grossly intact to confrontation, speech clear   Skin:                      No rashes, bruising, skin ulcers, petechiae or ecchymosis    Result Review    Result Review:  I have personally reviewed the results from the time of this admission to 3/16/2023 16:34 EDT and agree with these findings:  []  Laboratory  []  Microbiology  []  Radiology  []  EKG/Telemetry   []  Cardiology/Vascular   []  Pathology  []  Old records  []  Other:      Assessment & Plan   Assessment / Plan     Active Hospital Problems:  Active Hospital Problems    Diagnosis    • **Anemia    • Acute renal failure (ARF) (HCC)    • Pancytopenia due to chemotherapy (HCC)    • Nephrotic syndrome associated with amyloidosis (HCC)    • 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  Patient may be developing amyloidosis secondary to advanced multiple myeloma  Patient has 5.1 g of proteinuria  Plan:   DC IV fluids   Daily p.o. fluid intake 1500 cc and low sodium diet  Only way we can make definitive diagnosis of amyloidosis his kidney biopsy  Patient is  severely anemic need blood transfusion    Electronically signed by Robb Domínguez MD, 03/15/23, 8:17 PM EDT.

## 2023-03-16 NOTE — CONSULTS
"Nutrition Services    Patient Name: Evangelina Sutton  YOB: 1950  MRN: [id number]  Admission date: 3/15/2023      CLINICAL NUTRITION ASSESSMENT      Reason for Assessment  Identified at risk by screening criteria, Nonhealing wound or pressure ulcer   H&P:    Past Medical History:   Diagnosis Date   • Cancer (HCC)     BONE CANCER/TAKING CHEMO SHOT WEEKLY   • DDD (degenerative disc disease), cervical    • Neuropathy     LEGS AND ARMS   • PONV (postoperative nausea and vomiting)         Current Problems:   Active Hospital Problems    Diagnosis    • **Anemia         Nutrition/Diet History         Narrative     RD consult for PI to left gluteal.  In talking with pt unsure of how long wound has been there.  Per chart review pt has also lost 11# since Nov, a 7.3% clinically significant change.  Pt c/o just no appetite or desire to eat, hx of chemo for multiple myeloma.  NFPE shows areas of severe muscle & fat wasting, meeting ASPEN criteria for malnutrition.  Pt anemic, pt being set-up via nurse this morning to receive blood.  Pt has tried Boost in past & really didn't like, is agreeable to trying Boost Pudding & Thrive ice creams instead to add extra calories & protein.       Anthropometrics        Current Height, Weight Height: 157.5 cm (62\")  Weight: 63.8 kg (140 lb 10.5 oz)   Current BMI Body mass index is 25.73 kg/m².       Weight Hx  Wt Readings from Last 30 Encounters:   03/15/23 1730 63.8 kg (140 lb 10.5 oz)   02/21/23 2138 53.5 kg (117 lb 15.1 oz)   02/21/23 1401 56.8 kg (125 lb 3.5 oz)   11/28/22 2241 65.4 kg (144 lb 2.9 oz)   11/28/22 1212 68.5 kg (151 lb 0.2 oz)   11/22/22 0936 68.5 kg (151 lb)   07/19/22 1325 68.5 kg (151 lb)   06/07/22 1409 68.5 kg (151 lb)   05/10/22 1251 68.5 kg (151 lb)   04/25/22 2115 68.5 kg (151 lb)   04/25/22 1217 66.1 kg (145 lb 11.6 oz)   04/22/22 1428 68.9 kg (152 lb)   03/29/22 1333 68.9 kg (152 lb)   03/17/22 0837 65.1 kg (143 lb 8.3 oz)   03/11/22 1328 68 kg (150 " lb)   09/05/21 2227 65.1 kg (143 lb 8.3 oz)   09/05/21 2133 65.1 kg (143 lb 8.3 oz)   09/05/21 1444 68 kg (149 lb 14.6 oz)   10/13/20 0000 61.2 kg (135 lb)   10/01/20 0000 61.2 kg (135 lb)   06/02/20 0000 63.5 kg (140 lb)   05/08/19 0000 84.4 kg (186 lb 2 oz)   04/03/19 0000 84.5 kg (186 lb 6 oz)   03/22/19 0000 84.5 kg (186 lb 4 oz)   03/20/19 0000 84.6 kg (186 lb 7 oz)   02/20/19 0000 80.3 kg (177 lb 1 oz)   02/13/19 0000 78.3 kg (172 lb 9 oz)   01/14/19 0000 90 kg (198 lb 8 oz)            Wt Change Observation 11# since Nov, ~7.3%     Estimated/Assessed Needs       Energy Requirements    EST Needs (kcal/day) 9685-9146 (30-35 kcal)       Protein Requirements    EST Daily Needs (g/day) 77-96 (1.2-1.5 gm)       Fluid Requirements     Estimated Needs (mL/day) 1914     Labs/Medications         Pertinent Labs Reviewed.   Results from last 7 days   Lab Units 03/16/23  0534 03/15/23  1515   SODIUM mmol/L 142 142   POTASSIUM mmol/L 4.5 4.2   CHLORIDE mmol/L 108* 108*   CO2 mmol/L 25.0 26.4   BUN mg/dL 27* 31*   CREATININE mg/dL 1.48* 1.54*   CALCIUM mg/dL 8.8 9.0   BILIRUBIN mg/dL 0.2 <0.2   ALK PHOS U/L 55 51   ALT (SGPT) U/L 7 6   AST (SGOT) U/L 20 22   GLUCOSE mg/dL 109* 108*     Results from last 7 days   Lab Units 03/16/23  0534   HEMOGLOBIN g/dL 6.2*   HEMATOCRIT % 21.0*       Pertinent Medications Reviewed.     Current Nutrition Orders & Evaluation of Intake       Oral Nutrition     Current PO Diet Diet: Regular/House Diet; Texture: Regular Texture (IDDSI 7); Fluid Consistency: Thin (IDDSI 0)   Supplement No active supplement orders       Malnutrition Severity Assessment      Patient meets criteria for : Severe Malnutrition  Malnutrition Type (last 8 hours)     Malnutrition Severity Assessment     Row Name 03/16/23 0952       Malnutrition Severity Assessment    Malnutrition Type Chronic Disease - Related Malnutrition    Row Name 03/16/23 0952       Insufficient Energy Intake     Insufficient Energy Intake  <50% of  est. energy requirement for >or equal to 1 month    Row Name 03/16/23 0952       Unintentional Weight Loss     Unintentional Weight Loss Findings Severe    Unintentional Weight Loss  Weight loss of 7.5% in three months    Row Name 03/16/23 0952       Muscle Loss    Loss of Muscle Mass Findings Severe    [place] Region Severe - deep hollowing/scooping, lack of muscle to touch, facial bones well defined    Clavicle Bone Region Severe - protruding prominent bone    Acromion Bone Region Severe - squared shoulders, bones, and acromion process protrusion prominent    Row Name 03/16/23 0952       Fat Loss    Subcutaneous Fat Loss Findings Severe    Orbital Region  Severe - pronounced hollowness/depression, dark circles, loose saggy skin    Upper Arm Region Severe - mostly skin, very little space between folds, fingers touch    Row Name 03/16/23 0952       Criteria Met (Must meet criteria for severity in at least 2 of these categories: M Wasting, Fat Loss, Fluid, Secondary Signs, Wt. Status, Intake)    Patient meets criteria for  Severe Malnutrition                 Nutrition Diagnosis         Nutrition Dx Problem 1 Severe malnutrition related to inadequate oral Intake as evidenced by inadequate energy intake., decreased appetite., unintended wt change. and patient report.     Nutrition Intervention         Continue regular diet  Add  Boost Pudding at breakfast (270 kcal & 7 gm protein)  Add Thrive Ice cream -vanilla (540 kcal & 18 gm protein)     Medical Nutrition Therapy/Nutrition Education          Learner     Readiness Patient  Acceptance     Method     Response Explanation  Verbalizes understanding     Monitor/Evaluation        Monitor PO intake, Supplement intake, Pertinent labs, Weight, Skin status     Nutrition Discharge Plan         To be determined     Electronically signed by:  Lori Ch RD  03/16/23 09:53 EDT

## 2023-03-17 PROBLEM — R26.2 DIFFICULTY WALKING: Status: ACTIVE | Noted: 2023-03-17

## 2023-03-17 PROBLEM — R60.9 EDEMA: Status: ACTIVE | Noted: 2023-03-17

## 2023-03-17 LAB
ALBUMIN SERPL-MCNC: 2.8 G/DL (ref 3.5–5.2)
ALBUMIN/GLOB SERPL: 0.6 G/DL
ALP SERPL-CCNC: 56 U/L (ref 39–117)
ALT SERPL W P-5'-P-CCNC: 7 U/L (ref 1–33)
ANION GAP SERPL CALCULATED.3IONS-SCNC: 5.6 MMOL/L (ref 5–15)
AST SERPL-CCNC: 22 U/L (ref 1–32)
BASOPHILS # BLD AUTO: 0 10*3/MM3 (ref 0–0.2)
BASOPHILS NFR BLD AUTO: 0 % (ref 0–1.5)
BILIRUB SERPL-MCNC: 0.3 MG/DL (ref 0–1.2)
BUN SERPL-MCNC: 25 MG/DL (ref 8–23)
BUN/CREAT SERPL: 16 (ref 7–25)
CALCIUM SPEC-SCNC: 9 MG/DL (ref 8.6–10.5)
CHLORIDE SERPL-SCNC: 112 MMOL/L (ref 98–107)
CO2 SERPL-SCNC: 25.4 MMOL/L (ref 22–29)
COLLECT DURATION TIME UR: 24 HRS
CREAT CL 24H UR+SERPL-VRATE: 25.9 ML/MIN (ref 88–128)
CREAT CL 24H UR+SERPL-VRATE: 37.3 L/24 HR (ref 126.7–184.3)
CREAT SERPL-MCNC: 1.56 MG/DL (ref 0.57–1)
CREAT UR-MCNC: 19.8 MG/DL
CREATINE 24H UR-MRATE: 0.55 G/24 HR (ref 0.7–1.6)
DEPRECATED RDW RBC AUTO: 59.2 FL (ref 37–54)
EGFRCR SERPLBLD CKD-EPI 2021: 35.2 ML/MIN/1.73
EOSINOPHIL # BLD AUTO: 0.1 10*3/MM3 (ref 0–0.4)
EOSINOPHIL NFR BLD AUTO: 1.7 % (ref 0.3–6.2)
ERYTHROCYTE [DISTWIDTH] IN BLOOD BY AUTOMATED COUNT: 19 % (ref 12.3–15.4)
GLOBULIN UR ELPH-MCNC: 4.7 GM/DL
GLUCOSE SERPL-MCNC: 86 MG/DL (ref 65–99)
HCT VFR BLD AUTO: 29 % (ref 34–46.6)
HGB BLD-MCNC: 8.9 G/DL (ref 12–15.9)
IMM GRANULOCYTES # BLD AUTO: 0.05 10*3/MM3 (ref 0–0.05)
IMM GRANULOCYTES NFR BLD AUTO: 0.9 % (ref 0–0.5)
LYMPHOCYTES # BLD AUTO: 1 10*3/MM3 (ref 0.7–3.1)
LYMPHOCYTES NFR BLD AUTO: 17.3 % (ref 19.6–45.3)
MCH RBC QN AUTO: 26.6 PG (ref 26.6–33)
MCHC RBC AUTO-ENTMCNC: 30.7 G/DL (ref 31.5–35.7)
MCV RBC AUTO: 86.8 FL (ref 79–97)
MONOCYTES # BLD AUTO: 0.35 10*3/MM3 (ref 0.1–0.9)
MONOCYTES NFR BLD AUTO: 6.1 % (ref 5–12)
NEUTROPHILS NFR BLD AUTO: 4.27 10*3/MM3 (ref 1.7–7)
NEUTROPHILS NFR BLD AUTO: 74 % (ref 42.7–76)
NRBC BLD AUTO-RTO: 0 /100 WBC (ref 0–0.2)
PLATELET # BLD AUTO: 162 10*3/MM3 (ref 140–450)
PMV BLD AUTO: 9.4 FL (ref 6–12)
POTASSIUM SERPL-SCNC: 5.4 MMOL/L (ref 3.5–5.2)
PROT SERPL-MCNC: 7.5 G/DL (ref 6–8.5)
RBC # BLD AUTO: 3.34 10*6/MM3 (ref 3.77–5.28)
SODIUM SERPL-SCNC: 143 MMOL/L (ref 136–145)
SPECIMEN VOL 24H UR: 2800 ML
WBC NRBC COR # BLD: 5.77 10*3/MM3 (ref 3.4–10.8)

## 2023-03-17 PROCEDURE — 25010000002 ALBUMIN HUMAN 25% PER 50 ML: Performed by: INTERNAL MEDICINE

## 2023-03-17 PROCEDURE — 25010000002 FILGRASTIM PER 480 MCG: Performed by: INTERNAL MEDICINE

## 2023-03-17 PROCEDURE — 85025 COMPLETE CBC W/AUTO DIFF WBC: CPT | Performed by: INTERNAL MEDICINE

## 2023-03-17 PROCEDURE — 80053 COMPREHEN METABOLIC PANEL: CPT | Performed by: INTERNAL MEDICINE

## 2023-03-17 PROCEDURE — 86335 IMMUNFIX E-PHORSIS/URINE/CSF: CPT | Performed by: INTERNAL MEDICINE

## 2023-03-17 PROCEDURE — P9047 ALBUMIN (HUMAN), 25%, 50ML: HCPCS | Performed by: INTERNAL MEDICINE

## 2023-03-17 PROCEDURE — 82575 CREATININE CLEARANCE TEST: CPT | Performed by: INTERNAL MEDICINE

## 2023-03-17 PROCEDURE — 82570 ASSAY OF URINE CREATININE: CPT | Performed by: INTERNAL MEDICINE

## 2023-03-17 PROCEDURE — 84166 PROTEIN E-PHORESIS/URINE/CSF: CPT | Performed by: INTERNAL MEDICINE

## 2023-03-17 PROCEDURE — 94799 UNLISTED PULMONARY SVC/PX: CPT

## 2023-03-17 PROCEDURE — 84156 ASSAY OF PROTEIN URINE: CPT | Performed by: INTERNAL MEDICINE

## 2023-03-17 PROCEDURE — 25010000002 FUROSEMIDE PER 20 MG: Performed by: INTERNAL MEDICINE

## 2023-03-17 RX ORDER — MORPHINE SULFATE 15 MG/1
60 TABLET, FILM COATED, EXTENDED RELEASE ORAL EVERY 12 HOURS SCHEDULED
Status: DISCONTINUED | OUTPATIENT
Start: 2023-03-17 | End: 2023-03-22

## 2023-03-17 RX ORDER — FUROSEMIDE 10 MG/ML
20 INJECTION INTRAMUSCULAR; INTRAVENOUS 3 TIMES DAILY
Status: COMPLETED | OUTPATIENT
Start: 2023-03-17 | End: 2023-03-18

## 2023-03-17 RX ORDER — ALBUMIN (HUMAN) 12.5 G/50ML
25 SOLUTION INTRAVENOUS 3 TIMES DAILY
Status: COMPLETED | OUTPATIENT
Start: 2023-03-17 | End: 2023-03-18

## 2023-03-17 RX ADMIN — FUROSEMIDE 20 MG: 10 INJECTION, SOLUTION INTRAMUSCULAR; INTRAVENOUS at 17:04

## 2023-03-17 RX ADMIN — POTASSIUM CHLORIDE 20 MEQ: 10 CAPSULE, COATED, EXTENDED RELEASE ORAL at 11:05

## 2023-03-17 RX ADMIN — ALBUMIN HUMAN 25 G: 0.25 SOLUTION INTRAVENOUS at 16:37

## 2023-03-17 RX ADMIN — PREGABALIN 150 MG: 75 CAPSULE ORAL at 11:06

## 2023-03-17 RX ADMIN — MORPHINE SULFATE 60 MG: 15 TABLET, FILM COATED, EXTENDED RELEASE ORAL at 22:00

## 2023-03-17 RX ADMIN — Medication 10 ML: at 22:08

## 2023-03-17 RX ADMIN — ACYCLOVIR 400 MG: 800 TABLET ORAL at 11:06

## 2023-03-17 RX ADMIN — FILGRASTIM 480 MCG: 480 INJECTION, SOLUTION INTRAVENOUS; SUBCUTANEOUS at 14:53

## 2023-03-17 RX ADMIN — SENNOSIDES AND DOCUSATE SODIUM 2 TABLET: 8.6; 5 TABLET ORAL at 11:06

## 2023-03-17 RX ADMIN — Medication 10 ML: at 22:00

## 2023-03-17 RX ADMIN — OXYCODONE HYDROCHLORIDE 5 MG: 5 TABLET ORAL at 01:35

## 2023-03-17 RX ADMIN — POTASSIUM CHLORIDE 20 MEQ: 10 CAPSULE, COATED, EXTENDED RELEASE ORAL at 19:05

## 2023-03-17 RX ADMIN — PREGABALIN 150 MG: 75 CAPSULE ORAL at 22:00

## 2023-03-17 RX ADMIN — SENNOSIDES AND DOCUSATE SODIUM 2 TABLET: 8.6; 5 TABLET ORAL at 22:00

## 2023-03-17 RX ADMIN — FUROSEMIDE 20 MG: 10 INJECTION, SOLUTION INTRAMUSCULAR; INTRAVENOUS at 22:00

## 2023-03-17 RX ADMIN — Medication 10 ML: at 11:06

## 2023-03-17 NOTE — PROGRESS NOTES
[facility]     Progress Note    Patient Name: Evangelina Sutton  : 1950  MRN: [id number]  Primary Care Physician:  Gilberto Coleman MD  Date of admission: 3/15/2023    Subjective Patient was sleepy and slow this morning and received 2 units of blood  Globin is better today    Review of Systems  Constitutional:        Weakness tiredness fatigue  Eyes:                       No blurry vision, eye discharge, eye irritation, eye pain  HEENT:                   No acute hair loss, earache and discharge, nasal congestion or discharge, sore throat, postnasal drip  Respiratory:           No shortness of breath coughing sputum production wheezing hemoptysis pleuritic chest pain  Cardiovascular:     No chest pain, orthopnea, PND, dizziness, palpitation, lower extremity edema  Gastrointestinal:   No nausea vomiting diarrhea abdominal pain constipation  Genitourinary:       No urinary incontinence, hesitancy, frequency, urgency, dysuria  Hematologic:         No bruising, bleeding, pallor, lymphadenopathy  Endocrine:            No coldness, hot flashes, polyuria, abnormal hair growth  Musculoskeletal:    No body pains, aches, arthritic pains, muscle pain ,muscle wasting  Psychiatric:          No low or high mood, anxiety, hallucinations, delusions  Skin.                      No rash, ulcers, bruising, itching  Neurological:        No confusion, headache, focal weakness, numbness, dysphasia    Objective   Objective     Vitals:   Temp:  [97.8 °F (36.6 °C)-99.3 °F (37.4 °C)] 99 °F (37.2 °C)  Heart Rate:  [] 101  Resp:  [16-18] 16  BP: ()/(58-88) 112/60  Flow (L/min):  [3] 3  Physical Exam    Constitutional: Awake, alert responsive, conversant, no obvious distress              Psychiatric:  Appropriate affect, cooperative   Neurologic:  Awake alert ,oriented x 3, strength symmetric in all extremities, Cranial Nerves grossly intact to confrontation, speech clear   Eyes:   PERRLA, sclerae anicteric, no  conjunctival injection   HEENT:  Moist mucous membranes, no nasal or eye discharge, no throat congestion   Neck:   Supple, no thyromegaly, no lymphadenopathy, trachea midline, no elevated JVD   Respiratory:  Clear to auscultation bilaterally, nonlabored respirations    Cardiovascular: RRR, no murmurs, rubs, or gallops, palpable pedal pulses bilaterally, No bilateral ankle edema   Gastrointestinal: Positive bowel sounds, soft, nontender, nondistended, no organomegaly   Musculoskeletal:  No clubbing or cyanosis to extremities,muscle wasting, joint swelling, muscle weakness             Skin:                      No rashes, bruising, skin ulcers, petechiae or ecchymosis    Result Review    Result Review:  I have personally reviewed the results from the time of this admission to 3/17/2023 09:08 EDT and agree with these findings:  []  Laboratory  []  Microbiology  []  Radiology  []  EKG/Telemetry   []  Cardiology/Vascular   []  Pathology  []  Old records  []  Other:    Assessment & Plan   Assessment / Plan       Active Hospital Problems:    Active Hospital Problems    Diagnosis  POA   • **Anemia [D64.9]  Yes   • Edema [R60.9]  Unknown     Secondary to severe hypoalbuminemia nephrotic range proteinuria  Combined congestive heart failure     • Acute renal failure (ARF) (HCC) [N17.9]  Unknown   • Pancytopenia due to chemotherapy (HCC) [D61.810]  Unknown   • Nephrotic syndrome associated with amyloidosis ([facility]) [E85.4, N08]  Unknown     5.1 g proteinuria  Patient most likely has amyloidosis     • 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 fluid 1400 cc    Electronically signed by Robb Domínguez MD, 03/17/23, 9:06 AM EDT.

## 2023-03-17 NOTE — PROGRESS NOTES
[facility]     Progress Note    Patient Name: Evangelina Sutton  : 1950  MRN: [id number]  Primary Care Physician:  Gilberto Coleman MD  Date of admission: 3/15/2023    Subjective   Subjective     Chief Complaint: BUN/creatinine has already improved she has massive edema we will start her on albumin and Lasix as recommended by nephrology because she has low albumin level patient is getting too much of pain medications and has been very drowsy he was getting scheduled oxycodone and morphine and we will try to cut down the dose and see how she tolerates she has probably been taking a very high doses at home    HPI: Patient very drowsy we will stop oxycodone and cut down the morphine to 60 mg we will also give her albumin and Lasix and see if he can get rid of the edema feet    Review of Systems   All systems were reviewed and negative except for: Reviewed    Objective   Objective     Vitals:   Temp:  [97.8 °F (36.6 °C)-99.1 °F (37.3 °C)] 99 °F (37.2 °C)  Heart Rate:  [] 101  Resp:  [16] 16  BP: ()/(58-88) 112/60  Flow (L/min):  [3] 3    Physical Exam    Constitutional: Awake, alert   Eyes: PERRLA, sclerae anicteric, no conjunctival injection   HENT: NCAT, mucous membranes moist   Neck: Supple, no thyromegaly, no lymphadenopathy, trachea midline   Respiratory: Clear to auscultation bilaterally, nonlabored respirations    Cardiovascular: RRR, no murmurs, rubs, or gallops, palpable pedal pulses bilaterally   Gastrointestinal: Positive bowel sounds, soft, nontender, nondistended   Musculoskeletal:  bilateral ankle edema, no clubbing or cyanosis to extremities   Psychiatric: Appropriate affect, cooperative   Neurologic: Oriented x 3, strength symmetric in all extremities, Cranial Nerves grossly intact to confrontation, speech clear   Skin: No rashes   Edema feet  Result Review    Result Review:  I have personally reviewed the results from the time of this admission to 3/17/2023 11:33 EDT and agree  with these findings:  [x]  Laboratory anemia improved  []  Microbiology  []  Radiology  []  EKG/Telemetry   []  Cardiology/Vascular   []  Pathology  []  Old records  []  Other:  Most notable findings include: Patient had anemia elevated BUN/creatinine    Assessment & Plan   Assessment / Plan     Brief Patient Summary:  Evangelina Sutton is a 72 y.o. 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    CODE STATUS:   Medical Intervention Limits: NO intubation (DNI); NO cardioversion; NO dialysis; NO NIPPV (Non-Invasive Positive Pressure Ventilation); NO vasopressors  Level Of Support Discussed With: Patient  Code Status (Patient has no pulse and is not breathing): No CPR (Do Not Attempt to Resuscitate)  Medical Interventions (Patient has pulse or is breathing): Limited Support  Comments: Multiple myeloma for treatment but no CPR  Release to patient: Routine Release    Disposition:  I expect patient to be discharged after the patient has been stabilized.    Electronically signed by Abbe Zamudio MD, 03/17/23, 11:33 AM EDT.      Part of this note may be an electronic transcription/translation of spoken language to printed text using the Dragon Dictation System.

## 2023-03-17 NOTE — CONSULTS
Purpose of the visit was to evaluate for: support for patient/family. Spoke with RN and patient and discussed palliative care.      Assessment:Patient is on 4NT,able to get to the BS with assistance, no reports of pain, nausea or anxiety at this time. The patient needed assistance with getting her breakfast set up and opening containers. Staff reports the patient slept most of the morning. Nurse reports the patient flip flops with her confusion however refused a bone marrow biopsy.     Recommendations/Plan: Palliative care will reach out to the efren Fischer.    Tasks Completed: Emotional Support.    Other Comments: Palliative care nurse was updated by the [facility]  Nurse and visited the patient at the bedside. The patient was very appreciative of my help with her breakfast tray and getting all of her containers opened. The patient expressed that she feels like she does better at home. The patient has one living child, a son, Aaliyah Sutton.A call placed to Efren Fischer who was currently on lunch and asked that I call back in 30 minutes.     -Mariam MASON, RN, [facility]   Palliative Care    3/17/2023 13:53 EDT

## 2023-03-17 NOTE — CONSULTS
"Nutrition Services    Patient Name: Evangelina Sutton  YOB: 1950  MRN: [id number]  Admission date: 3/15/2023      CLINICAL NUTRITION ASSESSMENT      Reason for Assessment  Follow-up protocol   H&P:    Past Medical History:   Diagnosis Date   • Cancer (HCC)     BONE CANCER/TAKING CHEMO SHOT WEEKLY   • DDD (degenerative disc disease), cervical    • Neuropathy     LEGS AND ARMS   • PONV (postoperative nausea and vomiting)         Current Problems:   Active Hospital Problems    Diagnosis    • **Anemia    • Edema    • Difficulty walking    • Acute renal failure (ARF) (HCC)    • Pancytopenia due to chemotherapy (HCC)    • Nephrotic syndrome associated with amyloidosis (HCC)    • Combined congestive systolic and diastolic heart failure (HCC)    • Multiple myeloma (HCC)         Nutrition/Diet History         Narrative     RD consult for PI to left gluteal.  In talking with pt unsure of how long wound has been there.  Per chart review pt has also lost 11# since Nov, a 7.3% clinically significant change.  Pt c/o just no appetite or desire to eat, hx of chemo for multiple myeloma.  NFPE shows areas of severe muscle & fat wasting, meeting ASPEN criteria for malnutrition.  Pt anemic, pt being set-up via nurse this morning to receive blood.  Pt has tried Boost in past & really didn't like, is agreeable to trying Boost Pudding & Thrive ice creams instead to add extra calories & protein.      3/17 Pt didn't like the Thrive ice cream but likes the Boost Pudding, therefore will change order to reflect preferences.     Anthropometrics        Current Height, Weight Height: 157.5 cm (62\")  Weight: 63.8 kg (140 lb 10.5 oz)   Current BMI Body mass index is 25.73 kg/m².       Weight Hx  Wt Readings from Last 30 Encounters:   03/15/23 1730 63.8 kg (140 lb 10.5 oz)   02/21/23 2138 53.5 kg (117 lb 15.1 oz)   02/21/23 1401 56.8 kg (125 lb 3.5 oz)   11/28/22 2241 65.4 kg (144 lb 2.9 oz)   11/28/22 1212 68.5 kg (151 lb 0.2 oz) "   11/22/22 0936 68.5 kg (151 lb)   07/19/22 1325 68.5 kg (151 lb)   06/07/22 1409 68.5 kg (151 lb)   05/10/22 1251 68.5 kg (151 lb)   04/25/22 2115 68.5 kg (151 lb)   04/25/22 1217 66.1 kg (145 lb 11.6 oz)   04/22/22 1428 68.9 kg (152 lb)   03/29/22 1333 68.9 kg (152 lb)   03/17/22 0837 65.1 kg (143 lb 8.3 oz)   03/11/22 1328 68 kg (150 lb)   09/05/21 2227 65.1 kg (143 lb 8.3 oz)   09/05/21 2133 65.1 kg (143 lb 8.3 oz)   09/05/21 1444 68 kg (149 lb 14.6 oz)   10/13/20 0000 61.2 kg (135 lb)   10/01/20 0000 61.2 kg (135 lb)   06/02/20 0000 63.5 kg (140 lb)   05/08/19 0000 84.4 kg (186 lb 2 oz)   04/03/19 0000 84.5 kg (186 lb 6 oz)   03/22/19 0000 84.5 kg (186 lb 4 oz)   03/20/19 0000 84.6 kg (186 lb 7 oz)   02/20/19 0000 80.3 kg (177 lb 1 oz)   02/13/19 0000 78.3 kg (172 lb 9 oz)   01/14/19 0000 90 kg (198 lb 8 oz)            Wt Change Observation 11# since Nov, ~7.3%     Estimated/Assessed Needs       Energy Requirements    EST Needs (kcal/day) 5981-6582 (30-35 kcal)       Protein Requirements    EST Daily Needs (g/day) 77-96 (1.2-1.5 gm)       Fluid Requirements     Estimated Needs (mL/day) 1914     Labs/Medications         Pertinent Labs Reviewed.   Results from last 7 days   Lab Units 03/17/23  0626 03/16/23  0534 03/15/23  1515   SODIUM mmol/L 143 142 142   POTASSIUM mmol/L 5.4* 4.5 4.2   CHLORIDE mmol/L 112* 108* 108*   CO2 mmol/L 25.4 25.0 26.4   BUN mg/dL 25* 27* 31*   CREATININE mg/dL 1.56* 1.48* 1.54*   CALCIUM mg/dL 9.0 8.8 9.0   BILIRUBIN mg/dL 0.3 0.2 <0.2   ALK PHOS U/L 56 55 51   ALT (SGPT) U/L 7 7 6   AST (SGOT) U/L 22 20 22   GLUCOSE mg/dL 86 109* 108*     Results from last 7 days   Lab Units 03/17/23  0626   HEMOGLOBIN g/dL 8.9*   HEMATOCRIT % 29.0*       Pertinent Medications Reviewed.     Current Nutrition Orders & Evaluation of Intake       Oral Nutrition     Current PO Diet Diet: Regular/House Diet; Texture: Regular Texture (IDDSI 7); Fluid Consistency: Thin (IDDSI 0)   Supplement Orders Placed  This Encounter      Dietary Nutrition Supplements Boost Pudding; vanilla      Dietary Nutrition Supplements Other (See Comment); Thrice ice cream- vanilla       Malnutrition Severity Assessment      Patient meets criteria for : Severe Malnutrition       Nutrition Diagnosis         Nutrition Dx Problem 1 Severe malnutrition related to inadequate oral Intake as evidenced by inadequate energy intake., decreased appetite., unintended wt change. and patient report.     Nutrition Intervention         Continue regular diet  Increase Boost Pudding to TID (810 kcal & 21 gm protein)  Discontinue the Thrive Ice cream      Medical Nutrition Therapy/Nutrition Education          Learner     Readiness Patient  Acceptance     Method     Response Explanation  Verbalizes understanding     Monitor/Evaluation        Monitor PO intake, Supplement intake, Pertinent labs, Weight, Skin status     Nutrition Discharge Plan         To be determined     Electronically signed by:  Lori Ch RD  03/17/23 12:38 EDT

## 2023-03-18 LAB
ANION GAP SERPL CALCULATED.3IONS-SCNC: 7.6 MMOL/L (ref 5–15)
BASOPHILS # BLD AUTO: 0.02 10*3/MM3 (ref 0–0.2)
BASOPHILS NFR BLD AUTO: 0.3 % (ref 0–1.5)
BH BB BLOOD EXPIRATION DATE: NORMAL
BH BB BLOOD EXPIRATION DATE: NORMAL
BH BB BLOOD TYPE BARCODE: 5100
BH BB BLOOD TYPE BARCODE: 5100
BH BB DISPENSE STATUS: NORMAL
BH BB DISPENSE STATUS: NORMAL
BH BB PRODUCT CODE: NORMAL
BH BB PRODUCT CODE: NORMAL
BH BB UNIT NUMBER: NORMAL
BH BB UNIT NUMBER: NORMAL
BUN SERPL-MCNC: 26 MG/DL (ref 8–23)
BUN/CREAT SERPL: 18.7 (ref 7–25)
CALCIUM SPEC-SCNC: 9.7 MG/DL (ref 8.6–10.5)
CHLORIDE SERPL-SCNC: 108 MMOL/L (ref 98–107)
CO2 SERPL-SCNC: 29.4 MMOL/L (ref 22–29)
CREAT SERPL-MCNC: 1.39 MG/DL (ref 0.57–1)
CROSSMATCH INTERPRETATION: NORMAL
CROSSMATCH INTERPRETATION: NORMAL
DEPRECATED RDW RBC AUTO: 60.1 FL (ref 37–54)
EGFRCR SERPLBLD CKD-EPI 2021: 40.4 ML/MIN/1.73
EOSINOPHIL # BLD AUTO: 0.12 10*3/MM3 (ref 0–0.4)
EOSINOPHIL NFR BLD AUTO: 1.6 % (ref 0.3–6.2)
ERYTHROCYTE [DISTWIDTH] IN BLOOD BY AUTOMATED COUNT: 19 % (ref 12.3–15.4)
GLUCOSE SERPL-MCNC: 84 MG/DL (ref 65–99)
HCT VFR BLD AUTO: 28.4 % (ref 34–46.6)
HGB BLD-MCNC: 8.7 G/DL (ref 12–15.9)
IMM GRANULOCYTES # BLD AUTO: 0.23 10*3/MM3 (ref 0–0.05)
IMM GRANULOCYTES NFR BLD AUTO: 3 % (ref 0–0.5)
LYMPHOCYTES # BLD AUTO: 0.92 10*3/MM3 (ref 0.7–3.1)
LYMPHOCYTES NFR BLD AUTO: 12 % (ref 19.6–45.3)
MCH RBC QN AUTO: 26.5 PG (ref 26.6–33)
MCHC RBC AUTO-ENTMCNC: 30.6 G/DL (ref 31.5–35.7)
MCV RBC AUTO: 86.6 FL (ref 79–97)
MONOCYTES # BLD AUTO: 0.32 10*3/MM3 (ref 0.1–0.9)
MONOCYTES NFR BLD AUTO: 4.2 % (ref 5–12)
NEUTROPHILS NFR BLD AUTO: 6.08 10*3/MM3 (ref 1.7–7)
NEUTROPHILS NFR BLD AUTO: 78.9 % (ref 42.7–76)
NRBC BLD AUTO-RTO: 0 /100 WBC (ref 0–0.2)
PLAT MORPH BLD: NORMAL
PLATELET # BLD AUTO: 169 10*3/MM3 (ref 140–450)
PMV BLD AUTO: 9.9 FL (ref 6–12)
POTASSIUM SERPL-SCNC: 4.9 MMOL/L (ref 3.5–5.2)
RBC # BLD AUTO: 3.28 10*6/MM3 (ref 3.77–5.28)
RBC MORPH BLD: NORMAL
SODIUM SERPL-SCNC: 145 MMOL/L (ref 136–145)
UNIT  ABO: NORMAL
UNIT  ABO: NORMAL
UNIT  RH: NORMAL
UNIT  RH: NORMAL
WBC MORPH BLD: NORMAL
WBC NRBC COR # BLD: 7.69 10*3/MM3 (ref 3.4–10.8)

## 2023-03-18 PROCEDURE — 80048 BASIC METABOLIC PNL TOTAL CA: CPT | Performed by: INTERNAL MEDICINE

## 2023-03-18 PROCEDURE — 85025 COMPLETE CBC W/AUTO DIFF WBC: CPT | Performed by: INTERNAL MEDICINE

## 2023-03-18 PROCEDURE — 25010000002 FILGRASTIM PER 480 MCG: Performed by: INTERNAL MEDICINE

## 2023-03-18 PROCEDURE — 94799 UNLISTED PULMONARY SVC/PX: CPT

## 2023-03-18 PROCEDURE — P9047 ALBUMIN (HUMAN), 25%, 50ML: HCPCS | Performed by: INTERNAL MEDICINE

## 2023-03-18 PROCEDURE — 25010000002 ALBUMIN HUMAN 25% PER 50 ML: Performed by: INTERNAL MEDICINE

## 2023-03-18 PROCEDURE — 25010000002 FUROSEMIDE PER 20 MG: Performed by: INTERNAL MEDICINE

## 2023-03-18 PROCEDURE — 85007 BL SMEAR W/DIFF WBC COUNT: CPT | Performed by: INTERNAL MEDICINE

## 2023-03-18 RX ORDER — FUROSEMIDE 10 MG/ML
20 INJECTION INTRAMUSCULAR; INTRAVENOUS DAILY
Status: DISCONTINUED | OUTPATIENT
Start: 2023-03-18 | End: 2023-03-24

## 2023-03-18 RX ADMIN — ACYCLOVIR 400 MG: 800 TABLET ORAL at 10:19

## 2023-03-18 RX ADMIN — POTASSIUM CHLORIDE 20 MEQ: 10 CAPSULE, COATED, EXTENDED RELEASE ORAL at 17:37

## 2023-03-18 RX ADMIN — FUROSEMIDE 20 MG: 10 INJECTION, SOLUTION INTRAMUSCULAR; INTRAVENOUS at 10:19

## 2023-03-18 RX ADMIN — SENNOSIDES AND DOCUSATE SODIUM 2 TABLET: 8.6; 5 TABLET ORAL at 10:19

## 2023-03-18 RX ADMIN — SENNOSIDES AND DOCUSATE SODIUM 2 TABLET: 8.6; 5 TABLET ORAL at 21:33

## 2023-03-18 RX ADMIN — MORPHINE SULFATE 60 MG: 15 TABLET, FILM COATED, EXTENDED RELEASE ORAL at 10:19

## 2023-03-18 RX ADMIN — PREGABALIN 150 MG: 75 CAPSULE ORAL at 10:19

## 2023-03-18 RX ADMIN — Medication 10 ML: at 21:33

## 2023-03-18 RX ADMIN — ALBUMIN HUMAN 25 G: 0.25 SOLUTION INTRAVENOUS at 01:14

## 2023-03-18 RX ADMIN — FILGRASTIM 480 MCG: 480 INJECTION, SOLUTION INTRAVENOUS; SUBCUTANEOUS at 11:01

## 2023-03-18 RX ADMIN — PREGABALIN 150 MG: 75 CAPSULE ORAL at 21:33

## 2023-03-18 RX ADMIN — Medication 10 ML: at 10:19

## 2023-03-18 RX ADMIN — ALBUMIN HUMAN 25 G: 0.25 SOLUTION INTRAVENOUS at 11:00

## 2023-03-18 RX ADMIN — POTASSIUM CHLORIDE 20 MEQ: 10 CAPSULE, COATED, EXTENDED RELEASE ORAL at 10:19

## 2023-03-18 RX ADMIN — MORPHINE SULFATE 60 MG: 15 TABLET, FILM COATED, EXTENDED RELEASE ORAL at 21:33

## 2023-03-18 RX ADMIN — Medication 10 ML: at 21:34

## 2023-03-18 NOTE — PROGRESS NOTES
[facility]     Progress Note    Patient Name: Evangelina Sutton  : 1950  MRN: [id number]  Primary Care Physician:  Gilberto Coleman MD  Date of admission: 3/15/2023    Subjective   Patient is resting very comfortably and nurses did not bring to my attention any adversity  Review of Systems  Constitutional:        Weakness tiredness fatigue  Eyes:                       No blurry vision, eye discharge, eye irritation, eye pain  HEENT:                   No acute hair loss, earache and discharge, nasal congestion or discharge, sore throat, postnasal drip  Respiratory:           No shortness of breath coughing sputum production wheezing hemoptysis pleuritic chest pain  Cardiovascular:     No chest pain, orthopnea, PND, dizziness, palpitation, lower extremity edema  Gastrointestinal:   No nausea vomiting diarrhea abdominal pain constipation  Genitourinary:       No urinary incontinence, hesitancy, frequency, urgency, dysuria  Hematologic:         No bruising, bleeding, pallor, lymphadenopathy  Endocrine:            No coldness, hot flashes, polyuria, abnormal hair growth  Musculoskeletal:    No body pains, aches, arthritic pains, muscle pain ,muscle wasting  Psychiatric:          No low or high mood, anxiety, hallucinations, delusions  Skin.                      No rash, ulcers, bruising, itching  Neurological:        No confusion, headache, focal weakness, numbness, dysphasia    Objective   Objective     Vitals:   Temp:  [98.1 °F (36.7 °C)-99 °F (37.2 °C)] 98.6 °F (37 °C)  Heart Rate:  [] 99  Resp:  [16-20] 20  BP: (106-138)/(61-94) 106/61  Flow (L/min):  [1] 1  Physical Exam    Constitutional: Awake, alert responsive, conversant, no obvious distress              Psychiatric:  Appropriate affect, cooperative   Neurologic:  Awake alert ,oriented x 3, strength symmetric in all extremities, Cranial Nerves grossly intact to confrontation, speech clear   Eyes:   PERRLA, sclerae anicteric, no conjunctival  injection   HEENT:  Moist mucous membranes, no nasal or eye discharge, no throat congestion   Neck:   Supple, no thyromegaly, no lymphadenopathy, trachea midline, no elevated JVD   Respiratory:  Clear to auscultation bilaterally, nonlabored respirations    Cardiovascular: RRR, no murmurs, rubs, or gallops, palpable pedal pulses bilaterally, No bilateral ankle edema   Gastrointestinal: Positive bowel sounds, soft, nontender, nondistended, no organomegaly   Musculoskeletal:  No clubbing or cyanosis to extremities,muscle wasting, joint swelling, muscle weakness             Skin:                      No rashes, bruising, skin ulcers, petechiae or ecchymosis    Result Review    Result Review:  I have personally reviewed the results from the time of this admission to 3/18/2023 10:35 EDT and agree with these findings:  []  Laboratory  []  Microbiology  []  Radiology  []  EKG/Telemetry   []  Cardiology/Vascular   []  Pathology  []  Old records  []  Other:    Assessment & Plan   Assessment / Plan       Active Hospital Problems:    Active Hospital Problems    Diagnosis  POA   • **Anemia [D64.9]  Yes   • Edema [R60.9]  Unknown     Secondary to severe hypoalbuminemia nephrotic range proteinuria  Combined congestive heart failure     • Difficulty walking [R26.2]  Yes   • Acute renal failure (ARF) ([facility]) [N17.9]  Unknown   • Pancytopenia due to chemotherapy ([facility]) [D61.810]  Unknown   • Nephrotic syndrome associated with amyloidosis ([facility]) [E85.4, N08]  Unknown     5.1 g proteinuria  Patient most likely has amyloidosis     • 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

## 2023-03-18 NOTE — PROGRESS NOTES
[facility]     Progress Note    Patient Name: Evangelina Sutton  : 1950  MRN: [id number]  Primary Care Physician:  Gilberto Coleman MD  Date of admission: 3/15/2023    Subjective   Subjective     Chief Complaint: Patient is complaining of very severe pain in the back electrolytes have almost completely normalized blood counts have also have improved we will stop the filgrastim we have also given her IV Lasix and albumin and her edema feet has also resolved he is complaining extreme back pain probably with involvement by the multiple myeloma we have ordered an MRI and may have to give her radiation therapy to that area also has refused all the treatments in the past but now agrees and wants everything done we may have to give her radiation for the control of the back she has now recently been started on a new medications because she has become resistant to the previous one she has refused any bone marrow transplant which she was supposed to get she has been DNR kidney functions have improved there were probably because because of the diuretic  HPI: We will get an MRI of the lumbar spine and possibility of radiation therapy will be considered patient also had extreme lethargy with the pain medication that he was getting she was tolerating them very well but now it looks like with the kidney function declining is causing more problems for her and therefore dosage have cut down which she agrees but she says that her back pain is still pretty bad    Review of Systems   All systems were reviewed and negative except for: Reviewed    Objective   Objective     Vitals:   Temp:  [98.1 °F (36.7 °C)-99 °F (37.2 °C)] 98.6 °F (37 °C)  Heart Rate:  [] 99  Resp:  [16-20] 20  BP: (106-138)/(61-78) 106/61    Physical Exam    Constitutional: Awake, alert   Eyes: PERRLA, sclerae anicteric, no conjunctival injection   HENT: NCAT, mucous membranes moist   Neck: Supple, no thyromegaly, no lymphadenopathy, trachea  midline   Respiratory: Clear to auscultation bilaterally, nonlabored respirations    Cardiovascular: RRR, no murmurs, rubs, or gallops, palpable pedal pulses bilaterally   Gastrointestinal: Positive bowel sounds, soft, nontender, nondistended   Musculoskeletal: No bilateral ankle edema, no clubbing or cyanosis to extremities   Psychiatric: Appropriate affect, cooperative   Neurologic: Oriented x 3, strength symmetric in all extremities, Cranial Nerves grossly intact to confrontation, speech clear   Skin: No rashes   No change  Result Review    Result Review:  I have personally reviewed the results from the time of this admission to 3/18/2023 11:42 EDT and agree with these findings:  [x]  Laboratory chemo and multiple myeloma induced pancytopenia and elevated BUN/creatinine  []  Microbiology  []  Radiology  []  EKG/Telemetry   []  Cardiology/Vascular   []  Pathology  []  Old records  []  Other:  Most notable findings include: Multiple myeloma and side effects of chemotherapy    Assessment & Plan   Assessment / Plan     Brief Patient Summary:  Evangelina Sutton is a 72 y.o. female who patient with multiple myeloma very severe back pain we will get an MRI possible radiation.    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    CODE STATUS:   Medical Intervention Limits: NO intubation (DNI); NO cardioversion; NO dialysis; NO NIPPV (Non-Invasive Positive Pressure Ventilation); NO vasopressors  Level Of Support Discussed With: Patient  Code Status (Patient has no pulse and is not breathing): No CPR  (Do Not Attempt to Resuscitate)  Medical Interventions (Patient has pulse or is breathing): Limited Support  Comments: Multiple myeloma for treatment but no CPR  Release to patient: Routine Release    Disposition:  I expect patient to be discharged after the patient has been stabilized decisions regarding the radiation therapy are made.    Electronically signed by Abbe Zamudio MD, 03/18/23, 11:42 AM EDT.      Part of this note may be an electronic transcription/translation of spoken language to printed text using the Dragon Dictation System.

## 2023-03-19 ENCOUNTER — APPOINTMENT (OUTPATIENT)
Dept: MRI IMAGING | Facility: HOSPITAL | Age: 73
DRG: 841 | End: 2023-03-19
Payer: MEDICARE

## 2023-03-19 LAB
ANION GAP SERPL CALCULATED.3IONS-SCNC: 4.7 MMOL/L (ref 5–15)
ANISOCYTOSIS BLD QL: NORMAL
BASOPHILS # BLD AUTO: 0.05 10*3/MM3 (ref 0–0.2)
BASOPHILS NFR BLD AUTO: 0.5 % (ref 0–1.5)
BUN SERPL-MCNC: 25 MG/DL (ref 8–23)
BUN/CREAT SERPL: 16.4 (ref 7–25)
CALCIUM SPEC-SCNC: 9.6 MG/DL (ref 8.6–10.5)
CHLORIDE SERPL-SCNC: 106 MMOL/L (ref 98–107)
CO2 SERPL-SCNC: 33.3 MMOL/L (ref 22–29)
CREAT SERPL-MCNC: 1.52 MG/DL (ref 0.57–1)
DEPRECATED RDW RBC AUTO: 59.4 FL (ref 37–54)
EGFRCR SERPLBLD CKD-EPI 2021: 36.3 ML/MIN/1.73
EOSINOPHIL # BLD AUTO: 0.18 10*3/MM3 (ref 0–0.4)
EOSINOPHIL NFR BLD AUTO: 1.7 % (ref 0.3–6.2)
ERYTHROCYTE [DISTWIDTH] IN BLOOD BY AUTOMATED COUNT: 18.8 % (ref 12.3–15.4)
GLUCOSE SERPL-MCNC: 84 MG/DL (ref 65–99)
HCT VFR BLD AUTO: 28.7 % (ref 34–46.6)
HGB BLD-MCNC: 8.9 G/DL (ref 12–15.9)
HYPOCHROMIA BLD QL: NORMAL
IMM GRANULOCYTES # BLD AUTO: 0.05 10*3/MM3 (ref 0–0.05)
IMM GRANULOCYTES NFR BLD AUTO: 0.5 % (ref 0–0.5)
LYMPHOCYTES # BLD AUTO: 1.23 10*3/MM3 (ref 0.7–3.1)
LYMPHOCYTES NFR BLD AUTO: 11.9 % (ref 19.6–45.3)
MCH RBC QN AUTO: 27.1 PG (ref 26.6–33)
MCHC RBC AUTO-ENTMCNC: 31 G/DL (ref 31.5–35.7)
MCV RBC AUTO: 87.5 FL (ref 79–97)
MONOCYTES # BLD AUTO: 0.52 10*3/MM3 (ref 0.1–0.9)
MONOCYTES NFR BLD AUTO: 5 % (ref 5–12)
NEUTROPHILS NFR BLD AUTO: 8.34 10*3/MM3 (ref 1.7–7)
NEUTROPHILS NFR BLD AUTO: 80.4 % (ref 42.7–76)
NRBC BLD AUTO-RTO: 0 /100 WBC (ref 0–0.2)
OVALOCYTES BLD QL SMEAR: NORMAL
PLAT MORPH BLD: NORMAL
PLATELET # BLD AUTO: 178 10*3/MM3 (ref 140–450)
PMV BLD AUTO: 10.7 FL (ref 6–12)
POTASSIUM SERPL-SCNC: 4.8 MMOL/L (ref 3.5–5.2)
RBC # BLD AUTO: 3.28 10*6/MM3 (ref 3.77–5.28)
SODIUM SERPL-SCNC: 144 MMOL/L (ref 136–145)
WBC MORPH BLD: NORMAL
WBC NRBC COR # BLD: 10.37 10*3/MM3 (ref 3.4–10.8)

## 2023-03-19 PROCEDURE — 80048 BASIC METABOLIC PNL TOTAL CA: CPT | Performed by: INTERNAL MEDICINE

## 2023-03-19 PROCEDURE — 25010000002 FUROSEMIDE PER 20 MG: Performed by: INTERNAL MEDICINE

## 2023-03-19 PROCEDURE — A9577 INJ MULTIHANCE: HCPCS | Performed by: INTERNAL MEDICINE

## 2023-03-19 PROCEDURE — 85007 BL SMEAR W/DIFF WBC COUNT: CPT | Performed by: INTERNAL MEDICINE

## 2023-03-19 PROCEDURE — 94799 UNLISTED PULMONARY SVC/PX: CPT

## 2023-03-19 PROCEDURE — 85025 COMPLETE CBC W/AUTO DIFF WBC: CPT | Performed by: INTERNAL MEDICINE

## 2023-03-19 PROCEDURE — 72158 MRI LUMBAR SPINE W/O & W/DYE: CPT

## 2023-03-19 PROCEDURE — 97530 THERAPEUTIC ACTIVITIES: CPT

## 2023-03-19 PROCEDURE — 0 GADOBENATE DIMEGLUMINE 529 MG/ML SOLUTION: Performed by: INTERNAL MEDICINE

## 2023-03-19 PROCEDURE — 97110 THERAPEUTIC EXERCISES: CPT

## 2023-03-19 RX ADMIN — Medication 10 ML: at 21:04

## 2023-03-19 RX ADMIN — SENNOSIDES AND DOCUSATE SODIUM 2 TABLET: 8.6; 5 TABLET ORAL at 21:03

## 2023-03-19 RX ADMIN — Medication 10 ML: at 08:59

## 2023-03-19 RX ADMIN — GADOBENATE DIMEGLUMINE 10 ML: 529 INJECTION, SOLUTION INTRAVENOUS at 17:39

## 2023-03-19 RX ADMIN — PREGABALIN 150 MG: 75 CAPSULE ORAL at 21:04

## 2023-03-19 RX ADMIN — SENNOSIDES AND DOCUSATE SODIUM 2 TABLET: 8.6; 5 TABLET ORAL at 08:58

## 2023-03-19 RX ADMIN — ACETAMINOPHEN 650 MG: 325 TABLET ORAL at 12:25

## 2023-03-19 RX ADMIN — PREGABALIN 150 MG: 75 CAPSULE ORAL at 08:58

## 2023-03-19 RX ADMIN — ACYCLOVIR 400 MG: 800 TABLET ORAL at 08:58

## 2023-03-19 RX ADMIN — MORPHINE SULFATE 60 MG: 15 TABLET, FILM COATED, EXTENDED RELEASE ORAL at 08:58

## 2023-03-19 RX ADMIN — POTASSIUM CHLORIDE 20 MEQ: 10 CAPSULE, COATED, EXTENDED RELEASE ORAL at 08:58

## 2023-03-19 RX ADMIN — FUROSEMIDE 20 MG: 10 INJECTION, SOLUTION INTRAMUSCULAR; INTRAVENOUS at 08:58

## 2023-03-19 RX ADMIN — MORPHINE SULFATE 60 MG: 15 TABLET, FILM COATED, EXTENDED RELEASE ORAL at 21:03

## 2023-03-19 RX ADMIN — POTASSIUM CHLORIDE 20 MEQ: 10 CAPSULE, COATED, EXTENDED RELEASE ORAL at 18:08

## 2023-03-19 NOTE — PROGRESS NOTES
[facility]     Progress Note    Patient Name: Evangelina Sutton  : 1950  MRN: [id number]  Primary Care Physician:  Gilberto Coleman MD  Date of admission: 3/15/2023    Subjective Patient is resting very comfortably and had stable creatinine.  Lower extremity swelling is under decent control    Review of Systems  Constitutional:        Weakness tiredness fatigue  Eyes:                       No blurry vision, eye discharge, eye irritation, eye pain  HEENT:                   No acute hair loss, earache and discharge, nasal congestion or discharge, sore throat, postnasal drip  Respiratory:           No shortness of breath coughing sputum production wheezing hemoptysis pleuritic chest pain  Cardiovascular:     No chest pain, orthopnea, PND, dizziness, palpitation, lower extremity edema  Gastrointestinal:   No nausea vomiting diarrhea abdominal pain constipation  Genitourinary:       No urinary incontinence, hesitancy, frequency, urgency, dysuria  Hematologic:         No bruising, bleeding, pallor, lymphadenopathy  Endocrine:            No coldness, hot flashes, polyuria, abnormal hair growth  Musculoskeletal:    No body pains, aches, arthritic pains, muscle pain ,muscle wasting  Psychiatric:          No low or high mood, anxiety, hallucinations, delusions  Skin.                      No rash, ulcers, bruising, itching  Neurological:        No confusion, headache, focal weakness, numbness, dysphasia    Objective   Objective     Vitals:   Temp:  [97.5 °F (36.4 °C)-99.7 °F (37.6 °C)] 98.8 °F (37.1 °C)  Heart Rate:  [] 97  Resp:  [18-20] 18  BP: (118-133)/(64-75) 127/75  Physical Exam    Constitutional: Awake, alert responsive, conversant, no obvious distress              Psychiatric:  Appropriate affect, cooperative   Neurologic:  Awake alert ,oriented x 3, strength symmetric in all extremities, Cranial Nerves grossly intact to confrontation, speech clear   Eyes:   PERRLA, sclerae anicteric, no  conjunctival injection   HEENT:  Moist mucous membranes, no nasal or eye discharge, no throat congestion   Neck:   Supple, no thyromegaly, no lymphadenopathy, trachea midline, no elevated JVD   Respiratory:  Clear to auscultation bilaterally, nonlabored respirations    Cardiovascular: RRR, no murmurs, rubs, or gallops, palpable pedal pulses bilaterally, No bilateral ankle edema   Gastrointestinal: Positive bowel sounds, soft, nontender, nondistended, no organomegaly   Musculoskeletal:  No clubbing or cyanosis to extremities,muscle wasting, joint swelling, muscle weakness             Skin:                      No rashes, bruising, skin ulcers, petechiae or ecchymosis    Result Review    Result Review:  I have personally reviewed the results from the time of this admission to 3/19/2023 09:17 EDT and agree with these findings:  []  Laboratory  []  Microbiology  []  Radiology  []  EKG/Telemetry   []  Cardiology/Vascular   []  Pathology  []  Old records  []  Other:    Assessment & Plan   Assessment / Plan       Active Hospital Problems:    Active Hospital Problems    Diagnosis  POA   • **Anemia [D64.9]  Yes   • Edema [R60.9]  Unknown     Secondary to severe hypoalbuminemia nephrotic range proteinuria  Combined congestive heart failure     • Difficulty walking [R26.2]  Yes   • Acute renal failure (ARF) ([facility]) [N17.9]  Unknown   • Pancytopenia due to chemotherapy ([facility]) [D61.810]  Unknown   • Nephrotic syndrome associated with amyloidosis ([facility]) [E85.4, N08]  Unknown     5.1 g proteinuria  Patient most likely has amyloidosis     • Combined congestive systolic and diastolic heart failure ([facility]) [I50.40]  Yes     Ejection fraction 40 to 45% with LVH     • Multiple myeloma ([facility]) [C90.00]  Yes     Advanced with multiple lytic lesions         Plan:   24-hour urine collection results for proteinuria pending  Keep patient on 1500 cc fluid restriction and low-salt diet  Continue rest of present care       Electronically signed by  Robb Domínguez MD, 03/19/23, 9:17 AM EDT.

## 2023-03-19 NOTE — PROGRESS NOTES
[facility]     Progress Note    Patient Name: Evangelina Sutton  : 1950  MRN: [id number]  Primary Care Physician:  Gilberto Coleman MD  Date of admission: 3/15/2023    Subjective   Subjective     Chief Complaint: Patient's main issues right now is backache we will get an MRI and will see if it is possible to give her radiation treatment her hematological status is stabilized BUN/creatinine improved we will start her on Aldactone    HPI: With edema feet multiple myeloma with renal involvement as well as pancytopenia induced by chemo    Review of Systems   All systems were reviewed and negative except for: Reviewed    Objective   Objective     Vitals:   Temp:  [97.5 °F (36.4 °C)-99.7 °F (37.6 °C)] 98.8 °F (37.1 °C)  Heart Rate:  [] 97  Resp:  [18-20] 18  BP: (118-133)/(64-75) 127/75    Physical Exam    Constitutional: Awake, alert   Eyes: PERRLA, sclerae anicteric, no conjunctival injection   HENT: NCAT, mucous membranes moist   Neck: Supple, no thyromegaly, no lymphadenopathy, trachea midline   Respiratory: Clear to auscultation bilaterally, nonlabored respirations    Cardiovascular: RRR, no murmurs, rubs, or gallops, palpable pedal pulses bilaterally   Gastrointestinal: Positive bowel sounds, soft, nontender, nondistended   Musculoskeletal: No bilateral ankle edema, no clubbing or cyanosis to extremities   Psychiatric: Appropriate affect, cooperative   Neurologic: Oriented x 3, strength symmetric in all extremities, Cranial Nerves grossly intact to confrontation, speech clear   Skin: No rashes   No change  Result Review    Result Review:  I have personally reviewed the results from the time of this admission to 3/19/2023 10:28 EDT and agree with these findings:  [x]  Laboratory anemia and renal failure  []  Microbiology  []  Radiology  []  EKG/Telemetry   []  Cardiology/Vascular   []  Pathology  []  Old records  []  Other:  Most notable findings include: Anemia renal failure    Assessment & Plan    Assessment / Plan     Brief Patient Summary:  Evangelina Sutton is a 72 y.o. 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    CODE STATUS:   Medical Intervention Limits: NO intubation (DNI); NO cardioversion; NO dialysis; NO NIPPV (Non-Invasive Positive Pressure Ventilation); NO vasopressors  Level Of Support Discussed With: Patient  Code Status (Patient has no pulse and is not breathing): No CPR (Do Not Attempt to Resuscitate)  Medical Interventions (Patient has pulse or is breathing): Limited Support  Comments: Multiple myeloma for treatment but no CPR  Release to patient: Routine Release    Disposition:  I expect patient to be discharged after the patient is stabilized.    Electronically signed by Abbe Zamudio MD, 03/19/23, 10:28 AM EDT.      Part of this note may be an electronic transcription/translation of spoken language to printed text using the Dragon Dictation System.

## 2023-03-19 NOTE — THERAPY TREATMENT NOTE
Acute Care - Physical Therapy Treatment Note   [place]     Patient Name: Evangelina Sutton  : 1950  MRN: [id number]  Today's Date: 3/19/2023      Visit Dx:     ICD-10-CM ICD-9-CM   1. 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 LEVEL   • CHOLECYSTECTOMY     • HYSTERECTOMY     • PORTACATH PLACEMENT N/A 3/17/2022    Procedure: INSERTION OF PORTACATH;  Surgeon: Cj Rossi MD;  Location: [facility] MAIN OR;  Service: General;  Laterality: N/A;   • TOTAL HIP ARTHROPLASTY Left 2022    Procedure: LEFT HIP ARTHROPLASTY ANTERIOR;  Surgeon: Sridhar Coto MD;  Location: [facility] MAIN OR;  Service: Orthopedics;  Laterality: Left;     PT Assessment (last 12 hours)     PT Evaluation and Treatment     Row Name 23 0971          Physical Therapy Time and Intention    Subjective Information complains of;weakness;fatigue;pain  -DK     Document Type therapy note (daily note)  -DK     Mode of Treatment individual therapy;physical therapy  -DK     Patient Effort good  -DK     Symptoms Noted During/After Treatment fatigue;increased pain  -DK     Row Name 2387           Pain    Pretreatment Pain Rating 2/10  -DK     Posttreatment Pain Rating 2/10  -DK     Pain Location generalized  -DK     Pain Location - back  -DK     Pain Intervention(s) Repositioned;Ambulation/increased activity;Distraction;Therapeutic presence  -     Row Name 03/19/23 0941          Cognition    Affect/Mental Status (Cognition) WFL  -DK     Orientation Status (Cognition) oriented x 4  -DK     Follows Commands (Cognition) WFL  -DK     Cognitive Function WFL  -DK     Personal Safety Interventions gait belt;nonskid shoes/slippers when out of bed;supervised activity  -     Row Name 03/19/23 0941          Bed Mobility    Bed Mobility supine-sit  -DK     All Activities, [place] (Bed Mobility) standby assist;contact guard;1 person assist  -     Supine-Sit [place] (Bed Mobility) standby assist;contact guard;1 person assist  -     Bed Mobility, Safety Issues decreased use of legs for bridging/pushing  -     Assistive Device (Bed Mobility) bed rails;draw sheet  -     Row Name 03/19/23 0941          Transfers    Transfers sit-stand transfer;stand-sit transfer;stand pivot/stand step transfer  -     Row Name 03/19/23 0941          Sit-Stand Transfer    Sit-Stand [place] (Transfers) contact guard;1 person assist  -     Assistive Device (Sit-Stand Transfers) --  no assistive device  -     Row Name 03/19/23 0941          Stand-Sit Transfer    Stand-Sit [place] (Transfers) contact guard;1 person assist  -     Assistive Device (Stand-Sit Transfers) --  no assistive device  -     Row Name 03/19/23 0941          Stand Pivot/Stand Step Transfer    Stand Pivot/Stand Step [place] (Transfers) contact guard;1 person assist  -     Assistive Device (Stand Pivot Stand Step Transfer) --  no assistive device  -     Row Name 03/19/23 0941          Gait/Stairs (Locomotion)    Gait/Stairs Locomotion gait/ambulation independence;gait/ambulation assistive device;distance ambulated;gait  pattern  -DK     [place] Level (Gait) contact guard;1 person assist  -DK     Assistive Device (Gait) --  no assistive device  -DK     Distance in Feet (Gait) 3  -DK     Pattern (Gait) step-to  -DK     Deviations/Abnormal Patterns (Gait) festinating/shuffling;stride length decreased  -DK     Bilateral Gait Deviations forward flexed posture  -DK     Comment, (Gait/Stairs) Pt ambulated on room air with no assistive device.  She was left in the recliner on alert post treatment.  -     Row Name 03/19/23 0941          Safety Issues, Functional Mobility    Safety Issues Affecting Function (Mobility) safety precaution awareness  -DK     Impairments Affecting Function (Mobility) balance;endurance/activity tolerance;strength  -DK     Cognitive Impairments, Mobility Safety/Performance safety precaution awareness  -     Row Name 03/19/23 0941          Balance    Balance Assessment sitting static balance;sitting dynamic balance;standing static balance;standing dynamic balance  -DK     Static Sitting Balance standby assist  -DK     Dynamic Sitting Balance standby assist  -DK     Position, Sitting Balance unsupported;sitting edge of bed;sitting in chair  -DK     Static Standing Balance contact guard;1-person assist  -DK     Dynamic Standing Balance contact guard;1-person assist  -DK     Position/Device Used, Standing Balance --  no assistive device  -DK     Balance Interventions standing;dynamic;tandem gait  -     Row Name 03/19/23 0941          Motor Skills    Motor Skills --  therapeutic exercises  -DK     Therapeutic Exercise hip;knee;ankle  -     Row Name 03/19/23 0941          Hip (Therapeutic Exercise)    Hip (Therapeutic Exercise) AAROM (active assistive range of motion)  -DK     Hip AAROM (Therapeutic Exercise) bilateral;flexion;extension;aBduction;aDduction;supine;10 repetitions;2 sets  -     Row Name 03/19/23 0941          Knee (Therapeutic Exercise)    Knee (Therapeutic Exercise) AAROM (active assistive  range of motion)  -JAILENE     Knee AAROM (Therapeutic Exercise) bilateral;flexion;extension;supine;10 repetitions;2 sets  -     Row Name 03/19/23 0941          Ankle (Therapeutic Exercise)    Ankle (Therapeutic Exercise) AAROM (active assistive range of motion)  -JAILENE     Ankle AAROM (Therapeutic Exercise) bilateral;dorsiflexion;plantarflexion;supine;10 repetitions;2 sets  -     Row Name             Wound 03/15/23 1724 Left gluteal Pressure Injury    Wound - Properties Group Placement Date: 03/15/23  -RF Placement Time: 1724  -RF Present on Hospital Admission: Y  -RF Side: Left  -RF Location: gluteal  -RF Primary Wound Type: Pressure inj  -RF    Retired Wound - Properties Group Placement Date: 03/15/23  -RF Placement Time: 1724  -RF Present on Hospital Admission: Y  -RF Side: Left  -RF Location: gluteal  -RF Primary Wound Type: Pressure inj  -RF    Retired Wound - Properties Group Date first assessed: 03/15/23  -RF Time first assessed: 1724  -RF Present on Hospital Admission: Y  -RF Side: Left  -RF Location: gluteal  -RF Primary Wound Type: Pressure inj  -RF    Row Name             Wound 03/15/23 1736 Right anterior hip Other (comment)    Wound - Properties Group Placement Date: 03/15/23  -RF Placement Time: 1736  -RF Side: Right  -RF Orientation: anterior  -RF Location: hip  -RF Primary Wound Type: Other  -RF, bruising     Retired Wound - Properties Group Placement Date: 03/15/23  -RF Placement Time: 1736  -RF Side: Right  -RF Orientation: anterior  -RF Location: hip  -RF Primary Wound Type: Other  -RF, bruising     Retired Wound - Properties Group Date first assessed: 03/15/23  -RF Time first assessed: 1736  -RF Side: Right  -RF Location: hip  -RF Primary Wound Type: Other  -RF, bruising     Row Name 03/19/23 0941          Plan of Care Review    Plan of Care Reviewed With patient  -JAILENE     Progress improving  -JAILENE     Row Name 03/19/23 0941          Positioning and Restraints    Pre-Treatment Position in bed  -JAILENE      Post Treatment Position chair  -DK     In Chair reclined;call light within reach;encouraged to call for assist;exit alarm on;legs elevated;heels elevated  -DK     Row Name 03/19/23 0941          Therapy Assessment/Plan (PT)    Rehab Potential (PT) good, to achieve stated therapy goals  -     Criteria for Skilled Interventions Met (PT) skilled treatment is necessary  -DK     Therapy Frequency (PT) daily  -     Problem List (PT) problems related to;balance;mobility;strength;pain  -     Activity Limitations Related to Problem List (PT) unable to ambulate safely;unable to transfer safely  -DK     Row Name 03/19/23 0941          Progress Summary (PT)    Progress Toward Functional Goals (PT) progress toward functional goals is good  -           User Key  (r) = Recorded By, (t) = Taken By, (c) = Cosigned By    Initials Name Provider Type    Tavia Fairbanks, PTA Physical Therapist Assistant    Willard Bauman RN Registered Nurse                Physical Therapy Education     Title: PT OT SLP Therapies (Done)     Topic: Physical Therapy (Done)     Point: Mobility training (Done)     Learning Progress Summary           Patient Acceptance, E, VU by  at 3/19/2023 0405    Acceptance, E, NR by  at 3/16/2023 1107                   Point: Home exercise program (Done)     Learning Progress Summary           Patient Acceptance, E, VU by  at 3/19/2023 0405                   Point: Body mechanics (Done)     Learning Progress Summary           Patient Acceptance, E, VU by  at 3/19/2023 0405    Acceptance, E, NR by  at 3/16/2023 1107                   Point: Precautions (Done)     Learning Progress Summary           Patient Acceptance, E, VU by  at 3/19/2023 0405                               User Key     Initials Effective Dates Name Provider Type Discipline     04/25/21 -  Haritha Ling, PT Physical Therapist PT     09/12/22 -  Drew Handley, RN Registered Nurse Nurse              PT Recommendation and  Plan  Planned Therapy Interventions (PT): balance training, bed mobility training, gait training, strengthening, transfer training  Therapy Frequency (PT): daily  Progress Summary (PT)  Progress Toward Functional Goals (PT): progress toward functional goals is good  Plan of Care Reviewed With: patient  Progress: improving   Outcome Measures     Row Name 03/19/23 0941 03/16/23 1000          How much help from another person do you currently need...    Turning from your back to your side while in flat bed without using bedrails? 4  -DK 2  -CS     Moving from lying on back to sitting on the side of a flat bed without bedrails? 3  -DK 2  -CS     Moving to and from a bed to a chair (including a wheelchair)? 3  -DK 2  -CS     Standing up from a chair using your arms (e.g., wheelchair, bedside chair)? 3  -DK 2  -CS     Climbing 3-5 steps with a railing? 2  -DK 2  -CS     To walk in hospital room? 3  -DK 2  -CS     AM-PAC 6 Clicks Score (PT) 18  -DK 12  -CS        Functional Assessment    Outcome Measure Options AM-PAC 6 Clicks Basic Mobility (PT)  -DK AM-PAC 6 Clicks Basic Mobility (PT)  -CS           User Key  (r) = Recorded By, (t) = Taken By, (c) = Cosigned By    Initials Name Provider Type    Tavia Fairbanks PTA Physical Therapist Assistant    Haritha Jiang, PT Physical Therapist                 Time Calculation:    PT Charges     Row Name 03/19/23 0946             Time Calculation    PT Received On 03/19/23  -DK      PT Goal Re-Cert Due Date 03/25/23  -DK         Timed Charges    84303 - PT Therapeutic Exercise Minutes 14  -DK      80388 - Gait Training Minutes  2  -DK      44035 - PT Therapeutic Activity Minutes 8  -DK         Total Minutes    Timed Charges Total Minutes 24  -DK       Total Minutes 24  -DK            User Key  (r) = Recorded By, (t) = Taken By, (c) = Cosigned By    Initials Name Provider Type    Tavia Fairbanks PTA Physical Therapist Assistant              Therapy Charges for Today      Code Description Service Date Service Provider Modifiers Qty    89661811084 HC PT THER PROC EA 15 MIN 3/19/2023 Tavia Mckeon, YENI GP 1    33288129311 HC PT THERAPEUTIC ACT EA 15 MIN 3/19/2023 Tavia Mckeon PTA GP 1          PT G-Codes  Outcome Measure Options: AM-PAC 6 Clicks Basic Mobility (PT)  AM-PAC 6 Clicks Score (PT): 18    Tavia Mckeon PTA  3/19/2023

## 2023-03-19 NOTE — PLAN OF CARE
Goal Outcome Evaluation:  Plan of Care Reviewed With: patient        Progress: no change  Outcome Evaluation: No change in patient statuas this shift. Pt educated on MRI proceedure in am. VS WDL. No new needs at this time.

## 2023-03-20 LAB
ANION GAP SERPL CALCULATED.3IONS-SCNC: 7.2 MMOL/L (ref 5–15)
BASOPHILS # BLD AUTO: 0.07 10*3/MM3 (ref 0–0.2)
BASOPHILS NFR BLD AUTO: 0.6 % (ref 0–1.5)
BUN SERPL-MCNC: 27 MG/DL (ref 8–23)
BUN/CREAT SERPL: 16.3 (ref 7–25)
CALCIUM SPEC-SCNC: 9.7 MG/DL (ref 8.6–10.5)
CHLORIDE SERPL-SCNC: 102 MMOL/L (ref 98–107)
CO2 SERPL-SCNC: 33.8 MMOL/L (ref 22–29)
CREAT SERPL-MCNC: 1.66 MG/DL (ref 0.57–1)
DEPRECATED RDW RBC AUTO: 58.2 FL (ref 37–54)
EGFRCR SERPLBLD CKD-EPI 2021: 32.6 ML/MIN/1.73
EOSINOPHIL # BLD AUTO: 0.21 10*3/MM3 (ref 0–0.4)
EOSINOPHIL NFR BLD AUTO: 1.7 % (ref 0.3–6.2)
ERYTHROCYTE [DISTWIDTH] IN BLOOD BY AUTOMATED COUNT: 18.5 % (ref 12.3–15.4)
GLUCOSE SERPL-MCNC: 83 MG/DL (ref 65–99)
HCT VFR BLD AUTO: 30 % (ref 34–46.6)
HGB BLD-MCNC: 9.3 G/DL (ref 12–15.9)
IMM GRANULOCYTES # BLD AUTO: 0.12 10*3/MM3 (ref 0–0.05)
IMM GRANULOCYTES NFR BLD AUTO: 1 % (ref 0–0.5)
LYMPHOCYTES # BLD AUTO: 2.03 10*3/MM3 (ref 0.7–3.1)
LYMPHOCYTES NFR BLD AUTO: 16.3 % (ref 19.6–45.3)
MCH RBC QN AUTO: 27 PG (ref 26.6–33)
MCHC RBC AUTO-ENTMCNC: 31 G/DL (ref 31.5–35.7)
MCV RBC AUTO: 87 FL (ref 79–97)
MONOCYTES # BLD AUTO: 0.89 10*3/MM3 (ref 0.1–0.9)
MONOCYTES NFR BLD AUTO: 7.2 % (ref 5–12)
NEUTROPHILS NFR BLD AUTO: 73.2 % (ref 42.7–76)
NEUTROPHILS NFR BLD AUTO: 9.11 10*3/MM3 (ref 1.7–7)
NRBC BLD AUTO-RTO: 0.2 /100 WBC (ref 0–0.2)
PLATELET # BLD AUTO: 189 10*3/MM3 (ref 140–450)
PMV BLD AUTO: 10.6 FL (ref 6–12)
POTASSIUM SERPL-SCNC: 4.6 MMOL/L (ref 3.5–5.2)
RAD ONC ARIA COURSE ID: NORMAL
RAD ONC ARIA COURSE INTENT: NORMAL
RAD ONC ARIA COURSE LAST TREATMENT DATE: NORMAL
RAD ONC ARIA COURSE START DATE: NORMAL
RAD ONC ARIA COURSE TREATMENT ELAPSED DAYS: 0
RAD ONC ARIA FIRST TREATMENT DATE: NORMAL
RAD ONC ARIA PLAN FRACTIONS TREATED TO DATE: 1
RAD ONC ARIA PLAN ID: NORMAL
RAD ONC ARIA PLAN NAME: NORMAL
RAD ONC ARIA PLAN PRESCRIBED DOSE PER FRACTION: 4 GY
RAD ONC ARIA PLAN PRIMARY REFERENCE POINT: NORMAL
RAD ONC ARIA PLAN TOTAL FRACTIONS PRESCRIBED: 5
RAD ONC ARIA PLAN TOTAL PRESCRIBED DOSE: 2000 CGY
RAD ONC ARIA REFERENCE POINT DOSAGE GIVEN TO DATE: 4 GY
RAD ONC ARIA REFERENCE POINT ID: NORMAL
RAD ONC ARIA REFERENCE POINT SESSION DOSAGE GIVEN: 4 GY
RBC # BLD AUTO: 3.45 10*6/MM3 (ref 3.77–5.28)
SODIUM SERPL-SCNC: 143 MMOL/L (ref 136–145)
WBC NRBC COR # BLD: 12.43 10*3/MM3 (ref 3.4–10.8)

## 2023-03-20 PROCEDURE — 77280 THER RAD SIMULAJ FIELD SMPL: CPT | Performed by: RADIOLOGY

## 2023-03-20 PROCEDURE — 94799 UNLISTED PULMONARY SVC/PX: CPT

## 2023-03-20 PROCEDURE — 77300 RADIATION THERAPY DOSE PLAN: CPT | Performed by: RADIOLOGY

## 2023-03-20 PROCEDURE — 77334 RADIATION TREATMENT AID(S): CPT | Performed by: RADIOLOGY

## 2023-03-20 PROCEDURE — 77290 THER RAD SIMULAJ FIELD CPLX: CPT | Performed by: RADIOLOGY

## 2023-03-20 PROCEDURE — 77295 3-D RADIOTHERAPY PLAN: CPT | Performed by: RADIOLOGY

## 2023-03-20 PROCEDURE — 77263 THER RADIOLOGY TX PLNG CPLX: CPT | Performed by: RADIOLOGY

## 2023-03-20 PROCEDURE — 77387 GUIDANCE FOR RADJ TX DLVR: CPT | Performed by: RADIOLOGY

## 2023-03-20 PROCEDURE — 25010000002 DEXAMETHASONE SODIUM PHOSPHATE 10 MG/ML SOLUTION: Performed by: INTERNAL MEDICINE

## 2023-03-20 PROCEDURE — 80048 BASIC METABOLIC PNL TOTAL CA: CPT | Performed by: INTERNAL MEDICINE

## 2023-03-20 PROCEDURE — G6002 STEREOSCOPIC X-RAY GUIDANCE: HCPCS | Performed by: RADIOLOGY

## 2023-03-20 PROCEDURE — 97530 THERAPEUTIC ACTIVITIES: CPT

## 2023-03-20 PROCEDURE — 85025 COMPLETE CBC W/AUTO DIFF WBC: CPT | Performed by: INTERNAL MEDICINE

## 2023-03-20 PROCEDURE — 77427 RADIATION TX MANAGEMENT X5: CPT | Performed by: RADIOLOGY

## 2023-03-20 PROCEDURE — 97110 THERAPEUTIC EXERCISES: CPT

## 2023-03-20 PROCEDURE — 25010000002 FUROSEMIDE PER 20 MG: Performed by: INTERNAL MEDICINE

## 2023-03-20 PROCEDURE — 77412 RADIATION TX DELIVERY LVL 3: CPT | Performed by: RADIOLOGY

## 2023-03-20 RX ORDER — DEXAMETHASONE 4 MG/1
4 TABLET ORAL EVERY 12 HOURS SCHEDULED
Status: DISCONTINUED | OUTPATIENT
Start: 2023-03-22 | End: 2023-03-24 | Stop reason: HOSPADM

## 2023-03-20 RX ORDER — POLYETHYLENE GLYCOL 3350 17 G/17G
17 POWDER, FOR SOLUTION ORAL DAILY
Status: DISCONTINUED | OUTPATIENT
Start: 2023-03-20 | End: 2023-03-24 | Stop reason: HOSPADM

## 2023-03-20 RX ORDER — DEXAMETHASONE SODIUM PHOSPHATE 10 MG/ML
10 INJECTION, SOLUTION INTRAMUSCULAR; INTRAVENOUS EVERY 8 HOURS
Status: COMPLETED | OUTPATIENT
Start: 2023-03-20 | End: 2023-03-21

## 2023-03-20 RX ADMIN — PREGABALIN 150 MG: 75 CAPSULE ORAL at 20:23

## 2023-03-20 RX ADMIN — DEXAMETHASONE SODIUM PHOSPHATE 10 MG: 10 INJECTION INTRAMUSCULAR; INTRAVENOUS at 08:53

## 2023-03-20 RX ADMIN — MORPHINE SULFATE 60 MG: 15 TABLET, FILM COATED, EXTENDED RELEASE ORAL at 08:52

## 2023-03-20 RX ADMIN — ACYCLOVIR 400 MG: 800 TABLET ORAL at 08:52

## 2023-03-20 RX ADMIN — PREGABALIN 150 MG: 75 CAPSULE ORAL at 08:52

## 2023-03-20 RX ADMIN — Medication 10 ML: at 20:23

## 2023-03-20 RX ADMIN — SENNOSIDES AND DOCUSATE SODIUM 2 TABLET: 8.6; 5 TABLET ORAL at 08:52

## 2023-03-20 RX ADMIN — DEXAMETHASONE SODIUM PHOSPHATE 10 MG: 10 INJECTION INTRAMUSCULAR; INTRAVENOUS at 17:11

## 2023-03-20 RX ADMIN — MORPHINE SULFATE 60 MG: 15 TABLET, FILM COATED, EXTENDED RELEASE ORAL at 20:23

## 2023-03-20 RX ADMIN — POTASSIUM CHLORIDE 20 MEQ: 10 CAPSULE, COATED, EXTENDED RELEASE ORAL at 17:11

## 2023-03-20 RX ADMIN — POTASSIUM CHLORIDE 20 MEQ: 10 CAPSULE, COATED, EXTENDED RELEASE ORAL at 08:52

## 2023-03-20 RX ADMIN — Medication 10 ML: at 08:53

## 2023-03-20 RX ADMIN — FUROSEMIDE 20 MG: 10 INJECTION, SOLUTION INTRAMUSCULAR; INTRAVENOUS at 08:53

## 2023-03-20 RX ADMIN — POLYETHYLENE GLYCOL 3350 17 G: 17 POWDER, FOR SOLUTION ORAL at 10:55

## 2023-03-20 NOTE — PLAN OF CARE
Goal Outcome Evaluation:  Plan of Care Reviewed With: patient        Progress: no change  Outcome Evaluation: patient remains alert and oriented x4. no complaints of pain throughout the shift. first round of radiation performed today. patient is a standby assist to the bathroom. no new issues at this time.

## 2023-03-20 NOTE — SIGNIFICANT NOTE
03/20/23 1550   Coping/Psychosocial   Additional Documentation Spiritual Care (Group)   Spiritual Care   Spiritual Care Source  initiative   Response to Spiritual Care thanks expressed   Spiritual Care Interventions supportive conversation provided   Spiritual Care Visit Type initial   Spiritual Care Request coping/stress of illness support;spiritual/moral support   Receptivity to Spiritual Care visit welcomed

## 2023-03-20 NOTE — H&P
Radiation Oncology Inpatient Consult       Patient: Evangelina Sutton   YOB: 1950   Medical Record Number: [id number]      Date of Consult:  3/20/2023  Primary Diagnosis:   Multiple myeloma.                                                History of Present Illness: Evangelina Sutton is a 73-year-old female with history of multiple myeloma diagnosed in 2019 who is seen in consultation regarding radiotherapy for palliation of painful bony disease.  Ms. Sutton previously underwent radiotherapy to her cervical and thoracic and spine in 2019 per Dr. Albarado.  Since then, she has undergone management in part by Dr. Zamudio and has declined bone marrow transplant for multiple myeloma.  She presents today with severe low back pain not controlled with opioid analgesics.  She denies incontinence saddle anesthesia, lower extremity weakness or difficulty with bowel movements.    Review of Systems: Review of Systems  As per HPI    Past Medical History:   Diagnosis Date   • Cancer (HCC)     BONE CANCER/TAKING CHEMO SHOT WEEKLY   • DDD (degenerative disc disease), cervical    • Neuropathy     LEGS AND ARMS   • PONV (postoperative nausea and vomiting)         Past Surgical History:   Procedure Laterality Date   • BACK SURGERY      DISCECTOMY W/FUSION, NECK ? LEVEL   • CHOLECYSTECTOMY     • HYSTERECTOMY     • PORTACATH PLACEMENT N/A 3/17/2022    Procedure: INSERTION OF PORTACATH;  Surgeon: Cj Rossi MD;  Location: [facility] OR;  Service: General;  Laterality: N/A;   • TOTAL HIP ARTHROPLASTY Left 2022    Procedure: LEFT HIP ARTHROPLASTY ANTERIOR;  Surgeon: Sridhar Coto MD;  Location: [facility];  Service: Orthopedics;  Laterality: Left;      Family History   Problem Relation Age of Onset   • Malig Hyperthermia Neg Hx         Social History     Tobacco Use   • Smoking status: Former     Packs/day: 1.00     Types: Cigarettes     Quit date:      Years since quittin.2   • Smokeless  tobacco: Never   Vaping Use   • Vaping Use: Never used   Substance Use Topics   • Alcohol use: Never   • Drug use: Yes     Frequency: 20.0 times per week     Types: Morphine, Oxycodone        Allergies:Patient has no known allergies.     Current Facility-Administered Medications:   •  acetaminophen (TYLENOL) tablet 650 mg, 650 mg, Oral, Q4H PRN, Abbe Zamudio MD, 650 mg at 03/19/23 1225  •  acyclovir (ZOVIRAX) tablet 400 mg, 400 mg, Oral, Daily, Abbe Zamudio MD, 400 mg at 03/20/23 0852  •  aluminum-magnesium hydroxide-simethicone (MAALOX MAX) 400-400-40 MG/5ML suspension 15 mL, 15 mL, Oral, Q6H PRN, Abbe Zamudio MD  •  [START ON 3/22/2023] dexamethasone (DECADRON) tablet 4 mg, 4 mg, Oral, Q12H, Abbe Zamudio MD  •  dexamethasone sodium phosphate injection 10 mg, 10 mg, Intravenous, Q8H, Abbe Zamudio MD, 10 mg at 03/20/23 0853  •  furosemide (LASIX) injection 20 mg, 20 mg, Intravenous, Daily, Abbe Zamudio MD, 20 mg at 03/20/23 0853  •  heparin injection 500 Units, 5 mL, Intravenous, PRN, Abbe Zamudio MD  •  Morphine (MS CONTIN) 12 hr tablet 60 mg, 60 mg, Oral, Q12H, Abbe Zamudio MD, 60 mg at 03/20/23 0852  •  ondansetron (ZOFRAN) injection 4 mg, 4 mg, Intravenous, Q6H PRN, Abbe Zamudio MD  •  ondansetron ODT (ZOFRAN-ODT) disintegrating tablet 8 mg, 8 mg, Oral, Q8H PRN, Abbe Zamudio MD  •  potassium chloride (MICRO-K) CR capsule 20 mEq, 20 mEq, Oral, BID With Meals, Abbe Zamudio MD, 20 mEq at 03/20/23 0852  •  pregabalin (LYRICA) capsule 150 mg, 150 mg, Oral, Q12H, Abbe Zamudio MD, 150 mg at 03/20/23 0852  •  prochlorperazine (COMPAZINE) suppository 25 mg, 25 mg, Rectal, Q8H PRN, Abbe Zamudio MD  •  sennosides-docusate (PERICOLACE) 8.6-50 MG per tablet 2 tablet, 2 tablet, Oral, BID, Abbe Zamudio MD, 2 tablet at 03/20/23 0852  •  sodium chloride 0.9 % flush 10 mL, 10 mL, Intravenous, PRN, Abbe Zamudio MD  •  sodium chloride 0.9 % flush 10 mL, 10 mL, Intravenous,  "Q12H, Abbe Zamudio MD, 10 mL at 03/20/23 0853  •  sodium chloride 0.9 % flush 10 mL, 10 mL, Intravenous, Q12H, Abbe Zamudio MD, 10 mL at 03/20/23 0853  •  sodium chloride 0.9 % flush 10 mL, 10 mL, Intravenous, PRN, Abbe Zamudio MD, 10 mL at 03/17/23 2208  •  sodium chloride 0.9 % flush 20 mL, 20 mL, Intravenous, PRN, Abbe Zamudio MD  •  sodium chloride 0.9 % infusion 40 mL, 40 mL, Intravenous, PRN, Abbe Zamudio MD  •  sodium chloride 0.9 % infusion 40 mL, 40 mL, Intravenous, PRNRobb Yusuf, MD  •  zolpidem (AMBIEN) tablet 5 mg, 5 mg, Oral, Nightly PRN, Abbe Zamudio MD   Pain: There were no vitals filed for this visit.    ECOG Performance Status:   Quality of Life:  70 - Difficulty Walking, Needs Assistance   Performance Status:  (2) Ambulatory & Capable of Self Care, Unable to Carry Out Work Activity, Up & About Greater Than 50% of Waking Hours      Physical Examination:  Vitals:     Height: 157.5 cm (62\")  Weight:       03/15/23  1730   Weight: 63.8 kg (140 lb 10.5 oz)        Constitutional: The patient is a well-developed, well-nourished female  in no acute distress.  Alert and oriented ×3.  Eyes: PERRLA.  EOMI.  ENMT:  Ears and nose WNL.  No lesions noted in the oral cavity or oropharynx.  Respiratory: Normal respiratory effort  GI: Abdomen soft, nontender, nondistended  Extremities: No clubbing, cyanosis, or edema.  Neurologic: Cranial nerves II through XII are grossly intact, with no focal neurological deficits noted on exam.  Psychiatric: Alert and oriented x3. Normal affect, with no anxiety or depression noted.    Radiographs : I personally reviewed the MRI of the lumbar spine obtained on 3/19/2023.  This reveals interval progression of disease with multiple new T2 hyperintense enhancing lesions throughout the thoracolumbar spine and pelvis consistent with worsening multiple myeloma.  There is no evidence of a paraspinal soft tissue mass.      Labs:   WBC   Date Value Ref Range " Status   03/20/2023 12.43 (H) 3.40 - 10.80 10*3/mm3 Final     Hemoglobin   Date Value Ref Range Status   03/20/2023 9.3 (L) 12.0 - 15.9 g/dL Final     Hematocrit   Date Value Ref Range Status   03/20/2023 30.0 (L) 34.0 - 46.6 % Final     Platelets   Date Value Ref Range Status   03/20/2023 189 140 - 450 10*3/mm3 Final    No results found for: CEA      ASSESSMENT/PLAN: Evangelina Sutton is a 72-year-old female with multiple myeloma not having achieved remission.  She presents with severe lower back pain related to progression of disease within the lumbar spine and pelvis.  She has no focal neurologic deficits.    I discussed the clinical, radiographic and pathologic findings today with Ms. Sutton.  She is familiar with the role of radiotherapy in the palliation of painful bony disease.  I recommended external beam radiotherapy to the lumbar spine, outlining the risks, potential benefits and alternatives of this approach.  Ms. Sutton is agreeable to my recommendation and will undergo CT simulation today for treatment of L2-S1 of the spine with plans to start radiotherapy today; 20 Gray in 5 daily fractions of 4 Gray via 3D conformal radiotherapy.    Electronically signed by Jonathan Rosales MD, 03/20/23, 9:26 AM EDT.

## 2023-03-20 NOTE — PROGRESS NOTES
[facility]     Progress Note    Patient Name: Evangelina Sutton  : 1950  MRN: [id number]  Primary Care Physician:  Gilberto Coleman MD  Date of admission: 3/15/2023    Subjective   Subjective     Chief Complaint: Patient continues to complaining of backache MRI shows extensive involvement of the lumbar spine which is causing increasing pain have discussed with Dr. Rosales in radiation therapy will start radiation therapy and I will start Decadron as well to get immediate relief patient has been failing on the current treatment and new treatment has been started    HPI: Patient with worsening of multiple myeloma with severe back    Review of Systems   All systems were reviewed and negative except for: Reviewed    Objective   Objective     Vitals:   Temp:  [98 °F (36.7 °C)-99.5 °F (37.5 °C)] 99.5 °F (37.5 °C)  Heart Rate:  [] 102  Resp:  [16-18] 16  BP: (104-127)/(62-75) 113/63    Physical Exam    Constitutional: Awake, alert   Eyes: PERRLA, sclerae anicteric, no conjunctival injection   HENT: NCAT, mucous membranes moist   Neck: Supple, no thyromegaly, no lymphadenopathy, trachea midline   Respiratory: Clear to auscultation bilaterally, nonlabored respirations    Cardiovascular: RRR, no murmurs, rubs, or gallops, palpable pedal pulses bilaterally   Gastrointestinal: Positive bowel sounds, soft, nontender, nondistended   Musculoskeletal: No bilateral ankle edema, no clubbing or cyanosis to extremities   Psychiatric: Appropriate affect, cooperative   Neurologic: Oriented x 3, strength symmetric in all extremities, Cranial Nerves grossly intact to confrontation, speech clear   Skin: No rashes   Reviewed  Result Review    Result Review:  I have personally reviewed the results from the time of this admission to 3/20/2023 08:04 EDT and agree with these findings:  [x]  Laboratory elevated BUN/creatinine  []  Microbiology  [x]  Radiology worsening lumbar spine involvement  []  EKG/Telemetry   []   Cardiology/Vascular   []  Pathology  []  Old records  []  Other:  Most notable findings include: Lumbar spine involvement with multiple myeloma    Assessment & Plan   Assessment / Plan     Brief Patient Summary:  Evangelina Sutton is a 72 y.o. female who patient will be started on radiation therapy and IV Decadron    Active Hospital Problems:  Active Hospital Problems    Diagnosis    • **Anemia    • Edema    • Difficulty walking    • Acute renal failure (ARF) (HCC)    • Pancytopenia due to chemotherapy (HCC)    • Nephrotic syndrome associated with amyloidosis (HCC)    • Combined congestive systolic and diastolic heart failure (HCC)    • Multiple myeloma (HCC)        Plan:   IV Decadron    DVT prophylaxis:  Mechanical DVT prophylaxis orders are present.    CODE STATUS:   Medical Intervention Limits: NO intubation (DNI); NO cardioversion; NO dialysis; NO NIPPV (Non-Invasive Positive Pressure Ventilation); NO vasopressors  Level Of Support Discussed With: Patient  Code Status (Patient has no pulse and is not breathing): No CPR (Do Not Attempt to Resuscitate)  Medical Interventions (Patient has pulse or is breathing): Limited Support  Comments: Multiple myeloma for treatment but no CPR  Release to patient: Routine Release    Disposition:  I expect patient to be discharged aviation therapy to be given.    Electronically signed by Abbe Zamudio MD, 03/20/23, 8:04 AM EDT.      Part of this note may be an electronic transcription/translation of spoken language to printed text using the Dragon Dictation System.

## 2023-03-20 NOTE — ACP (ADVANCE CARE PLANNING)
Educated on and completed an EMS DNR, original on chart, copy to MR and family. Provided information on [facility] services. Son Miles to discuss with the patient as she was currently in the restroom. The patient to get 5 rounds of radiation. Son reports the patients current pain medications cause her to be very sleepy. The provider is making adjustments.    -Mariam MASON, RN, [facility]   Palliative Care    3/20/2023 14:26 EDT

## 2023-03-20 NOTE — PROGRESS NOTES
[facility]     Progress Note    Patient Name: Evangelina Sutton  : 1950  MRN: [id number]  Primary Care Physician:  Gilberto Coleman MD  Date of admission: 3/15/2023    Subjective   Patient is complaining of severe back pain because of lytic lesions from multiple myeloma otherwise she is doing fine.  Patient swelling has completely resolved  Review of Systems  Constitutional:        Weakness tiredness fatigue  Eyes:                       No blurry vision, eye discharge, eye irritation, eye pain  HEENT:                   No acute hair loss, earache and discharge, nasal congestion or discharge, sore throat, postnasal drip  Respiratory:           No shortness of breath coughing sputum production wheezing hemoptysis pleuritic chest pain  Cardiovascular:     No chest pain, orthopnea, PND, dizziness, palpitation, lower extremity edema  Gastrointestinal:   No nausea vomiting diarrhea abdominal pain constipation  Genitourinary:       No urinary incontinence, hesitancy, frequency, urgency, dysuria  Hematologic:         No bruising, bleeding, pallor, lymphadenopathy  Endocrine:            No coldness, hot flashes, polyuria, abnormal hair growth  Musculoskeletal:    No body pains, aches, arthritic pains, muscle pain ,muscle wasting  Psychiatric:          No low or high mood, anxiety, hallucinations, delusions  Skin.                      No rash, ulcers, bruising, itching  Neurological:        No confusion, headache, focal weakness, numbness, dysphasia    Objective   Objective     Vitals:   Temp:  [98 °F (36.7 °C)-99.5 °F (37.5 °C)] 99.5 °F (37.5 °C)  Heart Rate:  [] 102  Resp:  [16-18] 16  BP: (104-116)/(62-64) 113/63  Physical Exam    Constitutional: Awake, alert responsive, conversant, no obvious distress              Psychiatric:  Appropriate affect, cooperative   Neurologic:  Awake alert ,oriented x 3, strength symmetric in all extremities, Cranial Nerves grossly intact to confrontation, speech  clear   Eyes:   PERRLA, sclerae anicteric, no conjunctival injection   HEENT:  Moist mucous membranes, no nasal or eye discharge, no throat congestion   Neck:   Supple, no thyromegaly, no lymphadenopathy, trachea midline, no elevated JVD   Respiratory:  Clear to auscultation bilaterally, nonlabored respirations    Cardiovascular: RRR, no murmurs, rubs, or gallops, palpable pedal pulses bilaterally, No bilateral ankle edema   Gastrointestinal: Positive bowel sounds, soft, nontender, nondistended, no organomegaly   Musculoskeletal:  No clubbing or cyanosis to extremities,muscle wasting, joint swelling, muscle weakness             Skin:                      No rashes, bruising, skin ulcers, petechiae or ecchymosis    Result Review    Result Review:  I have personally reviewed the results from the time of this admission to 3/20/2023 09:06 EDT and agree with these findings:  []  Laboratory  []  Microbiology  []  Radiology  []  EKG/Telemetry   []  Cardiology/Vascular   []  Pathology  []  Old records  []  Other:    Assessment & Plan   Assessment / Plan       Active Hospital Problems:    Active Hospital Problems    Diagnosis  POA   • **Anemia [D64.9]  Yes   • Edema [R60.9]  Unknown     Secondary to severe hypoalbuminemia nephrotic range proteinuria  Combined congestive heart failure     • Difficulty walking [R26.2]  Yes   • Acute renal failure (ARF) ([facility]) [N17.9]  Unknown   • Pancytopenia due to chemotherapy ([facility]) [D61.810]  Unknown   • Nephrotic syndrome associated with amyloidosis ([facility]) [E85.4, N08]  Unknown     5.1 g proteinuria  Patient most likely has amyloidosis     • 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

## 2023-03-20 NOTE — PLAN OF CARE
Goal Outcome Evaluation:  Plan of Care Reviewed With: patient        Progress: no change  No change in patient status this shift. VS WDL. No new needs at this time. Continuing care.

## 2023-03-20 NOTE — THERAPY TREATMENT NOTE
Acute Care - Physical Therapy Treatment Note   [place]     Patient Name: Evangelina uStton  : 1950  MRN: [id number]  Today's Date: 3/20/2023      Visit Dx:     ICD-10-CM ICD-9-CM   1. 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 LEVEL   • CHOLECYSTECTOMY     • HYSTERECTOMY     • PORTACATH PLACEMENT N/A 3/17/2022    Procedure: INSERTION OF PORTACATH;  Surgeon: Cj Rossi MD;  Location: [facility] MAIN OR;  Service: General;  Laterality: N/A;   • TOTAL HIP ARTHROPLASTY Left 2022    Procedure: LEFT HIP ARTHROPLASTY ANTERIOR;  Surgeon: Sridhar Coto MD;  Location: [facility] MAIN OR;  Service: Orthopedics;  Laterality: Left;     PT Assessment (last 12 hours)     PT Evaluation and Treatment     Row Name 23          Physical Therapy Time and Intention    Subjective Information complains of;weakness;fatigue  -DK     Document Type therapy note (daily note)  -DK     Mode of Treatment individual therapy;physical therapy  -DK     Patient Effort good  -DK     Symptoms Noted During/After Treatment fatigue  -DK     Row Name 23          Pain     Pretreatment Pain Rating 2/10  -DK     Posttreatment Pain Rating 2/10  -DK     Pain Location generalized  -DK     Pain Location - back  -DK     Pain Intervention(s) Repositioned;Ambulation/increased activity;Distraction;Therapeutic presence  -     Row Name 03/20/23 0919          Cognition    Affect/Mental Status (Cognition) WFL  -DK     Orientation Status (Cognition) oriented x 4  -DK     Follows Commands (Cognition) WFL  -DK     Cognitive Function WFL  -DK     Personal Safety Interventions gait belt;nonskid shoes/slippers when out of bed;supervised activity  -     Row Name 03/20/23 0919          Bed Mobility    Bed Mobility supine-sit  -DK     All Activities, [place] (Bed Mobility) standby assist  -DK     Supine-Sit [place] (Bed Mobility) standby assist  -DK     Bed Mobility, Safety Issues decreased use of legs for bridging/pushing  -DK     Assistive Device (Bed Mobility) bed rails  -     Row Name 03/20/23 0919          Transfers    Transfers sit-stand transfer;stand-sit transfer;stand pivot/stand step transfer  -     Row Name 03/20/23 0919          Sit-Stand Transfer    Sit-Stand [place] (Transfers) contact guard;1 person assist;standby assist  -     Assistive Device (Sit-Stand Transfers) walker, front-wheeled  -DK     Row Name 03/20/23 0919          Stand-Sit Transfer    Stand-Sit [place] (Transfers) contact guard;1 person assist;standby assist  -     Assistive Device (Stand-Sit Transfers) walker, front-wheeled  -     Row Name 03/20/23 0919          Stand Pivot/Stand Step Transfer    Stand Pivot/Stand Step [place] (Transfers) contact guard;1 person assist  -     Assistive Device (Stand Pivot Stand Step Transfer) --  no assistive device  -     Row Name 03/20/23 0919          Gait/Stairs (Locomotion)    Gait/Stairs Locomotion gait/ambulation independence;gait/ambulation assistive device;distance ambulated;gait pattern  -     [place] Level (Gait) contact guard;1  person assist;standby assist  -DK     Assistive Device (Gait) walker, front-wheeled  -DK     Distance in Feet (Gait) 25  -DK     Pattern (Gait) step-to  -DK     Deviations/Abnormal Patterns (Gait) festinating/shuffling  -DK     Bilateral Gait Deviations forward flexed posture  -DK     Comment, (Gait/Stairs) Pt ambulated on room air with a rolling walker.  She was left in the recliner on alert post treatment.  -DK     Row Name 03/20/23 0919          Safety Issues, Functional Mobility    Impairments Affecting Function (Mobility) balance;endurance/activity tolerance;strength  -DK     Cognitive Impairments, Mobility Safety/Performance awareness, need for assistance;safety precaution awareness  -DK     Row Name 03/20/23 0919          Balance    Balance Assessment sitting static balance;sitting dynamic balance;standing static balance;standing dynamic balance  -DK     Static Sitting Balance standby assist  -DK     Dynamic Sitting Balance standby assist  -DK     Position, Sitting Balance unsupported;sitting edge of bed;sitting in chair  -DK     Static Standing Balance standby assist;contact guard;1-person assist  -DK     Dynamic Standing Balance standby assist;contact guard;1-person assist  -DK     Position/Device Used, Standing Balance walker, front-wheeled  -DK     Balance Interventions standing;dynamic;tandem gait  -DK     Row Name 03/20/23 0919          Motor Skills    Motor Skills --  therapeutic exercises  -DK     Therapeutic Exercise hip;knee;ankle  -     Row Name 03/20/23 0919          Hip (Therapeutic Exercise)    Hip (Therapeutic Exercise) AAROM (active assistive range of motion)  -DK     Hip AAROM (Therapeutic Exercise) bilateral;flexion;extension;aBduction;aDduction;supine;10 repetitions;2 sets  -     Row Name 03/20/23 0919          Knee (Therapeutic Exercise)    Knee (Therapeutic Exercise) AAROM (active assistive range of motion)  -DK     Knee AAROM (Therapeutic Exercise)  bilateral;flexion;extension;supine;10 repetitions;2 sets  -DK     Row Name 03/20/23 0919          Ankle (Therapeutic Exercise)    Ankle (Therapeutic Exercise) AAROM (active assistive range of motion)  -     Ankle AAROM (Therapeutic Exercise) bilateral;dorsiflexion;plantarflexion;supine;10 repetitions;2 sets  -DK     Row Name             Wound 03/15/23 1724 Left gluteal Pressure Injury    Wound - Properties Group Placement Date: 03/15/23  -RF Placement Time: 1724  -RF Present on Hospital Admission: Y  -RF Side: Left  -RF Location: gluteal  -RF Primary Wound Type: Pressure inj  -RF    Retired Wound - Properties Group Placement Date: 03/15/23  -RF Placement Time: 1724  -RF Present on Hospital Admission: Y  -RF Side: Left  -RF Location: gluteal  -RF Primary Wound Type: Pressure inj  -RF    Retired Wound - Properties Group Date first assessed: 03/15/23  -RF Time first assessed: 1724  -RF Present on Hospital Admission: Y  -RF Side: Left  -RF Location: gluteal  -RF Primary Wound Type: Pressure inj  -RF    Row Name             Wound 03/15/23 1736 Right anterior hip Other (comment)    Wound - Properties Group Placement Date: 03/15/23  -RF Placement Time: 1736  -RF Side: Right  -RF Orientation: anterior  -RF Location: hip  -RF Primary Wound Type: Other  -RF, bruising     Retired Wound - Properties Group Placement Date: 03/15/23  -RF Placement Time: 1736  -RF Side: Right  -RF Orientation: anterior  -RF Location: hip  -RF Primary Wound Type: Other  -RF, bruising     Retired Wound - Properties Group Date first assessed: 03/15/23  -RF Time first assessed: 1736  -RF Side: Right  -RF Location: hip  -RF Primary Wound Type: Other  -RF, bruising     Row Name 03/20/23 0919          Plan of Care Review    Plan of Care Reviewed With patient  -DK     Progress improving  -     Row Name 03/20/23 0919          Positioning and Restraints    Pre-Treatment Position in bed  -DK     Post Treatment Position chair  -DK     In Chair  reclined;call light within reach;encouraged to call for assist;exit alarm on;legs elevated;heels elevated  -     Row Name 03/20/23 0919          Therapy Assessment/Plan (PT)    Rehab Potential (PT) good, to achieve stated therapy goals  -     Criteria for Skilled Interventions Met (PT) skilled treatment is necessary  -     Therapy Frequency (PT) daily  -     Problem List (PT) problems related to;balance;mobility;strength;pain  -     Activity Limitations Related to Problem List (PT) unable to ambulate safely;unable to transfer safely  -     Row Name 03/20/23 0919          Progress Summary (PT)    Progress Toward Functional Goals (PT) progress toward functional goals is good  -           User Key  (r) = Recorded By, (t) = Taken By, (c) = Cosigned By    Initials Name Provider Type    Tavia Fairbanks, PTA Physical Therapist Assistant    Willard Bauman RN Registered Nurse                Physical Therapy Education     Title: PT OT SLP Therapies (Done)     Topic: Physical Therapy (Done)     Point: Mobility training (Done)     Learning Progress Summary           Patient Acceptance, E, VU by  at 3/19/2023 0405    Acceptance, E, NR by  at 3/16/2023 1107                   Point: Home exercise program (Done)     Learning Progress Summary           Patient Acceptance, E, VU by  at 3/19/2023 0405                   Point: Body mechanics (Done)     Learning Progress Summary           Patient Acceptance, E, VU by  at 3/19/2023 0405    Acceptance, E, NR by  at 3/16/2023 1107                   Point: Precautions (Done)     Learning Progress Summary           Patient Acceptance, E, VU by  at 3/19/2023 0405                               User Key     Initials Effective Dates Name Provider Type Discipline     04/25/21 -  Haritha Ling, PT Physical Therapist PT     09/12/22 -  Drew Handley, RN Registered Nurse Nurse              PT Recommendation and Plan  Planned Therapy Interventions (PT): balance  training, bed mobility training, gait training, strengthening, transfer training  Therapy Frequency (PT): daily  Progress Summary (PT)  Progress Toward Functional Goals (PT): progress toward functional goals is good  Plan of Care Reviewed With: patient  Progress: improving   Outcome Measures     Row Name 03/20/23 0919 03/19/23 0941          How much help from another person do you currently need...    Turning from your back to your side while in flat bed without using bedrails? 4  -DK 4  -DK     Moving from lying on back to sitting on the side of a flat bed without bedrails? 4  -DK 3  -DK     Moving to and from a bed to a chair (including a wheelchair)? 3  -DK 3  -DK     Standing up from a chair using your arms (e.g., wheelchair, bedside chair)? 3  -DK 3  -DK     Climbing 3-5 steps with a railing? 2  -DK 2  -DK     To walk in hospital room? 3  -DK 3  -DK     AM-PAC 6 Clicks Score (PT) 19  -DK 18  -DK        Functional Assessment    Outcome Measure Options AM-PAC 6 Clicks Basic Mobility (PT)  -DK AM-PAC 6 Clicks Basic Mobility (PT)  -DK           User Key  (r) = Recorded By, (t) = Taken By, (c) = Cosigned By    Initials Name Provider Type    Tavia Fairbanks PTA Physical Therapist Assistant                 Time Calculation:    PT Charges     Row Name 03/20/23 0923             Time Calculation    PT Received On 03/20/23  -DK      PT Goal Re-Cert Due Date 03/25/23  -DK         Timed Charges    08101 - PT Therapeutic Exercise Minutes 14  -DK      71316 - Gait Training Minutes  4  -DK      52355 - PT Therapeutic Activity Minutes 6  -DK         Total Minutes    Timed Charges Total Minutes 24  -DK       Total Minutes 24  -DK            User Key  (r) = Recorded By, (t) = Taken By, (c) = Cosigned By    Initials Name Provider Type    Tavia Fairbanks PTA Physical Therapist Assistant              Therapy Charges for Today     Code Description Service Date Service Provider Modifiers Qty    67087649471  PT THER PROC EA 15  MIN 3/19/2023 Tavia Mckeon, PTA GP 1    73735752050 HC PT THERAPEUTIC ACT EA 15 MIN 3/19/2023 Tavia Mckeon, PTA GP 1    36414257682 HC PT THER PROC EA 15 MIN 3/20/2023 Tavia Mckeon, PTA GP 1    37247391892 HC PT THERAPEUTIC ACT EA 15 MIN 3/20/2023 Tavia Mckeon, PTA GP 1          PT G-Codes  Outcome Measure Options: AM-PAC 6 Clicks Basic Mobility (PT)  AM-PAC 6 Clicks Score (PT): 19    Tavia Mckeon, YENI  3/20/2023

## 2023-03-21 LAB
ABO GROUP BLD: NORMAL
ALBUMIN 24H MFR UR ELPH: 3.4 %
ALPHA1 GLOB 24H MFR UR ELPH: 1.7 %
ALPHA2 GLOB 24H MFR UR ELPH: 6.2 %
ANION GAP SERPL CALCULATED.3IONS-SCNC: 7.6 MMOL/L (ref 5–15)
B-GLOBULIN MFR UR ELPH: 84.9 %
BASOPHILS # BLD AUTO: 0.01 10*3/MM3 (ref 0–0.2)
BASOPHILS NFR BLD AUTO: 0.1 % (ref 0–1.5)
BLD GP AB SCN SERPL QL: POSITIVE
BUN SERPL-MCNC: 42 MG/DL (ref 8–23)
BUN/CREAT SERPL: 21.6 (ref 7–25)
CALCIUM SPEC-SCNC: 9.2 MG/DL (ref 8.6–10.5)
CHLORIDE SERPL-SCNC: 102 MMOL/L (ref 98–107)
CO2 SERPL-SCNC: 31.4 MMOL/L (ref 22–29)
CREAT 24H UR-MRATE: 574 MG/24 HR (ref 800–1800)
CREAT SERPL-MCNC: 1.94 MG/DL (ref 0.57–1)
CREAT UR-MCNC: 20.5 MG/DL
DEPRECATED RDW RBC AUTO: 58.7 FL (ref 37–54)
EGFRCR SERPLBLD CKD-EPI 2021: 27.1 ML/MIN/1.73
EOSINOPHIL # BLD AUTO: 0 10*3/MM3 (ref 0–0.4)
EOSINOPHIL NFR BLD AUTO: 0 % (ref 0.3–6.2)
ERYTHROCYTE [DISTWIDTH] IN BLOOD BY AUTOMATED COUNT: 18.2 % (ref 12.3–15.4)
GAMMA GLOB 24H MFR UR ELPH: 3.8 %
GLUCOSE SERPL-MCNC: 135 MG/DL (ref 65–99)
HCT VFR BLD AUTO: 21.8 % (ref 34–46.6)
HGB BLD-MCNC: 6.7 G/DL (ref 12–15.9)
HIV 1 & 2 AB SER-IMP: ABNORMAL
IMM GRANULOCYTES # BLD AUTO: 0.48 10*3/MM3 (ref 0–0.05)
IMM GRANULOCYTES NFR BLD AUTO: 6.5 % (ref 0–0.5)
INTERPRETATION UR IFE-IMP: ABNORMAL
LARGE PLATELETS: NORMAL
LYMPHOCYTES # BLD AUTO: 0.72 10*3/MM3 (ref 0.7–3.1)
LYMPHOCYTES NFR BLD AUTO: 9.7 % (ref 19.6–45.3)
M PROTEIN 24H MFR UR ELPH: ABNORMAL %
MCH RBC QN AUTO: 26.8 PG (ref 26.6–33)
MCHC RBC AUTO-ENTMCNC: 30.7 G/DL (ref 31.5–35.7)
MCV RBC AUTO: 87.2 FL (ref 79–97)
MONOCYTES # BLD AUTO: 0.45 10*3/MM3 (ref 0.1–0.9)
MONOCYTES NFR BLD AUTO: 6.1 % (ref 5–12)
NEUTROPHILS NFR BLD AUTO: 5.73 10*3/MM3 (ref 1.7–7)
NEUTROPHILS NFR BLD AUTO: 77.6 % (ref 42.7–76)
NRBC BLD AUTO-RTO: 0 /100 WBC (ref 0–0.2)
PLATELET # BLD AUTO: 128 10*3/MM3 (ref 140–450)
PMV BLD AUTO: 10.3 FL (ref 6–12)
POTASSIUM SERPL-SCNC: 4.8 MMOL/L (ref 3.5–5.2)
PROT UR-MCNC: 81 MG/DL
RAD ONC ARIA COURSE ID: NORMAL
RAD ONC ARIA COURSE INTENT: NORMAL
RAD ONC ARIA COURSE LAST TREATMENT DATE: NORMAL
RAD ONC ARIA COURSE START DATE: NORMAL
RAD ONC ARIA COURSE TREATMENT ELAPSED DAYS: 1
RAD ONC ARIA FIRST TREATMENT DATE: NORMAL
RAD ONC ARIA PLAN FRACTIONS TREATED TO DATE: 2
RAD ONC ARIA PLAN ID: NORMAL
RAD ONC ARIA PLAN NAME: NORMAL
RAD ONC ARIA PLAN PRESCRIBED DOSE PER FRACTION: 4 GY
RAD ONC ARIA PLAN PRIMARY REFERENCE POINT: NORMAL
RAD ONC ARIA PLAN TOTAL FRACTIONS PRESCRIBED: 5
RAD ONC ARIA PLAN TOTAL PRESCRIBED DOSE: 2000 CGY
RAD ONC ARIA REFERENCE POINT DOSAGE GIVEN TO DATE: 8 GY
RAD ONC ARIA REFERENCE POINT ID: NORMAL
RAD ONC ARIA REFERENCE POINT SESSION DOSAGE GIVEN: 4 GY
RBC # BLD AUTO: 2.5 10*6/MM3 (ref 3.77–5.28)
RBC MORPH BLD: NORMAL
RH BLD: POSITIVE
SMALL PLATELETS BLD QL SMEAR: NORMAL
SODIUM SERPL-SCNC: 141 MMOL/L (ref 136–145)
T&S EXPIRATION DATE: NORMAL
WBC MORPH BLD: NORMAL
WBC NRBC COR # BLD: 7.39 10*3/MM3 (ref 3.4–10.8)

## 2023-03-21 PROCEDURE — 85025 COMPLETE CBC W/AUTO DIFF WBC: CPT | Performed by: INTERNAL MEDICINE

## 2023-03-21 PROCEDURE — 86860 RBC ANTIBODY ELUTION: CPT

## 2023-03-21 PROCEDURE — 94799 UNLISTED PULMONARY SVC/PX: CPT

## 2023-03-21 PROCEDURE — 97530 THERAPEUTIC ACTIVITIES: CPT

## 2023-03-21 PROCEDURE — 86870 RBC ANTIBODY IDENTIFICATION: CPT | Performed by: INTERNAL MEDICINE

## 2023-03-21 PROCEDURE — 86906 BLD TYPING SEROLOGIC RH PHNT: CPT

## 2023-03-21 PROCEDURE — 77412 RADIATION TX DELIVERY LVL 3: CPT | Performed by: RADIOLOGY

## 2023-03-21 PROCEDURE — 86870 RBC ANTIBODY IDENTIFICATION: CPT

## 2023-03-21 PROCEDURE — 86922 COMPATIBILITY TEST ANTIGLOB: CPT

## 2023-03-21 PROCEDURE — 77387 GUIDANCE FOR RADJ TX DLVR: CPT | Performed by: RADIOLOGY

## 2023-03-21 PROCEDURE — 80048 BASIC METABOLIC PNL TOTAL CA: CPT | Performed by: INTERNAL MEDICINE

## 2023-03-21 PROCEDURE — 25010000002 FUROSEMIDE PER 20 MG: Performed by: INTERNAL MEDICINE

## 2023-03-21 PROCEDURE — G6002 STEREOSCOPIC X-RAY GUIDANCE: HCPCS | Performed by: RADIOLOGY

## 2023-03-21 PROCEDURE — 86920 COMPATIBILITY TEST SPIN: CPT

## 2023-03-21 PROCEDURE — 97110 THERAPEUTIC EXERCISES: CPT

## 2023-03-21 PROCEDURE — 86901 BLOOD TYPING SEROLOGIC RH(D): CPT | Performed by: INTERNAL MEDICINE

## 2023-03-21 PROCEDURE — 85007 BL SMEAR W/DIFF WBC COUNT: CPT | Performed by: INTERNAL MEDICINE

## 2023-03-21 PROCEDURE — 86900 BLOOD TYPING SEROLOGIC ABO: CPT | Performed by: INTERNAL MEDICINE

## 2023-03-21 PROCEDURE — 86850 RBC ANTIBODY SCREEN: CPT

## 2023-03-21 PROCEDURE — 25010000002 DEXAMETHASONE SODIUM PHOSPHATE 10 MG/ML SOLUTION: Performed by: INTERNAL MEDICINE

## 2023-03-21 PROCEDURE — 86900 BLOOD TYPING SEROLOGIC ABO: CPT

## 2023-03-21 PROCEDURE — 86850 RBC ANTIBODY SCREEN: CPT | Performed by: INTERNAL MEDICINE

## 2023-03-21 RX ADMIN — DEXAMETHASONE SODIUM PHOSPHATE 10 MG: 10 INJECTION INTRAMUSCULAR; INTRAVENOUS at 08:43

## 2023-03-21 RX ADMIN — Medication 10 ML: at 08:43

## 2023-03-21 RX ADMIN — DEXAMETHASONE SODIUM PHOSPHATE 10 MG: 10 INJECTION INTRAMUSCULAR; INTRAVENOUS at 02:25

## 2023-03-21 RX ADMIN — ACETAMINOPHEN 650 MG: 325 TABLET ORAL at 18:49

## 2023-03-21 RX ADMIN — MORPHINE SULFATE 60 MG: 15 TABLET, FILM COATED, EXTENDED RELEASE ORAL at 08:43

## 2023-03-21 RX ADMIN — Medication 10 ML: at 21:54

## 2023-03-21 RX ADMIN — ACYCLOVIR 400 MG: 800 TABLET ORAL at 08:43

## 2023-03-21 RX ADMIN — PREGABALIN 150 MG: 75 CAPSULE ORAL at 21:53

## 2023-03-21 RX ADMIN — DEXAMETHASONE SODIUM PHOSPHATE 10 MG: 10 INJECTION INTRAMUSCULAR; INTRAVENOUS at 17:28

## 2023-03-21 RX ADMIN — Medication 10 ML: at 08:45

## 2023-03-21 RX ADMIN — POTASSIUM CHLORIDE 20 MEQ: 10 CAPSULE, COATED, EXTENDED RELEASE ORAL at 08:43

## 2023-03-21 RX ADMIN — MORPHINE SULFATE 60 MG: 15 TABLET, FILM COATED, EXTENDED RELEASE ORAL at 21:53

## 2023-03-21 RX ADMIN — POTASSIUM CHLORIDE 20 MEQ: 10 CAPSULE, COATED, EXTENDED RELEASE ORAL at 17:28

## 2023-03-21 RX ADMIN — PREGABALIN 150 MG: 75 CAPSULE ORAL at 08:43

## 2023-03-21 RX ADMIN — FUROSEMIDE 20 MG: 10 INJECTION, SOLUTION INTRAMUSCULAR; INTRAVENOUS at 08:43

## 2023-03-21 NOTE — PROGRESS NOTES
[facility]     Progress Note    Patient Name: Evangelina Sutton  : 1950  MRN: [id number]  Primary Care Physician:  Gilberto Coleman MD  Date of admission: 3/15/2023    Subjective Patient is resting very comfortably and complaining of discomfort however her pain is under decent control  Patient is requiring recurrent blood transfusions  She was transfused 2 units just last week  Review of Systems  Constitutional:        Weakness tiredness fatigue  Eyes:                       No blurry vision, eye discharge, eye irritation, eye pain  HEENT:                   No acute hair loss, earache and discharge, nasal congestion or discharge, sore throat, postnasal drip  Respiratory:           No shortness of breath coughing sputum production wheezing hemoptysis pleuritic chest pain  Cardiovascular:     No chest pain, orthopnea, PND, dizziness, palpitation, lower extremity edema  Gastrointestinal:   No nausea vomiting diarrhea abdominal pain constipation  Genitourinary:       No urinary incontinence, hesitancy, frequency, urgency, dysuria  Hematologic:         No bruising, bleeding, pallor, lymphadenopathy  Endocrine:            No coldness, hot flashes, polyuria, abnormal hair growth  Musculoskeletal:    No body pains, aches, arthritic pains, muscle pain ,muscle wasting  Psychiatric:          No low or high mood, anxiety, hallucinations, delusions  Skin.                      No rash, ulcers, bruising, itching  Neurological:        No confusion, headache, focal weakness, numbness, dysphasia    Objective   Objective     Vitals:   Temp:  [98.2 °F (36.8 °C)-98.8 °F (37.1 °C)] 98.8 °F (37.1 °C)  Heart Rate:  [] 96  Resp:  [18-20] 20  BP: (116-130)/(67-70) 116/67  Physical Exam    Constitutional: Awake, alert responsive, conversant, no obvious distress              Psychiatric:  Appropriate affect, cooperative   Neurologic:  Awake alert ,oriented x 3, strength symmetric in all extremities, Cranial Nerves grossly  intact to confrontation, speech clear   Eyes:   PERRLA, sclerae anicteric, no conjunctival injection   HEENT:  Moist mucous membranes, no nasal or eye discharge, no throat congestion   Neck:   Supple, no thyromegaly, no lymphadenopathy, trachea midline, no elevated JVD   Respiratory:  Clear to auscultation bilaterally, nonlabored respirations    Cardiovascular: RRR, no murmurs, rubs, or gallops, palpable pedal pulses bilaterally, No bilateral ankle edema   Gastrointestinal: Positive bowel sounds, soft, nontender, nondistended, no organomegaly   Musculoskeletal:  No clubbing or cyanosis to extremities,muscle wasting, joint swelling, muscle weakness             Skin:                      No rashes, bruising, skin ulcers, petechiae or ecchymosis    Result Review    Result Review:  I have personally reviewed the results from the time of this admission to 3/21/2023 09:26 EDT and agree with these findings:  []  Laboratory  []  Microbiology  []  Radiology  []  EKG/Telemetry   []  Cardiology/Vascular   []  Pathology  []  Old records  []  Other:    Assessment & Plan   Assessment / Plan       Active Hospital Problems:    Active Hospital Problems    Diagnosis  POA   • **Anemia [D64.9]  Yes   • Edema [R60.9]  Unknown     Secondary to severe hypoalbuminemia nephrotic range proteinuria  Combined congestive heart failure     • Difficulty walking [R26.2]  Yes   • Acute renal failure (ARF) ([facility]) [N17.9]  Unknown   • Pancytopenia due to chemotherapy ([facility]) [D61.810]  Unknown   • Nephrotic syndrome associated with amyloidosis ([facility]) [E85.4, N08]  Unknown     5.1 g proteinuria  Patient most likely has amyloidosis     • 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  Overall  prognosis is poor  Plan:   24-hour urine collection has been completed but protein results are still pending  Continue present care  Swelling under good control       Electronically signed by Robb Domínguez MD, 03/21/23, 9:26 AM EDT.

## 2023-03-21 NOTE — THERAPY TREATMENT NOTE
Acute Care - Physical Therapy Treatment Note   [place]     Patient Name: Evangelina Sutton  : 1950  MRN: [id number]  Today's Date: 3/21/2023      Visit Dx:     ICD-10-CM ICD-9-CM   1. 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 LEVEL   • CHOLECYSTECTOMY     • HYSTERECTOMY     • PORTACATH PLACEMENT N/A 3/17/2022    Procedure: INSERTION OF PORTACATH;  Surgeon: Cj Rossi MD;  Location: [facility] MAIN OR;  Service: General;  Laterality: N/A;   • TOTAL HIP ARTHROPLASTY Left 2022    Procedure: LEFT HIP ARTHROPLASTY ANTERIOR;  Surgeon: Sridhar Coto MD;  Location: [facility] MAIN OR;  Service: Orthopedics;  Laterality: Left;     PT Assessment (last 12 hours)     PT Evaluation and Treatment     Row Name 23 0922          Physical Therapy Time and Intention    Subjective Information complains of;weakness;fatigue;pain  -DK     Document Type therapy note (daily note)  -DK     Mode of Treatment individual therapy;physical therapy  -DK     Patient Effort good  -DK     Symptoms Noted During/After Treatment fatigue;increased pain  -DK     Comment Bent posture  noted.  -DK     Row Name 03/21/23 0922          Pain    Pretreatment Pain Rating 2/10  -DK     Posttreatment Pain Rating 2/10  -DK     Pain Location generalized  -DK     Pain Location - back  -DK     Pain Intervention(s) Repositioned;Ambulation/increased activity;Distraction;Therapeutic presence  -     Row Name 03/21/23 0922          Cognition    Affect/Mental Status (Cognition) WFL  -DK     Orientation Status (Cognition) oriented x 4  -DK     Follows Commands (Cognition) WFL  -DK     Cognitive Function WFL  -DK     Personal Safety Interventions gait belt;nonskid shoes/slippers when out of bed;supervised activity  -     Row Name 03/21/23 0922          Bed Mobility    Bed Mobility supine-sit  -DK     All Activities, [place] (Bed Mobility) standby assist  -DK     Supine-Sit [place] (Bed Mobility) standby assist  -DK     Bed Mobility, Safety Issues decreased use of legs for bridging/pushing  -DK     Assistive Device (Bed Mobility) bed rails  -     Row Name 03/21/23 0922          Transfers    Transfers sit-stand transfer;stand-sit transfer;stand pivot/stand step transfer  -     Row Name 03/21/23 0922          Sit-Stand Transfer    Sit-Stand [place] (Transfers) contact guard;1 person assist;standby assist  -DK     Assistive Device (Sit-Stand Transfers) walker, front-wheeled  -DK     Row Name 03/21/23 0922          Stand-Sit Transfer    Stand-Sit [place] (Transfers) contact guard;1 person assist;standby assist  -DK     Assistive Device (Stand-Sit Transfers) walker, front-wheeled  -DK     Row Name 03/21/23 0922          Gait/Stairs (Locomotion)    Gait/Stairs Locomotion gait/ambulation independence;gait/ambulation assistive device;distance ambulated;gait pattern  -DK     [place] Level (Gait) contact guard;1 person assist;standby assist  -DK     Assistive Device (Gait) walker, front-wheeled  -DK     Distance in Feet (Gait) 50  -DK     Pattern (Gait) step-to  -DK     Deviations/Abnormal  Patterns (Gait) festinating/shuffling  -     Bilateral Gait Deviations forward flexed posture  -     Comment, (Gait/Stairs) Pt ambulated on room air with a rolling walker.  She required the bathroom at the end of ambulation.  Pt returned to bed post treatment.  -     Row Name 03/21/23 0922          Safety Issues, Functional Mobility    Impairments Affecting Function (Mobility) balance;endurance/activity tolerance;strength  -     Cognitive Impairments, Mobility Safety/Performance safety precaution awareness  -     Row Name 03/21/23 0922          Balance    Balance Assessment sitting static balance;sitting dynamic balance;standing static balance;standing dynamic balance  -     Static Sitting Balance standby assist  -     Dynamic Sitting Balance standby assist  -DK     Position, Sitting Balance unsupported;sitting edge of bed  -     Static Standing Balance standby assist;contact guard;1-person assist  -     Dynamic Standing Balance standby assist;contact guard;1-person assist  -DK     Position/Device Used, Standing Balance walker, front-wheeled  -     Balance Interventions standing;dynamic;tandem gait  -     Row Name 03/21/23 0922          Motor Skills    Motor Skills --  therapeutic exercises  -     Therapeutic Exercise hip;knee;ankle  -     Row Name 03/21/23 0922          Hip (Therapeutic Exercise)    Hip (Therapeutic Exercise) AAROM (active assistive range of motion)  -     Hip AAROM (Therapeutic Exercise) bilateral;flexion;extension;aBduction;aDduction;supine;10 repetitions;2 sets  -     Row Name 03/21/23 0922          Knee (Therapeutic Exercise)    Knee (Therapeutic Exercise) AAROM (active assistive range of motion)  -     Knee AAROM (Therapeutic Exercise) bilateral;flexion;extension;supine;10 repetitions;2 sets  -     Row Name 03/21/23 0922          Ankle (Therapeutic Exercise)    Ankle (Therapeutic Exercise) AAROM (active assistive range of motion)  -     Ankle AAROM  (Therapeutic Exercise) bilateral;dorsiflexion;plantarflexion;supine;10 repetitions;2 sets  -DK     Row Name             Wound 03/15/23 1724 Left gluteal Pressure Injury    Wound - Properties Group Placement Date: 03/15/23  -RF Placement Time: 1724  -RF Present on Hospital Admission: Y  -RF Side: Left  -RF Location: gluteal  -RF Primary Wound Type: Pressure inj  -RF    Retired Wound - Properties Group Placement Date: 03/15/23  -RF Placement Time: 1724  -RF Present on Hospital Admission: Y  -RF Side: Left  -RF Location: gluteal  -RF Primary Wound Type: Pressure inj  -RF    Retired Wound - Properties Group Date first assessed: 03/15/23  -RF Time first assessed: 1724  -RF Present on Hospital Admission: Y  -RF Side: Left  -RF Location: gluteal  -RF Primary Wound Type: Pressure inj  -RF    Row Name             Wound 03/15/23 1736 Right anterior hip Other (comment)    Wound - Properties Group Placement Date: 03/15/23  -RF Placement Time: 1736  -RF Side: Right  -RF Orientation: anterior  -RF Location: hip  -RF Primary Wound Type: Other  -RF, bruising     Retired Wound - Properties Group Placement Date: 03/15/23  -RF Placement Time: 1736  -RF Side: Right  -RF Orientation: anterior  -RF Location: hip  -RF Primary Wound Type: Other  -RF, bruising     Retired Wound - Properties Group Date first assessed: 03/15/23  -RF Time first assessed: 1736  -RF Side: Right  -RF Location: hip  -RF Primary Wound Type: Other  -RF, bruising     Row Name 03/21/23 0922          Plan of Care Review    Plan of Care Reviewed With patient  -DK     Progress improving  -DK     Row Name 03/21/23 0922          Positioning and Restraints    Pre-Treatment Position in bed  -DK     Post Treatment Position bed  -DK     In Bed supine;call light within reach;encouraged to call for assist;side rails up x2;legs elevated;heels elevated  -DK     Row Name 03/21/23 0922          Therapy Assessment/Plan (PT)    Rehab Potential (PT) good, to achieve stated therapy  goals  -     Criteria for Skilled Interventions Met (PT) skilled treatment is necessary  -     Therapy Frequency (PT) daily  -     Problem List (PT) problems related to;balance;mobility;strength;pain  -     Activity Limitations Related to Problem List (PT) unable to ambulate safely;unable to transfer safely  -     Row Name 03/21/23 0922          Progress Summary (PT)    Progress Toward Functional Goals (PT) progress toward functional goals is good  -           User Key  (r) = Recorded By, (t) = Taken By, (c) = Cosigned By    Initials Name Provider Type     Tavia Mckeon PTA Physical Therapist Assistant     Willard Lopez, RN Registered Nurse                Physical Therapy Education     Title: PT OT SLP Therapies (Done)     Topic: Physical Therapy (Done)     Point: Mobility training (Done)     Learning Progress Summary           Patient Acceptance, E, VU,DU by RF at 3/21/2023 0846    Acceptance, E, VU by JAILENE at 3/19/2023 0405    Acceptance, E, NR by  at 3/16/2023 1107                   Point: Home exercise program (Done)     Learning Progress Summary           Patient Acceptance, E, VU,DU by RF at 3/21/2023 0846    Acceptance, E, VU by JAILENE at 3/19/2023 0405                   Point: Body mechanics (Done)     Learning Progress Summary           Patient Acceptance, E, VU,DU by RF at 3/21/2023 0846    Acceptance, E, VU by JAILENE at 3/19/2023 0405    Acceptance, E, NR by  at 3/16/2023 1107                   Point: Precautions (Done)     Learning Progress Summary           Patient Acceptance, E, VU,DU by RF at 3/21/2023 0846    Acceptance, E, VU by JAILENE at 3/19/2023 0405                               User Key     Initials Effective Dates Name Provider Type Discipline     04/25/21 -  Haritha Ling, PT Physical Therapist PT     09/12/22 -  Drew Handley, RN Registered Nurse Nurse     01/19/22 -  Willard Lopez, RN Registered Nurse Nurse              PT Recommendation and Plan  Planned Therapy  Interventions (PT): balance training, bed mobility training, gait training, strengthening, transfer training  Therapy Frequency (PT): daily  Progress Summary (PT)  Progress Toward Functional Goals (PT): progress toward functional goals is good  Plan of Care Reviewed With: patient  Progress: improving   Outcome Measures     Row Name 03/21/23 0921 03/20/23 0919 03/19/23 0941       How much help from another person do you currently need...    Turning from your back to your side while in flat bed without using bedrails? 4  -DK 4  -DK 4  -DK    Moving from lying on back to sitting on the side of a flat bed without bedrails? 4  -DK 4  -DK 3  -DK    Moving to and from a bed to a chair (including a wheelchair)? 3  -DK 3  -DK 3  -DK    Standing up from a chair using your arms (e.g., wheelchair, bedside chair)? 3  -DK 3  -DK 3  -DK    Climbing 3-5 steps with a railing? 2  -DK 2  -DK 2  -DK    To walk in hospital room? 3  -DK 3  -DK 3  -DK    AM-PAC 6 Clicks Score (PT) 19  -DK 19  -DK 18  -DK       Functional Assessment    Outcome Measure Options AM-PAC 6 Clicks Basic Mobility (PT)  -DK AM-PAC 6 Clicks Basic Mobility (PT)  -DK AM-PAC 6 Clicks Basic Mobility (PT)  -DK          User Key  (r) = Recorded By, (t) = Taken By, (c) = Cosigned By    Initials Name Provider Type    Tavia Fairbanks PTA Physical Therapist Assistant                 Time Calculation:    PT Charges     Row Name 03/21/23 0925             Time Calculation    PT Received On 03/21/23  -DK      PT Goal Re-Cert Due Date 03/25/23  -DK         Timed Charges    58495 - PT Therapeutic Exercise Minutes 14  -DK      40493 - Gait Training Minutes  6  -DK      08527 - PT Therapeutic Activity Minutes 9  -DK         Total Minutes    Timed Charges Total Minutes 29  -DK       Total Minutes 29  -DK            User Key  (r) = Recorded By, (t) = Taken By, (c) = Cosigned By    Initials Name Provider Type    Tavia Fairbanks PTA Physical Therapist Assistant               Therapy Charges for Today     Code Description Service Date Service Provider Modifiers Qty    85277211022 HC PT THER PROC EA 15 MIN 3/20/2023 Tavia Mckeon, PTA GP 1    64326864143 HC PT THERAPEUTIC ACT EA 15 MIN 3/20/2023 Tavia Mckeon, PTA GP 1    17415650631 HC PT THER PROC EA 15 MIN 3/21/2023 Tavia Mckeon, PTA GP 1    20756476539 HC PT THERAPEUTIC ACT EA 15 MIN 3/21/2023 Tavia Mckeon, PTA GP 1          PT G-Codes  Outcome Measure Options: AM-PAC 6 Clicks Basic Mobility (PT)  AM-PAC 6 Clicks Score (PT): 19    Tavia Mckeon PTA  3/21/2023

## 2023-03-21 NOTE — PROGRESS NOTES
On Treatment Visit       Patient: Evangelina Sutton   YOB: 1950   Medical Record Number: [id number]     Date of Visit  March 21, 2023   Primary Diagnosis: Multiple myeloma not having achieved remission         was seen today for an on treatment visit.  She is receiving radiation therapy to the lumbar spine. She  has received 800 cGy in 2 fractions out of a planned dose of 2000 cGy in 5 fractions.    No change in lower back pain.                                      Review of Systems:   Review of Systems  As per HPI  Vitals:     Vitals:    03/21/23 0418   BP: 116/67   Pulse: 96   Resp: 20   Temp: 98.8 °F (37.1 °C)   SpO2: 92%       Weight:   Wt Readings from Last 3 Encounters:   03/15/23 63.8 kg (140 lb 10.5 oz)   02/21/23 53.5 kg (117 lb 15.1 oz)   11/28/22 65.4 kg (144 lb 2.9 oz)      Pain:  There were no vitals filed for this visit.      Physical Exam:  Gen: WD/WN; NAD  HEENT: MMM  Trachea: midline  Chest: symmetric  Resp: normal respiratory effort  Extr: warm, well-perfused  Neuro: awake and alert; no aphasia or neglect    Plan: I have reviewed treatment setup notes, checked and approved the daily guidance images.  I reviewed dose delivery, treatment parameters and deemed them appropriate. We plan to continue radiation therapy as prescribed.          Radiation Oncology    Electronically signed by Jonathan Rosales MD 3/15/2023  10:01 EDT

## 2023-03-21 NOTE — PLAN OF CARE
Goal Outcome Evaluation:              Outcome Evaluation: AOx4. 2 units of PRBCs ordered, lab personnel said the type and screen had to be sent to another lab facility. Blood should be ready tonight or tomorrow 3/22. No c/o pain throughout shift. Radiation treatment completed today. Up with x1 assist. Plan for discharge home on Friday 3/24.

## 2023-03-21 NOTE — CONSULTS
"Nutrition Services    Patient Name: Evangelina Sutton  YOB: 1950  MRN: [id number]  Admission date: 3/15/2023      CLINICAL NUTRITION ASSESSMENT      Reason for Assessment  Follow-up protocol   H&P:    Past Medical History:   Diagnosis Date   • Cancer (HCC)     BONE CANCER/TAKING CHEMO SHOT WEEKLY   • DDD (degenerative disc disease), cervical    • Neuropathy     LEGS AND ARMS   • PONV (postoperative nausea and vomiting)         Current Problems:   Active Hospital Problems    Diagnosis    • **Anemia    • Edema    • Difficulty walking    • Acute renal failure (ARF) (HCC)    • Pancytopenia due to chemotherapy (HCC)    • Nephrotic syndrome associated with amyloidosis (HCC)    • 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 Pt continues receiving Boost pudding TID. PO intake is 100% over past few days. WNS. Will continue w/ current POC. RD will continue to monitor per protocol.      Anthropometrics        Current Height, Weight Height: 157.5 cm (62\")  Weight: 63.8 kg (140 lb 10.5 oz)   Current BMI Body mass index is 25.73 kg/m².       Weight Hx  Wt Readings from Last 30 Encounters:   03/15/23 1730 63.8 kg (140 lb 10.5 oz)   02/21/23 2138 53.5 kg (117 lb 15.1 oz)   02/21/23 1401 56.8 kg (125 lb 3.5 oz)   11/28/22 2241 65.4 kg (144 lb 2.9 oz)   11/28/22 1212 68.5 kg (151 lb 0.2 oz)   11/22/22 0936 68.5 kg (151 lb)   07/19/22 1325 68.5 kg (151 lb)   06/07/22 1409 68.5 kg (151 lb)   05/10/22 1251 68.5 kg (151 lb)   04/25/22 2115 68.5 kg (151 lb)   04/25/22 1217 66.1 kg (145 lb 11.6 oz)   04/22/22 1428 68.9 kg (152 lb)   03/29/22 1333 68.9 kg (152 lb)   03/17/22 0837 65.1 kg (143 lb 8.3 oz)   03/11/22 1328 68 kg (150 lb)   09/05/21 2227 65.1 kg (143 lb 8.3 oz)   09/05/21 2133 65.1 kg (143 lb 8.3 oz)   09/05/21 1444 68 kg (149 lb 14.6 oz)   10/13/20 0000 61.2 kg (135 lb)   10/01/20 0000 61.2 kg " (135 lb)   06/02/20 0000 63.5 kg (140 lb)   05/08/19 0000 84.4 kg (186 lb 2 oz)   04/03/19 0000 84.5 kg (186 lb 6 oz)   03/22/19 0000 84.5 kg (186 lb 4 oz)   03/20/19 0000 84.6 kg (186 lb 7 oz)   02/20/19 0000 80.3 kg (177 lb 1 oz)   02/13/19 0000 78.3 kg (172 lb 9 oz)   01/14/19 0000 90 kg (198 lb 8 oz)            Wt Change Observation 11# since Nov, ~7.3%. Requested updated wt from Nursing 3/21     Estimated/Assessed Needs       Energy Requirements    EST Needs (kcal/day) 0020-8697 (30-35 kcal)       Protein Requirements    EST Daily Needs (g/day) 77-96 (1.2-1.5 gm)       Fluid Requirements     Estimated Needs (mL/day) 1914     Labs/Medications         Pertinent Labs Reviewed.   Results from last 7 days   Lab Units 03/21/23  0605 03/20/23  0533 03/19/23  0544 03/18/23  0518 03/17/23  0626 03/16/23  0534 03/15/23  1515   SODIUM mmol/L 141 143 144   < > 143 142 142   POTASSIUM mmol/L 4.8 4.6 4.8   < > 5.4* 4.5 4.2   CHLORIDE mmol/L 102 102 106   < > 112* 108* 108*   CO2 mmol/L 31.4* 33.8* 33.3*   < > 25.4 25.0 26.4   BUN mg/dL 42* 27* 25*   < > 25* 27* 31*   CREATININE mg/dL 1.94* 1.66* 1.52*   < > 1.56* 1.48* 1.54*   CALCIUM mg/dL 9.2 9.7 9.6   < > 9.0 8.8 9.0   BILIRUBIN mg/dL  --   --   --   --  0.3 0.2 <0.2   ALK PHOS U/L  --   --   --   --  56 55 51   ALT (SGPT) U/L  --   --   --   --  7 7 6   AST (SGOT) U/L  --   --   --   --  22 20 22   GLUCOSE mg/dL 135* 83 84   < > 86 109* 108*    < > = values in this interval not displayed.     Results from last 7 days   Lab Units 03/21/23  0605   HEMOGLOBIN g/dL 6.7*   HEMATOCRIT % 21.8*       Pertinent Medications Reviewed.     Current Nutrition Orders & Evaluation of Intake       Oral Nutrition     Current PO Diet Diet: Regular/House Diet; Texture: Regular Texture (IDDSI 7); Fluid Consistency: Thin (IDDSI 0)   Supplement Orders Placed This Encounter      Dietary Nutrition Supplements Boost Pudding       Malnutrition Severity Assessment      Patient meets criteria for :  Severe Malnutrition       Nutrition Diagnosis         Nutrition Dx Problem 1 Severe malnutrition related to inadequate oral Intake as evidenced by inadequate energy intake., decreased appetite., unintended wt change. and patient report.     Nutrition Intervention         Continue regular diet  Boost Pudding to TID (810 kcal & 21 gm protein)     Medical Nutrition Therapy/Nutrition Education          Learner     Readiness Patient  Education not indicated at this time     Method     Response N/A  N/A     Monitor/Evaluation        Monitor PO intake, Supplement intake, Pertinent labs, Weight, Skin status     Nutrition Discharge Plan         Boost pudding 3x/day     Electronically signed by:  Carlie May RD  03/21/23 12:19 EDT

## 2023-03-21 NOTE — PROGRESS NOTES
[facility]     Progress Note    Patient Name: Evangelina Sutton  : 1950  MRN: [id number]  Primary Care Physician:  Gilberto Coleman MD  Date of admission: 3/15/2023    Subjective   Subjective     Chief Complaint: Patient with multiple myeloma very severe backache excepting radiation therapy which will be completed on Friday patient stable and doing well hemoglobin has dropped down so we will transfuse him with 2 units of packed RBC    HPI: With multiple myeloma on chemotherapy as an outpatient    Review of Systems   All systems were reviewed and negative except for: Reviewed    Objective   Objective     Vitals:   Temp:  [98.2 °F (36.8 °C)-98.8 °F (37.1 °C)] 98.8 °F (37.1 °C)  Heart Rate:  [] 96  Resp:  [18-20] 20  BP: (116-130)/(67-70) 116/67    Physical Exam    Constitutional: Awake, alert   Eyes: PERRLA, sclerae anicteric, no conjunctival injection   HENT: NCAT, mucous membranes moist   Neck: Supple, no thyromegaly, no lymphadenopathy, trachea midline   Respiratory: Clear to auscultation bilaterally, nonlabored respirations    Cardiovascular: RRR, no murmurs, rubs, or gallops, palpable pedal pulses bilaterally   Gastrointestinal: Positive bowel sounds, soft, nontender, nondistended   Musculoskeletal: No bilateral ankle edema, no clubbing or cyanosis to extremities   Psychiatric: Appropriate affect, cooperative   Neurologic: Oriented x 3, strength symmetric in all extremities, Cranial Nerves grossly intact to confrontation, speech clear   Skin: No rashes   No change  Result Review    Result Review:  I have personally reviewed the results from the time of this admission to 3/21/2023 08:27 EDT and agree with these findings:  [x]  Laboratory anemia multiple myeloma  []  Microbiology  []  Radiology  []  EKG/Telemetry   []  Cardiology/Vascular   []  Pathology  []  Old records  []  Other:  Most notable findings include: Postchemotherapy multiple myeloma    Assessment & Plan   Assessment / Plan      Brief Patient Summary:  Evangelina Sutton is a 72 y.o. 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    CODE STATUS:   Medical Intervention Limits: NO intubation (DNI); NO cardioversion; NO dialysis; NO NIPPV (Non-Invasive Positive Pressure Ventilation); NO vasopressors  Level Of Support Discussed With: Patient  Code Status (Patient has no pulse and is not breathing): No CPR (Do Not Attempt to Resuscitate)  Medical Interventions (Patient has pulse or is breathing): Limited Support  Comments: Multiple myeloma for treatment but no CPR  Release to patient: Routine Release    Disposition:  I expect patient to be discharged after the radiation therapy is completed blood transfusions to be given today.    Electronically signed by Abbe Zamudio MD, 03/21/23, 8:27 AM EDT.      Part of this note may be an electronic transcription/translation of spoken language to printed text using the Dragon Dictation System.

## 2023-03-22 ENCOUNTER — APPOINTMENT (OUTPATIENT)
Dept: MRI IMAGING | Facility: HOSPITAL | Age: 73
DRG: 841 | End: 2023-03-22
Payer: MEDICARE

## 2023-03-22 LAB
ANION GAP SERPL CALCULATED.3IONS-SCNC: 6.1 MMOL/L (ref 5–15)
BASOPHILS # BLD AUTO: 0.06 10*3/MM3 (ref 0–0.2)
BASOPHILS NFR BLD AUTO: 0.7 % (ref 0–1.5)
BUN SERPL-MCNC: 45 MG/DL (ref 8–23)
BUN/CREAT SERPL: 29.8 (ref 7–25)
CALCIUM SPEC-SCNC: 8.4 MG/DL (ref 8.6–10.5)
CHLORIDE SERPL-SCNC: 98 MMOL/L (ref 98–107)
CO2 SERPL-SCNC: 31.9 MMOL/L (ref 22–29)
CREAT SERPL-MCNC: 1.51 MG/DL (ref 0.57–1)
DEPRECATED RDW RBC AUTO: 56.9 FL (ref 37–54)
EGFRCR SERPLBLD CKD-EPI 2021: 36.6 ML/MIN/1.73
EOSINOPHIL # BLD AUTO: 0.01 10*3/MM3 (ref 0–0.4)
EOSINOPHIL NFR BLD AUTO: 0.1 % (ref 0.3–6.2)
ERYTHROCYTE [DISTWIDTH] IN BLOOD BY AUTOMATED COUNT: 18.6 % (ref 12.3–15.4)
GLUCOSE BLDC GLUCOMTR-MCNC: 121 MG/DL (ref 70–99)
GLUCOSE SERPL-MCNC: 91 MG/DL (ref 65–99)
HCT VFR BLD AUTO: 34.6 % (ref 34–46.6)
HGB BLD-MCNC: 11 G/DL (ref 12–15.9)
IMM GRANULOCYTES # BLD AUTO: 0.16 10*3/MM3 (ref 0–0.05)
IMM GRANULOCYTES NFR BLD AUTO: 2 % (ref 0–0.5)
LYMPHOCYTES # BLD AUTO: 0.58 10*3/MM3 (ref 0.7–3.1)
LYMPHOCYTES NFR BLD AUTO: 7.1 % (ref 19.6–45.3)
MCH RBC QN AUTO: 27 PG (ref 26.6–33)
MCHC RBC AUTO-ENTMCNC: 31.8 G/DL (ref 31.5–35.7)
MCV RBC AUTO: 84.8 FL (ref 79–97)
MONOCYTES # BLD AUTO: 1.16 10*3/MM3 (ref 0.1–0.9)
MONOCYTES NFR BLD AUTO: 14.2 % (ref 5–12)
NEUTROPHILS NFR BLD AUTO: 6.2 10*3/MM3 (ref 1.7–7)
NEUTROPHILS NFR BLD AUTO: 75.9 % (ref 42.7–76)
NRBC BLD AUTO-RTO: 0 /100 WBC (ref 0–0.2)
PASSIVE ANTI-CD-38: NORMAL
PLATELET # BLD AUTO: 159 10*3/MM3 (ref 140–450)
PMV BLD AUTO: 10.6 FL (ref 6–12)
POTASSIUM SERPL-SCNC: 4.8 MMOL/L (ref 3.5–5.2)
RAD ONC ARIA COURSE ID: NORMAL
RAD ONC ARIA COURSE INTENT: NORMAL
RAD ONC ARIA COURSE LAST TREATMENT DATE: NORMAL
RAD ONC ARIA COURSE START DATE: NORMAL
RAD ONC ARIA COURSE TREATMENT ELAPSED DAYS: 2
RAD ONC ARIA FIRST TREATMENT DATE: NORMAL
RAD ONC ARIA PLAN FRACTIONS TREATED TO DATE: 3
RAD ONC ARIA PLAN ID: NORMAL
RAD ONC ARIA PLAN NAME: NORMAL
RAD ONC ARIA PLAN PRESCRIBED DOSE PER FRACTION: 4 GY
RAD ONC ARIA PLAN PRIMARY REFERENCE POINT: NORMAL
RAD ONC ARIA PLAN TOTAL FRACTIONS PRESCRIBED: 5
RAD ONC ARIA PLAN TOTAL PRESCRIBED DOSE: 2000 CGY
RAD ONC ARIA REFERENCE POINT DOSAGE GIVEN TO DATE: 12 GY
RAD ONC ARIA REFERENCE POINT ID: NORMAL
RAD ONC ARIA REFERENCE POINT SESSION DOSAGE GIVEN: 4 GY
RBC # BLD AUTO: 4.08 10*6/MM3 (ref 3.77–5.28)
SODIUM SERPL-SCNC: 136 MMOL/L (ref 136–145)
WBC NRBC COR # BLD: 8.17 10*3/MM3 (ref 3.4–10.8)

## 2023-03-22 PROCEDURE — P9040 RBC LEUKOREDUCED IRRADIATED: HCPCS

## 2023-03-22 PROCEDURE — 94799 UNLISTED PULMONARY SVC/PX: CPT

## 2023-03-22 PROCEDURE — 86900 BLOOD TYPING SEROLOGIC ABO: CPT

## 2023-03-22 PROCEDURE — 25010000002 FUROSEMIDE PER 20 MG: Performed by: INTERNAL MEDICINE

## 2023-03-22 PROCEDURE — G6002 STEREOSCOPIC X-RAY GUIDANCE: HCPCS | Performed by: RADIOLOGY

## 2023-03-22 PROCEDURE — 63710000001 DEXAMETHASONE PER 0.25 MG: Performed by: INTERNAL MEDICINE

## 2023-03-22 PROCEDURE — 82962 GLUCOSE BLOOD TEST: CPT

## 2023-03-22 PROCEDURE — 77387 GUIDANCE FOR RADJ TX DLVR: CPT | Performed by: RADIOLOGY

## 2023-03-22 PROCEDURE — 85025 COMPLETE CBC W/AUTO DIFF WBC: CPT | Performed by: INTERNAL MEDICINE

## 2023-03-22 PROCEDURE — 36430 TRANSFUSION BLD/BLD COMPNT: CPT

## 2023-03-22 PROCEDURE — 70551 MRI BRAIN STEM W/O DYE: CPT

## 2023-03-22 PROCEDURE — 80048 BASIC METABOLIC PNL TOTAL CA: CPT | Performed by: INTERNAL MEDICINE

## 2023-03-22 PROCEDURE — 77412 RADIATION TX DELIVERY LVL 3: CPT | Performed by: RADIOLOGY

## 2023-03-22 RX ORDER — PREGABALIN 100 MG/1
100 CAPSULE ORAL EVERY 12 HOURS SCHEDULED
Status: DISCONTINUED | OUTPATIENT
Start: 2023-03-22 | End: 2023-03-24 | Stop reason: HOSPADM

## 2023-03-22 RX ADMIN — Medication 10 ML: at 08:39

## 2023-03-22 RX ADMIN — MORPHINE SULFATE 60 MG: 15 TABLET, FILM COATED, EXTENDED RELEASE ORAL at 08:38

## 2023-03-22 RX ADMIN — PREGABALIN 100 MG: 100 CAPSULE ORAL at 20:04

## 2023-03-22 RX ADMIN — Medication 10 ML: at 20:06

## 2023-03-22 RX ADMIN — SENNOSIDES AND DOCUSATE SODIUM 2 TABLET: 8.6; 5 TABLET ORAL at 08:38

## 2023-03-22 RX ADMIN — POTASSIUM CHLORIDE 20 MEQ: 10 CAPSULE, COATED, EXTENDED RELEASE ORAL at 08:38

## 2023-03-22 RX ADMIN — FUROSEMIDE 20 MG: 10 INJECTION, SOLUTION INTRAMUSCULAR; INTRAVENOUS at 08:39

## 2023-03-22 RX ADMIN — DEXAMETHASONE 4 MG: 4 TABLET ORAL at 08:38

## 2023-03-22 RX ADMIN — PREGABALIN 150 MG: 75 CAPSULE ORAL at 08:38

## 2023-03-22 RX ADMIN — ACYCLOVIR 400 MG: 800 TABLET ORAL at 08:38

## 2023-03-22 RX ADMIN — ACETAMINOPHEN 650 MG: 325 TABLET ORAL at 20:05

## 2023-03-22 RX ADMIN — DEXAMETHASONE 4 MG: 4 TABLET ORAL at 20:04

## 2023-03-22 NOTE — PLAN OF CARE
Goal Outcome Evaluation:  Plan of Care Reviewed With: patient        Progress: no change  Outcome Evaluation: Pt c/o pain this shift, administered scheduled pain meds as ordered. Pt is currently getting second unit of PRBCs as ordered, tolerating it well. Will recheck am labs after unit of blood is completed. No new issues or needs at this time.

## 2023-03-22 NOTE — PLAN OF CARE
Goal Outcome Evaluation:              Outcome Evaluation: AOx4. No c/o pain. Radiation treatment completed this shift. Port a cath re-accessed per protocol. Up to bathroom and chair x1 assistance. Skin care provided.

## 2023-03-22 NOTE — PROGRESS NOTES
[facility]     Progress Note    Patient Name: Evangelina Sutton  : 1950  MRN: [id number]  Primary Care Physician:  Gilberto Coleman MD  Date of admission: 3/15/2023    Subjective    No major acute events overnight  Patient is vitally stable  Continues to have good urine output  Her creatinine is not yet back to baseline but is hovering around 1.3-1.5 range      Facility-Administered Medications as of 3/22/2023   Medication Dose Route Frequency Provider Last Rate Last Admin   • acetaminophen (TYLENOL) tablet 650 mg  650 mg Oral Q4H PRN Abbe Zamudio MD   650 mg at 23 1849   • acyclovir (ZOVIRAX) tablet 400 mg  400 mg Oral Daily Abbe Zamudio MD   400 mg at 23 0838   • [COMPLETED] albumin human 25 % IV SOLN 25 g  25 g Intravenous TID Abbe Zamudio MD   Stopped at 23 1145   • aluminum-magnesium hydroxide-simethicone (MAALOX MAX) 400-400-40 MG/5ML suspension 15 mL  15 mL Oral Q6H PRN Abbe Zamudio MD       • dexamethasone (DECADRON) tablet 4 mg  4 mg Oral Q12H Abbe Zamudio MD   4 mg at 23 0838   • [COMPLETED] dexamethasone sodium phosphate injection 10 mg  10 mg Intravenous Q8H Abbe Zamudio MD   10 mg at 23 1728   • [COMPLETED] furosemide (LASIX) injection 20 mg  20 mg Intravenous TID Abbe Zamudio MD   20 mg at 23 1019   • furosemide (LASIX) injection 20 mg  20 mg Intravenous Daily Abbe Zamudio MD   20 mg at 23 0839   • [COMPLETED] gadobenate dimeglumine (MULTIHANCE) injection 10 mL  10 mL Intravenous Once in imaging Abbe Zamudio MD   10 mL at 23 1739   • heparin injection 500 Units  5 mL Intravenous PRN Abbe Zamudio MD       • Morphine (MS CONTIN) 12 hr tablet 60 mg  60 mg Oral Q12H Abbe Zamudio MD   60 mg at 23 0838   • ondansetron (ZOFRAN) injection 4 mg  4 mg Intravenous Q6H PRN Abbe Zamudio MD       • ondansetron ODT (ZOFRAN-ODT) disintegrating tablet 8 mg  8 mg Oral Q8H PRN Abbe Zamudio MD       •  polyethylene glycol (MIRALAX) packet 17 g  17 g Oral Daily Abbe Zamudio MD   17 g at 03/20/23 1055   • potassium chloride (MICRO-K) CR capsule 20 mEq  20 mEq Oral BID With Meals Abbe Zamudio MD   20 mEq at 03/22/23 0838   • pregabalin (LYRICA) capsule 150 mg  150 mg Oral Q12H Abbe Zamudio MD   150 mg at 03/22/23 0838   • prochlorperazine (COMPAZINE) suppository 25 mg  25 mg Rectal Q8H PRN Abbe Zamudio MD       • sennosides-docusate (PERICOLACE) 8.6-50 MG per tablet 2 tablet  2 tablet Oral BID Abbe Zamudio MD   2 tablet at 03/22/23 0838   • sodium chloride 0.9 % flush 10 mL  10 mL Intravenous PRN Abbe Zamudio MD       • sodium chloride 0.9 % flush 10 mL  10 mL Intravenous Q12H Abbe Zamudio MD   10 mL at 03/22/23 0839   • sodium chloride 0.9 % flush 10 mL  10 mL Intravenous Q12H Abbe Zamudio MD   10 mL at 03/22/23 0839   • sodium chloride 0.9 % flush 10 mL  10 mL Intravenous PRN Abbe Zamudio MD   10 mL at 03/17/23 2208   • sodium chloride 0.9 % flush 20 mL  20 mL Intravenous PRN Abbe Zamudio MD       • sodium chloride 0.9 % infusion 40 mL  40 mL Intravenous PRN Abbe Zamudio MD       • sodium chloride 0.9 % infusion 40 mL  40 mL Intravenous PRN Abbe Zamudio MD       • zolpidem (AMBIEN) tablet 5 mg  5 mg Oral Nightly PRN Abbe Zamudio MD              Review of Systems  Constitutional:        Weakness tiredness fatigue  Eyes:                       No blurry vision, eye discharge, eye irritation, eye pain  HEENT:                   No acute hair loss, earache and discharge, nasal congestion or discharge, sore throat, postnasal drip  Respiratory:           No shortness of breath coughing sputum production wheezing hemoptysis pleuritic chest pain  Cardiovascular:     No chest pain, orthopnea, PND, dizziness, palpitation, lower extremity edema  Gastrointestinal:   No nausea vomiting diarrhea abdominal pain constipation  Genitourinary:       No urinary incontinence, hesitancy,  frequency, urgency, dysuria  Hematologic:         No bruising, bleeding, pallor, lymphadenopathy  Endocrine:            No coldness, hot flashes, polyuria, abnormal hair growth  Musculoskeletal:    No body pains, aches, arthritic pains, muscle pain ,muscle wasting  Psychiatric:          No low or high mood, anxiety, hallucinations, delusions  Skin.                      No rash, ulcers, bruising, itching  Neurological:        No confusion, headache, focal weakness, numbness, dysphasia    Objective   Objective     Vitals:   Temp:  [97.5 °F (36.4 °C)-99.1 °F (37.3 °C)] 97.5 °F (36.4 °C)  Heart Rate:  [66-95] 66  Resp:  [16-20] 16  BP: (117-133)/(65-82) 117/66  Physical Exam    Constitutional: Awake, alert responsive, conversant, no obvious distress              Psychiatric:  Appropriate affect, cooperative   Neurologic:  Awake alert ,oriented x 3, strength symmetric in all extremities, Cranial Nerves grossly intact to confrontation, speech clear   Eyes:   PERRLA, sclerae anicteric, no conjunctival injection   HEENT:  Moist mucous membranes, no nasal or eye discharge, no throat congestion   Neck:   Supple, no thyromegaly, no lymphadenopathy, trachea midline, no elevated JVD   Respiratory:  Clear to auscultation bilaterally, nonlabored respirations    Cardiovascular: RRR, no murmurs, rubs, or gallops, palpable pedal pulses bilaterally, No bilateral ankle edema   Gastrointestinal: Positive bowel sounds, soft, nontender, nondistended, no organomegaly   Musculoskeletal:  No clubbing or cyanosis to extremities,muscle wasting, joint swelling, muscle weakness             Skin:                      No rashes, bruising, skin ulcers, petechiae or ecchymosis    Result Review    Result Review:  I have personally reviewed the results from the time of this admission to 3/22/2023 09:14 EDT and agree with these findings:  []  Laboratory  []  Microbiology  []  Radiology  []  EKG/Telemetry   []  Cardiology/Vascular   []  Pathology  []   Old records  []  Other:    Assessment & Plan   Assessment / Plan       Active Hospital Problems:  Active Hospital Problems    Diagnosis    • **Anemia    • Edema    • Difficulty walking    • Acute renal failure (ARF) (HCC)    • Pancytopenia due to chemotherapy (HCC)    • Nephrotic syndrome associated with amyloidosis (HCC)    • 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  Her course is also complicated with low hemoglobin status post transfusion she is also receiving radiation treatment for her lumbar spine pain    Plan:   We will stop potassium supplementation  We will decrease Lyrica dosing to 100 mg twice daily in the setting of MIGNON  Patient is tolerating MS Contin well at this time.Will need to be careful as metabolites can easily accumulate  Noted patient is also on acyclovir 400 mg prophylaxis  patient continues to have positiveUPEP on 24-hour urine collection    Electronically signed by Lucy Flowers MD, 03/22/23, 9:14 AM EDT.

## 2023-03-22 NOTE — PROGRESS NOTES
[facility]     Progress Note    Patient Name: Evangelina Sutton  : 1950  MRN: [id number]  Primary Care Physician:  Gilberto Coleman MD  Date of admission: 3/15/2023    Subjective   Subjective     Chief Complaint: Patient has advanced multiple myeloma transfusion to be given today continue with the radiation treatment for palliation she has refused more aggressive treatment including bone marrow    HPI: With advanced stage multiple myeloma being treated with chemotherapy but does not want bone marrow transplant radiation therapy for control of back pain    Review of Systems   All systems were reviewed and negative except for: Reviewed    Objective   Objective     Vitals:   Temp:  [97.5 °F (36.4 °C)-99.1 °F (37.3 °C)] 97.5 °F (36.4 °C)  Heart Rate:  [66-95] 66  Resp:  [16-20] 16  BP: (117-133)/(65-82) 117/66    Physical Exam    Constitutional: Awake, alert   Eyes: PERRLA, sclerae anicteric, no conjunctival injection   HENT: NCAT, mucous membranes moist   Neck: Supple, no thyromegaly, no lymphadenopathy, trachea midline   Respiratory: Clear to auscultation bilaterally, nonlabored respirations    Cardiovascular: RRR, no murmurs, rubs, or gallops, palpable pedal pulses bilaterally   Gastrointestinal: Positive bowel sounds, soft, nontender, nondistended   Musculoskeletal: No bilateral ankle edema, no clubbing or cyanosis to extremities   Psychiatric: Appropriate affect, cooperative   Neurologic: Oriented x 3, strength symmetric in all extremities, Cranial Nerves grossly intact to confrontation, speech clear   Skin: No rashes   No change  Result Review    Result Review:  I have personally reviewed the results from the time of this admission to 3/22/2023 08:14 EDT and agree with these findings:  [x]  Laboratory anemia  []  Microbiology  []  Radiology  []  EKG/Telemetry   []  Cardiology/Vascular   []  Pathology  []  Old records  []  Other:  Most notable findings include: Anemia blood transfusion to be  given    Assessment & Plan   Assessment / Plan     Brief Patient Summary:  Evangelina Sutton is a 72 y.o. 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    CODE STATUS:   Medical Intervention Limits: NO intubation (DNI); NO cardioversion; NO dialysis; NO NIPPV (Non-Invasive Positive Pressure Ventilation); NO vasopressors  Level Of Support Discussed With: Patient  Code Status (Patient has no pulse and is not breathing): No CPR (Do Not Attempt to Resuscitate)  Medical Interventions (Patient has pulse or is breathing): Limited Support  Comments: Multiple myeloma for treatment but no CPR  Release to patient: Routine Release    Disposition:  I expect patient to be discharged after the patient has been stabilized.    Electronically signed by Abbe Zamudio MD, 03/22/23, 8:14 AM EDT.      Part of this note may be an electronic transcription/translation of spoken language to printed text using the Dragon Dictation System.

## 2023-03-22 NOTE — NURSING NOTE
Palliative care will sign off at this time. If any new needs arise please re-consult.     -Mariam MASON, RN, CHPN   Palliative Care    3/22/2023 10:47 EDT

## 2023-03-23 ENCOUNTER — HOSPITAL ENCOUNTER (OUTPATIENT)
Dept: RADIATION ONCOLOGY | Facility: HOSPITAL | Age: 73
Discharge: HOME | End: 2023-03-23

## 2023-03-23 LAB
ANION GAP SERPL CALCULATED.3IONS-SCNC: 9.3 MMOL/L (ref 5–15)
BASOPHILS # BLD AUTO: 0.02 10*3/MM3 (ref 0–0.2)
BASOPHILS NFR BLD AUTO: 0.3 % (ref 0–1.5)
BH BB BLOOD EXPIRATION DATE: NORMAL
BH BB BLOOD EXPIRATION DATE: NORMAL
BH BB BLOOD TYPE BARCODE: 5100
BH BB BLOOD TYPE BARCODE: 5100
BH BB DISPENSE STATUS: NORMAL
BH BB DISPENSE STATUS: NORMAL
BH BB PRODUCT CODE: NORMAL
BH BB PRODUCT CODE: NORMAL
BH BB UNIT NUMBER: NORMAL
BH BB UNIT NUMBER: NORMAL
BUN SERPL-MCNC: 53 MG/DL (ref 8–23)
BUN/CREAT SERPL: 41.1 (ref 7–25)
CALCIUM SPEC-SCNC: 8 MG/DL (ref 8.6–10.5)
CHLORIDE SERPL-SCNC: 97 MMOL/L (ref 98–107)
CO2 SERPL-SCNC: 29.7 MMOL/L (ref 22–29)
CREAT SERPL-MCNC: 1.29 MG/DL (ref 0.57–1)
CROSSMATCH INTERPRETATION: NORMAL
CROSSMATCH INTERPRETATION: NORMAL
DEPRECATED RDW RBC AUTO: 55.2 FL (ref 37–54)
EGFRCR SERPLBLD CKD-EPI 2021: 44.2 ML/MIN/1.73
EOSINOPHIL # BLD AUTO: 0 10*3/MM3 (ref 0–0.4)
EOSINOPHIL NFR BLD AUTO: 0 % (ref 0.3–6.2)
ERYTHROCYTE [DISTWIDTH] IN BLOOD BY AUTOMATED COUNT: 18.2 % (ref 12.3–15.4)
GLUCOSE SERPL-MCNC: 103 MG/DL (ref 65–99)
HCT VFR BLD AUTO: 34.7 % (ref 34–46.6)
HGB BLD-MCNC: 11.5 G/DL (ref 12–15.9)
IMM GRANULOCYTES # BLD AUTO: 0.31 10*3/MM3 (ref 0–0.05)
IMM GRANULOCYTES NFR BLD AUTO: 4.1 % (ref 0–0.5)
LYMPHOCYTES # BLD AUTO: 0.4 10*3/MM3 (ref 0.7–3.1)
LYMPHOCYTES NFR BLD AUTO: 5.3 % (ref 19.6–45.3)
MCH RBC QN AUTO: 27.7 PG (ref 26.6–33)
MCHC RBC AUTO-ENTMCNC: 33.1 G/DL (ref 31.5–35.7)
MCV RBC AUTO: 83.6 FL (ref 79–97)
MONOCYTES # BLD AUTO: 0.88 10*3/MM3 (ref 0.1–0.9)
MONOCYTES NFR BLD AUTO: 11.7 % (ref 5–12)
NEUTROPHILS NFR BLD AUTO: 5.89 10*3/MM3 (ref 1.7–7)
NEUTROPHILS NFR BLD AUTO: 78.6 % (ref 42.7–76)
NRBC BLD AUTO-RTO: 0 /100 WBC (ref 0–0.2)
PLATELET # BLD AUTO: 165 10*3/MM3 (ref 140–450)
PMV BLD AUTO: 10.4 FL (ref 6–12)
POTASSIUM SERPL-SCNC: 4.1 MMOL/L (ref 3.5–5.2)
RAD ONC ARIA COURSE ID: NORMAL
RAD ONC ARIA COURSE INTENT: NORMAL
RAD ONC ARIA COURSE LAST TREATMENT DATE: NORMAL
RAD ONC ARIA COURSE START DATE: NORMAL
RAD ONC ARIA COURSE TREATMENT ELAPSED DAYS: 3
RAD ONC ARIA FIRST TREATMENT DATE: NORMAL
RAD ONC ARIA PLAN FRACTIONS TREATED TO DATE: 4
RAD ONC ARIA PLAN ID: NORMAL
RAD ONC ARIA PLAN NAME: NORMAL
RAD ONC ARIA PLAN PRESCRIBED DOSE PER FRACTION: 4 GY
RAD ONC ARIA PLAN PRIMARY REFERENCE POINT: NORMAL
RAD ONC ARIA PLAN TOTAL FRACTIONS PRESCRIBED: 5
RAD ONC ARIA PLAN TOTAL PRESCRIBED DOSE: 2000 CGY
RAD ONC ARIA REFERENCE POINT DOSAGE GIVEN TO DATE: 16 GY
RAD ONC ARIA REFERENCE POINT ID: NORMAL
RAD ONC ARIA REFERENCE POINT SESSION DOSAGE GIVEN: 4 GY
RBC # BLD AUTO: 4.15 10*6/MM3 (ref 3.77–5.28)
SODIUM SERPL-SCNC: 136 MMOL/L (ref 136–145)
UNIT  ABO: NORMAL
UNIT  ABO: NORMAL
UNIT  RH: NORMAL
UNIT  RH: NORMAL
WBC NRBC COR # BLD: 7.5 10*3/MM3 (ref 3.4–10.8)

## 2023-03-23 PROCEDURE — 94799 UNLISTED PULMONARY SVC/PX: CPT

## 2023-03-23 PROCEDURE — G6002 STEREOSCOPIC X-RAY GUIDANCE: HCPCS | Performed by: RADIOLOGY

## 2023-03-23 PROCEDURE — 80048 BASIC METABOLIC PNL TOTAL CA: CPT | Performed by: INTERNAL MEDICINE

## 2023-03-23 PROCEDURE — 77387 GUIDANCE FOR RADJ TX DLVR: CPT | Performed by: RADIOLOGY

## 2023-03-23 PROCEDURE — 25010000002 FUROSEMIDE PER 20 MG: Performed by: INTERNAL MEDICINE

## 2023-03-23 PROCEDURE — 97110 THERAPEUTIC EXERCISES: CPT

## 2023-03-23 PROCEDURE — 85025 COMPLETE CBC W/AUTO DIFF WBC: CPT | Performed by: INTERNAL MEDICINE

## 2023-03-23 PROCEDURE — 77412 RADIATION TX DELIVERY LVL 3: CPT | Performed by: RADIOLOGY

## 2023-03-23 PROCEDURE — 63710000001 DEXAMETHASONE PER 0.25 MG: Performed by: INTERNAL MEDICINE

## 2023-03-23 RX ADMIN — DEXAMETHASONE 4 MG: 4 TABLET ORAL at 08:55

## 2023-03-23 RX ADMIN — ACYCLOVIR 400 MG: 800 TABLET ORAL at 08:55

## 2023-03-23 RX ADMIN — Medication 10 ML: at 22:02

## 2023-03-23 RX ADMIN — FUROSEMIDE 20 MG: 10 INJECTION, SOLUTION INTRAMUSCULAR; INTRAVENOUS at 08:55

## 2023-03-23 RX ADMIN — PREGABALIN 100 MG: 100 CAPSULE ORAL at 08:54

## 2023-03-23 RX ADMIN — Medication 10 ML: at 08:55

## 2023-03-23 RX ADMIN — DEXAMETHASONE 4 MG: 4 TABLET ORAL at 22:01

## 2023-03-23 RX ADMIN — PREGABALIN 100 MG: 100 CAPSULE ORAL at 22:01

## 2023-03-23 NOTE — PROGRESS NOTES
[facility]     Progress Note    Patient Name: Evangelina Sutton  : 1950  MRN: [id number]  Primary Care Physician:  Gilberto Coleman MD  Date of admission: 3/15/2023    Subjective   Subjective     Chief Complaint: Patient stable doing well radiation therapy in progress hopefully will be completed by tomorrow and then we will discharge her renal function has improved    HPI: With multiple myeloma under active treatment at this time her pain medication requirement has gone down patient was taking too much of pain medication    Review of Systems   All systems were reviewed and negative except for: Reviewed    Objective   Objective     Vitals:   Temp:  [97.5 °F (36.4 °C)-98.2 °F (36.8 °C)] 98.2 °F (36.8 °C)  Heart Rate:  [71-87] 78  Resp:  [16-20] 20  BP: (101-138)/(65-76) 122/72    Physical Exam    Constitutional: Awake, alert   Eyes: PERRLA, sclerae anicteric, no conjunctival injection   HENT: NCAT, mucous membranes moist   Neck: Supple, no thyromegaly, no lymphadenopathy, trachea midline   Respiratory: Clear to auscultation bilaterally, nonlabored respirations    Cardiovascular: RRR, no murmurs, rubs, or gallops, palpable pedal pulses bilaterally   Gastrointestinal: Positive bowel sounds, soft, nontender, nondistended   Musculoskeletal: No bilateral ankle edema, no clubbing or cyanosis to extremities   Psychiatric: Appropriate affect, cooperative   Neurologic: Oriented x 3, strength symmetric in all extremities, Cranial Nerves grossly intact to confrontation, speech clear   Skin: No rashes   No change  Result Review    Result Review:  I have personally reviewed the results from the time of this admission to 3/23/2023 08:05 EDT and agree with these findings:  [x]  Laboratory anemia and elevated BUN/creatinine  []  Microbiology  []  Radiology  []  EKG/Telemetry   []  Cardiology/Vascular   []  Pathology  []  Old records  []  Other:  Most notable findings include: Multiple myeloma with renal    Assessment &  Plan   Assessment / Plan     Brief Patient Summary:  Evangelina Sutton is a 72 y.o. 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    CODE STATUS:   Medical Intervention Limits: NO intubation (DNI); NO cardioversion; NO dialysis; NO NIPPV (Non-Invasive Positive Pressure Ventilation); NO vasopressors  Level Of Support Discussed With: Patient  Code Status (Patient has no pulse and is not breathing): No CPR (Do Not Attempt to Resuscitate)  Medical Interventions (Patient has pulse or is breathing): Limited Support  Comments: Multiple myeloma for treatment but no CPR  Release to patient: Routine Release    Disposition:  I expect patient to be discharged hopefully by tomorrow if patient remains stable.    Electronically signed by Abbe Zamudio MD, 03/23/23, 8:05 AM EDT.      Part of this note may be an electronic transcription/translation of spoken language to printed text using the Dragon Dictation System.

## 2023-03-23 NOTE — PROGRESS NOTES
[facility]     Progress Note    Patient Name: Evangelina Sutton  : 1950  MRN: [id number]  Primary Care Physician:  Gilberto Coleman MD  Date of admission: 3/15/2023    Subjective    Patient's creatinine appears to be stable  Had radiation therapy yesterday  Vitals are stable  No other major acute events over    Facility-Administered Medications as of 3/23/2023   Medication Dose Route Frequency Provider Last Rate Last Admin   • acetaminophen (TYLENOL) tablet 650 mg  650 mg Oral Q4H PRN Abbe Zamudio MD   650 mg at 23   • acyclovir (ZOVIRAX) tablet 400 mg  400 mg Oral Daily Abbe Zamudio MD   400 mg at 23 0838   • [COMPLETED] albumin human 25 % IV SOLN 25 g  25 g Intravenous TID Abbe Zamudio MD   Stopped at 23 1145   • aluminum-magnesium hydroxide-simethicone (MAALOX MAX) 400-400-40 MG/5ML suspension 15 mL  15 mL Oral Q6H PRN Abbe Zamudio MD       • dexamethasone (DECADRON) tablet 4 mg  4 mg Oral Q12H Abbe Zamudio MD   4 mg at 23   • [COMPLETED] dexamethasone sodium phosphate injection 10 mg  10 mg Intravenous Q8H Abbe Zamudio MD   10 mg at 23 1728   • [COMPLETED] furosemide (LASIX) injection 20 mg  20 mg Intravenous TID Abbe Zamudio MD   20 mg at 23 1019   • furosemide (LASIX) injection 20 mg  20 mg Intravenous Daily Abbe Zamudio MD   20 mg at 23 0839   • [COMPLETED] gadobenate dimeglumine (MULTIHANCE) injection 10 mL  10 mL Intravenous Once in imaging Abbe Zamudio MD   10 mL at 23 1739   • heparin injection 500 Units  5 mL Intravenous PRN Abbe Zamudio MD       • ondansetron (ZOFRAN) injection 4 mg  4 mg Intravenous Q6H PRN Abbe Zamudio MD       • ondansetron ODT (ZOFRAN-ODT) disintegrating tablet 8 mg  8 mg Oral Q8H PRN Abbe Zamudio MD       • polyethylene glycol (MIRALAX) packet 17 g  17 g Oral Daily Abbe Zamudio MD   17 g at 23 1055   • pregabalin (LYRICA) capsule 100 mg  100 mg Oral Q12H  Lucy Flowers MD   100 mg at 03/22/23 2004   • prochlorperazine (COMPAZINE) suppository 25 mg  25 mg Rectal Q8H PRN Abbe Zamudio MD       • sennosides-docusate (PERICOLACE) 8.6-50 MG per tablet 2 tablet  2 tablet Oral BID Abbe Zamudio MD   2 tablet at 03/22/23 0838   • sodium chloride 0.9 % flush 10 mL  10 mL Intravenous PRN Abbe Zamudio MD       • sodium chloride 0.9 % flush 10 mL  10 mL Intravenous Q12H Abbe Zamudio MD   10 mL at 03/22/23 2006   • sodium chloride 0.9 % flush 10 mL  10 mL Intravenous Q12H Abbe Zamudio MD   10 mL at 03/22/23 2006   • sodium chloride 0.9 % flush 10 mL  10 mL Intravenous PRN Abbe Zamudio MD   10 mL at 03/17/23 2208   • sodium chloride 0.9 % flush 20 mL  20 mL Intravenous PRN Abbe Zamudio MD       • sodium chloride 0.9 % infusion 40 mL  40 mL Intravenous PRN Abbe Zamudio MD       • sodium chloride 0.9 % infusion 40 mL  40 mL Intravenous PRN Abbe Zamudio MD       • zolpidem (AMBIEN) tablet 5 mg  5 mg Oral Nightly PRN Abbe Zamudio MD              Review of Systems  Constitutional:        Weakness tiredness fatigue  Eyes:                       No blurry vision, eye discharge, eye irritation, eye pain  HEENT:                   No acute hair loss, earache and discharge, nasal congestion or discharge, sore throat, postnasal drip  Respiratory:           No shortness of breath coughing sputum production wheezing hemoptysis pleuritic chest pain  Cardiovascular:     No chest pain, orthopnea, PND, dizziness, palpitation, lower extremity edema  Gastrointestinal:   No nausea vomiting diarrhea abdominal pain constipation  Genitourinary:       No urinary incontinence, hesitancy, frequency, urgency, dysuria  Hematologic:         No bruising, bleeding, pallor, lymphadenopathy  Endocrine:            No coldness, hot flashes, polyuria, abnormal hair growth  Musculoskeletal:    No body pains, aches, arthritic pains, muscle pain ,muscle wasting  Psychiatric:           No low or high mood, anxiety, hallucinations, delusions  Skin.                      No rash, ulcers, bruising, itching  Neurological:        No confusion, headache, focal weakness, numbness, dysphasia    Objective   Objective     Vitals:   Temp:  [97.5 °F (36.4 °C)-98.3 °F (36.8 °C)] 98.3 °F (36.8 °C)  Heart Rate:  [71-87] 71  Resp:  [16-20] 16  BP: (101-138)/(65-76) 131/74  Physical Exam    Constitutional: Awake, alert responsive, conversant, no obvious distress              Psychiatric:  Appropriate affect, cooperative   Neurologic:  Awake alert ,oriented x 3, strength symmetric in all extremities, Cranial Nerves grossly intact to confrontation, speech clear   Eyes:   PERRLA, sclerae anicteric, no conjunctival injection   HEENT:  Moist mucous membranes, no nasal or eye discharge, no throat congestion   Neck:   Supple, no thyromegaly, no lymphadenopathy, trachea midline, no elevated JVD   Respiratory:  Clear to auscultation bilaterally, nonlabored respirations    Cardiovascular: RRR, no murmurs, rubs, or gallops, palpable pedal pulses bilaterally, No bilateral ankle edema   Gastrointestinal: Positive bowel sounds, soft, nontender, nondistended, no organomegaly   Musculoskeletal:  No clubbing or cyanosis to extremities,muscle wasting, joint swelling, muscle weakness             Skin:                      No rashes, bruising, skin ulcers, petechiae or ecchymosis    Result Review    Result Review:  I have personally reviewed the results from the time of this admission to 3/23/2023 08:15 EDT and agree with these findings:  []  Laboratory  []  Microbiology  []  Radiology  []  EKG/Telemetry   []  Cardiology/Vascular   []  Pathology  []  Old records  []  Other:    Assessment & Plan   Assessment / Plan       Active Hospital Problems:  Active Hospital Problems    Diagnosis    • **Anemia    • Edema    • Difficulty walking    • Acute renal failure (ARF) (HCC)    • Pancytopenia due to chemotherapy (HCC)    • Nephrotic  syndrome associated with amyloidosis (HCC)    • 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  Her course is also complicated with low hemoglobin status post transfusion she is also receiving radiation treatment for her lumbar spine pain    Plan:   Continue Lyrica dosing to 100 mg twice daily in the setting of MIGNON  Patient is tolerating MS Contin well at this time.Will need to be careful as metabolites can easily accumulate  Noted patient is also on acyclovir 400 mg prophylaxis  patient continues to have positiveUPEP on 24-hour urine collection

## 2023-03-23 NOTE — PLAN OF CARE
Goal Outcome Evaluation:  Plan of Care Reviewed With: patient        Progress: no change  Outcome Evaluation: Pt c/o back pain from lying on MRI table this shift, administered prn pain med as ordered. Pt was able to rest well this shift. Will draw morning labs via port-a-cath as ordered toward end of shift. No new issues or needs at this time.

## 2023-03-23 NOTE — THERAPY TREATMENT NOTE
Acute Care - Physical Therapy Treatment Note  [facility]     Patient Name: Evangelina Sutton  : 1950  MRN: [id number]  Today's Date: 3/23/2023      Visit Dx:     ICD-10-CM ICD-9-CM   1. 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 LEVEL   • CHOLECYSTECTOMY     • HYSTERECTOMY     • PORTACATH PLACEMENT N/A 3/17/2022    Procedure: INSERTION OF PORTACATH;  Surgeon: jC Rossi MD;  Location: [facility] MAIN OR;  Service: General;  Laterality: N/A;   • TOTAL HIP ARTHROPLASTY Left 2022    Procedure: LEFT HIP ARTHROPLASTY ANTERIOR;  Surgeon: Sridhar Coto MD;  Location: [facility] MAIN OR;  Service: Orthopedics;  Laterality: Left;     PT Assessment (last 12 hours)     PT Evaluation and Treatment     Row Name 23 9745          Physical Therapy Time and Intention    Subjective Information no complaints (P)   -TG     Document Type therapy note (daily note) (P)   -TG     Mode of Treatment individual therapy;physical therapy (P)   -TG     Patient Effort good (P)   -TG     Symptoms Noted During/After Treatment none (P)   -TG     Row Name 23 2306           General Information    Patient Profile Reviewed yes (P)   -TG     Patient Observations alert;cooperative;agree to therapy (P)   -TG     Row Name 03/23/23 1104          Cognition    Orientation Status (Cognition) oriented x 4 (P)   -TG     Row Name 03/23/23 1104          Bed Mobility    Bed Mobility supine-sit-supine (P)   -TG     Supine-Sit-Supine Davis (Bed Mobility) standby assist (P)   -TG     Assistive Device (Bed Mobility) bed rails (P)   -TG     Row Name 03/23/23 1104          Transfers    Comment, (Transfers) declined out of bed activity (P)   -TG     Row Name 03/23/23 1104          Gait/Stairs (Locomotion)    Comment, (Gait/Stairs) declined out of bed activity (P)   -TG     Row Name 03/23/23 1104          Balance    Balance Assessment sitting dynamic balance (P)   -TG     Dynamic Sitting Balance standby assist (P)   -TG     Position, Sitting Balance sitting edge of bed (P)   -TG     Row Name             Wound 03/15/23 1724 Left gluteal Pressure Injury    Wound - Properties Group Placement Date: 03/15/23  -RF Placement Time: 1724  -RF Present on Hospital Admission: Y  -RF Side: Left  -RF Location: gluteal  -RF Primary Wound Type: Pressure inj  -RF    Retired Wound - Properties Group Placement Date: 03/15/23  -RF Placement Time: 1724  -RF Present on Hospital Admission: Y  -RF Side: Left  -RF Location: gluteal  -RF Primary Wound Type: Pressure inj  -RF    Retired Wound - Properties Group Date first assessed: 03/15/23  -RF Time first assessed: 1724  -RF Present on Hospital Admission: Y  -RF Side: Left  -RF Location: gluteal  -RF Primary Wound Type: Pressure inj  -RF    Row Name             Wound 03/15/23 1736 Right anterior hip Other (comment)    Wound - Properties Group Placement Date: 03/15/23  -RF Placement Time: 1736  -RF Side: Right  -RF Orientation: anterior  -RF Location: hip  -RF Primary Wound Type: Other  -RF, bruising     Retired Wound - Properties Group Placement Date: 03/15/23  -RF Placement  Time: 1736  -RF Side: Right  -RF Orientation: anterior  -RF Location: hip  -RF Primary Wound Type: Other  -RF, bruising     Retired Wound - Properties Group Date first assessed: 03/15/23  -RF Time first assessed: 1736  -RF Side: Right  -RF Location: hip  -RF Primary Wound Type: Other  -RF, bruising     Row Name 03/23/23 1104          Plan of Care Review    Plan of Care Reviewed With patient (P)   -TG     Progress no change (P)   -TG     Row Name 03/23/23 1104          Progress Summary (PT)    Daily Progress Summary (PT) Pt declined out of bed activity this date. Performed seated therex. No complaints following session. (P)   -TG           User Key  (r) = Recorded By, (t) = Taken By, (c) = Cosigned By    Initials Name Provider Type    RF Willard Lopez, RN Registered Nurse    Neris Quintero, PT Student PT Student              Pt performed seated therapeutic exercises: long arc quads 2 x 10, marches 2 x 20, and hip abduction/adduction 2 x 10.      Physical Therapy Education     Title: PT OT SLP Therapies (Done)     Topic: Physical Therapy (Done)     Point: Mobility training (Done)     Learning Progress Summary           Patient Acceptance, E, VU,NR by RF at 3/22/2023 0840    Acceptance, E, VU,DU by RF at 3/21/2023 0846    Acceptance, E, VU by DK at 3/19/2023 0405    Acceptance, E, NR by CS at 3/16/2023 1107                   Point: Home exercise program (Done)     Learning Progress Summary           Patient Acceptance, E, VU,NR by RF at 3/22/2023 0840    Acceptance, E, VU,DU by RF at 3/21/2023 0846    Acceptance, E, VU by DK at 3/19/2023 0405                   Point: Body mechanics (Done)     Learning Progress Summary           Patient Acceptance, E, VU,NR by RF at 3/22/2023 0840    Acceptance, E, VU,DU by RF at 3/21/2023 0846    Acceptance, E, VU by DK at 3/19/2023 0405    Acceptance, E, NR by CS at 3/16/2023 1107                   Point: Precautions (Done)     Learning Progress Summary           Patient Acceptance,  E, VU,NR by  at 3/22/2023 0840    Acceptance, E, VU,DU by RF at 3/21/2023 0846    Acceptance, E, VU by  at 3/19/2023 0405                               User Key     Initials Effective Dates Name Provider Type Discipline     04/25/21 -  Haritha Ling, PT Physical Therapist PT    DK 09/12/22 -  Drew Handley, RN Registered Nurse Nurse    RF 01/19/22 -  Willard Lopez, RN Registered Nurse Nurse              PT Recommendation and Plan     Progress Summary (PT)  Daily Progress Summary (PT): (P) Pt declined out of bed activity this date. Performed seated therex. No complaints following session.  Plan of Care Reviewed With: (P) patient  Progress: (P) no change   Outcome Measures     Row Name 03/23/23 1100 03/21/23 0921          How much help from another person do you currently need...    Turning from your back to your side while in flat bed without using bedrails? 4 (P)   -TG 4  -DK     Moving from lying on back to sitting on the side of a flat bed without bedrails? 4 (P)   -TG 4  -DK     Moving to and from a bed to a chair (including a wheelchair)? 3 (P)   -TG 3  -DK     Standing up from a chair using your arms (e.g., wheelchair, bedside chair)? 3 (P)   -TG 3  -DK     Climbing 3-5 steps with a railing? 2 (P)   -TG 2  -DK     To walk in hospital room? 3 (P)   -TG 3  -DK     AM-PAC 6 Clicks Score (PT) 19 (P)   -TG 19  -DK        Functional Assessment    Outcome Measure Options AM-PAC 6 Clicks Basic Mobility (PT) (P)   -TG AM-PAC 6 Clicks Basic Mobility (PT)  -DK           User Key  (r) = Recorded By, (t) = Taken By, (c) = Cosigned By    Initials Name Provider Type    Tavia Fairbanks, PTA Physical Therapist Assistant    Neris Quintero, PT Student PT Student                 Time Calculation:    PT Charges     Row Name 03/23/23 1108             Time Calculation    PT Received On 03/23/23 (P)   -TG         Timed Charges    97755 - PT Therapeutic Exercise Minutes 10 (P)   -TG         Total Minutes    Timed Charges  Total Minutes 10 (P)   -TG       Total Minutes 10 (P)   -TG            User Key  (r) = Recorded By, (t) = Taken By, (c) = Cosigned By    Initials Name Provider Type    TG Neris Pizarro, PT Student PT Student              Therapy Charges for Today     Code Description Service Date Service Provider Modifiers Qty    89597142491 HC PT THER PROC EA 15 MIN 3/23/2023 Neris Pizarro, PT Student GP 1          PT G-Codes  Outcome Measure Options: (P) AM-PAC 6 Clicks Basic Mobility (PT)  AM-PAC 6 Clicks Score (PT): (P) 19    Neris Pizarro PT Student  3/23/2023

## 2023-03-24 ENCOUNTER — READMISSION MANAGEMENT (OUTPATIENT)
Dept: CALL CENTER | Facility: HOSPITAL | Age: 73
End: 2023-03-24
Payer: MEDICARE

## 2023-03-24 VITALS
BODY MASS INDEX: 23.41 KG/M2 | TEMPERATURE: 98.6 F | HEIGHT: 62 IN | RESPIRATION RATE: 16 BRPM | SYSTOLIC BLOOD PRESSURE: 144 MMHG | DIASTOLIC BLOOD PRESSURE: 79 MMHG | HEART RATE: 80 BPM | WEIGHT: 127.21 LBS | OXYGEN SATURATION: 98 %

## 2023-03-24 LAB
ANION GAP SERPL CALCULATED.3IONS-SCNC: 9.5 MMOL/L (ref 5–15)
BASOPHILS # BLD AUTO: 0.02 10*3/MM3 (ref 0–0.2)
BASOPHILS NFR BLD AUTO: 0.3 % (ref 0–1.5)
BUN SERPL-MCNC: 51 MG/DL (ref 8–23)
BUN/CREAT SERPL: 40.8 (ref 7–25)
CALCIUM SPEC-SCNC: 7.7 MG/DL (ref 8.6–10.5)
CHLORIDE SERPL-SCNC: 97 MMOL/L (ref 98–107)
CO2 SERPL-SCNC: 28.5 MMOL/L (ref 22–29)
CREAT SERPL-MCNC: 1.25 MG/DL (ref 0.57–1)
DEPRECATED RDW RBC AUTO: 54.2 FL (ref 37–54)
EGFRCR SERPLBLD CKD-EPI 2021: 45.9 ML/MIN/1.73
EOSINOPHIL # BLD AUTO: 0 10*3/MM3 (ref 0–0.4)
EOSINOPHIL NFR BLD AUTO: 0 % (ref 0.3–6.2)
ERYTHROCYTE [DISTWIDTH] IN BLOOD BY AUTOMATED COUNT: 17.8 % (ref 12.3–15.4)
GLUCOSE SERPL-MCNC: 104 MG/DL (ref 65–99)
HCT VFR BLD AUTO: 36.4 % (ref 34–46.6)
HGB BLD-MCNC: 11.8 G/DL (ref 12–15.9)
IMM GRANULOCYTES # BLD AUTO: 0.28 10*3/MM3 (ref 0–0.05)
IMM GRANULOCYTES NFR BLD AUTO: 4.2 % (ref 0–0.5)
LYMPHOCYTES # BLD AUTO: 0.35 10*3/MM3 (ref 0.7–3.1)
LYMPHOCYTES NFR BLD AUTO: 5.3 % (ref 19.6–45.3)
MCH RBC QN AUTO: 26.9 PG (ref 26.6–33)
MCHC RBC AUTO-ENTMCNC: 32.4 G/DL (ref 31.5–35.7)
MCV RBC AUTO: 83.1 FL (ref 79–97)
MONOCYTES # BLD AUTO: 0.67 10*3/MM3 (ref 0.1–0.9)
MONOCYTES NFR BLD AUTO: 10.1 % (ref 5–12)
NEUTROPHILS NFR BLD AUTO: 5.29 10*3/MM3 (ref 1.7–7)
NEUTROPHILS NFR BLD AUTO: 80.1 % (ref 42.7–76)
NRBC BLD AUTO-RTO: 0 /100 WBC (ref 0–0.2)
PLATELET # BLD AUTO: 157 10*3/MM3 (ref 140–450)
PMV BLD AUTO: 10 FL (ref 6–12)
POTASSIUM SERPL-SCNC: 3.9 MMOL/L (ref 3.5–5.2)
RAD ONC ARIA COURSE ID: NORMAL
RAD ONC ARIA COURSE INTENT: NORMAL
RAD ONC ARIA COURSE LAST TREATMENT DATE: NORMAL
RAD ONC ARIA COURSE START DATE: NORMAL
RAD ONC ARIA COURSE TREATMENT ELAPSED DAYS: 4
RAD ONC ARIA FIRST TREATMENT DATE: NORMAL
RAD ONC ARIA PLAN FRACTIONS TREATED TO DATE: 5
RAD ONC ARIA PLAN ID: NORMAL
RAD ONC ARIA PLAN NAME: NORMAL
RAD ONC ARIA PLAN PRESCRIBED DOSE PER FRACTION: 4 GY
RAD ONC ARIA PLAN PRIMARY REFERENCE POINT: NORMAL
RAD ONC ARIA PLAN TOTAL FRACTIONS PRESCRIBED: 5
RAD ONC ARIA PLAN TOTAL PRESCRIBED DOSE: 2000 CGY
RAD ONC ARIA REFERENCE POINT DOSAGE GIVEN TO DATE: 20 GY
RAD ONC ARIA REFERENCE POINT ID: NORMAL
RAD ONC ARIA REFERENCE POINT SESSION DOSAGE GIVEN: 4 GY
RBC # BLD AUTO: 4.38 10*6/MM3 (ref 3.77–5.28)
SODIUM SERPL-SCNC: 135 MMOL/L (ref 136–145)
WBC NRBC COR # BLD: 6.61 10*3/MM3 (ref 3.4–10.8)

## 2023-03-24 PROCEDURE — 94799 UNLISTED PULMONARY SVC/PX: CPT

## 2023-03-24 PROCEDURE — 85025 COMPLETE CBC W/AUTO DIFF WBC: CPT | Performed by: INTERNAL MEDICINE

## 2023-03-24 PROCEDURE — 80048 BASIC METABOLIC PNL TOTAL CA: CPT | Performed by: INTERNAL MEDICINE

## 2023-03-24 PROCEDURE — 63710000001 DEXAMETHASONE PER 0.25 MG: Performed by: INTERNAL MEDICINE

## 2023-03-24 PROCEDURE — 77387 GUIDANCE FOR RADJ TX DLVR: CPT | Performed by: RADIOLOGY

## 2023-03-24 PROCEDURE — G6002 STEREOSCOPIC X-RAY GUIDANCE: HCPCS | Performed by: RADIOLOGY

## 2023-03-24 PROCEDURE — 77336 RADIATION PHYSICS CONSULT: CPT | Performed by: RADIOLOGY

## 2023-03-24 PROCEDURE — 77412 RADIATION TX DELIVERY LVL 3: CPT | Performed by: RADIOLOGY

## 2023-03-24 RX ORDER — PREGABALIN 100 MG/1
100 CAPSULE ORAL DAILY
Qty: 30 CAPSULE | Refills: 1 | Status: ON HOLD | OUTPATIENT
Start: 2023-03-24

## 2023-03-24 RX ORDER — POLYETHYLENE GLYCOL 3350 17 G/17G
17 POWDER, FOR SOLUTION ORAL DAILY
Qty: 30 PACKET | Refills: 3 | Status: ON HOLD | OUTPATIENT
Start: 2023-03-25

## 2023-03-24 RX ADMIN — DEXAMETHASONE 4 MG: 4 TABLET ORAL at 08:00

## 2023-03-24 RX ADMIN — ACETAMINOPHEN 650 MG: 325 TABLET ORAL at 00:46

## 2023-03-24 RX ADMIN — Medication 10 ML: at 08:00

## 2023-03-24 RX ADMIN — PREGABALIN 100 MG: 100 CAPSULE ORAL at 08:00

## 2023-03-24 RX ADMIN — ACYCLOVIR 400 MG: 800 TABLET ORAL at 07:59

## 2023-03-24 NOTE — DISCHARGE SUMMARY
[facility]         DISCHARGE SUMMARY    Patient Name: Evangelina Sutton  : 1950  MRN: [id number]    Date of Admission: 3/15/2023  Date of Discharge: 3/24/2023  Primary Care Physician: Gilberto Coleman MD    Consults     Date and Time Order Name Status Description    3/20/2023  8:03 AM Inpatient Radiation Oncology Consult      3/15/2023  2:37 PM Inpatient Nephrology Consult            Presenting Problem:   Anemia [D64.9]  Difficulty walking [R26.2]    Active and Resolved Hospital Problems:  Active Hospital Problems    Diagnosis POA   • **Anemia [D64.9] Yes   • Edema [R60.9] Unknown   • Difficulty walking [R26.2] Yes   • Acute renal failure (ARF) (HCC) [N17.9] Unknown   • Pancytopenia due to chemotherapy (HCC) [D61.810] Unknown   • Nephrotic syndrome associated with amyloidosis (HCC) [E85.4, N08] Unknown   • 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         Hospital Course     Hospital Course:  Evangelina Sutton is a 72 y.o. female patient with multiple myeloma has been getting treatment as an outpatient patient had developed chemo induced pancytopenia also elevated BUN and creatinine she was therefore admitted for further management she was also having increasing pain in the back patient does have recurrence of disease getting worse consultation was made with Dr. Rosales who has now given radiation therapy to the back for the pain control for palliation as she was also seen by nephrology for elevated BUN and creatinine and decreased urine output patient was given IV hydration albumin and Lasix to reduce the swelling she is comfortable doing well transfused with blood about 4 units hemoglobin has gone up she has completed her radiation therapy today and will be discharged      DISCHARGE Follow Up Recommendations for labs and diagnostics: Patient with  multiple myeloma advanced admitted with renal failure which was probably because of the diuretics has improved      Pertinent  and/or Most Recent Results     LAB RESULTS:      Lab 03/24/23  0604 03/23/23  1756 03/22/23  0753 03/21/23  0605 03/20/23  0533   WBC 6.61 7.50 8.17 7.39 12.43*   HEMOGLOBIN 11.8* 11.5* 11.0* 6.7* 9.3*   HEMATOCRIT 36.4 34.7 34.6 21.8* 30.0*   PLATELETS 157 165 159 128* 189   NEUTROS ABS 5.29 5.89 6.20 5.73 9.11*   IMMATURE GRANS (ABS) 0.28* 0.31* 0.16* 0.48* 0.12*   LYMPHS ABS 0.35* 0.40* 0.58* 0.72 2.03   MONOS ABS 0.67 0.88 1.16* 0.45 0.89   EOS ABS 0.00 0.00 0.01 0.00 0.21   MCV 83.1 83.6 84.8 87.2 87.0         Lab 03/24/23  0604 03/23/23  1756 03/22/23  0753 03/21/23  0605 03/20/23  0533   SODIUM 135* 136 136 141 143   POTASSIUM 3.9 4.1 4.8 4.8 4.6   CHLORIDE 97* 97* 98 102 102   CO2 28.5 29.7* 31.9* 31.4* 33.8*   ANION GAP 9.5 9.3 6.1 7.6 7.2   BUN 51* 53* 45* 42* 27*   CREATININE 1.25* 1.29* 1.51* 1.94* 1.66*   EGFR 45.9* 44.2* 36.6* 27.1* 32.6*   GLUCOSE 104* 103* 91 135* 83   CALCIUM 7.7* 8.0* 8.4* 9.2 9.7                     Lab 03/21/23  0839   ABO TYPING O   RH TYPING Positive   ANTIBODY SCREEN Positive         Brief Urine Lab Results  (Last result in the past 365 days)      Color   Clarity   Blood   Leuk Est   Nitrite   Protein   CREAT   Urine HCG        03/17/23 1906             20.5             Microbiology Results (last 10 days)     ** No results found for the last 240 hours. **          MRI Brain Without Contrast    Result Date: 3/22/2023  Impression:   MR examination of the brain without IV contrast demonstrating no acute intracranial abnormality.  Multiple calvarial lesions have increased in size and number, as above.      BRO PERSAUD MD       Electronically Signed and Approved By: BRO PERSAUD MD on 3/22/2023 at 19:59             XR Chest 1 View    Result Date: 2/25/2023  Impression:   1. Mild prominence of perihilar and infrahilar markings with slight improvement  from comparison       COREY SCHAEFFER MD       Electronically Signed and Approved By: COREY SCHAEFFER MD on 2/25/2023 at 11:16                       Results for orders placed during the hospital encounter of 02/21/23    Adult Transthoracic Echo Complete w/ Color, Spectral and Contrast if necessary per protocol    Interpretation Summary  •  Left ventricular ejection fraction appears to be 41 - 45%.  •  Left ventricular diastolic function was normal.  •  Left atrial volume is moderately increased.    There were no apparent intracardiac masses, vegetations or thrombi.      Labs Pending at Discharge:        Discharge Details        Discharge Medications      New Medications      Instructions Start Date   polyethylene glycol 17 g packet  Commonly known as: MIRALAX   17 g, Oral, Daily   Start Date: March 25, 2023        Changes to Medications      Instructions Start Date   pregabalin 100 MG capsule  Commonly known as: LYRICA  What changed:   · medication strength  · how much to take  · when to take this   100 mg, Oral, Daily         Continue These Medications      Instructions Start Date   acyclovir 400 MG tablet  Commonly known as: ZOVIRAX   400 mg, Oral, 2 Times Daily, TAKES TO PREVENT SHINGLES      ondansetron ODT 8 MG disintegrating tablet  Commonly known as: ZOFRAN-ODT   8 mg, Translingual, Every 8 Hours PRN      oxyCODONE 5 MG immediate release tablet  Commonly known as: ROXICODONE   5 mg, Oral, Every 6 Hours      prochlorperazine 25 MG suppository  Commonly known as: COMPAZINE   25 mg, Rectal, Every 8 Hours PRN      spironolactone 25 MG tablet  Commonly known as: ALDACTONE   25 mg, Oral, Daily, INST PER ANESTHESIA PROTOCOL      Stimulant Laxative 8.6-50 MG per tablet  Generic drug: sennosides-docusate   2 tablets, Oral, 2 Times Daily      Vitamin D3 1.25 MG (63161 UT) tablet   1 tablet/day, Oral, Every 7 Days, TAKES ON Monday. INST PER ANESTHESIA PROTOCOL      zolpidem 5 MG tablet  Commonly known as: AMBIEN   5  mg, Oral, Nightly PRN         Stop These Medications    Darzalex Faspro 1800-63746 MG-UT/15ML solution injection  Generic drug: daratumumab-hyaluronidase-fihj     Morphine 100 MG 12 hr tablet  Commonly known as: MS CONTIN     Ninlaro 4 MG capsule  Generic drug: Ixazomib Citrate     Pomalyst 4 MG chemo capsule  Generic drug: pomalidomide     potassium chloride 10 MEQ CR tablet            No Known Allergies      Discharge Disposition:  Home or Self Care    Diet:  Hospital:  Diet Order   Procedures   • Diet: Regular/House Diet; Texture: Regular Texture (IDDSI 7); Fluid Consistency: Thin (IDDSI 0)         Discharge Activity: As tolerated        CODE STATUS:  Code Status and Medical Interventions:   Ordered at: 03/15/23 1908     Medical Intervention Limits:    NO intubation (DNI)    NO cardioversion    NO dialysis    NO NIPPV (Non-Invasive Positive Pressure Ventilation)    NO vasopressors     Level Of Support Discussed With:    Patient     Code Status (Patient has no pulse and is not breathing):    No CPR (Do Not Attempt to Resuscitate)     Medical Interventions (Patient has pulse or is breathing):    Limited Support     Comments:    Multiple myeloma for treatment but no CPR     Release to patient:    Routine Release         No future appointments.        Time spent on Discharge including face to face service: 45 minutes    Electronically signed by Abbe Zamudio MD, 03/24/23, 8:53 AM EDT.    Part of this note may be an electronic transcription/translation of spoken language to printed text using the Dragon Dictation System.

## 2023-03-24 NOTE — DISCHARGE INSTR - APPOINTMENTS
FOLLOW UP : With Gilberto Coleman  On March 30th Thursday at 10:00   At [address] [id number]

## 2023-03-24 NOTE — PLAN OF CARE
Goal Outcome Evaluation:  Plan of Care Reviewed With: patient        Progress: no change  Outcome Evaluation: Pt c/o pain this shift, administered prn pain meds as ordered. Will draw am labs toward end of shift. Pt may be discharged today. No new issues or needs at this time.

## 2023-03-24 NOTE — CONSULTS
Discharge Planning Assessment   [facility]     Patient Name: Evangelina Sutton  MRN: [id number]  Today's Date: 3/24/2023    Admit Date: 3/15/2023     Discharge Plan     Row Name 03/24/23 0949       Plan    Final Discharge Disposition Code 06 - home with home health care    Final Note Pt to discharge home with [facility]. No additional needs noted at this time.              Continued Care and Services - Admitted Since 3/15/2023     Home Medical Care     Service Provider Request Status Selected Services Address Phone Fax Patient Preferred    [facility] [place] Accepted N/A [address], [place] [address] [phone] --              Expected Discharge Date and Time     Expected Discharge Date Expected Discharge Time    Mar 24, 2023         YUMI Hedrick

## 2023-03-24 NOTE — PROGRESS NOTES
[facility]     Progress Note    Patient Name: Evangelina Sutton  : 1950  MRN: [id number]  Primary Care Physician:  Gilberto Coleman MD  Date of admission: 3/15/2023    Subjective    Patient's creatinine appears to be stable  No major acute events overnight  Plan for possible discharge today    Facility-Administered Medications as of 3/24/2023   Medication Dose Route Frequency Provider Last Rate Last Admin   • acetaminophen (TYLENOL) tablet 650 mg  650 mg Oral Q4H PRN Abbe Zamudio MD   650 mg at 23 0046   • [COMPLETED] acyclovir (ZOVIRAX) tablet 400 mg  400 mg Oral Daily Abbe Zamudio MD   400 mg at 23 0759   • [COMPLETED] albumin human 25 % IV SOLN 25 g  25 g Intravenous TID Abbe Zamudio MD   Stopped at 23 1145   • aluminum-magnesium hydroxide-simethicone (MAALOX MAX) 400-400-40 MG/5ML suspension 15 mL  15 mL Oral Q6H PRN Abbe Zamudio MD       • dexamethasone (DECADRON) tablet 4 mg  4 mg Oral Q12H Abbe Zamudio MD   4 mg at 23 0800   • [COMPLETED] dexamethasone sodium phosphate injection 10 mg  10 mg Intravenous Q8H Abbe Zamudio MD   10 mg at 23 1728   • [COMPLETED] furosemide (LASIX) injection 20 mg  20 mg Intravenous TID Abbe Zamudio MD   20 mg at 23 1019   • [COMPLETED] gadobenate dimeglumine (MULTIHANCE) injection 10 mL  10 mL Intravenous Once in imaging Abbe Zamudio MD   10 mL at 23 1739   • heparin injection 500 Units  5 mL Intravenous PRN Abbe Zamudio MD       • ondansetron (ZOFRAN) injection 4 mg  4 mg Intravenous Q6H PRN Abbe Zamudio MD       • ondansetron ODT (ZOFRAN-ODT) disintegrating tablet 8 mg  8 mg Oral Q8H PRN Abbe Zamudio MD       • polyethylene glycol (MIRALAX) packet 17 g  17 g Oral Daily Abbe Zamudio MD   17 g at 23 1055   • pregabalin (LYRICA) capsule 100 mg  100 mg Oral Q12H Lucy Flowers MD   100 mg at 23 0800   • prochlorperazine (COMPAZINE) suppository 25 mg  25 mg Rectal  Q8H PRN Abbe Zamudio MD       • sennosides-docusate (PERICOLACE) 8.6-50 MG per tablet 2 tablet  2 tablet Oral BID Abbe Zamudio MD   2 tablet at 03/22/23 0838   • sodium chloride 0.9 % flush 10 mL  10 mL Intravenous PRN Abbe Zamudio MD       • sodium chloride 0.9 % flush 10 mL  10 mL Intravenous Q12H Abbe Zamudio MD   10 mL at 03/24/23 0800   • sodium chloride 0.9 % flush 10 mL  10 mL Intravenous Q12H Abbe Zamudio MD   10 mL at 03/24/23 0800   • sodium chloride 0.9 % flush 10 mL  10 mL Intravenous PRN Abbe Zamudio MD   10 mL at 03/17/23 2208   • sodium chloride 0.9 % flush 20 mL  20 mL Intravenous PRN Abbe Zamudio MD       • sodium chloride 0.9 % infusion 40 mL  40 mL Intravenous PRN Abbe Zamudio MD       • sodium chloride 0.9 % infusion 40 mL  40 mL Intravenous PRN Abbe Zamudio MD       • zolpidem (AMBIEN) tablet 5 mg  5 mg Oral Nightly PRN Abbe Zamudio MD              Review of Systems  Constitutional:        Weakness tiredness fatigue  Eyes:                       No blurry vision, eye discharge, eye irritation, eye pain  HEENT:                   No acute hair loss, earache and discharge, nasal congestion or discharge, sore throat, postnasal drip  Respiratory:           No shortness of breath coughing sputum production wheezing hemoptysis pleuritic chest pain  Cardiovascular:     No chest pain, orthopnea, PND, dizziness, palpitation, lower extremity edema  Gastrointestinal:   No nausea vomiting diarrhea abdominal pain constipation  Genitourinary:       No urinary incontinence, hesitancy, frequency, urgency, dysuria  Hematologic:         No bruising, bleeding, pallor, lymphadenopathy  Endocrine:            No coldness, hot flashes, polyuria, abnormal hair growth  Musculoskeletal:    No body pains, aches, arthritic pains, muscle pain ,muscle wasting  Psychiatric:          No low or high mood, anxiety, hallucinations, delusions  Skin.                      No rash, ulcers,  bruising, itching  Neurological:        No confusion, headache, focal weakness, numbness, dysphasia    Objective   Objective     Vitals:   Temp:  [97.3 °F (36.3 °C)-98.8 °F (37.1 °C)] 98.6 °F (37 °C)  Heart Rate:  [79-91] 80  Resp:  [16-18] 16  BP: (112-144)/(65-79) 144/79  Physical Exam    Constitutional: Awake, alert responsive, conversant, no obvious distress              Psychiatric:  Appropriate affect, cooperative   Neurologic:  Awake alert ,oriented x 3, strength symmetric in all extremities, Cranial Nerves grossly intact to confrontation, speech clear   Eyes:   PERRLA, sclerae anicteric, no conjunctival injection   HEENT:  Moist mucous membranes, no nasal or eye discharge, no throat congestion   Neck:   Supple, no thyromegaly, no lymphadenopathy, trachea midline, no elevated JVD   Respiratory:  Clear to auscultation bilaterally, nonlabored respirations    Cardiovascular: RRR, no murmurs, rubs, or gallops, palpable pedal pulses bilaterally, No bilateral ankle edema   Gastrointestinal: Positive bowel sounds, soft, nontender, nondistended, no organomegaly   Musculoskeletal:  No clubbing or cyanosis to extremities,muscle wasting, joint swelling, muscle weakness             Skin:                      No rashes, bruising, skin ulcers, petechiae or ecchymosis    Result Review    Result Review:  I have personally reviewed the results from the time of this admission to 3/24/2023 08:19 EDT and agree with these findings:  []  Laboratory  []  Microbiology  []  Radiology  []  EKG/Telemetry   []  Cardiology/Vascular   []  Pathology  []  Old records  []  Other:    Assessment & Plan   Assessment / Plan       Active Hospital Problems:  Active Hospital Problems    Diagnosis    • **Anemia    • Edema    • Difficulty walking    • Acute renal failure (ARF) (HCC)    • Pancytopenia due to chemotherapy (HCC)    • Nephrotic syndrome associated with amyloidosis (HCC)    • 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  Her course is also complicated with low hemoglobin status post transfusion she is also receiving radiation treatment for her lumbar spine pain.  Creatinine now close to baseline at 1.2    Plan:   May continue Lyrica back to home dose  Patient is tolerating MS Contin well at this time.  patient continues to have positiveUPEP on 24-hour urine collection

## 2023-03-25 NOTE — OUTREACH NOTE
Prep Survey    Flowsheet Row Responses   [facility] facility patient discharged from? [facility]   Is LACE score < 7 ? No   Eligibility Readm Mgmt   Discharge diagnosis anemia,  chemo induced pancytopenia   Does the patient have one of the following disease processes/diagnoses(primary or secondary)? Other   Does the patient have Home health ordered? Yes   What is the Home health agency?  [facility]   Is there a DME ordered? No   General alerts for this patient HX multiple myeloma, chemo, CHF   Prep survey completed? Yes          Faviola LIVINGSTON - Registered Nurse

## 2023-03-27 NOTE — PROGRESS NOTES
Enter Query Response Below      Query Response: Unable to determine             If applicable, please update the problem list.     Patient: Evangelina Sutton        : 1950  Account: [id number]           Admit Date:         How to Respond to this query:       a. Click New Note     b. Answer query within the yellow box.                c. Update the Problem List, if applicable.      If you have any questions about this query contact me at: [email or web address]    :    72 year old admitted with weakness and Anemia. 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 EF 41 to 45% per ech dated 23. No BNP noted. Patient on Aldactone and given Lasix IV during stay. After study can combined heart failure be further clarified as:    Acute combined systolic /diastolic heart failure  Other_______  Unable to determine    By submitting this query, we are merely seeking further clarification of documentation to accurately reflect all conditions that you are monitoring, evaluating, treating or that extend the hospitalization or utilize additional resources of care. Please utilize your independent clinical judgment when addressing the question(s) above.     This query and your response, once completed, will be entered into the legal medical record.    Sincerely,  Fabi Copeland RN CDI   Clinical Documentation Integrity Program

## 2023-03-30 ENCOUNTER — READMISSION MANAGEMENT (OUTPATIENT)
Dept: CALL CENTER | Facility: HOSPITAL | Age: 73
End: 2023-03-30
Payer: MEDICARE

## 2023-03-30 NOTE — OUTREACH NOTE
Medical Week 1 Survey    Flowsheet Row Responses   [facility] facility patient discharged from? [facility]   Does the patient have one of the following disease processes/diagnoses(primary or secondary)? Other   Week 1 attempt successful? No   Unsuccessful attempts Attempt 1          WOLF LOPEZ - Registered Nurse

## 2023-04-03 ENCOUNTER — READMISSION MANAGEMENT (OUTPATIENT)
Dept: CALL CENTER | Facility: HOSPITAL | Age: 73
End: 2023-04-03
Payer: MEDICARE

## 2023-04-03 NOTE — OUTREACH NOTE
Medical Week 1 Survey    Flowsheet Row Responses   [facility] patient discharged from? [facility]   Does the patient have one of the following disease processes/diagnoses(primary or secondary)? Other   Week 1 attempt successful? No   Unsuccessful attempts Attempt 2          Lo TURNER - Licensed Nurse

## 2023-04-06 ENCOUNTER — READMISSION MANAGEMENT (OUTPATIENT)
Dept: CALL CENTER | Facility: HOSPITAL | Age: 73
End: 2023-04-06
Payer: MEDICARE

## 2023-04-06 NOTE — OUTREACH NOTE
Medical Week 1 Survey    Flowsheet Row Responses   [facility] patient discharged from? [facility]   Does the patient have one of the following disease processes/diagnoses(primary or secondary)? Other   Week 1 attempt successful? Yes   Call start time 1606   Call end time 1608   Discharge diagnosis anemia,  chemo induced pancytopenia   Person spoke with today (if not patient) and relationship patient   Meds reviewed with patient/caregiver? Yes   Prescription comments no concerns or questions.   Is the patient taking all medications as directed (includes completed medication regime)? Yes   Does the patient have a primary care provider?  Yes   Does the patient have an appointment with their PCP within 7 days of discharge? No   Comments regarding PCP states no to scheduling appt to pcp or Hematologist- advised to schedule appt- and educated the importance.   Nursing Interventions Educated patient on importance of making appointment, Advised patient to make appointment   What is the Home health agency?  [facility]    Home health comments Patient states  is still coming to the home.   Psychosocial issues? No   Did the patient receive a copy of their discharge instructions? Yes   Nursing interventions Reviewed instructions with patient   What is the patient's perception of their health status since discharge? Improving   Is the patient/caregiver able to teach back signs and symptoms related to disease process for when to call PCP? Yes   Is the patient/caregiver able to teach back signs and symptoms related to disease process for when to call 911? Yes   Is the patient/caregiver able to teach back the hierarchy of who to call/visit for symptoms/problems? PCP, Specialist, Home health nurse, Urgent Care, ED, 911 Yes   Week 1 call completed? Yes   Is the patient interested in additional calls from an ambulatory ?  NOTE:  applies to high risk patients requiring additional follow-up. No   Wrap up additional comments  Patient states she is doing fine- HH is still coming- educated the importance of scheduling fu visits no other concerns or questions noted.          Gemma WILLINGHAM - Registered Nurse

## 2023-04-09 NOTE — PROGRESS NOTES
Enter Query Response Below      Query Response: Thrombocytopenia due to chemotherapy supported by the evidence and history             If applicable, please update the problem list.     Patient: Evangelina Sutton        : 1950  Account: [id number]           Admit Date: 3/15/2023        How to Respond to this query:       a. Click New Note     b. Answer query within the yellow box.                c. Update the Problem List, if applicable.      If you have any questions about this query contact me at: [email or web address]    Dr. oPpe    72-year-old female admitted OBS 3/15, IP 3/17 with multiple myeloma and pancytopenia due to chemotherapy per the dc summary. Platelets noted to be 128 (3/21)- the rest of the [id number], WBC 1.97 (3/15)2.11 (3/16)- the rest of admit 5.77-12.43. CBC was monitored daily.    After study, was pancytopenia due to chemotherapy clinically supported during this admission?  pancytopenia due to chemotherapy was supported with additional clinical indicators:____________  pancytopenia due to chemotherapy was not supported  other- specify_____________  unable to determine    By submitting this query, we are merely seeking further clarification of documentation to accurately reflect all conditions that you are monitoring, evaluating, treating or that extend the hospitalization or utilize additional resources of care. Please utilize your independent clinical judgment when addressing the question(s) above.     This query and your response, once completed, will be entered into the legal medical record.    Sincerely,  Gabrielle Cannon MSN, RN, CCDS  Clinical Documentation Integrity Program

## 2023-04-13 ENCOUNTER — READMISSION MANAGEMENT (OUTPATIENT)
Dept: CALL CENTER | Facility: HOSPITAL | Age: 73
End: 2023-04-13
Payer: MEDICARE

## 2023-04-13 NOTE — OUTREACH NOTE
Medical Week 2 Survey    Flowsheet Row Responses   [facility] facility patient discharged from? [facility]   Does the patient have one of the following disease processes/diagnoses(primary or secondary)? Other   Week 2 attempt successful? No   Unsuccessful attempts Attempt 1          Gemma WILLINGHAM - Registered Nurse

## 2023-04-18 ENCOUNTER — READMISSION MANAGEMENT (OUTPATIENT)
Dept: CALL CENTER | Facility: HOSPITAL | Age: 73
End: 2023-04-18
Payer: MEDICARE

## 2023-04-18 NOTE — OUTREACH NOTE
Medical Week 2 Survey    Flowsheet Row Responses   [facility] patient discharged from? [facility]   Does the patient have one of the following disease processes/diagnoses(primary or secondary)? Other   Week 2 attempt successful? Yes   Call start time 1357   General alerts for this patient HX multiple myeloma, chemo, CHF   Discharge diagnosis anemia,  chemo induced pancytopenia   Call end time 1409   Is patient permission given to speak with other caregiver? Yes   Person spoke with today (if not patient) and relationship Aaliyah Contreras reviewed with patient/caregiver? Yes   Is the patient having any side effects they believe may be caused by any medication additions or changes? No   Does the patient have all medications ordered at discharge? N/A   Is the patient taking all medications as directed (includes completed medication regime)? Yes   Medication comments Oncology stopped all but morphine, appt tomorrow and will discuss her inability to walk.   Does the patient have a primary care provider?  Yes   Does the patient have an appointment with their PCP within 7 days of discharge? Yes   Has the patient kept scheduled appointments due by today? N/A   What is the Home health agency?  [facility]   Has home health visited the patient within 72 hours of discharge? Yes   Psychosocial issues? No   Did the patient receive a copy of their discharge instructions? Yes   Nursing interventions Reviewed instructions with patient   What is the patient's perception of their health status since discharge? Improving   Is the patient/caregiver able to teach back signs and symptoms related to disease process for when to call PCP? Yes   Is the patient/caregiver able to teach back signs and symptoms related to disease process for when to call 911? Yes   Is the patient/caregiver able to teach back the hierarchy of who to call/visit for symptoms/problems? PCP, Specialist, Home health nurse, Urgent Care, ED, 911 Yes   If the patient is a current  smoker, are they able to teach back resources for cessation? Not a smoker   Additional teach back comments PT has not been out yet he says, he will call them.   Week 2 Call Completed? Yes   Is the patient interested in additional calls from an ambulatory ?  NOTE:  applies to high risk patients requiring additional follow-up. No   Wrap up additional comments No questions, encouraged talking to a Marvin DOAN - Registered Nurse

## 2023-04-19 ENCOUNTER — APPOINTMENT (OUTPATIENT)
Dept: GENERAL RADIOLOGY | Facility: HOSPITAL | Age: 73
DRG: 841 | End: 2023-04-19
Payer: MEDICARE

## 2023-04-19 ENCOUNTER — APPOINTMENT (OUTPATIENT)
Dept: CT IMAGING | Facility: HOSPITAL | Age: 73
DRG: 841 | End: 2023-04-19
Payer: MEDICARE

## 2023-04-19 ENCOUNTER — READMISSION MANAGEMENT (OUTPATIENT)
Dept: CALL CENTER | Facility: HOSPITAL | Age: 73
End: 2023-04-19
Payer: MEDICARE

## 2023-04-19 ENCOUNTER — APPOINTMENT (OUTPATIENT)
Dept: MRI IMAGING | Facility: HOSPITAL | Age: 73
DRG: 841 | End: 2023-04-19
Payer: MEDICARE

## 2023-04-19 ENCOUNTER — HOSPITAL ENCOUNTER (INPATIENT)
Facility: HOSPITAL | Age: 73
LOS: 10 days | Discharge: NURSING, REHAB OR LONG-TERM CARE | DRG: 841 | End: 2023-04-29
Attending: EMERGENCY MEDICINE | Admitting: INTERNAL MEDICINE
Payer: MEDICARE

## 2023-04-19 DIAGNOSIS — E87.6 HYPOKALEMIA: ICD-10-CM

## 2023-04-19 DIAGNOSIS — R60.9 PERIPHERAL EDEMA: ICD-10-CM

## 2023-04-19 DIAGNOSIS — R26.2 DIFFICULTY WALKING: ICD-10-CM

## 2023-04-19 DIAGNOSIS — S39.92XA INJURY OF BACK, INITIAL ENCOUNTER: ICD-10-CM

## 2023-04-19 DIAGNOSIS — R53.1 WEAKNESS: ICD-10-CM

## 2023-04-19 DIAGNOSIS — Z78.9 DECREASED ACTIVITIES OF DAILY LIVING (ADL): ICD-10-CM

## 2023-04-19 DIAGNOSIS — C90.02 MULTIPLE MYELOMA IN RELAPSE: ICD-10-CM
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
## 2023-04-19 DIAGNOSIS — C90.00 MULTIPLE MYELOMA, REMISSION STATUS UNSPECIFIED: Primary | ICD-10-CM

## 2023-04-19 LAB
ALBUMIN SERPL-MCNC: 2.6 G/DL (ref 3.5–5.2)
ALBUMIN SERPL-MCNC: 2.7 G/DL (ref 3.5–5.2)
ALBUMIN/GLOB SERPL: 0.5 G/DL
ALBUMIN/GLOB SERPL: 0.5 G/DL
ALP SERPL-CCNC: 56 U/L (ref 39–117)
ALP SERPL-CCNC: 62 U/L (ref 39–117)
ALT SERPL W P-5'-P-CCNC: 9 U/L (ref 1–33)
ALT SERPL W P-5'-P-CCNC: 9 U/L (ref 1–33)
ANION GAP SERPL CALCULATED.3IONS-SCNC: 10.1 MMOL/L (ref 5–15)
ANION GAP SERPL CALCULATED.3IONS-SCNC: 9.5 MMOL/L (ref 5–15)
AST SERPL-CCNC: 23 U/L (ref 1–32)
AST SERPL-CCNC: 24 U/L (ref 1–32)
BACTERIA UR QL AUTO: ABNORMAL /HPF
BASOPHILS # BLD AUTO: 0.01 10*3/MM3 (ref 0–0.2)
BASOPHILS NFR BLD AUTO: 0.3 % (ref 0–1.5)
BILIRUB SERPL-MCNC: 0.4 MG/DL (ref 0–1.2)
BILIRUB SERPL-MCNC: 0.4 MG/DL (ref 0–1.2)
BILIRUB UR QL STRIP: NEGATIVE
BUN SERPL-MCNC: 16 MG/DL (ref 8–23)
BUN SERPL-MCNC: 17 MG/DL (ref 8–23)
BUN/CREAT SERPL: 21.9 (ref 7–25)
BUN/CREAT SERPL: 27.4 (ref 7–25)
CALCIUM SPEC-SCNC: 10.4 MG/DL (ref 8.6–10.5)
CALCIUM SPEC-SCNC: 10.8 MG/DL (ref 8.6–10.5)
CHLORIDE SERPL-SCNC: 102 MMOL/L (ref 98–107)
CHLORIDE SERPL-SCNC: 102 MMOL/L (ref 98–107)
CLARITY UR: CLEAR
CO2 SERPL-SCNC: 22.9 MMOL/L (ref 22–29)
CO2 SERPL-SCNC: 24.5 MMOL/L (ref 22–29)
COD CRY URNS QL: ABNORMAL /HPF
COLOR UR: YELLOW
CREAT SERPL-MCNC: 0.62 MG/DL (ref 0.57–1)
CREAT SERPL-MCNC: 0.73 MG/DL (ref 0.57–1)
DEPRECATED RDW RBC AUTO: 57.3 FL (ref 37–54)
DEPRECATED RDW RBC AUTO: 59.9 FL (ref 37–54)
EGFRCR SERPLBLD CKD-EPI 2021: 87.5 ML/MIN/1.73
EGFRCR SERPLBLD CKD-EPI 2021: 94.8 ML/MIN/1.73
EOSINOPHIL # BLD AUTO: 0.15 10*3/MM3 (ref 0–0.4)
EOSINOPHIL NFR BLD AUTO: 5 % (ref 0.3–6.2)
ERYTHROCYTE [DISTWIDTH] IN BLOOD BY AUTOMATED COUNT: 19.5 % (ref 12.3–15.4)
ERYTHROCYTE [DISTWIDTH] IN BLOOD BY AUTOMATED COUNT: 19.9 % (ref 12.3–15.4)
GLOBULIN UR ELPH-MCNC: 5.5 GM/DL
GLOBULIN UR ELPH-MCNC: 5.6 GM/DL
GLUCOSE SERPL-MCNC: 112 MG/DL (ref 65–99)
GLUCOSE SERPL-MCNC: 114 MG/DL (ref 65–99)
GLUCOSE UR STRIP-MCNC: NEGATIVE MG/DL
HCT VFR BLD AUTO: 29.5 % (ref 34–46.6)
HCT VFR BLD AUTO: 32.7 % (ref 34–46.6)
HGB BLD-MCNC: 10.4 G/DL (ref 12–15.9)
HGB BLD-MCNC: 9.9 G/DL (ref 12–15.9)
HGB UR QL STRIP.AUTO: NEGATIVE
HOLD SPECIMEN: NORMAL
HOLD SPECIMEN: NORMAL
HYALINE CASTS UR QL AUTO: ABNORMAL /LPF
IMM GRANULOCYTES # BLD AUTO: 0 10*3/MM3 (ref 0–0.05)
IMM GRANULOCYTES NFR BLD AUTO: 0 % (ref 0–0.5)
KETONES UR QL STRIP: NEGATIVE
LEUKOCYTE ESTERASE UR QL STRIP.AUTO: NEGATIVE
LYMPHOCYTES # BLD AUTO: 0.73 10*3/MM3 (ref 0.7–3.1)
LYMPHOCYTES NFR BLD AUTO: 24.2 % (ref 19.6–45.3)
MAGNESIUM SERPL-MCNC: 1.5 MG/DL (ref 1.6–2.4)
MCH RBC QN AUTO: 26.5 PG (ref 26.6–33)
MCH RBC QN AUTO: 27.4 PG (ref 26.6–33)
MCHC RBC AUTO-ENTMCNC: 31.8 G/DL (ref 31.5–35.7)
MCHC RBC AUTO-ENTMCNC: 33.6 G/DL (ref 31.5–35.7)
MCV RBC AUTO: 81.7 FL (ref 79–97)
MCV RBC AUTO: 83.4 FL (ref 79–97)
MONOCYTES # BLD AUTO: 0.19 10*3/MM3 (ref 0.1–0.9)
MONOCYTES NFR BLD AUTO: 6.3 % (ref 5–12)
NEUTROPHILS NFR BLD AUTO: 1.94 10*3/MM3 (ref 1.7–7)
NEUTROPHILS NFR BLD AUTO: 64.2 % (ref 42.7–76)
NITRITE UR QL STRIP: NEGATIVE
NRBC BLD AUTO-RTO: 0 /100 WBC (ref 0–0.2)
NT-PROBNP SERPL-MCNC: 299 PG/ML (ref 0–900)
PH UR STRIP.AUTO: 6 [PH] (ref 5–8)
PLATELET # BLD AUTO: 106 10*3/MM3 (ref 140–450)
PLATELET # BLD AUTO: 107 10*3/MM3 (ref 140–450)
PMV BLD AUTO: 8.9 FL (ref 6–12)
PMV BLD AUTO: 9.7 FL (ref 6–12)
POTASSIUM SERPL-SCNC: 2.9 MMOL/L (ref 3.5–5.2)
POTASSIUM SERPL-SCNC: 3.7 MMOL/L (ref 3.5–5.2)
PROT SERPL-MCNC: 8.1 G/DL (ref 6–8.5)
PROT SERPL-MCNC: 8.3 G/DL (ref 6–8.5)
PROT UR QL STRIP: ABNORMAL
QT INTERVAL: 336 MS
RBC # BLD AUTO: 3.61 10*6/MM3 (ref 3.77–5.28)
RBC # BLD AUTO: 3.92 10*6/MM3 (ref 3.77–5.28)
RBC # UR STRIP: ABNORMAL /HPF
REF LAB TEST METHOD: ABNORMAL
SODIUM SERPL-SCNC: 135 MMOL/L (ref 136–145)
SODIUM SERPL-SCNC: 136 MMOL/L (ref 136–145)
SP GR UR STRIP: 1.03 (ref 1–1.03)
SQUAMOUS #/AREA URNS HPF: ABNORMAL /HPF
TROPONIN T SERPL HS-MCNC: 26 NG/L
UROBILINOGEN UR QL STRIP: ABNORMAL
WBC # UR STRIP: ABNORMAL /HPF
WBC NRBC COR # BLD: 2.74 10*3/MM3 (ref 3.4–10.8)
WBC NRBC COR # BLD: 3.02 10*3/MM3 (ref 3.4–10.8)
WHOLE BLOOD HOLD COAG: NORMAL
WHOLE BLOOD HOLD SPECIMEN: NORMAL

## 2023-04-19 PROCEDURE — 81001 URINALYSIS AUTO W/SCOPE: CPT | Performed by: EMERGENCY MEDICINE

## 2023-04-19 PROCEDURE — 71045 X-RAY EXAM CHEST 1 VIEW: CPT

## 2023-04-19 PROCEDURE — 99285 EMERGENCY DEPT VISIT HI MDM: CPT

## 2023-04-19 PROCEDURE — 72131 CT LUMBAR SPINE W/O DYE: CPT

## 2023-04-19 PROCEDURE — 36415 COLL VENOUS BLD VENIPUNCTURE: CPT

## 2023-04-19 PROCEDURE — 83735 ASSAY OF MAGNESIUM: CPT

## 2023-04-19 PROCEDURE — 84484 ASSAY OF TROPONIN QUANT: CPT

## 2023-04-19 PROCEDURE — 25010000002 FUROSEMIDE PER 20 MG: Performed by: INTERNAL MEDICINE

## 2023-04-19 PROCEDURE — 25010000002 ALBUMIN HUMAN 25% PER 50 ML: Performed by: INTERNAL MEDICINE

## 2023-04-19 PROCEDURE — 83880 ASSAY OF NATRIURETIC PEPTIDE: CPT | Performed by: EMERGENCY MEDICINE

## 2023-04-19 PROCEDURE — 85025 COMPLETE CBC W/AUTO DIFF WBC: CPT

## 2023-04-19 PROCEDURE — P9047 ALBUMIN (HUMAN), 25%, 50ML: HCPCS | Performed by: INTERNAL MEDICINE

## 2023-04-19 PROCEDURE — 94799 UNLISTED PULMONARY SVC/PX: CPT

## 2023-04-19 PROCEDURE — 93005 ELECTROCARDIOGRAM TRACING: CPT | Performed by: EMERGENCY MEDICINE

## 2023-04-19 PROCEDURE — 85027 COMPLETE CBC AUTOMATED: CPT | Performed by: INTERNAL MEDICINE

## 2023-04-19 PROCEDURE — 93005 ELECTROCARDIOGRAM TRACING: CPT

## 2023-04-19 PROCEDURE — 0 POTASSIUM CHLORIDE 10 MEQ/100ML SOLUTION: Performed by: EMERGENCY MEDICINE

## 2023-04-19 PROCEDURE — 80053 COMPREHEN METABOLIC PANEL: CPT

## 2023-04-19 PROCEDURE — 72148 MRI LUMBAR SPINE W/O DYE: CPT

## 2023-04-19 PROCEDURE — 80053 COMPREHEN METABOLIC PANEL: CPT | Performed by: INTERNAL MEDICINE

## 2023-04-19 PROCEDURE — 0 POTASSIUM CHLORIDE PER 2 MEQ: Performed by: INTERNAL MEDICINE

## 2023-04-19 RX ORDER — SODIUM CHLORIDE 0.9 % (FLUSH) 0.9 %
10 SYRINGE (ML) INJECTION AS NEEDED
Status: DISCONTINUED | OUTPATIENT
Start: 2023-04-19 | End: 2023-04-29 | Stop reason: HOSPADM

## 2023-04-19 RX ORDER — FUROSEMIDE 10 MG/ML
40 INJECTION INTRAMUSCULAR; INTRAVENOUS
Status: DISCONTINUED | OUTPATIENT
Start: 2023-04-19 | End: 2023-04-28

## 2023-04-19 RX ORDER — ALBUMIN (HUMAN) 12.5 G/50ML
25 SOLUTION INTRAVENOUS 2 TIMES DAILY
Status: COMPLETED | OUTPATIENT
Start: 2023-04-19 | End: 2023-04-20

## 2023-04-19 RX ORDER — ACETAMINOPHEN 325 MG/1
650 TABLET ORAL EVERY 4 HOURS PRN
Status: DISCONTINUED | OUTPATIENT
Start: 2023-04-19 | End: 2023-04-29 | Stop reason: HOSPADM

## 2023-04-19 RX ORDER — ALUMINA, MAGNESIA, AND SIMETHICONE 2400; 2400; 240 MG/30ML; MG/30ML; MG/30ML
15 SUSPENSION ORAL EVERY 6 HOURS PRN
Status: DISCONTINUED | OUTPATIENT
Start: 2023-04-19 | End: 2023-04-29 | Stop reason: HOSPADM

## 2023-04-19 RX ORDER — ONDANSETRON 2 MG/ML
4 INJECTION INTRAMUSCULAR; INTRAVENOUS EVERY 6 HOURS PRN
Status: DISCONTINUED | OUTPATIENT
Start: 2023-04-19 | End: 2023-04-29 | Stop reason: HOSPADM

## 2023-04-19 RX ORDER — ERGOCALCIFEROL 1.25 MG/1
50000 CAPSULE ORAL WEEKLY
COMMUNITY

## 2023-04-19 RX ORDER — POTASSIUM CHLORIDE 29.8 MG/ML
20 INJECTION INTRAVENOUS
Status: COMPLETED | OUTPATIENT
Start: 2023-04-19 | End: 2023-04-19

## 2023-04-19 RX ORDER — PREGABALIN 100 MG/1
100 CAPSULE ORAL DAILY
Status: DISCONTINUED | OUTPATIENT
Start: 2023-04-19 | End: 2023-04-29 | Stop reason: HOSPADM

## 2023-04-19 RX ORDER — HYDROCODONE BITARTRATE AND ACETAMINOPHEN 5; 325 MG/1; MG/1
1 TABLET ORAL EVERY 6 HOURS PRN
Status: DISCONTINUED | OUTPATIENT
Start: 2023-04-19 | End: 2023-04-22

## 2023-04-19 RX ORDER — SPIRONOLACTONE 25 MG/1
25 TABLET ORAL DAILY
Status: DISCONTINUED | OUTPATIENT
Start: 2023-04-19 | End: 2023-04-29

## 2023-04-19 RX ORDER — POTASSIUM CHLORIDE 750 MG/1
40 CAPSULE, EXTENDED RELEASE ORAL ONCE
Status: COMPLETED | OUTPATIENT
Start: 2023-04-19 | End: 2023-04-19

## 2023-04-19 RX ORDER — POLYETHYLENE GLYCOL 3350 17 G/17G
17 POWDER, FOR SOLUTION ORAL DAILY
Status: DISCONTINUED | OUTPATIENT
Start: 2023-04-19 | End: 2023-04-29 | Stop reason: HOSPADM

## 2023-04-19 RX ORDER — SODIUM CHLORIDE 0.9 % (FLUSH) 0.9 %
10 SYRINGE (ML) INJECTION EVERY 12 HOURS SCHEDULED
Status: DISCONTINUED | OUTPATIENT
Start: 2023-04-19 | End: 2023-04-29 | Stop reason: HOSPADM

## 2023-04-19 RX ORDER — POTASSIUM CHLORIDE 7.45 MG/ML
10 INJECTION INTRAVENOUS
Status: COMPLETED | OUTPATIENT
Start: 2023-04-19 | End: 2023-04-19

## 2023-04-19 RX ORDER — SODIUM CHLORIDE 9 MG/ML
40 INJECTION, SOLUTION INTRAVENOUS AS NEEDED
Status: DISCONTINUED | OUTPATIENT
Start: 2023-04-19 | End: 2023-04-29 | Stop reason: HOSPADM

## 2023-04-19 RX ADMIN — SPIRONOLACTONE 25 MG: 25 TABLET ORAL at 19:56

## 2023-04-19 RX ADMIN — HYDROCODONE BITARTRATE AND ACETAMINOPHEN 1 TABLET: 5; 325 TABLET ORAL at 18:23

## 2023-04-19 RX ADMIN — POTASSIUM CHLORIDE 10 MEQ: 10 INJECTION, SOLUTION INTRAVENOUS at 16:46

## 2023-04-19 RX ADMIN — POLYETHYLENE GLYCOL 3350 17 G: 17 POWDER, FOR SOLUTION ORAL at 19:55

## 2023-04-19 RX ADMIN — FUROSEMIDE 40 MG: 10 INJECTION, SOLUTION INTRAMUSCULAR; INTRAVENOUS at 19:55

## 2023-04-19 RX ADMIN — POTASSIUM CHLORIDE 20 MEQ: 400 INJECTION, SOLUTION INTRAVENOUS at 19:56

## 2023-04-19 RX ADMIN — POTASSIUM CHLORIDE 20 MEQ: 400 INJECTION, SOLUTION INTRAVENOUS at 21:05

## 2023-04-19 RX ADMIN — POTASSIUM CHLORIDE 10 MEQ: 10 INJECTION, SOLUTION INTRAVENOUS at 18:30

## 2023-04-19 RX ADMIN — ALBUMIN HUMAN 25 G: 0.25 SOLUTION INTRAVENOUS at 22:44

## 2023-04-19 RX ADMIN — PREGABALIN 100 MG: 100 CAPSULE ORAL at 19:57

## 2023-04-19 RX ADMIN — Medication 10 ML: at 21:05

## 2023-04-19 RX ADMIN — POTASSIUM CHLORIDE 40 MEQ: 10 CAPSULE, COATED, EXTENDED RELEASE ORAL at 13:25

## 2023-04-19 NOTE — ED PROVIDER NOTES
Time: 1:05 PM EDT  Date of encounter:  4/19/2023  Independent Historian/Clinical History and Information was obtained by:   Patient  Chief Complaint: back pain    History is limited by: N/A    History of Present Illness:  Patient is a 72 y.o. year old female who presents to the emergency department for evaluation of back pain x3 days. Pt fell 3 days ago, she was trying to get into the chair when she missed the chair and fell. She is unsure if she hit her back on the doorway. Pt normally walks with a walker and has no strength in her lower legs bilaterally since the fall. Pt has bone cancer and received IV chemo a couple weeks ago. Denies bowel/bladder incontinence, radicular pain down legs.     HPI    Patient Care Team  Primary Care Provider: Gilberto Coleman MD    Past Medical History:     No Known Allergies  Past Medical History:   Diagnosis Date   • Cancer     BONE CANCER/TAKING CHEMO SHOT WEEKLY   • DDD (degenerative disc disease), cervical    • Neuropathy     LEGS AND ARMS   • PONV (postoperative nausea and vomiting)      Past Surgical History:   Procedure Laterality Date   • BACK SURGERY      DISCECTOMY W/FUSION, NECK ? LEVEL   • CHOLECYSTECTOMY     • HYSTERECTOMY     • PORTACATH PLACEMENT N/A 3/17/2022    Procedure: INSERTION OF PORTACATH;  Surgeon: Cj Rossi MD;  Location: [facility] OR;  Service: General;  Laterality: N/A;   • TOTAL HIP ARTHROPLASTY Left 4/25/2022    Procedure: LEFT HIP ARTHROPLASTY ANTERIOR;  Surgeon: Sridhar Coto MD;  Location: [facility] OR;  Service: Orthopedics;  Laterality: Left;     Family History   Problem Relation Age of Onset   • Malig Hyperthermia Neg Hx        Home Medications:  Prior to Admission medications    Medication Sig Start Date End Date Taking? Authorizing Provider   acyclovir (ZOVIRAX) 400 MG tablet Take 1 tablet by mouth 2 (Two) Times a Day. TAKES TO PREVENT SHINGLES    Provider, MD Heather   Cholecalciferol (Vitamin D3) 1.25 MG (61840 UT) tablet  Take 1 tablet/day by mouth Every 7 (Seven) Days. TAKES ON Monday. INST PER ANESTHESIA PROTOCOL    Heather Pierre MD   ondansetron ODT (ZOFRAN-ODT) 8 MG disintegrating tablet Place 1 tablet on the tongue Every 8 (Eight) Hours As Needed. 22   Heather Pierre MD   oxyCODONE (ROXICODONE) 5 MG immediate release tablet Take 1 tablet by mouth Every 6 (Six) Hours. 23   Heather Pierre MD   polyethylene glycol (MIRALAX) 17 g packet Take 17 g by mouth Daily. 3/25/23   Abbe Zamudio MD   pregabalin (LYRICA) 100 MG capsule Take 1 capsule by mouth Daily. 3/24/23   Abbe Zamudio MD   prochlorperazine (COMPAZINE) 25 MG suppository Insert 1 suppository into the rectum Every 8 (Eight) Hours As Needed. 22   Heather Pierre MD   sennosides-docusate (PERICOLACE) 8.6-50 MG per tablet Take 2 tablets by mouth 2 (Two) Times a Day. 22   Mino Gil MD   spironolactone (ALDACTONE) 25 MG tablet Take 1 tablet by mouth Daily. INST PER ANESTHESIA PROTOCOL    Heather Pierre MD   zolpidem (AMBIEN) 5 MG tablet Take 1 tablet by mouth At Night As Needed.    Heather Pierre MD        Social History:   Social History     Tobacco Use   • Smoking status: Former     Packs/day: 1.00     Types: Cigarettes     Quit date:      Years since quittin.3   • Smokeless tobacco: Never   Vaping Use   • Vaping Use: Never used   Substance Use Topics   • Alcohol use: Never   • Drug use: Yes     Frequency: 20.0 times per week     Types: Morphine, Oxycodone         Review of Systems:  Review of Systems   Constitutional: Negative for chills, diaphoresis and fever.   HENT: Negative for congestion, postnasal drip, rhinorrhea and sore throat.    Eyes: Negative for photophobia.   Respiratory: Negative for cough, chest tightness and shortness of breath.    Cardiovascular: Negative for chest pain, palpitations and leg swelling.   Gastrointestinal: Negative for abdominal pain, diarrhea, nausea and  "vomiting.   Genitourinary: Negative for difficulty urinating, dysuria, flank pain, frequency, hematuria and urgency.   Musculoskeletal: Positive for back pain. Negative for neck pain and neck stiffness.   Skin: Negative for pallor and rash.   Neurological: Positive for weakness. Negative for dizziness, syncope, numbness and headaches.   Hematological: Negative for adenopathy. Does not bruise/bleed easily.   Psychiatric/Behavioral: Negative.         Physical Exam:  /60 (BP Location: Left arm, Patient Position: Lying)   Pulse 87   Temp 98.1 °F (36.7 °C) (Oral)   Resp 16   Ht 157.5 cm (62\")   Wt 53.9 kg (118 lb 13.3 oz)   SpO2 94%   BMI 21.73 kg/m²     Physical Exam  Vitals and nursing note reviewed.   Constitutional:       General: She is not in acute distress.  HENT:      Head: Normocephalic and atraumatic.      Mouth/Throat:      Mouth: Mucous membranes are moist.   Eyes:      Extraocular Movements: Extraocular movements intact.      Pupils: Pupils are equal, round, and reactive to light.   Cardiovascular:      Rate and Rhythm: Normal rate and regular rhythm.      Pulses: Normal pulses.   Pulmonary:      Effort: Pulmonary effort is normal. No respiratory distress.      Breath sounds: No stridor. Examination of the right-upper field reveals rales. Examination of the right-middle field reveals rales. Examination of the right-lower field reveals rales. Decreased breath sounds (bilaterally) and rales present. No wheezing or rhonchi.   Chest:      Comments: Port accessed in R upper quadrant  Abdominal:      General: Abdomen is flat. There is no distension.      Palpations: Abdomen is soft.      Tenderness: There is no abdominal tenderness. There is no right CVA tenderness, left CVA tenderness, guarding or rebound.      Comments: No rigidity.   Musculoskeletal:         General: Normal range of motion.      Right shoulder: No swelling, deformity or tenderness. Normal range of motion.      Left shoulder: No " swelling, deformity or tenderness. Normal range of motion.      Right elbow: No swelling or deformity. Normal range of motion. No tenderness.      Left elbow: No swelling or deformity. Normal range of motion. No tenderness.      Right wrist: No swelling, deformity or tenderness. Normal range of motion.      Left wrist: No swelling, deformity or tenderness. Normal range of motion.      Cervical back: Normal range of motion and neck supple. No deformity or tenderness. Normal range of motion.      Thoracic back: No deformity or bony tenderness. Normal range of motion.      Lumbar back: Swelling (soft tissue) and bony tenderness (L4 and L5) present. No deformity. Normal range of motion.      Right hip: No deformity or tenderness. Normal range of motion.      Left hip: No deformity or tenderness. Normal range of motion.      Right knee: No swelling, deformity or effusion.      Left knee: No swelling, deformity or effusion.      Right lower leg: Edema present.      Left lower leg: Edema present.      Right ankle: Swelling present. No deformity. Normal range of motion.      Left ankle: Swelling present. No deformity. Normal range of motion.      Right foot: Swelling present.      Left foot: Swelling present.      Comments: Pelvis is stable   Skin:     General: Skin is warm and dry.      Capillary Refill: Capillary refill takes more than 3 seconds.   Neurological:      General: No focal deficit present.      Mental Status: She is alert. Mental status is at baseline.      Cranial Nerves: No cranial nerve deficit.      Sensory: Sensory deficit present.      Motor: Weakness present.      Deep Tendon Reflexes: Reflexes normal.      Comments: Weakness in plantar flexion bilaterally, pt not able to dorsiflex great toe bilaterally. Decreased sensation bilateral feet                  Procedures:  Procedures      Medical Decision Making:      Comorbidities that affect care:    Cancer    External Notes reviewed:    None      The  following orders were placed and all results were independently analyzed by me:  Orders Placed This Encounter   Procedures   • Rad Onc [place]ria Course Complete   • CT Lumbar Spine Without Contrast   • MRI Lumbar Spine Without Contrast   • XR Chest 1 View   • CT Abdomen Pelvis Without Contrast   • MRI Lumbar Spine With & Without Contrast   • MRI Brain Without Contrast   • MRI Cervical Spine Without Contrast   • MRI Thoracic Spine Without Contrast   • CT Radiation Therapy Planning   • [place] Draw   • Comprehensive Metabolic Panel   • Single High Sensitivity Troponin T   • Magnesium   • Urinalysis With Microscopic If Indicated (No Culture) - Urine, Clean Catch   • CBC Auto Differential   • BNP   • Urinalysis, Microscopic Only - Urine, Clean Catch   • Protime-INR   • CBC (No Diff)   • Comprehensive Metabolic Panel   • Comprehensive Metabolic Panel   • Magnesium   • CBC Auto Differential   • Basic Metabolic Panel   • CBC Auto Differential   • Basic Metabolic Panel   • Magnesium   • CBC Auto Differential   • Basic Metabolic Panel   • Magnesium   • CBC Auto Differential   • Comprehensive Metabolic Panel   • CBC Auto Differential   • Basic Metabolic Panel   • CBC Auto Differential   • Diet: Regular/House Diet; Texture: Regular Texture (IDDSI 7); Fluid Consistency: Thin (IDDSI 0)   • Undress & Gown   • Vital Signs   • Orthostatic Blood Pressure   • Please perform MRI checklist  Nursing Communication   • Vital Signs   • Activity - Ad Kavita   • Intake & Output   • Weigh Patient   • Oral Care   • Saline Lock & Maintain IV Access   • Place Sequential Compression Device   • Maintain Sequential Compression Device   • Discontinue Cardiac Monitoring   • Opioid Administration - Document EtCO2 and / or SpO2 With Each Set of Vitals & Any Change in Patient Status   • Opioid Administration - Notify Provider Hypercapnic Monitoring   • Opioid Administration - Continuous Pulse Oximetry (SpO2)   • Code Status and Medical Interventions:   •  General MD Inpatient Consult   • Inpatient Case Management  Consult   • Inpatient Neurology Consult General   • Inpatient Neurosurgery Consult   • Inpatient Radiation Oncology Consult   • Snack: Peanut butter crackers   • Snack: Cottage cheese and peaches   • OT Consult: Eval & Treat   • PT Consult: Eval & Treat Functional Mobility Below Baseline   • Oxygen Therapy- Nasal Cannula; Titrate for SPO2: 90% - 95%   • ECG 12 Lead ED Triage Standing Order; Weak / Dizzy / AMS   • Insert Peripheral IV   • Insert Peripheral IV   • Inpatient Admission   • Fall Precautions   • CBC & Differential   • Green Top (Gel)   • Lavender Top   • Gold Top - SST   • Light Blue Top   • CBC & Differential   • CBC & Differential   • CBC & Differential   • CBC & Differential   • CBC & Differential   • CBC & Differential       Medications Given in the Emergency Department:  Medications   sodium chloride 0.9 % flush 10 mL (has no administration in time range)   sodium chloride 0.9 % flush 10 mL (has no administration in time range)   sodium chloride 0.9 % flush 10 mL (10 mL Intravenous Given 4/24/23 2254)   sodium chloride 0.9 % infusion 40 mL (has no administration in time range)   acetaminophen (TYLENOL) tablet 650 mg (650 mg Oral Given 4/23/23 0402)   aluminum-magnesium hydroxide-simethicone (MAALOX MAX) 400-400-40 MG/5ML suspension 15 mL (has no administration in time range)   ondansetron (ZOFRAN) injection 4 mg (4 mg Intravenous Given 4/23/23 0355)   furosemide (LASIX) injection 40 mg (40 mg Intravenous Given 4/24/23 1720)   polyethylene glycol (MIRALAX) packet 17 g (17 g Oral Given 4/24/23 0913)   pregabalin (LYRICA) capsule 100 mg (100 mg Oral Given 4/24/23 0913)   spironolactone (ALDACTONE) tablet 25 mg (25 mg Oral Given 4/24/23 0913)   melatonin tablet 5 mg (5 mg Oral Given 4/22/23 0059)   dexamethasone (DECADRON) injection 4 mg (4 mg Intravenous Given 4/24/23 2254)   HYDROmorphone (DILAUDID) injection 0.5 mg (0.5 mg  Intravenous Given 4/24/23 1945)   Morphine (MS CONTIN) 12 hr tablet 100 mg (100 mg Oral Given 4/24/23 0913)   oxyCODONE (ROXICODONE) immediate release tablet 5 mg (5 mg Oral Given 4/24/23 2310)   potassium chloride (MICRO-K) CR capsule 40 mEq (40 mEq Oral Given 4/19/23 1325)   potassium chloride 10 mEq in 100 mL IVPB (10 mEq Intravenous New Bag 4/19/23 1830)   potassium chloride 20 mEq in 50 mL IVPB (20 mEq Intravenous New Bag 4/19/23 2105)   albumin human 25 % IV SOLN 25 g (25 g Intravenous New Bag 4/20/23 2026)   potassium chloride 20 mEq in 50 mL IVPB (20 mEq Intravenous New Bag 4/21/23 1442)   Iohexol (OMNIPAQUE) 12 MG/ML solution 500 mL (500 mL Oral Given 4/21/23 1355)   magnesium sulfate 2g/50 mL (PREMIX) infusion (2 g Intravenous New Bag 4/22/23 1240)   potassium chloride 10 mEq in 100 mL IVPB (10 mEq Intravenous New Bag 4/22/23 1648)   dexamethasone sodium phosphate injection 10 mg (10 mg Intravenous Given 4/23/23 2200)   gadobenate dimeglumine (MULTIHANCE) injection 10 mL (10 mL Intravenous Given 4/24/23 2043)        ED Course:         Labs:    Lab Results (last 24 hours)     Procedure Component Value Units Date/Time    CBC & Differential [[id number]]  (Abnormal) Collected: 04/24/23 0623    Specimen: Blood Updated: 04/24/23 0639    Narrative:      The following orders were created for panel order CBC & Differential.  Procedure                               Abnormality         Status                     ---------                               -----------         ------                     CBC Auto Differential[[id number]]        Abnormal            Final result                 Please view results for these tests on the individual orders.    Comprehensive Metabolic Panel [[id number]]  (Abnormal) Collected: 04/24/23 0623    Specimen: Blood Updated: 04/24/23 0703     Glucose 143 mg/dL      BUN 41 mg/dL      Creatinine 1.13 mg/dL      Sodium 129 mmol/L      Potassium 4.0 mmol/L      Chloride 84 mmol/L      CO2 32.8  mmol/L      Calcium 10.0 mg/dL      Total Protein 9.4 g/dL      Albumin 3.7 g/dL      ALT (SGPT) 15 U/L      AST (SGOT) 35 U/L      Alkaline Phosphatase 52 U/L      Total Bilirubin 0.8 mg/dL      Globulin 5.7 gm/dL      A/G Ratio 0.6 g/dL      BUN/Creatinine Ratio 36.3     Anion Gap 12.2 mmol/L      eGFR 51.8 mL/min/1.73     Narrative:      GFR Normal >60  Chronic Kidney Disease <60  Kidney Failure <15    The GFR formula is only valid for adults with stable renal function between ages 18 and 70.    CBC Auto Differential [[id number]]  (Abnormal) Collected: 04/24/23 0623    Specimen: Blood Updated: 04/24/23 0639     WBC 3.61 10*3/mm3      RBC 4.01 10*6/mm3      Hemoglobin 10.7 g/dL      Hematocrit 32.2 %      MCV 80.3 fL      MCH 26.7 pg      MCHC 33.2 g/dL      RDW 18.6 %      RDW-SD 53.6 fl      MPV 9.1 fL      Platelets 127 10*3/mm3      Neutrophil % 81.6 %      Lymphocyte % 13.9 %      Monocyte % 4.2 %      Eosinophil % 0.0 %      Basophil % 0.0 %      Immature Grans % 0.3 %      Neutrophils, Absolute 2.95 10*3/mm3      Lymphocytes, Absolute 0.50 10*3/mm3      Monocytes, Absolute 0.15 10*3/mm3      Eosinophils, Absolute 0.00 10*3/mm3      Basophils, Absolute 0.00 10*3/mm3      Immature Grans, Absolute 0.01 10*3/mm3      nRBC 0.0 /100 WBC            Imaging:    CT Radiation Therapy Planning    Result Date: 4/24/2023  This scan was performed as part of a plan for Radiation Therapy        Differential Diagnosis and Discussion:    Back Pain: The patient presents with back pain. My differential diagnosis includes but is not limited to acute spinal epidural abscess, acute spinal epidural bleed, cauda equina syndrome, abdominal aortic aneurysm, aortic dissection, kidney stone, pyelonephritis, musculoskeletal back pain, spinal fracture, and osteoarthritis.     All labs were reviewed and interpreted by me.  All X-rays impressions were independently interpreted by me.  EKG was interpreted by me.  CT scan radiology impression  was interpreted by me.  MRI impression was interpreted by me.     MDM  Number of Diagnoses or Management Options  Fall, initial encounter  Hypokalemia  Injury of back, initial encounter  Multiple myeloma, remission status unspecified  Peripheral edema  Weakness  Diagnosis management comments: Patient's vital signs were stable in the emergency room..  Patient's CBC demonstrates a white blood cell count of 3.02, hemoglobin 9.9, hematocrit of 29.5 and a platelet count of 106.  The patient's CMP demonstrates a glucose of 112, sodium 135, potassium 2.9 otherwise no other abnormalities.  Patient's BNP was normal.  The patient's troponin was 26.  The patient had a CT scan ordered by the nurse practitioner before evaluated the patient.  The patient's CT scan demonstrated no acute fracture or traumatic malalignment.  There was diffuse lytic osseous lesions consistent with the patient's known multiple myeloma myeloma.  An MRI of the lumbar spine was performed which demonstrated no acute process.  There is no evidence of cauda equina syndrome.  The patient continued to have weakness in both legs.  It was thought possibly to do to the new edema in her legs.  The patient did appear to have deep tendon reflexes I do think Guillain-Barré is less likely.  Due to the patient's continued weakness in the legs the patient was subsequently admitted to the hospital for further evaluation.  The patient's potassium was replaced in the emergency room with oral and IV potassium.       Amount and/or Complexity of Data Reviewed  Clinical lab tests: reviewed  Tests in the radiology section of CPT®: reviewed  Tests in the medicine section of CPT®: reviewed  Decide to obtain previous medical records or to obtain history from someone other than the patient: yes  Discuss the patient with other providers: yes (I discussed the case with Dr. Zamudio, the patient's oncologist.  He will admit the patient to the hospital for further evaluation of the  leg weakness)             Social Determinants of Health:    Patient is independent, reliable, and has access to care.       Disposition and Care Coordination:    Admit:   Through independent evaluation of the patient's history, physical, and imperical data, the patient meets criteria for observation/admission to the hospital.    I have explained discharge medications and the need for follow up with the patient/caretakers. This was also printed in the discharge instructions. Patient was discharged with the following medications and follow up:      Medication List      No changes were made to your prescriptions during this visit.      No follow-up provider specified.     Final diagnoses:   Weakness   Hypokalemia   Peripheral edema   Fall, initial encounter   Injury of back, initial encounter   Multiple myeloma, remission status unspecified        ED Disposition     ED Disposition   Decision to Admit    Condition   --    Comment   Level of Care: Med/Surg [1]   Diagnosis: Weakness [439202]   Admitting Physician: MOKO GARCIA [[id number]]   Attending Physician: MOOK GARCIA [[id number]]   Isolate for COVID?: No [0]   Certification: I Certify That Inpatient Hospital Services Are Medically Necessary For Greater Than 2 Midnights               This medical record created using voice recognition software.    Documentation assistance provided by Melo Lantigua acting as scribe for Mitch Juan DO. Information recorded by the scribe was done at my direction and has been verified and validated by me.          Melo Lantigua  04/19/23 1328       Mitch Juan DO  04/25/23 0216       Mitch Juan DO  04/25/23 0217       Mitch Juan DO  04/25/23 0218

## 2023-04-19 NOTE — H&P
[facility]   HISTORY AND PHYSICAL    Patient Name: Evangelina Sutton  : 1950  MRN: [id number]  Primary Care Physician:  Gilberto Coleman MD  Date of admission: 2023    Subjective   Subjective     Chief Complaint: Patient known to me with history of multiple myeloma is being admitted because of massive edema she has low albumin levels and because of the third spacing she gets edema previously has responded very well to IV Lasix patient has not probably been taking her potassium tablets the way they were prescribed she is therefore being admitted we will give her diuresis get the potassium supplements and will give albumin to mobilize the fluid she has multiple myeloma has been receiving treatments for the last several years    HPI: With advanced multiple myeloma with severe edema because of the poor nutrition.  Low albumin level    Evangelina Sutton is a 72 y.o. female pulm myeloma with fluid overload    Review of Systems   All systems were reviewed and negative except for: Is reviewed    Personal History     Past Medical History:   Diagnosis Date   • Cancer     BONE CANCER/TAKING CHEMO SHOT WEEKLY   • DDD (degenerative disc disease), cervical    • Neuropathy     LEGS AND ARMS   • PONV (postoperative nausea and vomiting)        Past Surgical History:   Procedure Laterality Date   • BACK SURGERY      DISCECTOMY W/FUSION, NECK ? LEVEL   • CHOLECYSTECTOMY     • HYSTERECTOMY     • PORTACATH PLACEMENT N/A 3/17/2022    Procedure: INSERTION OF PORTACATH;  Surgeon: Cj Rossi MD;  [facility];  Service: General;  Laterality: N/A;   • TOTAL HIP ARTHROPLASTY Left 2022    Procedure: LEFT HIP ARTHROPLASTY ANTERIOR;  Surgeon: Sridhar Coto MD;  Location: [facility];  Service: Orthopedics;  Laterality: Left;       Family History: family history is not on file. Otherwise pertinent FHx was reviewed and not pertinent to current issue.    Social History:  reports that she quit smoking  about 26 years ago. Her smoking use included cigarettes. She smoked an average of 1 pack per day. She has never used smokeless tobacco. She reports current drug use. Frequency: 20.00 times per week. Drugs: Morphine and Oxycodone. She reports that she does not drink alcohol.    Home Medications:  oxyCODONE, polyethylene glycol, pregabalin, spironolactone, and vitamin D      Allergies:  No Known Allergies    Objective   Objective     Vitals:   Temp:  [98 °F (36.7 °C)] 98 °F (36.7 °C)  Heart Rate:  [] 96  Resp:  [20] 20  BP: (126-128)/(71-76) 128/76  Physical Exam    Constitutional: Alert oriented not in acute distress   Eyes: Pupils equal reacting to light and accommodation   HENT: Normal   Neck: No palpable lymphadenopathy   Respiratory: No rales or rhonchi   Cardiovascular: S1-S2 normal no S3 or S4   Gastrointestinal: Soft no palpable organomegaly   Musculoskeletal: Feet 3-4+   Neurologic: Localizing sign  Skin: No rash    Result Review    Result Review:  I have personally reviewed the results from the time of this admission to 4/19/2023 18:15 EDT and agree with these findings:  [x]  Laboratory anemia with hypoalbuminemia  []  Microbiology  []  Radiology  []  EKG/Telemetry   []  Cardiology/Vascular   []  Pathology  []  Old records  []  Other:  Most notable findings include: Anemia with hypoalbuminemia    Assessment & Plan   Assessment / Plan     Brief Patient Summary:  Evangelina Sutton is a 72 y.o. female who patient with complications from low albumin and causing massive edema    Active Hospital Problems:  Active Hospital Problems    Diagnosis    • **Weakness        Plan:   IV Lasix will give potassium supple    DVT prophylaxis:  No DVT prophylaxis order currently exists.    CODE STATUS:         Admission Status:  I believe this patient meets in pt status.    Electronically signed by Abbe Zamudio MD, 04/19/23, 6:15 PM EDT.      Part of this note may be an electronic transcription/translation of spoken  language to printed text using the Dragon Dictation System.

## 2023-04-19 NOTE — PLAN OF CARE
Goal Outcome Evaluation:              Outcome Evaluation: Pt vss. pt resting. pt NPO. No complaints of pain. Will continue to monitor.

## 2023-04-20 LAB
ALBUMIN SERPL-MCNC: 3 G/DL (ref 3.5–5.2)
ALBUMIN/GLOB SERPL: 0.6 G/DL
ALP SERPL-CCNC: 52 U/L (ref 39–117)
ALT SERPL W P-5'-P-CCNC: 7 U/L (ref 1–33)
ANION GAP SERPL CALCULATED.3IONS-SCNC: 5.3 MMOL/L (ref 5–15)
AST SERPL-CCNC: 20 U/L (ref 1–32)
BASOPHILS # BLD AUTO: 0 10*3/MM3 (ref 0–0.2)
BASOPHILS NFR BLD AUTO: 0 % (ref 0–1.5)
BB REFERENCE LAB SENDOUT: NORMAL
BB REFERENCE LAB SENDOUT: NORMAL
BILIRUB SERPL-MCNC: 0.4 MG/DL (ref 0–1.2)
BUN SERPL-MCNC: 17 MG/DL (ref 8–23)
BUN/CREAT SERPL: 27.4 (ref 7–25)
CALCIUM SPEC-SCNC: 10.8 MG/DL (ref 8.6–10.5)
CHLORIDE SERPL-SCNC: 103 MMOL/L (ref 98–107)
CO2 SERPL-SCNC: 27.7 MMOL/L (ref 22–29)
CREAT SERPL-MCNC: 0.62 MG/DL (ref 0.57–1)
DEPRECATED RDW RBC AUTO: 58.4 FL (ref 37–54)
EGFRCR SERPLBLD CKD-EPI 2021: 94.8 ML/MIN/1.73
EOSINOPHIL # BLD AUTO: 0.1 10*3/MM3 (ref 0–0.4)
EOSINOPHIL NFR BLD AUTO: 4.3 % (ref 0.3–6.2)
ERYTHROCYTE [DISTWIDTH] IN BLOOD BY AUTOMATED COUNT: 19.6 % (ref 12.3–15.4)
GLOBULIN UR ELPH-MCNC: 5 GM/DL
GLUCOSE SERPL-MCNC: 91 MG/DL (ref 65–99)
HCT VFR BLD AUTO: 29.7 % (ref 34–46.6)
HGB BLD-MCNC: 9.7 G/DL (ref 12–15.9)
IMM GRANULOCYTES # BLD AUTO: 0.01 10*3/MM3 (ref 0–0.05)
IMM GRANULOCYTES NFR BLD AUTO: 0.4 % (ref 0–0.5)
INR PPP: 1.18 (ref 0.86–1.15)
LYMPHOCYTES # BLD AUTO: 0.39 10*3/MM3 (ref 0.7–3.1)
LYMPHOCYTES NFR BLD AUTO: 17 % (ref 19.6–45.3)
MAGNESIUM SERPL-MCNC: 1.5 MG/DL (ref 1.6–2.4)
MCH RBC QN AUTO: 26.8 PG (ref 26.6–33)
MCHC RBC AUTO-ENTMCNC: 32.7 G/DL (ref 31.5–35.7)
MCV RBC AUTO: 82 FL (ref 79–97)
MONOCYTES # BLD AUTO: 0.14 10*3/MM3 (ref 0.1–0.9)
MONOCYTES NFR BLD AUTO: 6.1 % (ref 5–12)
NEUTROPHILS NFR BLD AUTO: 1.66 10*3/MM3 (ref 1.7–7)
NEUTROPHILS NFR BLD AUTO: 72.2 % (ref 42.7–76)
NRBC BLD AUTO-RTO: 0 /100 WBC (ref 0–0.2)
PLATELET # BLD AUTO: 116 10*3/MM3 (ref 140–450)
PMV BLD AUTO: 10.1 FL (ref 6–12)
POTASSIUM SERPL-SCNC: 4.1 MMOL/L (ref 3.5–5.2)
PROT SERPL-MCNC: 8 G/DL (ref 6–8.5)
PROTHROMBIN TIME: 15.1 SECONDS (ref 11.8–14.9)
RBC # BLD AUTO: 3.62 10*6/MM3 (ref 3.77–5.28)
SODIUM SERPL-SCNC: 136 MMOL/L (ref 136–145)
WBC NRBC COR # BLD: 2.3 10*3/MM3 (ref 3.4–10.8)

## 2023-04-20 PROCEDURE — 97161 PT EVAL LOW COMPLEX 20 MIN: CPT

## 2023-04-20 PROCEDURE — P9047 ALBUMIN (HUMAN), 25%, 50ML: HCPCS | Performed by: INTERNAL MEDICINE

## 2023-04-20 PROCEDURE — 94760 N-INVAS EAR/PLS OXIMETRY 1: CPT

## 2023-04-20 PROCEDURE — 94799 UNLISTED PULMONARY SVC/PX: CPT

## 2023-04-20 PROCEDURE — 25010000002 ALBUMIN HUMAN 25% PER 50 ML: Performed by: INTERNAL MEDICINE

## 2023-04-20 PROCEDURE — 85610 PROTHROMBIN TIME: CPT | Performed by: INTERNAL MEDICINE

## 2023-04-20 PROCEDURE — 85025 COMPLETE CBC W/AUTO DIFF WBC: CPT | Performed by: INTERNAL MEDICINE

## 2023-04-20 PROCEDURE — 25010000002 FUROSEMIDE PER 20 MG: Performed by: INTERNAL MEDICINE

## 2023-04-20 PROCEDURE — 97165 OT EVAL LOW COMPLEX 30 MIN: CPT

## 2023-04-20 PROCEDURE — 83735 ASSAY OF MAGNESIUM: CPT | Performed by: INTERNAL MEDICINE

## 2023-04-20 PROCEDURE — 80053 COMPREHEN METABOLIC PANEL: CPT | Performed by: INTERNAL MEDICINE

## 2023-04-20 RX ADMIN — SPIRONOLACTONE 25 MG: 25 TABLET ORAL at 10:08

## 2023-04-20 RX ADMIN — FUROSEMIDE 40 MG: 10 INJECTION, SOLUTION INTRAMUSCULAR; INTRAVENOUS at 17:48

## 2023-04-20 RX ADMIN — HYDROCODONE BITARTRATE AND ACETAMINOPHEN 1 TABLET: 5; 325 TABLET ORAL at 21:59

## 2023-04-20 RX ADMIN — Medication 10 ML: at 10:08

## 2023-04-20 RX ADMIN — PREGABALIN 100 MG: 100 CAPSULE ORAL at 10:08

## 2023-04-20 RX ADMIN — ALBUMIN HUMAN 25 G: 0.25 SOLUTION INTRAVENOUS at 20:26

## 2023-04-20 RX ADMIN — POLYETHYLENE GLYCOL 3350 17 G: 17 POWDER, FOR SOLUTION ORAL at 10:08

## 2023-04-20 RX ADMIN — Medication 10 ML: at 20:26

## 2023-04-20 RX ADMIN — FUROSEMIDE 40 MG: 10 INJECTION, SOLUTION INTRAMUSCULAR; INTRAVENOUS at 10:08

## 2023-04-20 RX ADMIN — ALBUMIN HUMAN 25 G: 0.25 SOLUTION INTRAVENOUS at 10:08

## 2023-04-20 NOTE — PLAN OF CARE
Goal Outcome Evaluation:              Outcome Evaluation: Pt vss. pt resting well. Pt urinating frequently. No complaints of pain. Will continue to monitor.

## 2023-04-20 NOTE — OUTREACH NOTE
Medical Week 3 Survey    Flowsheet Row Responses   [facility] facility patient discharged from? [facility]   Does the patient have one of the following disease processes/diagnoses(primary or secondary)? Other   Week 3 attempt successful? No   Unsuccessful attempts Attempt 1   Revoke Readmitted          LESLY PADRON - Registered Nurse

## 2023-04-20 NOTE — THERAPY EVALUATION
Patient Name: Evangelina Sutton  : 1950    MRN: [id number]                              Today's Date: 2023       Admit Date: 2023    Visit Dx:     ICD-10-CM ICD-9-CM   1. Weakness  R53.1 780.79   2. Hypokalemia  E87.6 276.8   3. Peripheral edema  R60.9 782.3   4. Fall, initial encounter  W19.XXXA E888.9   5. Injury of back, initial encounter  S39.92XA 959.19   6. Multiple myeloma, remission status unspecified  C90.00 203.00   7. 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 LEVEL   • CHOLECYSTECTOMY     • HYSTERECTOMY     • PORTACATH PLACEMENT N/A 3/17/2022    Procedure: INSERTION OF PORTACATH;  Surgeon: Cj Rossi MD;  Location: [facility] MAIN OR;  Service: General;  Laterality: N/A;   • TOTAL HIP ARTHROPLASTY Left 2022    Procedure: LEFT HIP ARTHROPLASTY ANTERIOR;  Surgeon: Sridhar Coto MD;  Location: [facility] MAIN OR;  Service: Orthopedics;  Laterality: Left;      General Information     Row Name 23 1000          OT Time and Intention    Document Type evaluation  -PG     Mode of Treatment individual therapy;occupational therapy  -PG     Row  Name 04/20/23 1000          General Information    Patient Profile Reviewed yes  Patient reports she lives with her brother and is independent with all self-care/transfers.  Uses both rolling walker and rollator and bathes in step over tub with shower chair  -PG     Prior Level of Function independent:;transfer;ADL's  -PG     Existing Precautions/Restrictions fall  -PG     Barriers to Rehab none identified  -PG     Row Name 04/20/23 1000          Occupational Profile    Reason for Services/Referral (Occupational Profile) Patient is a 72-year-old female admitted for fall, weakness and severe peripheral edema.  No previous OT services identified.  Patient is being evaluated by occupational therapy due to her recent decline in ADL function  -PG     Row Name 04/20/23 1000          Living Environment    People in Home sibling(s)  -PG     Row Name 04/20/23 1000          Cognition    Orientation Status (Cognition) oriented x 3  -PG     Row Name 04/20/23 1000          Safety Issues, Functional Mobility    Impairments Affecting Function (Mobility) balance;endurance/activity tolerance;strength  -PG           User Key  (r) = Recorded By, (t) = Taken By, (c) = Cosigned By    Initials Name Provider Type    PG Kevin Sharma, OT Occupational Therapist                 Mobility/ADL's     Row Name 04/20/23 1003          Transfers    Comment, (Transfers) Patient declined stating that she cannot get up now  -PG     Row Name 04/20/23 1003          Activities of Daily Living    BADL Assessment/Intervention bathing;upper body dressing;lower body dressing;grooming;toileting  -PG     Row Name 04/20/23 1003          Bathing Assessment/Intervention    [place] Level (Bathing) bathing skills;dependent (less than 25% patient effort)  -PG     Row Name 04/20/23 1003          Upper Body Dressing Assessment/Training    [place] Level (Upper Body Dressing) upper body dressing skills;maximum assist (25% patient effort)  -PG     Row Name  04/20/23 1003          Lower Body Dressing Assessment/Training    [place] Level (Lower Body Dressing) lower body dressing skills;dependent (less than 25% patient effort)  -PG     Row Name 04/20/23 1003          Grooming Assessment/Training    [place] Level (Grooming) grooming skills;set up  -PG     Row Name 04/20/23 1003          Toileting Assessment/Training    [place] Level (Toileting) toileting skills;dependent (less than 25% patient effort)  -PG           User Key  (r) = Recorded By, (t) = Taken By, (c) = Cosigned By    Initials Name Provider Type    PG Kevin Sharma OT Occupational Therapist               Obj/Interventions     Row Name 04/20/23 1004          Sensory Assessment (Somatosensory)    Sensory Assessment (Somatosensory) sensation intact  -PG     Row Name 04/20/23 1004          Vision Assessment/Intervention    Visual Impairment/Limitations WFL  -PG     Row Name 04/20/23 1004          Range of Motion Comprehensive    Comment, General Range of Motion Decreased right shoulder flexion due to old shoulder injury, left upper extremity within functional limits  -PG     Row Name 04/20/23 1004          Motor Skills    Motor Skills coordination;functional endurance  -PG     Coordination WFL  -PG     Functional Endurance Fair minus  -PG           User Key  (r) = Recorded By, (t) = Taken By, (c) = Cosigned By    Initials Name Provider Type    PG Kevin Sharma OT Occupational Therapist               Goals/Plan     Row Name 04/20/23 1007          Transfer Goal 1 (OT)    Activity/Assistive Device (Transfer Goal 1, OT) transfers, all  -PG     [place] Level/Cues Needed (Transfer Goal 1, OT) modified independence  -PG     Time Frame (Transfer Goal 1, OT) long term goal (LTG);10 days  -PG     Row Name 04/20/23 1007          Bathing Goal 1 (OT)    Activity/Device (Bathing Goal 1, OT) bathing skills, all  -PG     [place] Level/Cues Needed (Bathing Goal 1, OT) modified independence  -PG     Time  Frame (Bathing Goal 1, OT) long term goal (LTG);10 days  -PG     Row Name 04/20/23 1007          Dressing Goal 1 (OT)    Activity/Device (Dressing Goal 1, OT) dressing skills, all  -PG     [place]/Cues Needed (Dressing Goal 1, OT) modified independence  -PG     Time Frame (Dressing Goal 1, OT) long term goal (LTG);10 days  -PG     Row Name 04/20/23 1007          Toileting Goal 1 (OT)    Activity/Device (Toileting Goal 1, OT) toileting skills, all  -PG     [place] Level/Cues Needed (Toileting Goal 1, OT) modified independence  -PG     Time Frame (Toileting Goal 1, OT) long term goal (LTG);10 days  -PG     Row Name 04/20/23 1007          Grooming Goal 1 (OT)    Activity/Device (Grooming Goal 1, OT) grooming skills, all  -PG     [place] (Grooming Goal 1, OT) modified independence  -PG     Time Frame (Grooming Goal 1, OT) long term goal (LTG);10 days  -PG     Row Name 04/20/23 1007          Problem Specific Goal 1 (OT)    Problem Specific Goal 1 (OT) Patient will improve activity tolerance to fair plus to support independence and engagement with ADL activities  -PG     Time Frame (Problem Specific Goal 1, OT) long term goal (LTG);10 days  -PG     Row Name 04/20/23 1007          Therapy Assessment/Plan (OT)    Planned Therapy Interventions (OT) activity tolerance training;BADL retraining;strengthening exercise;transfer/mobility retraining;patient/caregiver education/training;occupation/activity based interventions  -PG           User Key  (r) = Recorded By, (t) = Taken By, (c) = Cosigned By    Initials Name Provider Type    PG Kevin Sharma, OT Occupational Therapist               Clinical Impression     Row Name 04/20/23 1006          Pain Assessment    Pretreatment Pain Rating 0/10 - no pain  -PG     Posttreatment Pain Rating 0/10 - no pain  -PG     Row Name 04/20/23 1006          Plan of Care Review    Plan of Care Reviewed With patient  -PG     Progress no change  -PG     Outcome Evaluation Patient  presents with limitations affecting strength, activity tolerance, and balance impacting patient's ability to return home safely and independently.  The skills of a therapist will be required to safely and effectively implement the following treatment plan to restore maximal level of function  -PG     Row Name 04/20/23 1006          Therapy Assessment/Plan (OT)    Patient/Family Therapy Goal Statement (OT) Get stronger and return home independently  -PG     Rehab Potential (OT) good, to achieve stated therapy goals  -PG     Criteria for Skilled Therapeutic Interventions Met (OT) meets criteria;skilled treatment is necessary;yes  -PG     Therapy Frequency (OT) 5 times/wk  -PG     Row Name 04/20/23 1006          Therapy Plan Review/Discharge Plan (OT)    Anticipated Discharge Disposition (OT) skilled nursing facility;inpatient rehabilitation facility  -PG           User Key  (r) = Recorded By, (t) = Taken By, (c) = Cosigned By    Initials Name Provider Type    PG Kevin Sharma, NOEMÍ Occupational Therapist               Outcome Measures     Row Name 04/20/23 1008          How much help from another is currently needed...    Putting on and taking off regular lower body clothing? 1  -PG     Bathing (including washing, rinsing, and drying) 1  -PG     Toileting (which includes using toilet bed pan or urinal) 1  -PG     Putting on and taking off regular upper body clothing 2  -PG     Taking care of personal grooming (such as brushing teeth) 3  -PG     Eating meals 4  -PG     AM-PAC 6 Clicks Score (OT) 12  -PG     Row Name 04/20/23 1008          Functional Assessment    Outcome Measure Options AM-PAC 6 Clicks Daily Activity (OT);Optimal Instrument  -PG     Row Name 04/20/23 1008          Optimal Instrument    Optimal Instrument Optimal - 3  -PG     Bending/Stooping 5  -PG     Standing 5  -PG     Reaching 2  -PG     From the list, choose the 3 activities you would most like to be able to do without any difficulty  Bending/stooping;Standing;Reaching  -PG     Total Score Optimal - 3 12  -PG           User Key  (r) = Recorded By, (t) = Taken By, (c) = Cosigned By    Initials Name Provider Type    PG Kevin Sharma OT Occupational Therapist                Occupational Therapy Education     Title: PT OT SLP Therapies (Done)     Topic: Occupational Therapy (Done)     Point: ADL training (Done)     Description:   Instruct learner(s) on proper safety adaptation and remediation techniques during self care or transfers.   Instruct in proper use of assistive devices.              Learning Progress Summary           Patient Acceptance, E,D, VU by PG at 4/20/2023 1009                   Point: Home exercise program (Done)     Description:   Instruct learner(s) on appropriate technique for monitoring, assisting and/or progressing therapeutic exercises/activities.              Learning Progress Summary           Patient Acceptance, E,D, VU by PG at 4/20/2023 1009                   Point: Precautions (Done)     Description:   Instruct learner(s) on prescribed precautions during self-care and functional transfers.              Learning Progress Summary           Patient Acceptance, E,D, VU by PG at 4/20/2023 1009                   Point: Body mechanics (Done)     Description:   Instruct learner(s) on proper positioning and spine alignment during self-care, functional mobility activities and/or exercises.              Learning Progress Summary           Patient Acceptance, E,D, VU by PG at 4/20/2023 1009                               User Key     Initials Effective Dates Name Provider Type Discipline     06/16/21 -  Kevin Sharma OT Occupational Therapist OT              OT Recommendation and Plan  Planned Therapy Interventions (OT): activity tolerance training, BADL retraining, strengthening exercise, transfer/mobility retraining, patient/caregiver education/training, occupation/activity based interventions  Therapy Frequency (OT): 5  times/wk  Plan of Care Review  Plan of Care Reviewed With: patient  Progress: no change  Outcome Evaluation: Patient presents with limitations affecting strength, activity tolerance, and balance impacting patient's ability to return home safely and independently.  The skills of a therapist will be required to safely and effectively implement the following treatment plan to restore maximal level of function     Time Calculation:    Time Calculation- OT     Row Name 04/20/23 1010             Time Calculation- OT    OT Received On 04/20/23  -PG      OT Goal Re-Cert Due Date 04/29/23  -PG         Untimed Charges    OT Eval/Re-eval Minutes 35  -PG         Total Minutes    Untimed Charges Total Minutes 35  -PG       Total Minutes 35  -PG            User Key  (r) = Recorded By, (t) = Taken By, (c) = Cosigned By    Initials Name Provider Type    PG Kevin Sharma OT Occupational Therapist              Therapy Charges for Today     Code Description Service Date Service Provider Modifiers Qty    [id number] HC OT EVAL LOW COMPLEXITY 3 4/20/2023 Kevin Sharma OT GO 1               Kevin Sharma OT  4/20/2023

## 2023-04-20 NOTE — PLAN OF CARE
Goal Outcome Evaluation:  Plan of Care Reviewed With: patient        Progress: no change  Outcome Evaluation: VSS. Patient rested comfortably in bed for entire shift. No issues or concerns. Will continue plan of care.

## 2023-04-20 NOTE — PROGRESS NOTES
[facility]     Progress Note    Patient Name: Evangelina Sutton  : 1950  MRN: [id number]  Primary Care Physician:  Gilberto Coleman MD  Date of admission: 2023    Subjective   Subjective     Chief Complaint: Edema feet is improving urine output is increased improving we will continue Lasix and albumin serum calcium appears to be getting higher to she has probably progression of the multiple myeloma she has refused bone marrow transplant and she has refused to get a bone marrow examination to assess the status of marrow    HPI: With multiple myeloma advanced stage probably getting resistant to the current treatment    Review of Systems   All systems were reviewed and negative except for: Reviewed    Objective   Objective     Vitals:   Temp:  [97.5 °F (36.4 °C)-98.2 °F (36.8 °C)] 98.2 °F (36.8 °C)  Heart Rate:  [] 107  Resp:  [18-20] 18  BP: (119-137)/(65-88) 119/65    Physical Exam    Constitutional: Awake, alert   Eyes: PERRLA, sclerae anicteric, no conjunctival injection   HENT: NCAT, mucous membranes moist   Neck: Supple, no thyromegaly, no lymphadenopathy, trachea midline   Respiratory: Clear to auscultation bilaterally, nonlabored respirations    Cardiovascular: RRR, no murmurs, rubs, or gallops, palpable pedal pulses bilaterally   Gastrointestinal: Positive bowel sounds, soft, nontender, nondistended   Musculoskeletal: No bilateral ankle edema, no clubbing or cyanosis to extremities   Psychiatric: Appropriate affect, cooperative   Neurologic: Oriented x 3, strength symmetric in all extremities, Cranial Nerves grossly intact to confrontation, speech clear   Skin: No rashes   No change  Result Review    Result Review:  I have personally reviewed the results from the time of this admission to 2023 07:59 EDT and agree with these findings:  [x]  Laboratory anemia with multiple myeloma hypercalcemia  []  Microbiology  []  Radiology  []  EKG/Telemetry   []  Cardiology/Vascular   []   Pathology  []  Old records  []  Other:  Most notable findings include: Anemia with hypercalcemia    Assessment & Plan   Assessment / Plan     Brief Patient Summary:  Evangelina Sutton is a 72 y.o. female who massive edema feet with low albumin will give albumin and Lasix    Active Hospital Problems:  Active Hospital Problems    Diagnosis    • **Weakness        Plan:   IV diuretics with albumin    DVT prophylaxis:  Mechanical DVT prophylaxis orders are present.    CODE STATUS:   Level Of Support Discussed With: Patient  Code Status (Patient has no pulse and is not breathing): CPR (Attempt to Resuscitate)  Medical Interventions (Patient has pulse or is breathing): Full Support    Disposition:  I expect patient to be discharged after the patient has been stabilized anticipate in next 2 to 3 days.    Electronically signed by Abbe Zamudio MD, 04/20/23, 7:59 AM EDT.      Part of this note may be an electronic transcription/translation of spoken language to printed text using the Dragon Dictation System.

## 2023-04-20 NOTE — PLAN OF CARE
Goal Outcome Evaluation:  Plan of Care Reviewed With: patient        Progress: no change  Outcome Evaluation: Patient presents with deficits in balance, strength, transfers, and ambulation. Patient will benefit from skilled PT services to address these mobility deficits and decrease risk of falls. Recommend sub acute rehab following hospital discharge

## 2023-04-20 NOTE — CONSULTS
"Nutrition Services    Patient Name: Evangelina Sutton  YOB: 1950  MRN: [id number]  Admission date: 4/19/2023      CLINICAL NUTRITION ASSESSMENT      Reason for Assessment  MST score 2+, Nonhealing wound or pressure ulcer     H&P:    Past Medical History:   Diagnosis Date   • Cancer     BONE CANCER/TAKING CHEMO SHOT WEEKLY   • DDD (degenerative disc disease), cervical    • Neuropathy     LEGS AND ARMS   • PONV (postoperative nausea and vomiting)         Current Problems:   Active Hospital Problems    Diagnosis    • **Weakness         Nutrition/Diet History         Narrative     RD assessed pt 2' to MST of 2 w/ reported wt loss and decreased intake x 1 month and reported PI. Pt is admitted d/t weakness. PMH of multiple myeloma. Pt is noted to have massive edema.    Pt is on Reg diet, no intake documented at this time. Pt has reported PI, no wound care note yet. Pt reports good appetite and intake. Pt agreeable to ONS BID to promote nutrient intake and weight maintenance.     Pt meets criteria for moderate malnutrition based on muscle and fat loss. Limited NFPE d/t edema.        Anthropometrics        Current Height, Weight Height: 157.5 cm (62\")      Current BMI Body mass index is 23.27 kg/m².       Weight Hx  Wt Readings from Last 30 Encounters:   03/21/23 1640 57.7 kg (127 lb 3.3 oz)   03/15/23 1730 63.8 kg (140 lb 10.5 oz)   02/21/23 2138 53.5 kg (117 lb 15.1 oz)   02/21/23 1401 56.8 kg (125 lb 3.5 oz)   11/28/22 2241 65.4 kg (144 lb 2.9 oz)   11/28/22 1212 68.5 kg (151 lb 0.2 oz)   11/22/22 0936 68.5 kg (151 lb)   07/19/22 1325 68.5 kg (151 lb)   06/07/22 1409 68.5 kg (151 lb)   05/10/22 1251 68.5 kg (151 lb)   04/25/22 2115 68.5 kg (151 lb)   04/25/22 1217 66.1 kg (145 lb 11.6 oz)   04/22/22 1428 68.9 kg (152 lb)   03/29/22 1333 68.9 kg (152 lb)   03/17/22 0837 65.1 kg (143 lb 8.3 oz)   03/11/22 1328 68 kg (150 lb)   09/05/21 2227 65.1 kg (143 lb 8.3 oz)   09/05/21 2133 65.1 kg (143 lb 8.3 oz) "   09/05/21 1444 68 kg (149 lb 14.6 oz)   10/13/20 0000 61.2 kg (135 lb)   10/01/20 0000 61.2 kg (135 lb)   06/02/20 0000 63.5 kg (140 lb)   05/08/19 0000 84.4 kg (186 lb 2 oz)   04/03/19 0000 84.5 kg (186 lb 6 oz)   03/22/19 0000 84.5 kg (186 lb 4 oz)   03/20/19 0000 84.6 kg (186 lb 7 oz)   02/20/19 0000 80.3 kg (177 lb 1 oz)   02/13/19 0000 78.3 kg (172 lb 9 oz)   01/14/19 0000 90 kg (198 lb 8 oz)            Wt Change Observation Fluctuations 2' to volume status, currently on Lasix. New wt requested from RN on 4/20     Estimated/Assessed Needs       Energy Requirements 35-40 kcal/kg CBW   EST Needs (kcal/day) 8050-5404       Protein Requirements 1.2-1.5 g/kg   EST Daily Needs (g/day) 70-87       Fluid Requirements 25 ml/kg    Estimated Needs (mL/day) 1450     Labs/Medications         Pertinent Labs Reviewed.   Results from last 7 days   Lab Units 04/20/23  0535 04/19/23 1856 04/19/23  1211   SODIUM mmol/L 136 136 135*   POTASSIUM mmol/L 4.1 3.7 2.9*   CHLORIDE mmol/L 103 102 102   CO2 mmol/L 27.7 24.5 22.9   BUN mg/dL 17 17 16   CREATININE mg/dL 0.62 0.62 0.73   CALCIUM mg/dL 10.8* 10.8* 10.4   BILIRUBIN mg/dL 0.4 0.4 0.4   ALK PHOS U/L 52 62 56   ALT (SGPT) U/L 7 9 9   AST (SGOT) U/L 20 24 23   GLUCOSE mg/dL 91 114* 112*     Results from last 7 days   Lab Units 04/20/23  0535 04/19/23 1856 04/19/23  1211   MAGNESIUM mg/dL 1.5*  --  1.5*   HEMOGLOBIN g/dL 9.7*   < > 9.9*   HEMATOCRIT % 29.7*   < > 29.5*    < > = values in this interval not displayed.     COVID19   Date Value Ref Range Status   02/21/2023 Not Detected Not Detected - Ref. Range Final     No results found for: HGBA1C      Pertinent Medications Reviewed.     Current Nutrition Orders & Evaluation of Intake       Oral Nutrition     Current PO Diet Diet: Regular/House Diet; Texture: Regular Texture (IDDSI 7); Fluid Consistency: Thin (IDDSI 0)   Supplement Orders Placed This Encounter      Dietary Nutrition Supplements Magic Cup; orange        Malnutrition Severity Assessment      Patient meets criteria for : Moderate (non-severe) Malnutrition  Malnutrition Type (last 8 hours)     Malnutrition Severity Assessment     Row Name 04/20/23 1123       Malnutrition Severity Assessment    Malnutrition Type Chronic Disease - Related Malnutrition    Row Name 04/20/23 1123       Muscle Loss    Loss of Muscle Mass Findings Moderate    [place] Region Moderate - slight depression    Clavicle Bone Region Severe - protruding prominent bone    Acromion Bone Region Moderate - acromion may slightly protrude    Dorsal Hand Region Moderate - slight depression    Row Name 04/20/23 1123       Fat Loss    Subcutaneous Fat Loss Findings Moderate    Orbital Region  Moderate -  somewhat hollowness, slightly dark circles    Row Name 04/20/23 1123       Fluid Accumulation (Edema)    Fluid Acumulation Findings Moderate    Row Name 04/20/23 1123       Criteria Met (Must meet criteria for severity in at least 2 of these categories: M Wasting, Fat Loss, Fluid, Secondary Signs, Wt. Status, Intake)    Patient meets criteria for  Moderate (non-severe) Malnutrition                   Nutrition Diagnosis         Nutrition Dx Problem 1 Moderate malnutrition related to increased nutrient needs due to catabolic disease as evidenced by multiple myeloma        Nutrition Intervention         Magic Cup (orange) BID  +580 kcal, 18 g PRO/day     Medical Nutrition Therapy/Nutrition Education          Learner     Readiness Patient  Acceptance     Method     Response Explanation  Verbalizes understanding     Monitor/Evaluation        Monitor I&O, PO intake, Supplement intake, Weight, POC/GOC       Nutrition Discharge Plan         To be determined       Electronically signed by:  Carlie May RD  04/20/23 11:25 EDT

## 2023-04-20 NOTE — THERAPY EVALUATION
Acute Care - Physical Therapy Initial Evaluation   Ángela     Patient Name: Evangelina Sutton  : 1950  MRN: [id number]  Today's Date: 2023      Visit Dx:     ICD-10-CM ICD-9-CM   1. Weakness  R53.1 780.79   2. Hypokalemia  E87.6 276.8   3. Peripheral edema  R60.9 782.3   4. Fall, initial encounter  W19.XXXA E888.9   5. Injury of back, initial encounter  S39.92XA 959.19   6. Multiple myeloma, remission status unspecified  C90.00 203.00   7. Decreased activities of daily living (ADL)  Z78.9 V49.89   8. 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 LEVEL   • CHOLECYSTECTOMY     • HYSTERECTOMY     • PORTACATH PLACEMENT N/A 3/17/2022    Procedure: INSERTION OF PORTACATH;  Surgeon: Cj Rossi MD;  Location: [facility] MAIN OR;  Service: General;  Laterality: N/A;   • TOTAL HIP ARTHROPLASTY Left 2022    Procedure: LEFT HIP ARTHROPLASTY ANTERIOR;  Surgeon: Sridhar Coto MD;  Location: [facility] MAIN OR;  Service: Orthopedics;  Laterality: Left;     PT Assessment (last 12 hours)     PT Evaluation and Treatment     Row Name 23 1300          Physical Therapy Time and Intention     Subjective Information complains of;weakness  -AV     Document Type evaluation  -AV     Mode of Treatment individual therapy;physical therapy  -AV     Row Name 04/20/23 1300          General Information    Patient Profile Reviewed yes  -AV     Prior Level of Function --  Reports assist as needed. Ambulated with rolling walker. No home O2. 3 falls in the past month.  -AV     Equipment Currently Used at Home walker, rolling  -AV     Existing Precautions/Restrictions fall  -AV     Row Name 04/20/23 1300          Living Environment    Current Living Arrangements home  -AV     Home Accessibility stairs to enter home  -AV     People in Home sibling(s)  Brother  -AV     Row Name 04/20/23 1300          Home Main Entrance    Number of Stairs, Main Entrance none  -AV     Row Name 04/20/23 1300          Cognition    Orientation Status (Cognition) oriented x 3  -AV     Row Name 04/20/23 1300          Range of Motion (ROM)    Range of Motion bilateral lower extremities;ROM is WFL  -AV     Row Name 04/20/23 1300          Strength (Manual Muscle Testing)    Strength (Manual Muscle Testing) bilateral lower extremities  2+/5  -AV     Row Name 04/20/23 1300          Bed Mobility    Bed Mobility supine-sit-supine  -AV     Supine-Sit-Supine [place] (Bed Mobility) minimum assist (75% patient effort)  -AV     Bed Mobility, Safety Issues decreased use of legs for bridging/pushing;decreased use of arms for pushing/pulling  -AV     Comment, (Bed Mobility) Patient required minimal assistance to maintain sitting once EOB.  -AV     Row Name 04/20/23 1300          Transfers    Comment, (Transfers) Deferred for safety  -AV     Row Name 04/20/23 1300          Safety Issues, Functional Mobility    Impairments Affecting Function (Mobility) balance;endurance/activity tolerance;strength  -AV     Row Name 04/20/23 1300          Balance    Balance Assessment sitting static balance  -AV     Static Sitting Balance minimal assist  -AV     Position,  Sitting Balance supported;sitting edge of bed  -AV     Row Name 04/20/23 1300          Plan of Care Review    Plan of Care Reviewed With patient  -AV     Progress no change  -AV     Outcome Evaluation Patient presents with deficits in balance, strength, transfers, and ambulation. Patient will benefit from skilled PT services to address these mobility deficits and decrease risk of falls. Recommend sub acute rehab following hospital discharge  -AV     Row Name 04/20/23 1300          Therapy Assessment/Plan (PT)    Rehab Potential (PT) good, to achieve stated therapy goals  -AV     Criteria for Skilled Interventions Met (PT) yes;meets criteria  -AV     Therapy Frequency (PT) daily  -AV     Predicted Duration of Therapy Intervention (PT) 10 days  -AV     Problem List (PT) problems related to;balance;mobility;strength  -AV     Activity Limitations Related to Problem List (PT) unable to transfer safely;unable to ambulate safely  -AV     Row Name 04/20/23 1300          PT Evaluation Complexity    History, PT Evaluation Complexity 1-2 personal factors and/or comorbidities  -AV     Examination of Body Systems (PT Eval Complexity) total of 4 or more elements  -AV     Clinical Presentation (PT Evaluation Complexity) stable  -AV     Clinical Decision Making (PT Evaluation Complexity) low complexity  -AV     Overall Complexity (PT Evaluation Complexity) low complexity  -AV     Row Name 04/20/23 1300          Therapy Plan Review/Discharge Plan (PT)    Therapy Plan Review (PT) evaluation/treatment results reviewed;patient  -AV     Row Name 04/20/23 1300          Physical Therapy Goals    Bed Mobility Goal Selection (PT) bed mobility, PT goal 1  -AV     Transfer Goal Selection (PT) transfer, PT goal 1  -AV     Gait Training Goal Selection (PT) gait training, PT goal 1  -AV     Row Name 04/20/23 1300          Bed Mobility Goal 1 (PT)    Activity/Assistive Device (Bed Mobility Goal 1, PT) sit to supine/supine to sit  -AV      [place]/Cues Needed (Bed Mobility Goal 1, PT) standby assist  -AV     Time Frame (Bed Mobility Goal 1, PT) 10 days  -AV     Row Name 04/20/23 1300          Transfer Goal 1 (PT)    Activity/Assistive Device (Transfer Goal 1, PT) sit-to-stand/stand-to-sit;bed-to-chair/chair-to-bed;walker, rolling  -AV     [place]/Cues Needed (Transfer Goal 1, PT) standby assist  -AV     Time Frame (Transfer Goal 1, PT) 10 days  -AV     Row Name 04/20/23 1300          Gait Training Goal 1 (PT)    Activity/Assistive Device (Gait Training Goal 1, PT) gait (walking locomotion);assistive device use;walker, rolling  -AV     [place] (Gait Training Goal 1, PT) standby assist  -AV     Distance (Gait Training Goal 1, PT) 150  -AV     Time Frame (Gait Training Goal 1, PT) 10 days  -AV           User Key  (r) = Recorded By, (t) = Taken By, (c) = Cosigned By    Initials Name Provider Type    AV Hector Ortega, PT Physical Therapist                Physical Therapy Education     Title: PT OT SLP Therapies (In Progress)     Topic: Physical Therapy (In Progress)     Point: Mobility training (Done)     Learning Progress Summary           Patient Acceptance, E,TB, VU by AV at 4/20/2023 1323                   Point: Home exercise program (Not Started)     Learner Progress:  Not documented in this visit.          Point: Body mechanics (Done)     Learning Progress Summary           Patient Acceptance, E,TB, VU by AV at 4/20/2023 1323                   Point: Precautions (Done)     Learning Progress Summary           Patient Acceptance, E,TB, VU by AV at 4/20/2023 1323                               User Key     Initials Effective Dates Name Provider Type Discipline    AV 06/11/21 -  Hector Ortega, PT Physical Therapist PT              PT Recommendation and Plan  Anticipated Discharge Disposition (PT): sub acute care setting  Planned Therapy Interventions (PT): balance training, bed mobility training, gait training,  neuromuscular re-education, strengthening, transfer training  Therapy Frequency (PT): daily  Plan of Care Reviewed With: patient  Progress: no change  Outcome Evaluation: Patient presents with deficits in balance, strength, transfers, and ambulation. Patient will benefit from skilled PT services to address these mobility deficits and decrease risk of falls. Recommend sub acute rehab following hospital discharge   Outcome Measures     Row Name 04/20/23 1300             How much help from another person do you currently need...    Turning from your back to your side while in flat bed without using bedrails? 3  -AV      Moving from lying on back to sitting on the side of a flat bed without bedrails? 3  -AV      Moving to and from a bed to a chair (including a wheelchair)? 2  -AV      Standing up from a chair using your arms (e.g., wheelchair, bedside chair)? 2  -AV      Climbing 3-5 steps with a railing? 1  -AV      To walk in hospital room? 2  -AV      AM-PAC 6 Clicks Score (PT) 13  -AV         Functional Assessment    Outcome Measure Options AM-PAC 6 Clicks Basic Mobility (PT)  -AV            User Key  (r) = Recorded By, (t) = Taken By, (c) = Cosigned By    Initials Name Provider Type    AV Hector Ortega, PT Physical Therapist                 Time Calculation:    PT Charges     Row Name 04/20/23 1322             Time Calculation    PT Received On 04/20/23  -AV      PT Goal Re-Cert Due Date 04/29/23  -AV         Untimed Charges    PT Eval/Re-eval Minutes 35  -AV         Total Minutes    Untimed Charges Total Minutes 35  -AV       Total Minutes 35  -AV            User Key  (r) = Recorded By, (t) = Taken By, (c) = Cosigned By    Initials Name Provider Type    AV Hector Ortega, PT Physical Therapist              Therapy Charges for Today     Code Description Service Date Service Provider Modifiers Qty    78120013972 HC PT EVAL LOW COMPLEXITY 3 4/20/2023 Hector Ortega, PT GP 1          PT G-Codes  Outcome  Measure Options: AM-PAC 6 Clicks Basic Mobility (PT)  AM-PAC 6 Clicks Score (PT): 13  AM-PAC 6 Clicks Score (OT): 12    Hector Ortega, PT  4/20/2023

## 2023-04-21 ENCOUNTER — APPOINTMENT (OUTPATIENT)
Dept: CT IMAGING | Facility: HOSPITAL | Age: 73
DRG: 841 | End: 2023-04-21
Payer: MEDICARE

## 2023-04-21 PROBLEM — E44.0 MODERATE MALNUTRITION: Status: ACTIVE | Noted: 2023-04-21

## 2023-04-21 LAB
ANION GAP SERPL CALCULATED.3IONS-SCNC: 9.4 MMOL/L (ref 5–15)
BASOPHILS # BLD AUTO: 0.01 10*3/MM3 (ref 0–0.2)
BASOPHILS NFR BLD AUTO: 0.3 % (ref 0–1.5)
BUN SERPL-MCNC: 21 MG/DL (ref 8–23)
BUN/CREAT SERPL: 24.7 (ref 7–25)
CALCIUM SPEC-SCNC: 10.7 MG/DL (ref 8.6–10.5)
CHLORIDE SERPL-SCNC: 94 MMOL/L (ref 98–107)
CO2 SERPL-SCNC: 29.6 MMOL/L (ref 22–29)
CREAT SERPL-MCNC: 0.85 MG/DL (ref 0.57–1)
DEPRECATED RDW RBC AUTO: 55.9 FL (ref 37–54)
EGFRCR SERPLBLD CKD-EPI 2021: 72.9 ML/MIN/1.73
EOSINOPHIL # BLD AUTO: 0.06 10*3/MM3 (ref 0–0.4)
EOSINOPHIL NFR BLD AUTO: 2 % (ref 0.3–6.2)
ERYTHROCYTE [DISTWIDTH] IN BLOOD BY AUTOMATED COUNT: 19.1 % (ref 12.3–15.4)
GLUCOSE SERPL-MCNC: 99 MG/DL (ref 65–99)
HCT VFR BLD AUTO: 27.6 % (ref 34–46.6)
HGB BLD-MCNC: 9.1 G/DL (ref 12–15.9)
IMM GRANULOCYTES # BLD AUTO: 0.02 10*3/MM3 (ref 0–0.05)
IMM GRANULOCYTES NFR BLD AUTO: 0.7 % (ref 0–0.5)
LYMPHOCYTES # BLD AUTO: 0.76 10*3/MM3 (ref 0.7–3.1)
LYMPHOCYTES NFR BLD AUTO: 25.2 % (ref 19.6–45.3)
MCH RBC QN AUTO: 26.7 PG (ref 26.6–33)
MCHC RBC AUTO-ENTMCNC: 33 G/DL (ref 31.5–35.7)
MCV RBC AUTO: 80.9 FL (ref 79–97)
MONOCYTES # BLD AUTO: 0.2 10*3/MM3 (ref 0.1–0.9)
MONOCYTES NFR BLD AUTO: 6.6 % (ref 5–12)
NEUTROPHILS NFR BLD AUTO: 1.97 10*3/MM3 (ref 1.7–7)
NEUTROPHILS NFR BLD AUTO: 65.2 % (ref 42.7–76)
NRBC BLD AUTO-RTO: 0 /100 WBC (ref 0–0.2)
PLATELET # BLD AUTO: 106 10*3/MM3 (ref 140–450)
PMV BLD AUTO: 9.5 FL (ref 6–12)
POTASSIUM SERPL-SCNC: 3.3 MMOL/L (ref 3.5–5.2)
RBC # BLD AUTO: 3.41 10*6/MM3 (ref 3.77–5.28)
SODIUM SERPL-SCNC: 133 MMOL/L (ref 136–145)
WBC NRBC COR # BLD: 3.02 10*3/MM3 (ref 3.4–10.8)

## 2023-04-21 PROCEDURE — 97110 THERAPEUTIC EXERCISES: CPT

## 2023-04-21 PROCEDURE — 94799 UNLISTED PULMONARY SVC/PX: CPT

## 2023-04-21 PROCEDURE — 80048 BASIC METABOLIC PNL TOTAL CA: CPT | Performed by: INTERNAL MEDICINE

## 2023-04-21 PROCEDURE — 25010000002 FUROSEMIDE PER 20 MG: Performed by: INTERNAL MEDICINE

## 2023-04-21 PROCEDURE — 0 POTASSIUM CHLORIDE PER 2 MEQ: Performed by: INTERNAL MEDICINE

## 2023-04-21 PROCEDURE — 74176 CT ABD & PELVIS W/O CONTRAST: CPT

## 2023-04-21 PROCEDURE — 94760 N-INVAS EAR/PLS OXIMETRY 1: CPT

## 2023-04-21 PROCEDURE — 85025 COMPLETE CBC W/AUTO DIFF WBC: CPT | Performed by: INTERNAL MEDICINE

## 2023-04-21 PROCEDURE — 25010000002 ONDANSETRON PER 1 MG: Performed by: INTERNAL MEDICINE

## 2023-04-21 PROCEDURE — 0 IOHEXOL 12 MG/ML SOLUTION: Performed by: INTERNAL MEDICINE

## 2023-04-21 RX ORDER — POTASSIUM CHLORIDE 29.8 MG/ML
20 INJECTION INTRAVENOUS ONCE
Status: DISCONTINUED | OUTPATIENT
Start: 2023-04-21 | End: 2023-04-21

## 2023-04-21 RX ORDER — POTASSIUM CHLORIDE 29.8 MG/ML
20 INJECTION INTRAVENOUS
Status: COMPLETED | OUTPATIENT
Start: 2023-04-21 | End: 2023-04-21

## 2023-04-21 RX ADMIN — POTASSIUM CHLORIDE 20 MEQ: 400 INJECTION, SOLUTION INTRAVENOUS at 14:42

## 2023-04-21 RX ADMIN — HYDROCODONE BITARTRATE AND ACETAMINOPHEN 1 TABLET: 5; 325 TABLET ORAL at 16:39

## 2023-04-21 RX ADMIN — HYDROCODONE BITARTRATE AND ACETAMINOPHEN 1 TABLET: 5; 325 TABLET ORAL at 09:15

## 2023-04-21 RX ADMIN — PREGABALIN 100 MG: 100 CAPSULE ORAL at 09:10

## 2023-04-21 RX ADMIN — Medication 10 ML: at 09:11

## 2023-04-21 RX ADMIN — ONDANSETRON 4 MG: 2 INJECTION INTRAMUSCULAR; INTRAVENOUS at 21:29

## 2023-04-21 RX ADMIN — ONDANSETRON 4 MG: 2 INJECTION INTRAMUSCULAR; INTRAVENOUS at 13:55

## 2023-04-21 RX ADMIN — ONDANSETRON 4 MG: 2 INJECTION INTRAMUSCULAR; INTRAVENOUS at 06:47

## 2023-04-21 RX ADMIN — IOHEXOL 500 ML: 12 SOLUTION ORAL at 13:55

## 2023-04-21 RX ADMIN — FUROSEMIDE 40 MG: 10 INJECTION, SOLUTION INTRAMUSCULAR; INTRAVENOUS at 09:10

## 2023-04-21 RX ADMIN — ONDANSETRON 4 MG: 2 INJECTION INTRAMUSCULAR; INTRAVENOUS at 09:14

## 2023-04-21 RX ADMIN — HYDROCODONE BITARTRATE AND ACETAMINOPHEN 1 TABLET: 5; 325 TABLET ORAL at 21:58

## 2023-04-21 RX ADMIN — POLYETHYLENE GLYCOL 3350 17 G: 17 POWDER, FOR SOLUTION ORAL at 09:10

## 2023-04-21 RX ADMIN — Medication 10 ML: at 21:29

## 2023-04-21 RX ADMIN — SPIRONOLACTONE 25 MG: 25 TABLET ORAL at 09:10

## 2023-04-21 RX ADMIN — FUROSEMIDE 40 MG: 10 INJECTION, SOLUTION INTRAMUSCULAR; INTRAVENOUS at 17:52

## 2023-04-21 RX ADMIN — POTASSIUM CHLORIDE 20 MEQ: 400 INJECTION, SOLUTION INTRAVENOUS at 13:55

## 2023-04-21 NOTE — PROGRESS NOTES
[facility]     Progress Note    Patient Name: Evangelina Sutton  : 1950  MRN: [id number]  Primary Care Physician:  Gilberto Coleman MD  Date of admission: 2023    Subjective   Subjective     Chief Complaint: Patient complaining of nausea vomiting heaving vomiting negative abdominal discomfort CT scan of the abdomen will be obtained him calcium is almost normal at 10.7 patient's edema feet has markedly improved almost completely resolved otherwise feeling better but we will get a CT scan of the abdomen to assess the status of nausea vomiting    HPI: Patient with history of multiple myeloma on chemotherapy    Review of Systems   All systems were reviewed and negative except for: Reviewed    Objective   Objective     Vitals:   Temp:  [98.8 °F (37.1 °C)-99.7 °F (37.6 °C)] 98.9 °F (37.2 °C)  Heart Rate:  [] 95  Resp:  [17-20] 17  BP: (102-147)/(63-76) 128/76    Physical Exam    Constitutional: Awake, alert   Eyes: PERRLA, sclerae anicteric, no conjunctival injection   HENT: NCAT, mucous membranes moist   Neck: Supple, no thyromegaly, no lymphadenopathy, trachea midline   Respiratory: Clear to auscultation bilaterally, nonlabored respirations    Cardiovascular: RRR, no murmurs, rubs, or gallops, palpable pedal pulses bilaterally   Gastrointestinal: Positive bowel sounds, soft, nontender, nondistended   Musculoskeletal: No bilateral ankle edema, no clubbing or cyanosis to extremities   Psychiatric: Appropriate affect, cooperative   Neurologic: Oriented x 3, strength symmetric in all extremities, Cranial Nerves grossly intact to confrontation, speech clear   Skin: No rashes   No change  Result Review    Result Review:  I have personally reviewed the results from the time of this admission to 2023 11:28 EDT and agree with these findings:  [x]  Laboratory anemia and thrombocytopenia  []  Microbiology  []  Radiology  []  EKG/Telemetry   []  Cardiology/Vascular   []  Pathology  []  Old records  []   Other:  Most notable findings include: Anemia thrombocytopenia with history of multiple myeloma    Assessment & Plan   Assessment / Plan     Brief Patient Summary:  Evangelina Sutton is a 72 y.o. female who anemia thrombocytopenia history of multiple myeloma and hypercalcemia complaining of nausea and vomiting    Active Hospital Problems:  Active Hospital Problems    Diagnosis    • **Weakness    • Moderate Malnutrition (HCC)        Plan:   We will get a CT scan of the abdomen continue rest of the management    DVT prophylaxis:  Mechanical DVT prophylaxis orders are present.    CODE STATUS:   Level Of Support Discussed With: Patient  Code Status (Patient has no pulse and is not breathing): CPR (Attempt to Resuscitate)  Medical Interventions (Patient has pulse or is breathing): Full Support    Disposition:  I expect patient to be discharged after the patient has been stabilized.    Electronically signed by Abbe Zamudio MD, 04/21/23, 11:28 AM EDT.      Part of this note may be an electronic transcription/translation of spoken language to printed text using the Dragon Dictation System.

## 2023-04-21 NOTE — PLAN OF CARE
Goal Outcome Evaluation:  Plan of Care Reviewed With: patient        Progress: no change  Outcome Evaluation: VSS. Patient rested comfortably in the bed with eyes closed. No issues or concerns this shift. Will continue plan of care.

## 2023-04-21 NOTE — THERAPY TREATMENT NOTE
Patient Name: Evangelina Sutton  : 1950    MRN: [id number]                              Today's Date: 2023       Admit Date: 2023    Visit Dx:     ICD-10-CM ICD-9-CM   1. Weakness  R53.1 780.79   2. Hypokalemia  E87.6 276.8   3. Peripheral edema  R60.9 782.3   4. Fall, initial encounter  W19.XXXA E888.9   5. Injury of back, initial encounter  S39.92XA 959.19   6. Multiple myeloma, remission status unspecified  C90.00 203.00   7. Decreased activities of daily living (ADL)  Z78.9 V49.89   8. 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 LEVEL   • CHOLECYSTECTOMY     • HYSTERECTOMY     • PORTACATH PLACEMENT N/A 3/17/2022    Procedure: INSERTION OF PORTACATH;  Surgeon: Cj Rossi MD;  Location: [facility] MAIN OR;  Service: General;  Laterality: N/A;   • TOTAL HIP ARTHROPLASTY Left 2022    Procedure: LEFT HIP ARTHROPLASTY ANTERIOR;  Surgeon: Sridhar Coto MD;  Location: [facility] MAIN OR;  Service: Orthopedics;  Laterality: Left;      General Information     Row Name 23 1102          OT Time and Intention    Document Type therapy note (daily note)  -PG     Mode of Treatment  occupational therapy;individual therapy  -PG           User Key  (r) = Recorded By, (t) = Taken By, (c) = Cosigned By    Initials Name Provider Type    Kevin Chirinos OT Occupational Therapist                 Mobility/ADL's    No documentation.                Obj/Interventions     Row Name 04/21/23 1102          Shoulder (Therapeutic Exercise)    Shoulder (Therapeutic Exercise) AROM (active range of motion)  -PG     Shoulder AROM (Therapeutic Exercise) 10 repetitions;bilateral  -PG     Row Name 04/21/23 1102          Elbow/Forearm (Therapeutic Exercise)    Elbow/Forearm (Therapeutic Exercise) AROM (active range of motion)  -PG     Elbow/Forearm AROM (Therapeutic Exercise) 10 repetitions;bilateral  -PG     Row Name 04/21/23 1102          Motor Skills    Therapeutic Exercise shoulder;elbow/forearm  -PG           User Key  (r) = Recorded By, (t) = Taken By, (c) = Cosigned By    Initials Name Provider Type    Kevin Chirinos OT Occupational Therapist               Goals/Plan    No documentation.                Clinical Impression     Row Name 04/21/23 1103          Plan of Care Review    Progress no change  -PG           User Key  (r) = Recorded By, (t) = Taken By, (c) = Cosigned By    Initials Name Provider Type    Kevin Chirinos OT Occupational Therapist               Outcome Measures     Row Name 04/21/23 1103          How much help from another is currently needed...    Putting on and taking off regular lower body clothing? 1  -PG     Bathing (including washing, rinsing, and drying) 1  -PG     Toileting (which includes using toilet bed pan or urinal) 1  -PG     Putting on and taking off regular upper body clothing 1  -PG     Taking care of personal grooming (such as brushing teeth) 2  -PG     Eating meals 3  -PG     AM-PAC 6 Clicks Score (OT) 9  -PG     Row Name 04/21/23 1103          Functional Assessment    Outcome Measure Options AM-PAC 6 Clicks Daily Activity (OT);Optimal Instrument  -PG     Row Name  04/21/23 1103          Optimal Instrument    Optimal Instrument Optimal - 3  -PG     Bending/Stooping 5  -PG     Standing 5  -PG     Reaching 2  -PG           User Key  (r) = Recorded By, (t) = Taken By, (c) = Cosigned By    Initials Name Provider Type    Kevin Chirinos OT Occupational Therapist                Occupational Therapy Education     Title: PT OT SLP Therapies (Done)     Topic: Occupational Therapy (Done)     Point: ADL training (Done)     Description:   Instruct learner(s) on proper safety adaptation and remediation techniques during self care or transfers.   Instruct in proper use of assistive devices.              Learning Progress Summary           Patient Acceptance, E,TB, VU by  at 4/21/2023 0530    Acceptance, E,D, VU by PG at 4/20/2023 1009                   Point: Home exercise program (Done)     Description:   Instruct learner(s) on appropriate technique for monitoring, assisting and/or progressing therapeutic exercises/activities.              Learning Progress Summary           Patient Acceptance, E,TB, VU by  at 4/21/2023 0530    Acceptance, E,D, VU by PG at 4/20/2023 1009                   Point: Precautions (Done)     Description:   Instruct learner(s) on prescribed precautions during self-care and functional transfers.              Learning Progress Summary           Patient Acceptance, E,TB, VU by  at 4/21/2023 0530    Acceptance, E,D, VU by PG at 4/20/2023 1009                   Point: Body mechanics (Done)     Description:   Instruct learner(s) on proper positioning and spine alignment during self-care, functional mobility activities and/or exercises.              Learning Progress Summary           Patient Acceptance, E,TB, VU by  at 4/21/2023 0530    Acceptance, E,D, VU by PG at 4/20/2023 1009                               User Key     Initials Effective Dates Name Provider Type [facility] 07/26/21 -  Marta, April, RNA Registered Nurse Nurse    PG 06/16/21 -   Kevin Sharma OT Occupational Therapist OT              OT Recommendation and Plan  Planned Therapy Interventions (OT): activity tolerance training, BADL retraining, strengthening exercise, transfer/mobility retraining, patient/caregiver education/training, occupation/activity based interventions  Therapy Frequency (OT): 5 times/wk  Plan of Care Review  Plan of Care Reviewed With: patient  Progress: no change  Outcome Evaluation: Patient presents with limitations affecting strength, activity tolerance, and balance impacting patient's ability to return home safely and independently.  The skills of a therapist will be required to safely and effectively implement the following treatment plan to restore maximal level of function     Time Calculation:    Time Calculation- OT     Row Name 04/21/23 1104             Time Calculation- OT    OT Received On 04/21/23  -PG      OT Goal Re-Cert Due Date 04/29/23  -PG         Timed Charges    29743 - OT Therapeutic Exercise Minutes 8  -PG         Total Minutes    Timed Charges Total Minutes 8  -PG       Total Minutes 8  -PG            User Key  (r) = Recorded By, (t) = Taken By, (c) = Cosigned By    Initials Name Provider Type    PG Kevin Sharma OT Occupational Therapist              Therapy Charges for Today     Code Description Service Date Service Provider Modifiers Qty    94609481620  OT EVAL LOW COMPLEXITY 3 4/20/2023 Kevin Sharma OT GO 1    [id number]  OT THER PROC EA 15 MIN 4/21/2023 Kevin Sharma OT GO 1               Kevin Sharma OT  4/21/2023

## 2023-04-21 NOTE — THERAPY TREATMENT NOTE
Acute Care - Physical Therapy Progress Note   [facility]     Patient Name: Evangelina Sutton  : 1950  MRN: [id number]  Today's Date: 2023      Visit Dx:     ICD-10-CM ICD-9-CM   1. Weakness  R53.1 780.79   2. Hypokalemia  E87.6 276.8   3. Peripheral edema  R60.9 782.3   4. Fall, initial encounter  W19.XXXA E888.9   5. Injury of back, initial encounter  S39.92XA 959.19   6. Multiple myeloma, remission status unspecified  C90.00 203.00   7. Decreased activities of daily living (ADL)  Z78.9 V49.89   8. 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 LEVEL   • CHOLECYSTECTOMY     • HYSTERECTOMY     • PORTACATH PLACEMENT N/A 3/17/2022    Procedure: INSERTION OF PORTACATH;  Surgeon: Cj Rossi MD;  Location: [facility] MAIN OR;  Service: General;  Laterality: N/A;   • TOTAL HIP ARTHROPLASTY Left 2022    Procedure: LEFT HIP ARTHROPLASTY ANTERIOR;  Surgeon: Sridhar Coto MD;  Location: [facility] MAIN OR;  Service: Orthopedics;  Laterality: Left;     PT Assessment (last 12 hours)     PT Evaluation and Treatment     Row Name 23 1300          Physical Therapy  Time and Intention    Subjective Information complains of;nausea/vomiting  -CS     Document Type therapy note (daily note)  -CS     Mode of Treatment individual therapy;physical therapy  -CS     Patient Effort fair  -CS     Symptoms Noted During/After Treatment nausea;fatigue  -CS     Row Name 04/21/23 1300          Motor Skills    Therapeutic Exercise hip;knee;ankle  -     Row Name 04/21/23 1300          Hip (Therapeutic Exercise)    Hip (Therapeutic Exercise) AAROM (active assistive range of motion)  -CS     Hip AAROM (Therapeutic Exercise) bilateral;supine;10 repetitions;5 repetitions  heel slides, hip abd/add  -CS     Row Name 04/21/23 1300          Knee (Therapeutic Exercise)    Knee (Therapeutic Exercise) AAROM (active assistive range of motion)  -CS     Knee AAROM (Therapeutic Exercise) bilateral;supine;10 repetitions;5 repetitions  SAQ's  -CS     Row Name 04/21/23 1300          Ankle (Therapeutic Exercise)    Ankle (Therapeutic Exercise) AAROM (active assistive range of motion)  -     Ankle AAROM (Therapeutic Exercise) bilateral;dorsiflexion;plantarflexion;supine;10 repetitions;5 repetitions  -     Row Name 04/21/23 1300          Progress Summary (PT)    Progress Toward Functional Goals (PT) progress toward functional goals is fair  -CS           User Key  (r) = Recorded By, (t) = Taken By, (c) = Cosigned By    Initials Name Provider Type     Brien Vergara PTA Physical Therapist Assistant                Physical Therapy Education     Title: PT OT SLP Therapies (Done)     Topic: Physical Therapy (Done)     Point: Mobility training (Done)     Learning Progress Summary           Patient Acceptance, E,TB, VU by  at 4/21/2023 0530    Acceptance, E,TB, VU by RUKHSANA at 4/20/2023 1323                   Point: Home exercise program (Done)     Learning Progress Summary           Patient Acceptance, E,TB, VU by  at 4/21/2023 0530                   Point: Body mechanics (Done)     Learning Progress Summary            Patient Acceptance, E,TB, VU by  at 4/21/2023 0530    Acceptance, E,TB, VU by  at 4/20/2023 1323                   Point: Precautions (Done)     Learning Progress Summary           Patient Acceptance, E,TB, VU by  at 4/21/2023 0530    Acceptance, E,TB, VU by  at 4/20/2023 1323                               User Key     Initials Effective Dates Name Provider Type Discipline     07/26/21 -  Marta April, RNA Registered Nurse Nurse     06/11/21 -  Hector Ortega, PT Physical Therapist PT              PT Recommendation and Plan     Progress Summary (PT)  Progress Toward Functional Goals (PT): progress toward functional goals is fair   Outcome Measures     Row Name 04/21/23 1300 04/20/23 1300          How much help from another person do you currently need...    Turning from your back to your side while in flat bed without using bedrails? 3  -CS 3  -AV     Moving from lying on back to sitting on the side of a flat bed without bedrails? 3  -CS 3  -AV     Moving to and from a bed to a chair (including a wheelchair)? 2  -CS 2  -AV     Standing up from a chair using your arms (e.g., wheelchair, bedside chair)? 2  -CS 2  -AV     Climbing 3-5 steps with a railing? 1  -CS 1  -AV     To walk in hospital room? 1  -CS 2  -AV     AM-PAC 6 Clicks Score (PT) 12  -CS 13  -AV        Functional Assessment    Outcome Measure Options AM-PAC 6 Clicks Basic Mobility (PT)  -CS AM-PAC 6 Clicks Basic Mobility (PT)  -AV           User Key  (r) = Recorded By, (t) = Taken By, (c) = Cosigned By    Initials Name Provider Type    AV Hector Ortega, PT Physical Therapist    Brien Benson PTA Physical Therapist Assistant                 Time Calculation:    PT Charges     Row Name 04/21/23 1313             Time Calculation    Start Time 1122  -CS      PT Received On 04/21/23  -CS         Timed Charges    87649 - PT Therapeutic Exercise Minutes 10  -CS         Total Minutes    Timed Charges Total Minutes 10  -CS        Total Minutes 10  -CS            User Key  (r) = Recorded By, (t) = Taken By, (c) = Cosigned By    Initials Name Provider Type    CS Brien Vergara PTA Physical Therapist Assistant              Therapy Charges for Today     Code Description Service Date Service Provider Modifiers Qty    [id number] HC PT THER PROC EA 15 MIN 4/21/2023 Brien Vergara PTA GP 1          PT G-Codes  Outcome Measure Options: AM-PAC 6 Clicks Basic Mobility (PT)  AM-PAC 6 Clicks Score (PT): 12  AM-PAC 6 Clicks Score (OT): 9    Brien Vergara PTA  4/21/2023

## 2023-04-22 LAB
ANION GAP SERPL CALCULATED.3IONS-SCNC: 13.4 MMOL/L (ref 5–15)
BASOPHILS # BLD AUTO: 0 10*3/MM3 (ref 0–0.2)
BASOPHILS NFR BLD AUTO: 0 % (ref 0–1.5)
BUN SERPL-MCNC: 29 MG/DL (ref 8–23)
BUN/CREAT SERPL: 29.9 (ref 7–25)
CALCIUM SPEC-SCNC: 10.5 MG/DL (ref 8.6–10.5)
CHLORIDE SERPL-SCNC: 86 MMOL/L (ref 98–107)
CO2 SERPL-SCNC: 30.6 MMOL/L (ref 22–29)
CREAT SERPL-MCNC: 0.97 MG/DL (ref 0.57–1)
DEPRECATED RDW RBC AUTO: 53.2 FL (ref 37–54)
EGFRCR SERPLBLD CKD-EPI 2021: 62.2 ML/MIN/1.73
EOSINOPHIL # BLD AUTO: 0 10*3/MM3 (ref 0–0.4)
EOSINOPHIL NFR BLD AUTO: 0 % (ref 0.3–6.2)
ERYTHROCYTE [DISTWIDTH] IN BLOOD BY AUTOMATED COUNT: 18.7 % (ref 12.3–15.4)
GLUCOSE SERPL-MCNC: 105 MG/DL (ref 65–99)
HCT VFR BLD AUTO: 28.1 % (ref 34–46.6)
HGB BLD-MCNC: 9.5 G/DL (ref 12–15.9)
IMM GRANULOCYTES # BLD AUTO: 0.02 10*3/MM3 (ref 0–0.05)
IMM GRANULOCYTES NFR BLD AUTO: 0.6 % (ref 0–0.5)
LYMPHOCYTES # BLD AUTO: 0.45 10*3/MM3 (ref 0.7–3.1)
LYMPHOCYTES NFR BLD AUTO: 13.2 % (ref 19.6–45.3)
MAGNESIUM SERPL-MCNC: 1.4 MG/DL (ref 1.6–2.4)
MCH RBC QN AUTO: 26.9 PG (ref 26.6–33)
MCHC RBC AUTO-ENTMCNC: 33.8 G/DL (ref 31.5–35.7)
MCV RBC AUTO: 79.6 FL (ref 79–97)
MONOCYTES # BLD AUTO: 0.3 10*3/MM3 (ref 0.1–0.9)
MONOCYTES NFR BLD AUTO: 8.8 % (ref 5–12)
NEUTROPHILS NFR BLD AUTO: 2.65 10*3/MM3 (ref 1.7–7)
NEUTROPHILS NFR BLD AUTO: 77.4 % (ref 42.7–76)
NRBC BLD AUTO-RTO: 0 /100 WBC (ref 0–0.2)
PLATELET # BLD AUTO: 110 10*3/MM3 (ref 140–450)
PMV BLD AUTO: 9.3 FL (ref 6–12)
POTASSIUM SERPL-SCNC: 3.1 MMOL/L (ref 3.5–5.2)
RBC # BLD AUTO: 3.53 10*6/MM3 (ref 3.77–5.28)
SODIUM SERPL-SCNC: 130 MMOL/L (ref 136–145)
WBC NRBC COR # BLD: 3.42 10*3/MM3 (ref 3.4–10.8)

## 2023-04-22 PROCEDURE — 80048 BASIC METABOLIC PNL TOTAL CA: CPT | Performed by: INTERNAL MEDICINE

## 2023-04-22 PROCEDURE — 25010000002 MAGNESIUM SULFATE 2 GM/50ML SOLUTION: Performed by: INTERNAL MEDICINE

## 2023-04-22 PROCEDURE — 25010000002 FUROSEMIDE PER 20 MG: Performed by: INTERNAL MEDICINE

## 2023-04-22 PROCEDURE — 83735 ASSAY OF MAGNESIUM: CPT | Performed by: INTERNAL MEDICINE

## 2023-04-22 PROCEDURE — 85025 COMPLETE CBC W/AUTO DIFF WBC: CPT | Performed by: INTERNAL MEDICINE

## 2023-04-22 PROCEDURE — 94799 UNLISTED PULMONARY SVC/PX: CPT

## 2023-04-22 PROCEDURE — 0 POTASSIUM CHLORIDE 10 MEQ/100ML SOLUTION: Performed by: INTERNAL MEDICINE

## 2023-04-22 RX ORDER — POTASSIUM CHLORIDE 7.45 MG/ML
10 INJECTION INTRAVENOUS
Status: COMPLETED | OUTPATIENT
Start: 2023-04-22 | End: 2023-04-22

## 2023-04-22 RX ORDER — CHOLECALCIFEROL (VITAMIN D3) 125 MCG
5 CAPSULE ORAL NIGHTLY PRN
Status: DISCONTINUED | OUTPATIENT
Start: 2023-04-22 | End: 2023-04-29 | Stop reason: HOSPADM

## 2023-04-22 RX ORDER — OXYCODONE HYDROCHLORIDE 5 MG/1
10 TABLET ORAL EVERY 8 HOURS PRN
Status: DISCONTINUED | OUTPATIENT
Start: 2023-04-22 | End: 2023-04-24

## 2023-04-22 RX ORDER — MAGNESIUM SULFATE HEPTAHYDRATE 40 MG/ML
2 INJECTION, SOLUTION INTRAVENOUS ONCE
Status: COMPLETED | OUTPATIENT
Start: 2023-04-22 | End: 2023-04-22

## 2023-04-22 RX ORDER — OXYCODONE HYDROCHLORIDE 5 MG/1
10 TABLET ORAL 2 TIMES DAILY
Status: DISCONTINUED | OUTPATIENT
Start: 2023-04-22 | End: 2023-04-22

## 2023-04-22 RX ADMIN — Medication 5 MG: at 00:59

## 2023-04-22 RX ADMIN — PREGABALIN 100 MG: 100 CAPSULE ORAL at 08:20

## 2023-04-22 RX ADMIN — ACETAMINOPHEN 650 MG: 325 TABLET ORAL at 17:04

## 2023-04-22 RX ADMIN — ACETAMINOPHEN 650 MG: 325 TABLET ORAL at 00:59

## 2023-04-22 RX ADMIN — Medication 10 ML: at 21:32

## 2023-04-22 RX ADMIN — OXYCODONE 10 MG: 5 TABLET ORAL at 21:32

## 2023-04-22 RX ADMIN — SPIRONOLACTONE 25 MG: 25 TABLET ORAL at 08:20

## 2023-04-22 RX ADMIN — FUROSEMIDE 40 MG: 10 INJECTION, SOLUTION INTRAMUSCULAR; INTRAVENOUS at 08:20

## 2023-04-22 RX ADMIN — HYDROCODONE BITARTRATE AND ACETAMINOPHEN 1 TABLET: 5; 325 TABLET ORAL at 10:24

## 2023-04-22 RX ADMIN — FUROSEMIDE 40 MG: 10 INJECTION, SOLUTION INTRAMUSCULAR; INTRAVENOUS at 17:04

## 2023-04-22 RX ADMIN — POTASSIUM CHLORIDE 10 MEQ: 7.46 INJECTION, SOLUTION INTRAVENOUS at 14:56

## 2023-04-22 RX ADMIN — Medication 10 ML: at 08:20

## 2023-04-22 RX ADMIN — OXYCODONE HYDROCHLORIDE 10 MG: 5 TABLET ORAL at 12:40

## 2023-04-22 RX ADMIN — POLYETHYLENE GLYCOL 3350 17 G: 17 POWDER, FOR SOLUTION ORAL at 08:20

## 2023-04-22 RX ADMIN — HYDROCODONE BITARTRATE AND ACETAMINOPHEN 1 TABLET: 5; 325 TABLET ORAL at 04:16

## 2023-04-22 RX ADMIN — POTASSIUM CHLORIDE 10 MEQ: 7.46 INJECTION, SOLUTION INTRAVENOUS at 12:41

## 2023-04-22 RX ADMIN — POTASSIUM CHLORIDE 10 MEQ: 7.46 INJECTION, SOLUTION INTRAVENOUS at 16:48

## 2023-04-22 RX ADMIN — MAGNESIUM SULFATE HEPTAHYDRATE 2 G: 2 INJECTION, SOLUTION INTRAVENOUS at 12:40

## 2023-04-22 RX ADMIN — POTASSIUM CHLORIDE 10 MEQ: 7.46 INJECTION, SOLUTION INTRAVENOUS at 13:37

## 2023-04-22 NOTE — PLAN OF CARE
Goal Outcome Evaluation:           Progress: no change  Outcome Evaluation: Patient c/o severe pain all day. MD notified and pain medication frequency adjusted to q8h. Potassium at 3.1 and magnesium at 1.4 today. IV replacement ordered and administered. Vital signs stable. No other significant changed this shift.

## 2023-04-22 NOTE — PROGRESS NOTES
[facility]     Progress Note    Patient Name: Evangelina Sutton  : 1950  MRN: [id number]  Primary Care Physician:  Gilberto Coleman MD  Date of admission: 2023    Subjective   Subjective     Chief Complaint: Has severe neuropathy complaining of a lot of pain will resume her pain medication  HPI: Nausea vomiting has resolved but complaining of very severe pain in extremities weakness    Review of Systems   All systems were reviewed and negative except for: Reviewed    Objective   Objective     Vitals:   Temp:  [98.8 °F (37.1 °C)-99.3 °F (37.4 °C)] 98.8 °F (37.1 °C)  Heart Rate:  [83-97] 83  Resp:  [14-18] 18  BP: (136-149)/(69-82) 136/69    Physical Exam    Constitutional: Awake, alert   Eyes: PERRLA, sclerae anicteric, no conjunctival injection   HENT: NCAT, mucous membranes moist   Neck: Supple, no thyromegaly, no lymphadenopathy, trachea midline   Respiratory: Clear to auscultation bilaterally, nonlabored respirations    Cardiovascular: RRR, no murmurs, rubs, or gallops, palpable pedal pulses bilaterally   Gastrointestinal: Positive bowel sounds, soft, nontender, nondistended   Musculoskeletal: No bilateral ankle edema, no clubbing or cyanosis to extremities   Psychiatric: Appropriate affect, cooperative   Neurologic: Oriented x 3, strength symmetric in all extremities, Cranial Nerves grossly intact to confrontation, speech clear   Skin: No rashes   No change  Result Review    Result Review:  I have personally reviewed the results from the time of this admission to 2023 11:38 EDT and agree with these findings:  [x]  Laboratory anemia  []  Microbiology  []  Radiology  []  EKG/Telemetry   []  Cardiology/Vascular   []  Pathology  []  Old records  []  Other:  Most notable findings include: Anemia with multiple myeloma    Assessment & Plan   Assessment / Plan     Brief Patient Summary:  Evangelina Sutton is a 72 y.o. female who anemia with multiple myeloma severe pain increase the pain  medication    Active Hospital Problems:  Active Hospital Problems    Diagnosis    • **Weakness    • Moderate Malnutrition (HCC)        Plan:   Increase the pain medication we will get physical therapy    DVT prophylaxis:  Mechanical DVT prophylaxis orders are present.    CODE STATUS:   Level Of Support Discussed With: Patient  Code Status (Patient has no pulse and is not breathing): CPR (Attempt to Resuscitate)  Medical Interventions (Patient has pulse or is breathing): Full Support    Disposition:  I expect patient to be discharged after the patient has been stable.    Electronically signed by Abbe Zamudio MD, 04/22/23, 11:38 AM EDT.      Part of this note may be an electronic transcription/translation of spoken language to printed text using the Dragon Dictation System.

## 2023-04-22 NOTE — PLAN OF CARE
Goal Outcome Evaluation:  Plan of Care Reviewed With: patient        Progress: declining  Outcome Evaluation: Patient has had frequent nausea, vomiting, administered zofran as ordered. Nausea continued, discussed with MD. Complained of back pain and Norco administered. Awaiting CT scan of abdomen, continue to monitor.

## 2023-04-23 ENCOUNTER — APPOINTMENT (OUTPATIENT)
Dept: MRI IMAGING | Facility: HOSPITAL | Age: 73
DRG: 841 | End: 2023-04-23
Payer: MEDICARE

## 2023-04-23 LAB
ANION GAP SERPL CALCULATED.3IONS-SCNC: 11.2 MMOL/L (ref 5–15)
BASOPHILS # BLD AUTO: 0.01 10*3/MM3 (ref 0–0.2)
BASOPHILS NFR BLD AUTO: 0.3 % (ref 0–1.5)
BUN SERPL-MCNC: 34 MG/DL (ref 8–23)
BUN/CREAT SERPL: 32.7 (ref 7–25)
CALCIUM SPEC-SCNC: 10.4 MG/DL (ref 8.6–10.5)
CHLORIDE SERPL-SCNC: 87 MMOL/L (ref 98–107)
CO2 SERPL-SCNC: 32.8 MMOL/L (ref 22–29)
CREAT SERPL-MCNC: 1.04 MG/DL (ref 0.57–1)
DEPRECATED RDW RBC AUTO: 52.5 FL (ref 37–54)
EGFRCR SERPLBLD CKD-EPI 2021: 57.2 ML/MIN/1.73
EOSINOPHIL # BLD AUTO: 0.01 10*3/MM3 (ref 0–0.4)
EOSINOPHIL NFR BLD AUTO: 0.3 % (ref 0.3–6.2)
ERYTHROCYTE [DISTWIDTH] IN BLOOD BY AUTOMATED COUNT: 18.5 % (ref 12.3–15.4)
GLUCOSE SERPL-MCNC: 104 MG/DL (ref 65–99)
HCT VFR BLD AUTO: 30.4 % (ref 34–46.6)
HGB BLD-MCNC: 10.2 G/DL (ref 12–15.9)
IMM GRANULOCYTES # BLD AUTO: 0.01 10*3/MM3 (ref 0–0.05)
IMM GRANULOCYTES NFR BLD AUTO: 0.3 % (ref 0–0.5)
LYMPHOCYTES # BLD AUTO: 0.51 10*3/MM3 (ref 0.7–3.1)
LYMPHOCYTES NFR BLD AUTO: 13.9 % (ref 19.6–45.3)
MAGNESIUM SERPL-MCNC: 1.9 MG/DL (ref 1.6–2.4)
MCH RBC QN AUTO: 26.6 PG (ref 26.6–33)
MCHC RBC AUTO-ENTMCNC: 33.6 G/DL (ref 31.5–35.7)
MCV RBC AUTO: 79.4 FL (ref 79–97)
MONOCYTES # BLD AUTO: 0.45 10*3/MM3 (ref 0.1–0.9)
MONOCYTES NFR BLD AUTO: 12.2 % (ref 5–12)
NEUTROPHILS NFR BLD AUTO: 2.69 10*3/MM3 (ref 1.7–7)
NEUTROPHILS NFR BLD AUTO: 73 % (ref 42.7–76)
NRBC BLD AUTO-RTO: 0 /100 WBC (ref 0–0.2)
PLATELET # BLD AUTO: 125 10*3/MM3 (ref 140–450)
PMV BLD AUTO: 9 FL (ref 6–12)
POTASSIUM SERPL-SCNC: 3.5 MMOL/L (ref 3.5–5.2)
RBC # BLD AUTO: 3.83 10*6/MM3 (ref 3.77–5.28)
SODIUM SERPL-SCNC: 131 MMOL/L (ref 136–145)
WBC NRBC COR # BLD: 3.68 10*3/MM3 (ref 3.4–10.8)

## 2023-04-23 PROCEDURE — 70551 MRI BRAIN STEM W/O DYE: CPT

## 2023-04-23 PROCEDURE — 80048 BASIC METABOLIC PNL TOTAL CA: CPT | Performed by: INTERNAL MEDICINE

## 2023-04-23 PROCEDURE — 83735 ASSAY OF MAGNESIUM: CPT | Performed by: INTERNAL MEDICINE

## 2023-04-23 PROCEDURE — 72141 MRI NECK SPINE W/O DYE: CPT

## 2023-04-23 PROCEDURE — 85025 COMPLETE CBC W/AUTO DIFF WBC: CPT | Performed by: INTERNAL MEDICINE

## 2023-04-23 PROCEDURE — 25010000002 FUROSEMIDE PER 20 MG: Performed by: INTERNAL MEDICINE

## 2023-04-23 PROCEDURE — 25010000002 ONDANSETRON PER 1 MG: Performed by: INTERNAL MEDICINE

## 2023-04-23 PROCEDURE — 72146 MRI CHEST SPINE W/O DYE: CPT

## 2023-04-23 PROCEDURE — 25010000002 DEXAMETHASONE SODIUM PHOSPHATE 10 MG/ML SOLUTION: Performed by: RADIOLOGY

## 2023-04-23 PROCEDURE — 94799 UNLISTED PULMONARY SVC/PX: CPT

## 2023-04-23 PROCEDURE — 97110 THERAPEUTIC EXERCISES: CPT

## 2023-04-23 RX ORDER — DEXAMETHASONE SODIUM PHOSPHATE 4 MG/ML
4 INJECTION, SOLUTION INTRA-ARTICULAR; INTRALESIONAL; INTRAMUSCULAR; INTRAVENOUS; SOFT TISSUE EVERY 6 HOURS
Status: DISCONTINUED | OUTPATIENT
Start: 2023-04-24 | End: 2023-04-29

## 2023-04-23 RX ORDER — DEXAMETHASONE SODIUM PHOSPHATE 10 MG/ML
8 INJECTION, SOLUTION INTRAMUSCULAR; INTRAVENOUS DAILY
Status: DISCONTINUED | OUTPATIENT
Start: 2023-04-23 | End: 2023-04-23

## 2023-04-23 RX ORDER — DEXAMETHASONE SODIUM PHOSPHATE 10 MG/ML
10 INJECTION, SOLUTION INTRAMUSCULAR; INTRAVENOUS ONCE
Status: COMPLETED | OUTPATIENT
Start: 2023-04-23 | End: 2023-04-23

## 2023-04-23 RX ADMIN — OXYCODONE 10 MG: 5 TABLET ORAL at 22:07

## 2023-04-23 RX ADMIN — Medication 10 ML: at 21:59

## 2023-04-23 RX ADMIN — OXYCODONE 10 MG: 5 TABLET ORAL at 06:11

## 2023-04-23 RX ADMIN — Medication 10 ML: at 09:28

## 2023-04-23 RX ADMIN — ONDANSETRON 4 MG: 2 INJECTION INTRAMUSCULAR; INTRAVENOUS at 03:55

## 2023-04-23 RX ADMIN — FUROSEMIDE 40 MG: 10 INJECTION, SOLUTION INTRAMUSCULAR; INTRAVENOUS at 09:27

## 2023-04-23 RX ADMIN — OXYCODONE 10 MG: 5 TABLET ORAL at 14:05

## 2023-04-23 RX ADMIN — DEXAMETHASONE SODIUM PHOSPHATE 10 MG: 10 INJECTION INTRAMUSCULAR; INTRAVENOUS at 22:00

## 2023-04-23 RX ADMIN — FUROSEMIDE 40 MG: 10 INJECTION, SOLUTION INTRAMUSCULAR; INTRAVENOUS at 19:19

## 2023-04-23 RX ADMIN — SPIRONOLACTONE 25 MG: 25 TABLET ORAL at 09:28

## 2023-04-23 RX ADMIN — ACETAMINOPHEN 650 MG: 325 TABLET ORAL at 04:02

## 2023-04-23 RX ADMIN — PREGABALIN 100 MG: 100 CAPSULE ORAL at 09:27

## 2023-04-23 NOTE — PLAN OF CARE
Problem: Adult Inpatient Plan of Care  Goal: Plan of Care Review  Outcome: Ongoing, Progressing  Flowsheets (Taken 4/23/2023 1812)  Progress: no change  Plan of Care Reviewed With: patient  Outcome Evaluation: Patient alert and oriented. Patient receiving pain medication q8 hrs. Patient has had no other complaints this shift.  Goal: Patient-Specific Goal (Individualized)  Outcome: Ongoing, Progressing  Goal: Absence of Hospital-Acquired Illness or Injury  Outcome: Ongoing, Progressing  Intervention: Identify and Manage Fall Risk  Recent Flowsheet Documentation  Taken 4/23/2023 1800 by Laya Rand RN  Safety Promotion/Fall Prevention: safety round/check completed  Taken 4/23/2023 1500 by Laya Rand RN  Safety Promotion/Fall Prevention: safety round/check completed  Taken 4/23/2023 1300 by Laya Rand RN  Safety Promotion/Fall Prevention: safety round/check completed  Taken 4/23/2023 1100 by Laya Rand RN  Safety Promotion/Fall Prevention: safety round/check completed  Taken 4/23/2023 0715 by Laya Rand RN  Safety Promotion/Fall Prevention:   assistive device/personal items within reach   clutter free environment maintained   fall prevention program maintained   lighting adjusted   nonskid shoes/slippers when out of bed   room organization consistent   safety round/check completed  Intervention: Prevent and Manage VTE (Venous Thromboembolism) Risk  Recent Flowsheet Documentation  Taken 4/23/2023 0928 by Laya Rand RN  Range of Motion: active ROM (range of motion) encouraged  Intervention: Prevent Infection  Recent Flowsheet Documentation  Taken 4/23/2023 0715 by Laya Rand RN  Infection Prevention:   cohorting utilized   environmental surveillance performed   hand hygiene promoted   rest/sleep promoted   single patient room provided  Goal: Optimal Comfort and Wellbeing  Outcome: Ongoing, Progressing  Intervention: Provide Person-Centered Care  Recent Flowsheet  Documentation  Taken 4/23/2023 0928 by Laya Rand RN  Trust Relationship/Rapport:   care explained   choices provided   emotional support provided   empathic listening provided   questions answered   questions encouraged   reassurance provided   thoughts/feelings acknowledged  Goal: Readiness for Transition of Care  Outcome: Ongoing, Progressing     Problem: Fall Injury Risk  Goal: Absence of Fall and Fall-Related Injury  Outcome: Ongoing, Progressing  Intervention: Identify and Manage Contributors  Recent Flowsheet Documentation  Taken 4/23/2023 0715 by Laya Rand RN  Medication Review/Management:   medications reviewed   high-risk medications identified  Intervention: Promote Injury-Free Environment  Recent Flowsheet Documentation  Taken 4/23/2023 1800 by Laya Rand RN  Safety Promotion/Fall Prevention: safety round/check completed  Taken 4/23/2023 1500 by Laya Rand RN  Safety Promotion/Fall Prevention: safety round/check completed  Taken 4/23/2023 1300 by Laya Rand RN  Safety Promotion/Fall Prevention: safety round/check completed  Taken 4/23/2023 1100 by Laya Rand RN  Safety Promotion/Fall Prevention: safety round/check completed  Taken 4/23/2023 0715 by Laya Rand RN  Safety Promotion/Fall Prevention:   assistive device/personal items within reach   clutter free environment maintained   fall prevention program maintained   lighting adjusted   nonskid shoes/slippers when out of bed   room organization consistent   safety round/check completed     Problem: Skin Injury Risk Increased  Goal: Skin Health and Integrity  Outcome: Ongoing, Progressing   Goal Outcome Evaluation:  Plan of Care Reviewed With: patient        Progress: no change  Outcome Evaluation: Patient alert and oriented. Patient receiving pain medication q8 hrs. Patient has had no other complaints this shift.

## 2023-04-23 NOTE — PLAN OF CARE
Goal Outcome Evaluation:   VSS. Complaints of significant back pain. Prn pain medication given. Will continue current plan of care.

## 2023-04-23 NOTE — CONSULTS
[facility]   Consult Note      Patient Name: Evangelina Sutton  : 1950  MRN: [id number]  Primary Care Physician:  Gilberto Coleman MD  Date of admission: 2023    Subjective   Subjective     This 73 years old woman was seen upon request of Abbe Zamudio MD for evaluation.    Chief Complaint:   Weak legs.    HPI:  The information was obtained from her and her son who was at the bedside.  The son dated the onset of her illness sometime either on  or 2023 after she got up from the commode, her legs gave way and she fell down.  She had pain in her low back area.  Second she was able to walk until the next day when she could not move both lower extremities.  This was persistent.  There was no associated bowel or bladder incontinence.  There was no chest pain or abdominal pain.  However, she had pain over the lower lumbar sacral region.    Review of Systems  There is no difficulty seeing as well as in speaking any swallowing.  There is no chest pains or abdominal pains.  She was not incontinent of her urine or her bowels.      Past Medical History:   Diagnosis Date   • Cancer     BONE CANCER/TAKING CHEMO SHOT WEEKLY   • DDD (degenerative disc disease), cervical    • Neuropathy     LEGS AND ARMS   • PONV (postoperative nausea and vomiting)        Past Surgical History:   Procedure Laterality Date   • BACK SURGERY      DISCECTOMY W/FUSION, NECK ? LEVEL   • CHOLECYSTECTOMY     • HYSTERECTOMY     • PORTACATH PLACEMENT N/A 3/17/2022    Procedure: INSERTION OF PORTACATH;  Surgeon: Cj Rossi MD;  Location: [facility] MAIN OR;  Service: General;  Laterality: N/A;   • TOTAL HIP ARTHROPLASTY Left 2022    Procedure: LEFT HIP ARTHROPLASTY ANTERIOR;  Surgeon: Sridhar Coto MD;  Location: [facility] MAIN OR;  Service: Orthopedics;  Laterality: Left;       Family History: family history is not on file. Otherwise pertinent FHx was reviewed and not pertinent to current issue.    Social  History:  reports that she quit smoking about 26 years ago. Her smoking use included cigarettes. She smoked an average of 1 pack per day. She has never used smokeless tobacco. She reports current drug use. Frequency: 20.00 times per week. Drugs: Morphine and Oxycodone. She reports that she does not drink alcohol.    Psychosocial History: No known psychiatric disturbance.    Home Medications:  oxyCODONE, polyethylene glycol, pregabalin, spironolactone, and vitamin D      Allergies:  No Known Allergies    Vitals:   Temp:  [98.6 °F (37 °C)-99.4 °F (37.4 °C)] 98.6 °F (37 °C)  Heart Rate:  [91-99] 98  Resp:  [16-18] 16  BP: (104-144)/(64-77) 124/76  Physical Exam   She was awake and alert was not informed distress was fairly developed and is practically skin and bones.    Her heart was regular heart rate was 86/min.  There were no murmurs.  The lungs were clear.    The carotid pulses were 1+ and equal.  There were no bruits on either side.    Neurological Examination:   Her responses were coherent and relevant.  She was not aware of what was going on around her.  She was able to understand and follow verbal commands.    I did not look into the fundus on either side because of the COVID-19 pandemic social distancing.    The pupils were 3 to 4 mm. They were round and equal reactive to light directly and consensually.  There was no extraocular muscle weakness.    No facial asymmetry.  No facial weakness.  Was able to hear normal conversational speech.    The strength of the sternomastoid and trapezius muscles was normal and symmetrical.  The uvula and the tongue were in the midline.    There is questionable weakness of the right upper extremity.  No weakness in the left upper extremity.  Both lower extremities were weak.  She was not able to move both lower extremities.    She has a sensory level with cold temperature and pinprick at T10 on the left side and on the right is at T11.    The deep tendon reflexes were absent on  both sides.    Did finger-to-nose test fairly well equal on both sides.      Result Review    Result Review:  I have personally reviewed the results from the time of this admission to 4/23/2023 17:50 EDT and agree with these findings:  []  Laboratory  []  Microbiology  [x]  Radiology  []  EKG/Telemetry   []  Cardiology/Vascular   []  Pathology  []  Old records  []  Other:  Most notable findings include:   April 23, 2023  I reviewed the computer images of the MRI of her brain that was done on April 23, 2023.  Study showed no acute changes.  There is mild subcortical and periventricular white matter changes consistent with old microvascular ischemia which is unchanged as of the study done on March 22, 2023.  There is moderate cortical atrophy which is more marked in the frontal and temporal regions which are essentially unchanged compared to study done on March 22, 2023.  There were lesions in the skull or calvarium on both sides which had increased in number as of the study done on March 22, 2023.    I reviewed the computer images of the MRI of the cervical spine that was done on April 23, 2023.  This showed moderate to marked multilevel disc osteophyte complex bulging from C3 down to C6 levels, worse at C5-C6 level.  There is 3 to 4 mm anterolisthesis of the C6 vertebra in relation to the C5 vertebra.  There is post Operative changes noted at C3 67, C7-C8 levels.    I reviewed the computer images of the MRI of the thoracic spine that was done on April 23, 2023.  Compression of the spinal cord at T9 level extending to the right pedicle and articular process with significant epidural soft tissue component resulting in severe narrowing of the spinal canal and severe compression of the spinal cord at T9 level.  This showed moderate disc osteophyte complex bulging from T1 down to T12 level.    I reviewed the computer images of the MRI of her lumbosacral spine that was done on April 19, 2023.  There are no acute changes.   There is a multilevel disc disc osteophyte complex bulging from T11 down to S1 vertebra, worse at T10-S1 levels worse L3-L4, L4-L5 and L5-S1 levels.    Impression:    Thoracic Myelopathy, motor and sensory T -10 on the left, T-11 on the right  Spinal cord compression, T9 level  Cervical Spondylosis  Thoracic Spondylosis  Lumbosacral Spondylosis  Multiple metastases, both sides of the skull, and along the cervical thoracic and lumbosacral spines.  Multiple Myeloma      Plan:   The condition was discussed with her son and on separate occasion with Dr. Abbe Zamudio.  I agree with Decadron therapy.  Neurosurgical evaluation is indicated.      Please note that portions of this note were completed with a voice recognition program.  Part of this note is an electric or electronic transcription/translation of spoken language to printed text using the dragon dictating system.    Electronically signed by Ryder Medel Jr., MD, 04/23/23, 5:50 PM EDT.

## 2023-04-23 NOTE — THERAPY TREATMENT NOTE
Acute Care - Physical Therapy Treatment Note   [place]     Patient Name: Evangelina Sutton  : 1950  MRN: [id number]  Today's Date: 2023      Visit Dx:     ICD-10-CM ICD-9-CM   1. Weakness  R53.1 780.79   2. Hypokalemia  E87.6 276.8   3. Peripheral edema  R60.9 782.3   4. Fall, initial encounter  W19.XXXA E888.9   5. Injury of back, initial encounter  S39.92XA 959.19   6. Multiple myeloma, remission status unspecified  C90.00 203.00   7. Decreased activities of daily living (ADL)  Z78.9 V49.89   8. 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 LEVEL   • CHOLECYSTECTOMY     • HYSTERECTOMY     • PORTACATH PLACEMENT N/A 3/17/2022    Procedure: INSERTION OF PORTACATH;  Surgeon: Cj Rossi MD;  Location: [facility] MAIN OR;  Service: General;  Laterality: N/A;   • TOTAL HIP ARTHROPLASTY Left 2022    Procedure: LEFT HIP ARTHROPLASTY ANTERIOR;  Surgeon: Sridhar Coto MD;  Location: [facility] MAIN OR;  Service: Orthopedics;  Laterality: Left;     PT Assessment (last 12 hours)     PT Evaluation and Treatment     Row Name 23 1216          Physical Therapy  Time and Intention    Subjective Information complains of;weakness;fatigue;pain  -DK     Document Type therapy note (daily note)  -DK     Mode of Treatment individual therapy;physical therapy  -DK     Patient Effort fair  -DK     Symptoms Noted During/After Treatment fatigue;increased pain  -DK     Comment Pt reports feeling terrible, in great pain, has had pain meds already.  -     Row Name 04/23/23 1216          Pain    Pretreatment Pain Rating 10/10  -DK     Posttreatment Pain Rating 10/10  -DK     Pain Location generalized  -DK     Pain Location - back;hip;knee  -DK     Pain Intervention(s) Distraction;Therapeutic presence  -     Row Name 04/23/23 1216          Cognition    Affect/Mental Status (Cognition) WFL  -DK     Orientation Status (Cognition) oriented x 4  -DK     Follows Commands (Cognition) WFL  -DK     Cognitive Function WFL  -DK     Personal Safety Interventions nonskid shoes/slippers when out of bed;supervised activity;gait belt  -     Row Name 04/23/23 1216          Motor Skills    Motor Skills --  therapeutic exercises  -DK     Coordination WFL  -DK     Therapeutic Exercise hip;knee;ankle  -DK     Additional Documentation --  No transfers per pt request-increased pain level.  -     Row Name 04/23/23 1216          Hip (Therapeutic Exercise)    Hip (Therapeutic Exercise) AAROM (active assistive range of motion)  -DK     Hip AAROM (Therapeutic Exercise) bilateral;flexion;extension;aBduction;aDduction;supine;10 repetitions;2 sets  -     Row Name 04/23/23 1216          Knee (Therapeutic Exercise)    Knee (Therapeutic Exercise) AAROM (active assistive range of motion)  -     Knee AAROM (Therapeutic Exercise) bilateral;flexion;extension;supine;10 repetitions;2 sets  -     Row Name 04/23/23 1216          Ankle (Therapeutic Exercise)    Ankle (Therapeutic Exercise) AAROM (active assistive range of motion)  -     Ankle AAROM (Therapeutic Exercise)  bilateral;dorsiflexion;plantarflexion;supine;10 repetitions;2 sets  -DK     Row Name 04/23/23 1216          Plan of Care Review    Plan of Care Reviewed With patient  -DK     Progress no change  -DK     Row Name 04/23/23 1216          Positioning and Restraints    Pre-Treatment Position in bed  -DK     Post Treatment Position bed  -DK     In Bed supine;call light within reach;encouraged to call for assist;exit alarm on;side rails up x2;legs elevated;heels elevated  -     Row Name 04/23/23 1216          Therapy Assessment/Plan (PT)    Rehab Potential (PT) fair, will monitor progress closely  -     Criteria for Skilled Interventions Met (PT) skilled treatment is necessary  -DK     Therapy Frequency (PT) daily  -DK     Problem List (PT) problems related to;balance;mobility;range of motion (ROM);strength;pain;hearing  -DK     Activity Limitations Related to Problem List (PT) unable to ambulate safely;unable to transfer safely  -     Row Name 04/23/23 1216          Progress Summary (PT)    Progress Toward Functional Goals (PT) progress toward functional goals is fair  -           User Key  (r) = Recorded By, (t) = Taken By, (c) = Cosigned By    Initials Name Provider Type    Tavia Fairbanks PTA Physical Therapist Assistant                Physical Therapy Education     Title: PT OT SLP Therapies (Done)     Topic: Physical Therapy (Done)     Point: Mobility training (Done)     Learning Progress Summary           Patient Acceptance, E,TB, VU by  at 4/21/2023 0530    Acceptance, E,TB, VU by AV at 4/20/2023 1323                   Point: Home exercise program (Done)     Learning Progress Summary           Patient Acceptance, E,TB, VU by  at 4/21/2023 0530                   Point: Body mechanics (Done)     Learning Progress Summary           Patient Acceptance, E,TB, VU by  at 4/21/2023 0530    Acceptance, E,TB, VU by AV at 4/20/2023 1323                   Point: Precautions (Done)     Learning Progress Summary            Patient Acceptance, E,TB, VU by  at 4/21/2023 0530    Acceptance, E,TB, VU by  at 4/20/2023 1323                               User Key     Initials Effective Dates Name Provider Type Discipline     07/26/21 -  Marta April, RNA Registered Nurse Nurse     06/11/21 -  Hector Ortega, PT Physical Therapist PT              PT Recommendation and Plan  Planned Therapy Interventions (PT): balance training, bed mobility training, gait training, strengthening, transfer training  Therapy Frequency (PT): daily  Progress Summary (PT)  Progress Toward Functional Goals (PT): progress toward functional goals is fair  Plan of Care Reviewed With: patient  Progress: no change   Outcome Measures     Row Name 04/23/23 1216 04/21/23 1300 04/20/23 1300       How much help from another person do you currently need...    Turning from your back to your side while in flat bed without using bedrails? 3  -DK 3  -CS 3  -AV    Moving from lying on back to sitting on the side of a flat bed without bedrails? 3  -DK 3  -CS 3  -AV    Moving to and from a bed to a chair (including a wheelchair)? 2  -DK 2  -CS 2  -AV    Standing up from a chair using your arms (e.g., wheelchair, bedside chair)? 2  -DK 2  -CS 2  -AV    Climbing 3-5 steps with a railing? 1  -DK 1  -CS 1  -AV    To walk in hospital room? 1  -DK 1  -CS 2  -AV    AM-PAC 6 Clicks Score (PT) 12  -DK 12  -CS 13  -AV       Functional Assessment    Outcome Measure Options AM-PAC 6 Clicks Basic Mobility (PT)  -DK AM-PAC 6 Clicks Basic Mobility (PT)  - AM-PAC 6 Clicks Basic Mobility (PT)  -AV          User Key  (r) = Recorded By, (t) = Taken By, (c) = Cosigned By    Initials Name Provider Type    Tavia Fairbanks, PTA Physical Therapist Assistant     Hector Ortega, DIANE Physical Therapist    Brien Benson PTA Physical Therapist Assistant                 Time Calculation:    PT Charges     Row Name 04/23/23 1219             Time Calculation    PT Received On  04/23/23  -DK      PT Goal Re-Cert Due Date 04/29/23  -DK         Timed Charges    08772 - PT Therapeutic Exercise Minutes 14  -DK      90428 - PT Therapeutic Activity Minutes 4  -DK         Total Minutes    Timed Charges Total Minutes 18  -DK       Total Minutes 18  -DK            User Key  (r) = Recorded By, (t) = Taken By, (c) = Cosigned By    Initials Name Provider Type    Tavia Fairbanks PTA Physical Therapist Assistant              Therapy Charges for Today     Code Description Service Date Service Provider Modifiers Qty    [id number] HC PT THER PROC EA 15 MIN 4/23/2023 Tavia Mckeon PTA GP 1          PT G-Codes  Outcome Measure Options: AM-PAC 6 Clicks Basic Mobility (PT)  AM-PAC 6 Clicks Score (PT): 12  AM-PAC 6 Clicks Score (OT): 9    Tavia Mckeon PTA  4/23/2023

## 2023-04-23 NOTE — PROGRESS NOTES
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 we will get MRI of the brain as well as of the spine to see if there is anything surgical or radiotherapy wise will be able to do patient also will need to be in rehab to get neuropathy under control and see if she will be able to ambulate again she was able to ambulate a couple of weeks ago states that it started about a week before she came to the hospital so we will get a complete work-up to see if we can help her she has refused all the aggressive treatment in the past we had recommended her to get a bone marrow transplant which she refused also refused to get another experimental drugs which we could give her at the [facility] we will try to help her as much as we can we may need a neurology and neurosurgery consultation    HPI: With the multiple myeloma with extensive involvement of the spine    Review of Systems   All systems were reviewed and negative except for: Reviewed    Objective   Objective     Vitals:   Temp:  [98.1 °F (36.7 °C)-99.4 °F (37.4 °C)] 98.8 °F (37.1 °C)  Heart Rate:  [89-99] 99  Resp:  [16-18] 16  BP: (104-144)/(64-77) 128/64    Physical Exam    Constitutional: Awake, alert   Eyes: PERRLA, sclerae anicteric, no conjunctival injection   HENT: NCAT, mucous membranes moist   Neck: Supple, no thyromegaly, no lymphadenopathy, trachea midline   Respiratory: Clear to auscultation bilaterally, nonlabored respirations    Cardiovascular: RRR, no murmurs, rubs, or gallops, palpable pedal pulses bilaterally   Gastrointestinal:  Positive bowel sounds, soft, nontender, nondistended   Musculoskeletal: No bilateral ankle edema, no clubbing or cyanosis to extremities   Psychiatric: Appropriate affect, cooperative   Neurologic: Oriented x 3, strength symmetric in all extremities, Cranial Nerves grossly intact to confrontation, speech clear   Skin: No rashes   No change  Result Review    Result Review:  I have personally reviewed the results from the time of this admission to 4/23/2023 09:38 EDT and agree with these findings:  [x]  Laboratory multiple myeloma  []  Microbiology  []  Radiology  []  EKG/Telemetry   []  Cardiology/Vascular   []  Pathology  []  Old records  []  Other:  Most notable findings include: Multiple myeloma    Assessment & Plan   Assessment / Plan     Brief Patient Summary:  Evangelina Sutton is a 72 y.o. female who Ultibro myeloma with neuropathy    Active Hospital Problems:  Active Hospital Problems    Diagnosis    • **Weakness    • Moderate Malnutrition (HCC)        Plan:   We will get neurology neurosurgery consultation    DVT prophylaxis:  Mechanical DVT prophylaxis orders are present.    CODE STATUS:   Level Of Support Discussed With: Patient  Code Status (Patient has no pulse and is not breathing): CPR (Attempt to Resuscitate)  Medical Interventions (Patient has pulse or is breathing): Full Support    Disposition:  I expect patient to be discharged after the patient has been stabilized.    Electronically signed by Abbe Zamudio MD, 04/23/23, 9:38 AM EDT.      Part of this note may be an electronic transcription/translation of spoken language to printed text using the Dragon Dictation System.

## 2023-04-24 ENCOUNTER — APPOINTMENT (OUTPATIENT)
Dept: MRI IMAGING | Facility: HOSPITAL | Age: 73
DRG: 841 | End: 2023-04-24
Payer: MEDICARE

## 2023-04-24 LAB
ALBUMIN SERPL-MCNC: 3.7 G/DL (ref 3.5–5.2)
ALBUMIN/GLOB SERPL: 0.6 G/DL
ALP SERPL-CCNC: 52 U/L (ref 39–117)
ALT SERPL W P-5'-P-CCNC: 15 U/L (ref 1–33)
ANION GAP SERPL CALCULATED.3IONS-SCNC: 12.2 MMOL/L (ref 5–15)
AST SERPL-CCNC: 35 U/L (ref 1–32)
BASOPHILS # BLD AUTO: 0 10*3/MM3 (ref 0–0.2)
BASOPHILS NFR BLD AUTO: 0 % (ref 0–1.5)
BILIRUB SERPL-MCNC: 0.8 MG/DL (ref 0–1.2)
BUN SERPL-MCNC: 41 MG/DL (ref 8–23)
BUN/CREAT SERPL: 36.3 (ref 7–25)
CALCIUM SPEC-SCNC: 10 MG/DL (ref 8.6–10.5)
CHLORIDE SERPL-SCNC: 84 MMOL/L (ref 98–107)
CO2 SERPL-SCNC: 32.8 MMOL/L (ref 22–29)
CREAT SERPL-MCNC: 1.13 MG/DL (ref 0.57–1)
DEPRECATED RDW RBC AUTO: 53.6 FL (ref 37–54)
EGFRCR SERPLBLD CKD-EPI 2021: 51.8 ML/MIN/1.73
EOSINOPHIL # BLD AUTO: 0 10*3/MM3 (ref 0–0.4)
EOSINOPHIL NFR BLD AUTO: 0 % (ref 0.3–6.2)
ERYTHROCYTE [DISTWIDTH] IN BLOOD BY AUTOMATED COUNT: 18.6 % (ref 12.3–15.4)
GLOBULIN UR ELPH-MCNC: 5.7 GM/DL
GLUCOSE SERPL-MCNC: 143 MG/DL (ref 65–99)
HCT VFR BLD AUTO: 32.2 % (ref 34–46.6)
HGB BLD-MCNC: 10.7 G/DL (ref 12–15.9)
IMM GRANULOCYTES # BLD AUTO: 0.01 10*3/MM3 (ref 0–0.05)
IMM GRANULOCYTES NFR BLD AUTO: 0.3 % (ref 0–0.5)
LYMPHOCYTES # BLD AUTO: 0.5 10*3/MM3 (ref 0.7–3.1)
LYMPHOCYTES NFR BLD AUTO: 13.9 % (ref 19.6–45.3)
MCH RBC QN AUTO: 26.7 PG (ref 26.6–33)
MCHC RBC AUTO-ENTMCNC: 33.2 G/DL (ref 31.5–35.7)
MCV RBC AUTO: 80.3 FL (ref 79–97)
MONOCYTES # BLD AUTO: 0.15 10*3/MM3 (ref 0.1–0.9)
MONOCYTES NFR BLD AUTO: 4.2 % (ref 5–12)
NEUTROPHILS NFR BLD AUTO: 2.95 10*3/MM3 (ref 1.7–7)
NEUTROPHILS NFR BLD AUTO: 81.6 % (ref 42.7–76)
NRBC BLD AUTO-RTO: 0 /100 WBC (ref 0–0.2)
PLATELET # BLD AUTO: 127 10*3/MM3 (ref 140–450)
PMV BLD AUTO: 9.1 FL (ref 6–12)
POTASSIUM SERPL-SCNC: 4 MMOL/L (ref 3.5–5.2)
PROT SERPL-MCNC: 9.4 G/DL (ref 6–8.5)
RAD ONC ARIA COURSE END DATE: NORMAL
RAD ONC ARIA COURSE ID: NORMAL
RAD ONC ARIA COURSE ID: NORMAL
RAD ONC ARIA COURSE INTENT: NORMAL
RAD ONC ARIA COURSE INTENT: NORMAL
RAD ONC ARIA COURSE LAST TREATMENT DATE: NORMAL
RAD ONC ARIA COURSE LAST TREATMENT DATE: NORMAL
RAD ONC ARIA COURSE START DATE: NORMAL
RAD ONC ARIA COURSE START DATE: NORMAL
RAD ONC ARIA COURSE TREATMENT ELAPSED DAYS: 0
RAD ONC ARIA COURSE TREATMENT ELAPSED DAYS: 4
RAD ONC ARIA FIRST TREATMENT DATE: NORMAL
RAD ONC ARIA FIRST TREATMENT DATE: NORMAL
RAD ONC ARIA PLAN FRACTIONS TREATED TO DATE: 1
RAD ONC ARIA PLAN FRACTIONS TREATED TO DATE: 5
RAD ONC ARIA PLAN ID: NORMAL
RAD ONC ARIA PLAN ID: NORMAL
RAD ONC ARIA PLAN NAME: NORMAL
RAD ONC ARIA PLAN PRESCRIBED DOSE PER FRACTION: 4 GY
RAD ONC ARIA PLAN PRESCRIBED DOSE PER FRACTION: 4 GY
RAD ONC ARIA PLAN PRIMARY REFERENCE POINT: NORMAL
RAD ONC ARIA PLAN PRIMARY REFERENCE POINT: NORMAL
RAD ONC ARIA PLAN TOTAL FRACTIONS PRESCRIBED: 5
RAD ONC ARIA PLAN TOTAL FRACTIONS PRESCRIBED: 5
RAD ONC ARIA PLAN TOTAL PRESCRIBED DOSE: 2000 CGY
RAD ONC ARIA PLAN TOTAL PRESCRIBED DOSE: 2000 CGY
RAD ONC ARIA REFERENCE POINT DOSAGE GIVEN TO DATE: 20 GY
RAD ONC ARIA REFERENCE POINT DOSAGE GIVEN TO DATE: 4 GY
RAD ONC ARIA REFERENCE POINT ID: NORMAL
RAD ONC ARIA REFERENCE POINT ID: NORMAL
RAD ONC ARIA REFERENCE POINT SESSION DOSAGE GIVEN: 4 GY
RBC # BLD AUTO: 4.01 10*6/MM3 (ref 3.77–5.28)
SODIUM SERPL-SCNC: 129 MMOL/L (ref 136–145)
WBC NRBC COR # BLD: 3.61 10*3/MM3 (ref 3.4–10.8)

## 2023-04-24 PROCEDURE — A9577 INJ MULTIHANCE: HCPCS | Performed by: INTERNAL MEDICINE

## 2023-04-24 PROCEDURE — 77290 THER RAD SIMULAJ FIELD CPLX: CPT | Performed by: RADIOLOGY

## 2023-04-24 PROCEDURE — 25010000002 DEXAMETHASONE PER 1 MG: Performed by: RADIOLOGY

## 2023-04-24 PROCEDURE — 99221 1ST HOSP IP/OBS SF/LOW 40: CPT | Performed by: NEUROLOGICAL SURGERY

## 2023-04-24 PROCEDURE — 25010000002 HYDROMORPHONE 1 MG/ML SOLUTION: Performed by: INTERNAL MEDICINE

## 2023-04-24 PROCEDURE — 0 GADOBENATE DIMEGLUMINE 529 MG/ML SOLUTION: Performed by: INTERNAL MEDICINE

## 2023-04-24 PROCEDURE — 77280 THER RAD SIMULAJ FIELD SMPL: CPT | Performed by: RADIOLOGY

## 2023-04-24 PROCEDURE — 94799 UNLISTED PULMONARY SVC/PX: CPT

## 2023-04-24 PROCEDURE — 77412 RADIATION TX DELIVERY LVL 3: CPT | Performed by: RADIOLOGY

## 2023-04-24 PROCEDURE — 72158 MRI LUMBAR SPINE W/O & W/DYE: CPT

## 2023-04-24 PROCEDURE — 85025 COMPLETE CBC W/AUTO DIFF WBC: CPT | Performed by: INTERNAL MEDICINE

## 2023-04-24 PROCEDURE — 77300 RADIATION THERAPY DOSE PLAN: CPT | Performed by: RADIOLOGY

## 2023-04-24 PROCEDURE — 25010000002 FUROSEMIDE PER 20 MG: Performed by: INTERNAL MEDICINE

## 2023-04-24 PROCEDURE — 77332 RADIATION TREATMENT AID(S): CPT | Performed by: RADIOLOGY

## 2023-04-24 PROCEDURE — 77334 RADIATION TREATMENT AID(S): CPT | Performed by: RADIOLOGY

## 2023-04-24 PROCEDURE — 77295 3-D RADIOTHERAPY PLAN: CPT | Performed by: RADIOLOGY

## 2023-04-24 PROCEDURE — 77387 GUIDANCE FOR RADJ TX DLVR: CPT | Performed by: RADIOLOGY

## 2023-04-24 PROCEDURE — 80053 COMPREHEN METABOLIC PANEL: CPT | Performed by: INTERNAL MEDICINE

## 2023-04-24 RX ORDER — OXYCODONE HYDROCHLORIDE 5 MG/1
5 TABLET ORAL EVERY 6 HOURS PRN
Status: DISCONTINUED | OUTPATIENT
Start: 2023-04-24 | End: 2023-04-29 | Stop reason: HOSPADM

## 2023-04-24 RX ORDER — MORPHINE SULFATE 100 MG/1
100 TABLET ORAL DAILY
Status: DISCONTINUED | OUTPATIENT
Start: 2023-04-24 | End: 2023-04-29 | Stop reason: HOSPADM

## 2023-04-24 RX ADMIN — FUROSEMIDE 40 MG: 10 INJECTION, SOLUTION INTRAMUSCULAR; INTRAVENOUS at 17:20

## 2023-04-24 RX ADMIN — PREGABALIN 100 MG: 100 CAPSULE ORAL at 09:13

## 2023-04-24 RX ADMIN — OXYCODONE HYDROCHLORIDE 5 MG: 5 TABLET ORAL at 23:10

## 2023-04-24 RX ADMIN — MORPHINE SULFATE 100 MG: 100 TABLET, FILM COATED, EXTENDED RELEASE ORAL at 09:13

## 2023-04-24 RX ADMIN — GADOBENATE DIMEGLUMINE 10 ML: 529 INJECTION, SOLUTION INTRAVENOUS at 20:43

## 2023-04-24 RX ADMIN — DEXAMETHASONE SODIUM PHOSPHATE 4 MG: 4 INJECTION INTRA-ARTICULAR; INTRALESIONAL; INTRAMUSCULAR; INTRAVENOUS; SOFT TISSUE at 22:54

## 2023-04-24 RX ADMIN — FUROSEMIDE 40 MG: 10 INJECTION, SOLUTION INTRAMUSCULAR; INTRAVENOUS at 09:13

## 2023-04-24 RX ADMIN — DEXAMETHASONE SODIUM PHOSPHATE 4 MG: 4 INJECTION INTRA-ARTICULAR; INTRALESIONAL; INTRAMUSCULAR; INTRAVENOUS; SOFT TISSUE at 05:22

## 2023-04-24 RX ADMIN — Medication 10 ML: at 22:54

## 2023-04-24 RX ADMIN — DEXAMETHASONE SODIUM PHOSPHATE 4 MG: 4 INJECTION INTRA-ARTICULAR; INTRALESIONAL; INTRAMUSCULAR; INTRAVENOUS; SOFT TISSUE at 09:13

## 2023-04-24 RX ADMIN — Medication 10 ML: at 09:14

## 2023-04-24 RX ADMIN — HYDROMORPHONE HYDROCHLORIDE 0.5 MG: 1 INJECTION, SOLUTION INTRAMUSCULAR; INTRAVENOUS; SUBCUTANEOUS at 19:45

## 2023-04-24 RX ADMIN — POLYETHYLENE GLYCOL 3350 17 G: 17 POWDER, FOR SOLUTION ORAL at 09:13

## 2023-04-24 RX ADMIN — DEXAMETHASONE SODIUM PHOSPHATE 4 MG: 4 INJECTION INTRA-ARTICULAR; INTRALESIONAL; INTRAMUSCULAR; INTRAVENOUS; SOFT TISSUE at 17:20

## 2023-04-24 RX ADMIN — SPIRONOLACTONE 25 MG: 25 TABLET ORAL at 09:13

## 2023-04-24 RX ADMIN — HYDROMORPHONE HYDROCHLORIDE 0.5 MG: 1 INJECTION, SOLUTION INTRAMUSCULAR; INTRAVENOUS; SUBCUTANEOUS at 00:37

## 2023-04-24 NOTE — CONSULTS
"Nutrition Services    Patient Name: Evangelina Sutton  YOB: 1950  MRN: [id number]  Admission date: 4/19/2023      CLINICAL NUTRITION ASSESSMENT      Reason for Assessment  Follow-up protocol     H&P:    Past Medical History:   Diagnosis Date   • Cancer     BONE CANCER/TAKING CHEMO SHOT WEEKLY   • DDD (degenerative disc disease), cervical    • Neuropathy     LEGS AND ARMS   • PONV (postoperative nausea and vomiting)         Current Problems:   Active Hospital Problems    Diagnosis    • **Weakness    • Moderate Malnutrition (HCC)         Nutrition/Diet History         Narrative     Nutrition follow up. Pt is admitted d/t weakness. PMH of multiple myeloma. Patient meets criteria for moderate malnutrition based on muscle and fat loss.     Po intake has been poor over the last few days. Patient reports she has no appetite. Lunch is untouched at time of RD visit. Patient reports she has not been consuming Magic Cup because she doesn't like it. Is receptive to snacks of cottage cheese and peaches and peanut butter and crackers. Will DC supplements and order snacks for patient to encourage po intake and help meet estimated nutrient needs. Patient is in agreement with plan.     Will CTM per protocol.     Anthropometrics        Current Height, Weight Height: 157.5 cm (62\")  Weight: 53.9 kg (118 lb 13.3 oz)   Current BMI Body mass index is 21.73 kg/m².       Weight Hx  Wt Readings from Last 30 Encounters:   04/20/23 1117 53.9 kg (118 lb 13.3 oz)   03/21/23 1640 57.7 kg (127 lb 3.3 oz)   03/15/23 1730 63.8 kg (140 lb 10.5 oz)   02/21/23 2138 53.5 kg (117 lb 15.1 oz)   02/21/23 1401 56.8 kg (125 lb 3.5 oz)   11/28/22 2241 65.4 kg (144 lb 2.9 oz)   11/28/22 1212 68.5 kg (151 lb 0.2 oz)   11/22/22 0936 68.5 kg (151 lb)   07/19/22 1325 68.5 kg (151 lb)   06/07/22 1409 68.5 kg (151 lb)   05/10/22 1251 68.5 kg (151 lb)   04/25/22 2115 68.5 kg (151 lb)   04/25/22 1217 66.1 kg (145 lb 11.6 oz)   04/22/22 1428 68.9 kg (152 " lb)   03/29/22 1333 68.9 kg (152 lb)   03/17/22 0837 65.1 kg (143 lb 8.3 oz)   03/11/22 1328 68 kg (150 lb)   09/05/21 2227 65.1 kg (143 lb 8.3 oz)   09/05/21 2133 65.1 kg (143 lb 8.3 oz)   09/05/21 1444 68 kg (149 lb 14.6 oz)   10/13/20 0000 61.2 kg (135 lb)   10/01/20 0000 61.2 kg (135 lb)   06/02/20 0000 63.5 kg (140 lb)   05/08/19 0000 84.4 kg (186 lb 2 oz)   04/03/19 0000 84.5 kg (186 lb 6 oz)   03/22/19 0000 84.5 kg (186 lb 4 oz)   03/20/19 0000 84.6 kg (186 lb 7 oz)   02/20/19 0000 80.3 kg (177 lb 1 oz)   02/13/19 0000 78.3 kg (172 lb 9 oz)   01/14/19 0000 90 kg (198 lb 8 oz)            Wt Change Observation Fluctuations 2' to volume status, currently on Lasix     Estimated/Assessed Needs       Energy Requirements 35-40 kcal/kg    EST Needs (kcal/day) 9070-5471       Protein Requirements 1.2-1.5 g/kg   EST Daily Needs (g/day) 65-81       Fluid Requirements 25 ml/kg    Estimated Needs (mL/day) 2627-4227     Labs/Medications         Pertinent Labs Reviewed.   Results from last 7 days   Lab Units 04/24/23  0623 04/23/23  0355 04/22/23  0827 04/21/23  0518 04/20/23  0535 04/19/23  1856   SODIUM mmol/L 129* 131* 130*   < > 136 136   POTASSIUM mmol/L 4.0 3.5 3.1*   < > 4.1 3.7   CHLORIDE mmol/L 84* 87* 86*   < > 103 102   CO2 mmol/L 32.8* 32.8* 30.6*   < > 27.7 24.5   BUN mg/dL 41* 34* 29*   < > 17 17   CREATININE mg/dL 1.13* 1.04* 0.97   < > 0.62 0.62   CALCIUM mg/dL 10.0 10.4 10.5   < > 10.8* 10.8*   BILIRUBIN mg/dL 0.8  --   --   --  0.4 0.4   ALK PHOS U/L 52  --   --   --  52 62   ALT (SGPT) U/L 15  --   --   --  7 9   AST (SGOT) U/L 35*  --   --   --  20 24   GLUCOSE mg/dL 143* 104* 105*   < > 91 114*    < > = values in this interval not displayed.     Results from last 7 days   Lab Units 04/24/23  0623 04/23/23  0355 04/22/23  0827 04/21/23  0518 04/20/23  0535   MAGNESIUM mg/dL  --  1.9 1.4*  --  1.5*   HEMOGLOBIN g/dL 10.7* 10.2*  --    < > 9.7*   HEMATOCRIT % 32.2* 30.4*  --    < > 29.7*    < > = values in  this interval not displayed.     COVID19   Date Value Ref Range Status   02/21/2023 Not Detected Not Detected - Ref. Range Final     No results found for: HGBA1C      Pertinent Medications Reviewed.     Current Nutrition Orders & Evaluation of Intake       Oral Nutrition     Current PO Diet Diet: Regular/House Diet; Texture: Regular Texture (IDDSI 7); Fluid Consistency: Thin (IDDSI 0)   Supplement Orders Placed This Encounter      Dietary Nutrition Supplements Magic Cup; orange       Malnutrition Severity Assessment      Patient meets criteria for : Moderate (non-severe) Malnutrition         Nutrition Diagnosis         Nutrition Dx Problem 1 Moderate malnutrition related to increased nutrient needs due to catabolic disease as evidenced by multiple myeloma        Nutrition Intervention         DC Magic cup  Add snacks of peanut butter crackers, cottage cheese and peaches     Medical Nutrition Therapy/Nutrition Education          Learner     Readiness Patient  Acceptance     Method     Response Explanation  Verbalizes understanding     Monitor/Evaluation        Monitor I&O, PO intake, Supplement intake, Pertinent labs, Weight, POC/GOC       Nutrition Discharge Plan         To be determined       Electronically signed by:  Mehran Cardenas RD  04/24/23 13:33 EDT

## 2023-04-24 NOTE — PLAN OF CARE
Problem: Adult Inpatient Plan of Care  Goal: Plan of Care Review  Outcome: Ongoing, Progressing  Flowsheets (Taken 4/24/2023 1703)  Progress: no change  Plan of Care Reviewed With: patient  Outcome Evaluation: Patient alert and oriented. Patient's pain medication regimen ajusted, patient seemed to rest well this shift. Patient went for radiation this shift.  Goal: Patient-Specific Goal (Individualized)  Outcome: Ongoing, Progressing  Goal: Absence of Hospital-Acquired Illness or Injury  Outcome: Ongoing, Progressing  Intervention: Identify and Manage Fall Risk  Recent Flowsheet Documentation  Taken 4/24/2023 1300 by Laya Rand RN  Safety Promotion/Fall Prevention: safety round/check completed  Taken 4/24/2023 1100 by Laya Rand RN  Safety Promotion/Fall Prevention: safety round/check completed  Taken 4/24/2023 1013 by Laya Rand RN  Safety Promotion/Fall Prevention: patient off unit  Taken 4/24/2023 0913 by Laya Rand RN  Safety Promotion/Fall Prevention: safety round/check completed  Taken 4/24/2023 0715 by Laya Rand RN  Safety Promotion/Fall Prevention:   assistive device/personal items within reach   clutter free environment maintained   fall prevention program maintained   lighting adjusted   nonskid shoes/slippers when out of bed   safety round/check completed  Intervention: Prevent and Manage VTE (Venous Thromboembolism) Risk  Recent Flowsheet Documentation  Taken 4/24/2023 0913 by Laya Rand RN  Range of Motion: active ROM (range of motion) encouraged  Intervention: Prevent Infection  Recent Flowsheet Documentation  Taken 4/24/2023 0715 by Laya Rand RN  Infection Prevention:   cohorting utilized   environmental surveillance performed   rest/sleep promoted   single patient room provided   hand hygiene promoted  Goal: Optimal Comfort and Wellbeing  Outcome: Ongoing, Progressing  Intervention: Monitor Pain and Promote Comfort  Recent Flowsheet Documentation  Taken  4/24/2023 0913 by Laya Rand RN  Pain Management Interventions:   care clustered   pain management plan reviewed with patient/caregiver   quiet environment facilitated   see MAR  Goal: Readiness for Transition of Care  Outcome: Ongoing, Progressing     Problem: Fall Injury Risk  Goal: Absence of Fall and Fall-Related Injury  Outcome: Ongoing, Progressing  Intervention: Identify and Manage Contributors  Recent Flowsheet Documentation  Taken 4/24/2023 0715 by Laya Rand RN  Medication Review/Management:   medications reviewed   high-risk medications identified  Intervention: Promote Injury-Free Environment  Recent Flowsheet Documentation  Taken 4/24/2023 1300 by Laya Rand, RN  Safety Promotion/Fall Prevention: safety round/check completed  Taken 4/24/2023 1100 by Laya Rand RN  Safety Promotion/Fall Prevention: safety round/check completed  Taken 4/24/2023 1013 by Laya Rand RN  Safety Promotion/Fall Prevention: patient off unit  Taken 4/24/2023 0913 by Laya Rand RN  Safety Promotion/Fall Prevention: safety round/check completed  Taken 4/24/2023 0715 by Laya Rand RN  Safety Promotion/Fall Prevention:   assistive device/personal items within reach   clutter free environment maintained   fall prevention program maintained   lighting adjusted   nonskid shoes/slippers when out of bed   safety round/check completed     Problem: Skin Injury Risk Increased  Goal: Skin Health and Integrity  Outcome: Ongoing, Progressing   Goal Outcome Evaluation:  Plan of Care Reviewed With: patient        Progress: no change  Outcome Evaluation: Patient alert and oriented. Patient's pain medication regimen ajusted, patient seemed to rest well this shift. Patient went for radiation this shift.

## 2023-04-24 NOTE — PLAN OF CARE
Goal Outcome Evaluation:  Plan of Care Reviewed With: patient        Progress: no change     Problem: Adult Inpatient Plan of Care  Goal: Plan of Care Review  Outcome: Ongoing, Progressing  Flowsheets (Taken 4/24/2023 5314)  Progress: no change  Plan of Care Reviewed With: patient   Vitals stable. New order placed for pain control, see eMAR. Family visited this shift. Will continue to monitor.

## 2023-04-24 NOTE — H&P
Radiation Oncology Inpatient Consult       Patient: Evangelina Sutton   YOB: 1950   Medical Record Number: [id number]      Date of Consult:  4/24/2023  Primary Diagnosis:   Multiple myeloma not having achieved remission                                                History of Present Illness: Evangelina Sutton is a 72-year-old female with multiple myeloma who is seen in consultation regarding radiotherapy for palliation of malignant epidural spinal cord compression.  Ms. Sutton has been experiencing bilateral lower extremity weakness for approximately 1 week.  She additionally reports some numbness from the waist down.  MRI of the thoracic spine performed on 4/24/2023 revealed a large T2 hyperintense lesion within the right side of the T9 vertebral body extending into the pedicle and articular pillars with significant right lateral dorsal epidural extension.  The epidural component results in severe narrowing of the spinal canal and severe compression of the thoracic cord without high signal within the cord.  Ms. Sutton has been evaluated by Dr. Mundo Aldana.    Review of Systems: Review of Systems  Review of systems is positive for fatigue, low back pain and bilateral lower extremity weakness      Past Medical History:   Diagnosis Date   • Cancer     BONE CANCER/TAKING CHEMO SHOT WEEKLY   • DDD (degenerative disc disease), cervical    • Neuropathy     LEGS AND ARMS   • PONV (postoperative nausea and vomiting)         Past Surgical History:   Procedure Laterality Date   • BACK SURGERY      DISCECTOMY W/FUSION, NECK ? LEVEL   • CHOLECYSTECTOMY     • HYSTERECTOMY     • PORTACATH PLACEMENT N/A 3/17/2022    Procedure: INSERTION OF PORTACATH;  Surgeon: Cj Rossi MD;  Location: [facility] OR;  Service: General;  Laterality: N/A;   • TOTAL HIP ARTHROPLASTY Left 4/25/2022    Procedure: LEFT HIP ARTHROPLASTY ANTERIOR;  Surgeon: Sridhar Coto MD;  Location: [facility] MAIN OR;  Service:  Orthopedics;  Laterality: Left;      Family History   Problem Relation Age of Onset   • Malig Hyperthermia Neg Hx         Social History     Tobacco Use   • Smoking status: Former     Packs/day: 1.00     Types: Cigarettes     Quit date:      Years since quittin.3   • Smokeless tobacco: Never   Vaping Use   • Vaping Use: Never used   Substance Use Topics   • Alcohol use: Never   • Drug use: Yes     Frequency: 20.0 times per week     Types: Morphine, Oxycodone        Allergies:Patient has no known allergies.     Current Facility-Administered Medications:   •  acetaminophen (TYLENOL) tablet 650 mg, 650 mg, Oral, Q4H PRN, Abbe Zamudio MD, 650 mg at 23 0402  •  aluminum-magnesium hydroxide-simethicone (MAALOX MAX) 400-400-40 MG/5ML suspension 15 mL, 15 mL, Oral, Q6H PRN, Abbe Zamudio MD  •  dexamethasone (DECADRON) injection 4 mg, 4 mg, Intravenous, Q6H, Jonathan Rosales MD, 4 mg at 23 09  •  furosemide (LASIX) injection 40 mg, 40 mg, Intravenous, BID, Abbe Zamudio MD, 40 mg at 23 09  •  gadobenate dimeglumine (MULTIHANCE) injection 10 mL, 10 mL, Intravenous, Once in imaging, Abbe Zamudio MD  •  HYDROmorphone (DILAUDID) injection 0.5 mg, 0.5 mg, Intravenous, Q4H PRN, Abbe Zamudio MD, 0.5 mg at 23 0037  •  melatonin tablet 5 mg, 5 mg, Oral, Nightly PRN, Abbe Zamudio MD, 5 mg at 23 0059  •  Morphine (MS CONTIN) 12 hr tablet 100 mg, 100 mg, Oral, Daily, Abbe Zamudio MD, 100 mg at 23 0913  •  ondansetron (ZOFRAN) injection 4 mg, 4 mg, Intravenous, Q6H PRN, Abbe Zamudio MD, 4 mg at 23 0355  •  oxyCODONE (ROXICODONE) immediate release tablet 5 mg, 5 mg, Oral, Q6H PRN, Abbe Zamudio MD  •  polyethylene glycol (MIRALAX) packet 17 g, 17 g, Oral, Daily, Abbe Zamudio MD, 17 g at 23  •  pregabalin (LYRICA) capsule 100 mg, 100 mg, Oral, Daily, Abbe Zamudio MD, 100 mg at 23  •  sodium chloride 0.9 % flush 10 mL, 10  "mL, Intravenous, PRN, Mitch Juan, DO  •  sodium chloride 0.9 % flush 10 mL, 10 mL, Intravenous, PRN, Abbe Zamudio MD  •  sodium chloride 0.9 % flush 10 mL, 10 mL, Intravenous, Q12H, Abbe Zamudio MD, 10 mL at 04/24/23 0914  •  sodium chloride 0.9 % infusion 40 mL, 40 mL, Intravenous, PRN, Abbe Zamudio MD  •  spironolactone (ALDACTONE) tablet 25 mg, 25 mg, Oral, Daily, Abbe Zamudio MD, 25 mg at 04/24/23 0913   Pain: There were no vitals filed for this visit.    ECOG Performance Status:   Quality of Life:  30 - Wheelchair Confined  Performance Status:  (3) Capable of Limited Self Care, Confined to Bed or Chair Greater Than 50% of Waking Hours        Physical Examination:  Vitals:    Inpatient vital signs reviewed  Height: 157.5 cm (62\")  Weight:       04/20/23  1117   Weight: 53.9 kg (118 lb 13.3 oz)        Constitutional: The patient is a well-developed, well-nourished female  in no acute distress.  Alert and oriented ×3.  Eyes: PERRLA.  EOMI.  ENMT:  Ears and nose WNL.  No lesions noted in the oral cavity or oropharynx.  Lymphatics: No cervical, supraclavicular lymphadenopathy  Respiratory: Normal respiratory effort  GI: Abdomen soft, nontender, nondistended, with no hepatosplenomegaly or masses palpated.  Extremities: No clubbing, cyanosis, or edema.  Neurologic: Normal bilateral lower extremity tone but inability to lift legs against gravity; decreased sensation to light touch bilaterally  Psychiatric: Alert and oriented x3. Normal affect, with no anxiety or depression noted.    I personally reviewed the MRI of the thoracic spine without contrast performed on 4/23/2023.  The pertinent findings are as above.    Labs:   WBC   Date Value Ref Range Status   04/24/2023 3.61 3.40 - 10.80 10*3/mm3 Final     Hemoglobin   Date Value Ref Range Status   04/24/2023 10.7 (L) 12.0 - 15.9 g/dL Final     Hematocrit   Date Value Ref Range Status   04/24/2023 32.2 (L) 34.0 - 46.6 % Final     Platelets   Date Value " Ref Range Status   04/24/2023 127 (L) 140 - 450 10*3/mm3 Final    No results found for: CEA      ASSESSMENT/PLAN: Evangelina Sutton is a 72-year-old female with multiple myeloma with extensive bony involvement.  She presents with bilateral lower extremity weakness and paresthesias related to malignant epidural spinal cord compression.  ECOG 3    I discussed the findings of the most recent MRI of the thoracic spine with Ms. Sutton.  I once again discussed the role of radiotherapy in the palliation of painful bony disease from multiple myeloma.  I recommended external beam radiotherapy to the thoracic spine, outlining the risks, potential benefits and alternatives of this approach.  Ms. Sutton is agreeable to my recommendation and will undergo CT simulation today for treatment planning purposes with plans to start treatment today; 20 Gray in 5 daily fractions of 4 Gray via 3D conformal radiotherapy.    Electronically signed by Jonathan Rosales MD, 04/24/23, 1:25 PM EDT.

## 2023-04-24 NOTE — PROGRESS NOTES
[facility]     Progress Note    Patient Name: Evangelina Sutton  : 1950  MRN: [id number]  Primary Care Physician:  Gilberto Coleman MD  Date of admission: 2023    Subjective   Subjective     Chief Complaint: Patient with history of multiple myeloma has a new lesion in the thoracic spine have discussed the case with Dr. Aldana as well as Dr. Rosales she will be started on radiation treatment today has been given IV Decadron pain medications will be increased as her pain has got worse and oxycodone is not helping previously she has been on morphine sulfate which will be started edema feet has resolved she still continues to have the weak    HPI: Patient will be started on radiation treatment    Review of Systems   All systems were reviewed and negative except for: Reviewed    Objective   Objective     Vitals:   Temp:  [97.6 °F (36.4 °C)-98.6 °F (37 °C)] 97.6 °F (36.4 °C)  Heart Rate:  [] 81  Resp:  [16] 16  BP: (108-128)/(50-76) 109/76    Physical Exam    Constitutional: Awake, alert   Eyes: PERRLA, sclerae anicteric, no conjunctival injection   HENT: NCAT, mucous membranes moist   Neck: Supple, no thyromegaly, no lymphadenopathy, trachea midline   Respiratory: Clear to auscultation bilaterally, nonlabored respirations    Cardiovascular: RRR, no murmurs, rubs, or gallops, palpable pedal pulses bilaterally   Gastrointestinal: Positive bowel sounds, soft, nontender, nondistended   Musculoskeletal: No bilateral ankle edema, no clubbing or cyanosis to extremities   Psychiatric: Appropriate affect, cooperative   Neurologic: Oriented x 3, strength symmetric in all extremities, Cranial Nerves grossly intact to confrontation, speech clear   Skin: No rashes   No change  Result Review    Result Review:  I have personally reviewed the results from the time of this admission to 2023 07:54 EDT and agree with these findings:  [x]  Laboratory anemia  []  Microbiology  []  Radiology  []  EKG/Telemetry    []  Cardiology/Vascular   []  Pathology  []  Old records  []  Other:  Most notable findings include: Metastatic disease to the T9 vertebra    Assessment & Plan   Assessment / Plan     Brief Patient Summary:  Evangelina Sutton is a 72 y.o. female who nonvertebral radiation treatment    Active Hospital Problems:  Active Hospital Problems    Diagnosis    • **Weakness    • Moderate Malnutrition (HCC)        Plan:   Duration treatment IV Decadron    DVT prophylaxis:  Mechanical DVT prophylaxis orders are present.    CODE STATUS:   Level Of Support Discussed With: Patient  Code Status (Patient has no pulse and is not breathing): CPR (Attempt to Resuscitate)  Medical Interventions (Patient has pulse or is breathing): Full Support    Disposition:  I expect patient to be discharged the patient has been stable and.    Electronically signed by Abbe Zamudio MD, 04/24/23, 7:54 AM EDT.      Part of this note may be an electronic transcription/translation of spoken language to printed text using the Dragon Dictation System.

## 2023-04-24 NOTE — CONSULTS
[facility]   Neurosurgery Consult    Patient Name:Evangelina Sutton  : 1950  MRN: [id number]  Primary Care Physician: Gilberto Coleman MD  Date of admission: 2023    Subjective   Subjective     Chief Complaint: Leg weakness    History of Present Illness   Evangelina Sutton is a 72 y.o. female with 3-year history of myeloma.  She has had leg weakness for a period of at least 4 days and has not ambulated.  She also has some numbness from about the umbilicus down.  Her work-up included an MRI yesterday which demonstrated a T9 lesion which correlated with her weakness and numbness.  I am evaluating her for this.    Review of Systems   Musculoskeletal: Positive for back pain.   Neurological: Positive for weakness and numbness.         Personal History     Past Medical History:   Diagnosis Date   • Cancer     BONE CANCER/TAKING CHEMO SHOT WEEKLY   • DDD (degenerative disc disease), cervical    • Neuropathy     LEGS AND ARMS   • PONV (postoperative nausea and vomiting)        Past Surgical History:   Procedure Laterality Date   • BACK SURGERY      DISCECTOMY W/FUSION, NECK ? LEVEL   • CHOLECYSTECTOMY     • HYSTERECTOMY     • PORTACATH PLACEMENT N/A 3/17/2022    Procedure: INSERTION OF PORTACATH;  Surgeon: Cj Rossi MD;  Location: [facility];  Service: General;  Laterality: N/A;   • TOTAL HIP ARTHROPLASTY Left 2022    Procedure: LEFT HIP ARTHROPLASTY ANTERIOR;  Surgeon: Sridhar Coto MD;  Location: [facility];  Service: Orthopedics;  Laterality: Left;       Family History: Her family history is not on file.     Social History: She  reports that she quit smoking about 26 years ago. Her smoking use included cigarettes. She smoked an average of 1 pack per day. She has never used smokeless tobacco. She reports current drug use. Frequency: 20.00 times per week. Drugs: Morphine and Oxycodone. She reports that she does not drink alcohol.    Home Medications:  oxyCODONE, polyethylene glycol,  pregabalin, spironolactone, and vitamin D    Allergies:  She has No Known Allergies.    Objective    Objective     Vitals:    Temp:  [97.6 °F (36.4 °C)-98.6 °F (37 °C)] 97.6 °F (36.4 °C)  Heart Rate:  [] 81  Resp:  [16] 16  BP: (108-128)/(50-76) 109/76    Physical Exam  Constitutional:       Appearance: She is normal weight.   Cardiovascular:      Comments: No evidence of peripheral edema  Pulmonary:      Effort: Pulmonary effort is normal.   Skin:     General: Skin is warm and dry.   Neurological:      Mental Status: She is alert and oriented to person, place, and time.      Sensory: Sensory deficit ( Decreased to light touch from approximately the umbilicus down bilaterally.) present.      Motor: Weakness ( She has roughly 2 out of 5 strength to plantarflexion/dorsiflexion and quadriceps in the bilateral lower extremities) present.   Psychiatric:         Mood and Affect: Mood normal.          Result Review    Result Review:  I have personally reviewed the results from the time of this admission to 4/24/2023 07:29 EDT and agree with these findings:  []  Laboratory  []  Microbiology  [x]  Radiology  []  EKG/Telemetry   []  Cardiology/Vascular   []  Pathology  []  Old records  []  Other:  Most notable findings include: She has a lesion involving the right pedicle lamina and vertebral body at T9 consistent with her history of myeloma.  This causes spinal cord compression.  There is no significant edema within the spinal cord but there may be some.    Assessment & Plan   Assessment / Plan     Brief Patient Summary:  Evangelina Sutton is a 72 y.o. female with myeloma and cord compression with lower extremity weakness.    Active Hospital Problems:  Active Hospital Problems    Diagnosis    • **Weakness    • Moderate Malnutrition (HCC)      Plan:   Without evidence of instability, the typical treatment is radiation and steroids.  I discussed possible high-dose steroids with Dr. Rosales who reported that previous  randomized studies showed no improvement over the high-dose versus typical dose steroids.  He will begin radiation today.    DVT prophylaxis:  Mechanical DVT prophylaxis orders are present.

## 2023-04-25 LAB
ANION GAP SERPL CALCULATED.3IONS-SCNC: 8.6 MMOL/L (ref 5–15)
BASOPHILS # BLD AUTO: 0 10*3/MM3 (ref 0–0.2)
BASOPHILS NFR BLD AUTO: 0 % (ref 0–1.5)
BUN SERPL-MCNC: 71 MG/DL (ref 8–23)
BUN/CREAT SERPL: 43.6 (ref 7–25)
CALCIUM SPEC-SCNC: 9.1 MG/DL (ref 8.6–10.5)
CHLORIDE SERPL-SCNC: 86 MMOL/L (ref 98–107)
CO2 SERPL-SCNC: 33.4 MMOL/L (ref 22–29)
CREAT SERPL-MCNC: 1.63 MG/DL (ref 0.57–1)
DEPRECATED RDW RBC AUTO: 53.1 FL (ref 37–54)
EGFRCR SERPLBLD CKD-EPI 2021: 33.4 ML/MIN/1.73
EOSINOPHIL # BLD AUTO: 0 10*3/MM3 (ref 0–0.4)
EOSINOPHIL NFR BLD AUTO: 0 % (ref 0.3–6.2)
ERYTHROCYTE [DISTWIDTH] IN BLOOD BY AUTOMATED COUNT: 18.6 % (ref 12.3–15.4)
GLUCOSE SERPL-MCNC: 137 MG/DL (ref 65–99)
HCT VFR BLD AUTO: 28.6 % (ref 34–46.6)
HGB BLD-MCNC: 9.5 G/DL (ref 12–15.9)
IMM GRANULOCYTES # BLD AUTO: 0.02 10*3/MM3 (ref 0–0.05)
IMM GRANULOCYTES NFR BLD AUTO: 0.5 % (ref 0–0.5)
LYMPHOCYTES # BLD AUTO: 0.29 10*3/MM3 (ref 0.7–3.1)
LYMPHOCYTES NFR BLD AUTO: 7.7 % (ref 19.6–45.3)
MCH RBC QN AUTO: 26.9 PG (ref 26.6–33)
MCHC RBC AUTO-ENTMCNC: 33.2 G/DL (ref 31.5–35.7)
MCV RBC AUTO: 81 FL (ref 79–97)
MONOCYTES # BLD AUTO: 0.36 10*3/MM3 (ref 0.1–0.9)
MONOCYTES NFR BLD AUTO: 9.5 % (ref 5–12)
NEUTROPHILS NFR BLD AUTO: 3.11 10*3/MM3 (ref 1.7–7)
NEUTROPHILS NFR BLD AUTO: 82.3 % (ref 42.7–76)
NRBC BLD AUTO-RTO: 0 /100 WBC (ref 0–0.2)
PLATELET # BLD AUTO: 135 10*3/MM3 (ref 140–450)
PMV BLD AUTO: 10.4 FL (ref 6–12)
POTASSIUM SERPL-SCNC: 5 MMOL/L (ref 3.5–5.2)
RAD ONC ARIA COURSE ID: NORMAL
RAD ONC ARIA COURSE INTENT: NORMAL
RAD ONC ARIA COURSE LAST TREATMENT DATE: NORMAL
RAD ONC ARIA COURSE START DATE: NORMAL
RAD ONC ARIA COURSE TREATMENT ELAPSED DAYS: 1
RAD ONC ARIA FIRST TREATMENT DATE: NORMAL
RAD ONC ARIA PLAN FRACTIONS TREATED TO DATE: 2
RAD ONC ARIA PLAN ID: NORMAL
RAD ONC ARIA PLAN PRESCRIBED DOSE PER FRACTION: 4 GY
RAD ONC ARIA PLAN PRIMARY REFERENCE POINT: NORMAL
RAD ONC ARIA PLAN TOTAL FRACTIONS PRESCRIBED: 5
RAD ONC ARIA PLAN TOTAL PRESCRIBED DOSE: 2000 CGY
RAD ONC ARIA REFERENCE POINT DOSAGE GIVEN TO DATE: 8 GY
RAD ONC ARIA REFERENCE POINT ID: NORMAL
RAD ONC ARIA REFERENCE POINT SESSION DOSAGE GIVEN: 4 GY
RBC # BLD AUTO: 3.53 10*6/MM3 (ref 3.77–5.28)
SODIUM SERPL-SCNC: 128 MMOL/L (ref 136–145)
WBC NRBC COR # BLD: 3.78 10*3/MM3 (ref 3.4–10.8)

## 2023-04-25 PROCEDURE — 94760 N-INVAS EAR/PLS OXIMETRY 1: CPT

## 2023-04-25 PROCEDURE — 25010000002 HYDROMORPHONE 1 MG/ML SOLUTION: Performed by: INTERNAL MEDICINE

## 2023-04-25 PROCEDURE — 94799 UNLISTED PULMONARY SVC/PX: CPT

## 2023-04-25 PROCEDURE — 85025 COMPLETE CBC W/AUTO DIFF WBC: CPT | Performed by: INTERNAL MEDICINE

## 2023-04-25 PROCEDURE — 25010000002 FUROSEMIDE PER 20 MG: Performed by: INTERNAL MEDICINE

## 2023-04-25 PROCEDURE — 77412 RADIATION TX DELIVERY LVL 3: CPT | Performed by: RADIOLOGY

## 2023-04-25 PROCEDURE — 25010000002 DEXAMETHASONE PER 1 MG: Performed by: RADIOLOGY

## 2023-04-25 PROCEDURE — 77387 GUIDANCE FOR RADJ TX DLVR: CPT | Performed by: RADIOLOGY

## 2023-04-25 PROCEDURE — 80048 BASIC METABOLIC PNL TOTAL CA: CPT | Performed by: INTERNAL MEDICINE

## 2023-04-25 RX ADMIN — FUROSEMIDE 40 MG: 10 INJECTION, SOLUTION INTRAMUSCULAR; INTRAVENOUS at 18:46

## 2023-04-25 RX ADMIN — FUROSEMIDE 40 MG: 10 INJECTION, SOLUTION INTRAMUSCULAR; INTRAVENOUS at 10:01

## 2023-04-25 RX ADMIN — Medication 10 ML: at 10:08

## 2023-04-25 RX ADMIN — DEXAMETHASONE SODIUM PHOSPHATE 4 MG: 4 INJECTION INTRA-ARTICULAR; INTRALESIONAL; INTRAMUSCULAR; INTRAVENOUS; SOFT TISSUE at 17:48

## 2023-04-25 RX ADMIN — Medication 10 ML: at 22:57

## 2023-04-25 RX ADMIN — HYDROMORPHONE HYDROCHLORIDE 0.5 MG: 1 INJECTION, SOLUTION INTRAMUSCULAR; INTRAVENOUS; SUBCUTANEOUS at 03:01

## 2023-04-25 RX ADMIN — PREGABALIN 100 MG: 100 CAPSULE ORAL at 10:01

## 2023-04-25 RX ADMIN — MORPHINE SULFATE 100 MG: 100 TABLET, FILM COATED, EXTENDED RELEASE ORAL at 10:01

## 2023-04-25 RX ADMIN — SPIRONOLACTONE 25 MG: 25 TABLET ORAL at 10:01

## 2023-04-25 RX ADMIN — OXYCODONE HYDROCHLORIDE 5 MG: 5 TABLET ORAL at 05:27

## 2023-04-25 RX ADMIN — DEXAMETHASONE SODIUM PHOSPHATE 4 MG: 4 INJECTION INTRA-ARTICULAR; INTRALESIONAL; INTRAMUSCULAR; INTRAVENOUS; SOFT TISSUE at 22:57

## 2023-04-25 RX ADMIN — DEXAMETHASONE SODIUM PHOSPHATE 4 MG: 4 INJECTION INTRA-ARTICULAR; INTRALESIONAL; INTRAMUSCULAR; INTRAVENOUS; SOFT TISSUE at 10:01

## 2023-04-25 RX ADMIN — DEXAMETHASONE SODIUM PHOSPHATE 4 MG: 4 INJECTION INTRA-ARTICULAR; INTRALESIONAL; INTRAMUSCULAR; INTRAVENOUS; SOFT TISSUE at 03:01

## 2023-04-25 NOTE — PROGRESS NOTES
On Treatment Visit       Patient: Evangelina Sutton   YOB: 1950   Medical Record Number: [id number]     Date of Visit  April 25, 2023   Primary Diagnosis: Multiple myeloma not having achieved remission         was seen today for an on treatment visit.  She is receiving radiation therapy to the thoracic spine; T9/T10. She  has received 800 cGy in 2 fractions out of a planned dose of 2000 cGy in 5 fractions.    Some improvement in pain since being admitted                                            Vitals:     Vitals:    04/25/23 0725   BP: 99/63   Pulse: 79   Resp: 16   Temp: 97.5 °F (36.4 °C)   SpO2: 91%       Weight:   Wt Readings from Last 3 Encounters:   04/20/23 53.9 kg (118 lb 13.3 oz)   03/21/23 57.7 kg (127 lb 3.3 oz)   02/21/23 53.5 kg (117 lb 15.1 oz)      Pain:  There were no vitals filed for this visit.      Physical Exam:  Gen: WD/WN; NAD; resting supine HEENT: MMM  Trachea: midline  Chest: symmetric  Resp: normal respiratory effort  Extr: warm, well-perfused  Neuro: awake and alert; no aphasia or neglect    Plan: I have reviewed treatment setup notes, checked and approved the daily guidance images.  I reviewed dose delivery, treatment parameters and deemed them appropriate. We plan to continue radiation therapy as prescribed.          Radiation Oncology    Electronically signed by Jonathan Rosales MD 4/19/2023  11:01 EDT

## 2023-04-25 NOTE — PROGRESS NOTES
[facility]     Progress Note    Patient Name: Evangelina Sutton  : 1950  MRN: [id number]  Primary Care Physician:  Gilberto Coleman MD  Date of admission: 2023    Subjective   Subjective     Chief Complaint: Patient stable and doing well still complaining of weakness pain is well controlled radiation therapy to be started today    HPI: With spinal cord compression with multiple myeloma radiation therapy to be started    Review of Systems   All systems were reviewed and negative except for: Reviewed    Objective   Objective     Vitals:   Temp:  [97.5 °F (36.4 °C)-98.6 °F (37 °C)] 97.5 °F (36.4 °C)  Heart Rate:  [] 81  Resp:  [14-16] 16  BP: ()/(46-80) 108/65    Physical Exam    Constitutional: Awake, alert   Eyes: PERRLA, sclerae anicteric, no conjunctival injection   HENT: NCAT, mucous membranes moist   Neck: Supple, no thyromegaly, no lymphadenopathy, trachea midline   Respiratory: Clear to auscultation bilaterally, nonlabored respirations    Cardiovascular: RRR, no murmurs, rubs, or gallops, palpable pedal pulses bilaterally   Gastrointestinal: Positive bowel sounds, soft, nontender, nondistended   Musculoskeletal: No bilateral ankle edema, no clubbing or cyanosis to extremities   Psychiatric: Appropriate affect, cooperative   Neurologic: Oriented x 3, strength symmetric in all extremities, Cranial Nerves grossly intact to confrontation, speech clear   Skin: No rashes   No change  Result Review    Result Review:  I have personally reviewed the results from the time of this admission to 2023 07:57 EDT and agree with these findings:  [x]  Laboratory multiple myeloma with anemia  []  Microbiology  [x]  Radiology spinal cord compression and T9 fracture  []  EKG/Telemetry   []  Cardiology/Vascular   []  Pathology  []  Old records  []  Other:  Most notable findings include: Spinal cord compression    Assessment & Plan   Assessment / Plan     Brief Patient Summary:  Evangelina Sutton is  a 72 y.o. female who with multiple myeloma with involvement of the spinal cord    Active Hospital Problems:  Active Hospital Problems    Diagnosis    • **Weakness    • Moderate Malnutrition (HCC)        Plan:   Radiation therapy to be started continue Decadron    DVT prophylaxis:  Mechanical DVT prophylaxis orders are present.    CODE STATUS:   Level Of Support Discussed With: Patient  Code Status (Patient has no pulse and is not breathing): CPR (Attempt to Resuscitate)  Medical Interventions (Patient has pulse or is breathing): Full Support    Disposition:  I expect patient to be discharged after the patient has been stabilized.    Electronically signed by Abbe Zamudio MD, 04/25/23, 7:57 AM EDT.      Part of this note may be an electronic transcription/translation of spoken language to printed text using the Dragon Dictation System.

## 2023-04-25 NOTE — PLAN OF CARE
Goal Outcome Evaluation:  Pt had radiation this AM. No complaints today. No changes noted this shift. VSS.

## 2023-04-25 NOTE — PLAN OF CARE
Goal Outcome Evaluation:           Progress: no change  Outcome Evaluation: VSS. Given dilaudid and oxycodone for pain. MRI completed last night. No other changes. Will continue to monitor.

## 2023-04-26 LAB
RAD ONC ARIA COURSE ID: NORMAL
RAD ONC ARIA COURSE INTENT: NORMAL
RAD ONC ARIA COURSE LAST TREATMENT DATE: NORMAL
RAD ONC ARIA COURSE START DATE: NORMAL
RAD ONC ARIA COURSE TREATMENT ELAPSED DAYS: 2
RAD ONC ARIA FIRST TREATMENT DATE: NORMAL
RAD ONC ARIA PLAN FRACTIONS TREATED TO DATE: 3
RAD ONC ARIA PLAN ID: NORMAL
RAD ONC ARIA PLAN PRESCRIBED DOSE PER FRACTION: 4 GY
RAD ONC ARIA PLAN PRIMARY REFERENCE POINT: NORMAL
RAD ONC ARIA PLAN TOTAL FRACTIONS PRESCRIBED: 5
RAD ONC ARIA PLAN TOTAL PRESCRIBED DOSE: 2000 CGY
RAD ONC ARIA REFERENCE POINT DOSAGE GIVEN TO DATE: 12 GY
RAD ONC ARIA REFERENCE POINT ID: NORMAL
RAD ONC ARIA REFERENCE POINT SESSION DOSAGE GIVEN: 4 GY

## 2023-04-26 PROCEDURE — 25010000002 FUROSEMIDE PER 20 MG: Performed by: INTERNAL MEDICINE

## 2023-04-26 PROCEDURE — 77387 GUIDANCE FOR RADJ TX DLVR: CPT | Performed by: RADIOLOGY

## 2023-04-26 PROCEDURE — 25010000002 DEXAMETHASONE PER 1 MG: Performed by: RADIOLOGY

## 2023-04-26 PROCEDURE — 94799 UNLISTED PULMONARY SVC/PX: CPT

## 2023-04-26 PROCEDURE — 99231 SBSQ HOSP IP/OBS SF/LOW 25: CPT | Performed by: NEUROLOGICAL SURGERY

## 2023-04-26 PROCEDURE — 77412 RADIATION TX DELIVERY LVL 3: CPT | Performed by: RADIOLOGY

## 2023-04-26 PROCEDURE — 97530 THERAPEUTIC ACTIVITIES: CPT

## 2023-04-26 PROCEDURE — 94760 N-INVAS EAR/PLS OXIMETRY 1: CPT

## 2023-04-26 RX ADMIN — MORPHINE SULFATE 100 MG: 100 TABLET, FILM COATED, EXTENDED RELEASE ORAL at 08:57

## 2023-04-26 RX ADMIN — Medication 10 ML: at 08:57

## 2023-04-26 RX ADMIN — Medication 10 ML: at 22:03

## 2023-04-26 RX ADMIN — PREGABALIN 100 MG: 100 CAPSULE ORAL at 08:57

## 2023-04-26 RX ADMIN — POLYETHYLENE GLYCOL 3350 17 G: 17 POWDER, FOR SOLUTION ORAL at 08:57

## 2023-04-26 RX ADMIN — OXYCODONE HYDROCHLORIDE 5 MG: 5 TABLET ORAL at 12:05

## 2023-04-26 RX ADMIN — DEXAMETHASONE SODIUM PHOSPHATE 4 MG: 4 INJECTION INTRA-ARTICULAR; INTRALESIONAL; INTRAMUSCULAR; INTRAVENOUS; SOFT TISSUE at 06:29

## 2023-04-26 RX ADMIN — DEXAMETHASONE SODIUM PHOSPHATE 4 MG: 4 INJECTION INTRA-ARTICULAR; INTRALESIONAL; INTRAMUSCULAR; INTRAVENOUS; SOFT TISSUE at 23:16

## 2023-04-26 RX ADMIN — DEXAMETHASONE SODIUM PHOSPHATE 4 MG: 4 INJECTION INTRA-ARTICULAR; INTRALESIONAL; INTRAMUSCULAR; INTRAVENOUS; SOFT TISSUE at 16:13

## 2023-04-26 RX ADMIN — DEXAMETHASONE SODIUM PHOSPHATE 4 MG: 4 INJECTION INTRA-ARTICULAR; INTRALESIONAL; INTRAMUSCULAR; INTRAVENOUS; SOFT TISSUE at 09:00

## 2023-04-26 RX ADMIN — FUROSEMIDE 40 MG: 10 INJECTION, SOLUTION INTRAMUSCULAR; INTRAVENOUS at 17:18

## 2023-04-26 RX ADMIN — FUROSEMIDE 40 MG: 10 INJECTION, SOLUTION INTRAMUSCULAR; INTRAVENOUS at 08:57

## 2023-04-26 RX ADMIN — SPIRONOLACTONE 25 MG: 25 TABLET ORAL at 08:57

## 2023-04-26 NOTE — PLAN OF CARE
Goal Outcome Evaluation:  Plan of Care Reviewed With: patient           Outcome Evaluation: VSS. NO C/O PAIN NOTED THIS SHIFT.

## 2023-04-26 NOTE — PLAN OF CARE
Goal Outcome Evaluation:              Outcome Evaluation: Pt resting well. Pt medicated for pain with prn oxycodone and scheduled MS contin. No other complaints. Will continue to monitor.

## 2023-04-26 NOTE — THERAPY TREATMENT NOTE
Patient Name: Evangelina Sutton  : 1950    MRN: [id number]                              Today's Date: 2023       Admit Date: 2023    Visit Dx:     ICD-10-CM ICD-9-CM   1. Weakness  R53.1 780.79   2. Hypokalemia  E87.6 276.8   3. Peripheral edema  R60.9 782.3   4. Fall, initial encounter  W19.XXXA E888.9   5. Injury of back, initial encounter  S39.92XA 959.19   6. Multiple myeloma, remission status unspecified  C90.00 203.00   7. Decreased activities of daily living (ADL)  Z78.9 V49.89   8. Difficulty walking  R26.2 719.7   9. 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 LEVEL   • CHOLECYSTECTOMY     • HYSTERECTOMY     • PORTACATH PLACEMENT N/A 3/17/2022    Procedure: INSERTION OF PORTACATH;  Surgeon: Cj Rossi MD;  Location: [facility] MAIN OR;  Service: General;  Laterality: N/A;   • TOTAL HIP ARTHROPLASTY Left 2022    Procedure: LEFT HIP ARTHROPLASTY ANTERIOR;  Surgeon: Sridhar Coto MD;  Location: [facility] MAIN OR;  Service: Orthopedics;  Laterality: Left;      General Information     Row Name 23 1304          OT Time and  Intention    Document Type therapy note (daily note)  -PG     Mode of Treatment individual therapy;occupational therapy  -PG           User Key  (r) = Recorded By, (t) = Taken By, (c) = Cosigned By    Initials Name Provider Type    Kevin Chirinos OT Occupational Therapist                 Mobility/ADL's     Row Name 04/26/23 1304          Bed Mobility    Bed Mobility supine-sit-supine  -PG     Supine-Sit-Supine [place] (Bed Mobility) maximum assist (25% patient effort)  -PG     Comment, (Bed Mobility) poor sitting balance sitting EOB  -PG           User Key  (r) = Recorded By, (t) = Taken By, (c) = Cosigned By    Initials Name Provider Type    PG Kevin Sharma OT Occupational Therapist               Obj/Interventions    No documentation.                Goals/Plan    No documentation.                Clinical Impression     Row Name 04/26/23 1304          Plan of Care Review    Plan of Care Reviewed With patient  -PG     Progress no change  -PG           User Key  (r) = Recorded By, (t) = Taken By, (c) = Cosigned By    Initials Name Provider Type    PG Kevin Sharma OT Occupational Therapist               Outcome Measures     Row Name 04/26/23 1304          How much help from another is currently needed...    Putting on and taking off regular lower body clothing? 1  -PG     Bathing (including washing, rinsing, and drying) 1  -PG     Toileting (which includes using toilet bed pan or urinal) 1  -PG     Putting on and taking off regular upper body clothing 1  -PG     Taking care of personal grooming (such as brushing teeth) 1  -PG     Eating meals 3  -PG     AM-PAC 6 Clicks Score (OT) 8  -PG     Row Name 04/26/23 0900          How much help from another person do you currently need...    Turning from your back to your side while in flat bed without using bedrails? 3  -MG     Moving from lying on back to sitting on the side of a flat bed without bedrails? 3  -MG     Moving to and from a bed to a chair (including  a wheelchair)? 2  -MG     Standing up from a chair using your arms (e.g., wheelchair, bedside chair)? 2  -MG     Climbing 3-5 steps with a railing? 1  -MG     To walk in hospital room? 1  -MG     AM-PAC 6 Clicks Score (PT) 12  -MG     Highest level of mobility 4 --> Transferred to chair/commode  -MG     Row Name 04/26/23 1304          Functional Assessment    Outcome Measure Options Optimal Instrument;AM-PAC 6 Clicks Daily Activity (OT)  -PG     Row Name 04/26/23 1304          Optimal Instrument    Optimal Instrument Optimal - 3  -PG     Bending/Stooping 5  -PG     Standing 5  -PG     Reaching 2  -PG           User Key  (r) = Recorded By, (t) = Taken By, (c) = Cosigned By    Initials Name Provider Type    PG Kevin Sharma, OT Occupational Therapist    Dedra López RN Registered Nurse                Occupational Therapy Education     Title: PT OT SLP Therapies (Done)     Topic: Occupational Therapy (Done)     Point: ADL training (Done)     Description:   Instruct learner(s) on proper safety adaptation and remediation techniques during self care or transfers.   Instruct in proper use of assistive devices.              Learning Progress Summary           Patient Acceptance, E,TB, VU by  at 4/21/2023 0530    Acceptance, E,D, VU by PG at 4/20/2023 1009                   Point: Home exercise program (Done)     Description:   Instruct learner(s) on appropriate technique for monitoring, assisting and/or progressing therapeutic exercises/activities.              Learning Progress Summary           Patient Acceptance, E,TB, VU by  at 4/21/2023 0530    Acceptance, E,D, VU by PG at 4/20/2023 1009                   Point: Precautions (Done)     Description:   Instruct learner(s) on prescribed precautions during self-care and functional transfers.              Learning Progress Summary           Patient Acceptance, E,TB, VU by  at 4/21/2023 0530    Acceptance, E,D, VU by  at 4/20/2023 1009                   Point:  Body mechanics (Done)     Description:   Instruct learner(s) on proper positioning and spine alignment during self-care, functional mobility activities and/or exercises.              Learning Progress Summary           Patient Acceptance, E,TB, VU by  at 4/21/2023 0530    Acceptance, E,D, VU by PG at 4/20/2023 1009                               User Key     Initials Effective Dates Name Provider Type Discipline     07/26/21 -  Marta April, RNA Registered Nurse Nurse    PG 06/16/21 -  Kevin Sharma OT Occupational Therapist OT              OT Recommendation and Plan  Planned Therapy Interventions (OT): activity tolerance training, BADL retraining, strengthening exercise, transfer/mobility retraining, patient/caregiver education/training, occupation/activity based interventions  Therapy Frequency (OT): 5 times/wk  Plan of Care Review  Plan of Care Reviewed With: patient  Progress: no change  Outcome Evaluation: Patient presents with limitations affecting strength, activity tolerance, and balance impacting patient's ability to return home safely and independently.  The skills of a therapist will be required to safely and effectively implement the following treatment plan to restore maximal level of function     Time Calculation:    Time Calculation- OT     Row Name 04/26/23 1305             Time Calculation- OT    OT Received On 04/26/23  -PG      OT Goal Re-Cert Due Date 04/29/23  -PG         Timed Charges    10098 - OT Therapeutic Activity Minutes 10  -PG         Total Minutes    Timed Charges Total Minutes 10  -PG       Total Minutes 10  -PG            User Key  (r) = Recorded By, (t) = Taken By, (c) = Cosigned By    Initials Name Provider Type     Kevin Sharma OT Occupational Therapist              Therapy Charges for Today     Code Description Service Date Service Provider Modifiers Qty    [id number]  OT THERAPEUTIC ACT EA 15 MIN 4/26/2023 Kevin Sharma OT GO 1               Kevin Sharma  OT  4/26/2023

## 2023-04-26 NOTE — PROGRESS NOTES
[facility]     Progress Note    Patient Name: Evangelina Sutton  : 1950  MRN: [id number]  Primary Care Physician:  Gilberto Coleman MD  Date of admission: 2023    Subjective   Subjective     Chief Complaint: Pain well controlled we will continue current pain management edition therapy in progress patient will require rehab placement after completion of radiation therapy    HPI: Patient with multiple myeloma with involvement of T9    Review of Systems   All systems were reviewed and negative except for: Reviewed    Objective   Objective     Vitals:   Temp:  [97.7 °F (36.5 °C)-98.2 °F (36.8 °C)] 98.2 °F (36.8 °C)  Heart Rate:  [84-94] 89  Resp:  [16-18] 18  BP: ()/(59-74) 122/74    Physical Exam    Constitutional: Awake, alert   Eyes: PERRLA, sclerae anicteric, no conjunctival injection   HENT: NCAT, mucous membranes moist   Neck: Supple, no thyromegaly, no lymphadenopathy, trachea midline   Respiratory: Clear to auscultation bilaterally, nonlabored respirations    Cardiovascular: RRR, no murmurs, rubs, or gallops, palpable pedal pulses bilaterally   Gastrointestinal: Positive bowel sounds, soft, nontender, nondistended   Musculoskeletal: No bilateral ankle edema, no clubbing or cyanosis to extremities   Psychiatric: Appropriate affect, cooperative   Neurologic: Oriented x 3, Cranial Nerves grossly intact to confrontation, speech clear   Skin: No rashes   No change  Result Review    Result Review:  I have personally reviewed the results from the time of this admission to 2023 08:11 EDT and agree with these findings:  [x]  Laboratory anemia  []  Microbiology  []  Radiology  []  EKG/Telemetry   []  Cardiology/Vascular   []  Pathology  []  Old records  []  Other:  Most notable findings include: Anemia compression fracture T9 with large lesion    Assessment & Plan   Assessment / Plan     Brief Patient Summary:  Evangelina Sutton is a 72 y.o. female who patient with lower extremity weakness  because of the thoracic vertebral involvement    Active Hospital Problems:  Active Hospital Problems    Diagnosis    • **Weakness    • Moderate Malnutrition (HCC)        Plan:   Continue radiation therapy steroid    DVT prophylaxis:  Mechanical DVT prophylaxis orders are present.    CODE STATUS:   Level Of Support Discussed With: Patient  Code Status (Patient has no pulse and is not breathing): CPR (Attempt to Resuscitate)  Medical Interventions (Patient has pulse or is breathing): Full Support    Disposition:  I expect patient to be discharged patient will need rehab placement after completion of radiation therapy.    Electronically signed by Abbe Zamudio MD, 04/26/23, 8:11 AM EDT.      Part of this note may be an electronic transcription/translation of spoken language to printed text using the Dragon Dictation System.

## 2023-04-26 NOTE — PROGRESS NOTES
[facility]   Neurosurgery Progress Note    Patient Name: Evangelina Sutton  : 1950  MRN: [id number]  Date of admission: 2023  Surgical Procedures Since Admission:    Subjective   Subjective     Chief Complaint: Weakness    History of Present Illness   No new complaints.  Continued weakness in lower extremities.  Sensation may be slightly improved.  Pain overall improved.      Objective   Objective     Vitals:   Temp:  [97.3 °F (36.3 °C)-98.2 °F (36.8 °C)] 97.9 °F (36.6 °C)  Heart Rate:  [84-94] 86  Resp:  [16-18] 18  BP: ()/(59-74) 111/64    Lower extremity strength appears to be stable with about 2 out of 5 plantarflexion and dorsiflexion.  She has sensation in the lower extremities but decreased compared with the upper extremities consistent with her previous exam.    Assessment & Plan   Assessment / Plan     Brief Patient Summary:  Evangelina Sutton is a 72 y.o. female who has myeloma with lower extremity weakness.    Active Hospital Problems:  Active Hospital Problems    Diagnosis    • **Weakness    • Moderate Malnutrition (HCC)      Plan:   Continue XRT.  Patient will require inpatient rehab and hopefully over time will regain some of the strength which she has lost.

## 2023-04-27 LAB
RAD ONC ARIA COURSE ID: NORMAL
RAD ONC ARIA COURSE INTENT: NORMAL
RAD ONC ARIA COURSE LAST TREATMENT DATE: NORMAL
RAD ONC ARIA COURSE START DATE: NORMAL
RAD ONC ARIA COURSE TREATMENT ELAPSED DAYS: 3
RAD ONC ARIA FIRST TREATMENT DATE: NORMAL
RAD ONC ARIA PLAN FRACTIONS TREATED TO DATE: 4
RAD ONC ARIA PLAN ID: NORMAL
RAD ONC ARIA PLAN PRESCRIBED DOSE PER FRACTION: 4 GY
RAD ONC ARIA PLAN PRIMARY REFERENCE POINT: NORMAL
RAD ONC ARIA PLAN TOTAL FRACTIONS PRESCRIBED: 5
RAD ONC ARIA PLAN TOTAL PRESCRIBED DOSE: 2000 CGY
RAD ONC ARIA REFERENCE POINT DOSAGE GIVEN TO DATE: 16 GY
RAD ONC ARIA REFERENCE POINT ID: NORMAL
RAD ONC ARIA REFERENCE POINT SESSION DOSAGE GIVEN: 4 GY

## 2023-04-27 PROCEDURE — 94799 UNLISTED PULMONARY SVC/PX: CPT

## 2023-04-27 PROCEDURE — 77412 RADIATION TX DELIVERY LVL 3: CPT | Performed by: RADIOLOGY

## 2023-04-27 PROCEDURE — 25010000002 FUROSEMIDE PER 20 MG: Performed by: INTERNAL MEDICINE

## 2023-04-27 PROCEDURE — 25010000002 DEXAMETHASONE PER 1 MG: Performed by: RADIOLOGY

## 2023-04-27 PROCEDURE — 77387 GUIDANCE FOR RADJ TX DLVR: CPT | Performed by: RADIOLOGY

## 2023-04-27 RX ADMIN — Medication 10 ML: at 20:27

## 2023-04-27 RX ADMIN — FUROSEMIDE 40 MG: 10 INJECTION, SOLUTION INTRAMUSCULAR; INTRAVENOUS at 17:02

## 2023-04-27 RX ADMIN — Medication 10 ML: at 08:44

## 2023-04-27 RX ADMIN — MORPHINE SULFATE 100 MG: 100 TABLET, FILM COATED, EXTENDED RELEASE ORAL at 08:41

## 2023-04-27 RX ADMIN — FUROSEMIDE 40 MG: 10 INJECTION, SOLUTION INTRAMUSCULAR; INTRAVENOUS at 08:43

## 2023-04-27 RX ADMIN — SPIRONOLACTONE 25 MG: 25 TABLET ORAL at 08:42

## 2023-04-27 RX ADMIN — OXYCODONE HYDROCHLORIDE 5 MG: 5 TABLET ORAL at 12:59

## 2023-04-27 RX ADMIN — DEXAMETHASONE SODIUM PHOSPHATE 4 MG: 4 INJECTION INTRA-ARTICULAR; INTRALESIONAL; INTRAMUSCULAR; INTRAVENOUS; SOFT TISSUE at 09:25

## 2023-04-27 RX ADMIN — OXYCODONE HYDROCHLORIDE 5 MG: 5 TABLET ORAL at 20:24

## 2023-04-27 RX ADMIN — DEXAMETHASONE SODIUM PHOSPHATE 4 MG: 4 INJECTION INTRA-ARTICULAR; INTRALESIONAL; INTRAMUSCULAR; INTRAVENOUS; SOFT TISSUE at 17:02

## 2023-04-27 RX ADMIN — DEXAMETHASONE SODIUM PHOSPHATE 4 MG: 4 INJECTION INTRA-ARTICULAR; INTRALESIONAL; INTRAMUSCULAR; INTRAVENOUS; SOFT TISSUE at 04:50

## 2023-04-27 RX ADMIN — DEXAMETHASONE SODIUM PHOSPHATE 4 MG: 4 INJECTION INTRA-ARTICULAR; INTRALESIONAL; INTRAMUSCULAR; INTRAVENOUS; SOFT TISSUE at 23:52

## 2023-04-27 RX ADMIN — PREGABALIN 100 MG: 100 CAPSULE ORAL at 08:42

## 2023-04-27 NOTE — PLAN OF CARE
Goal Outcome Evaluation:              Outcome Evaluation: Pt vss. Pt resting well. Pt medicated for pain with prn oxycodone and scheduled MS contin. No other complaints. Will continue to monitor.

## 2023-04-27 NOTE — CONSULTS
"Nutrition Services    Patient Name: Evangelina Sutton  YOB: 1950  MRN: [id number]  Admission date: 4/19/2023      CLINICAL NUTRITION ASSESSMENT      Reason for Assessment  Follow-up protocol     H&P:    Past Medical History:   Diagnosis Date   • Cancer     BONE CANCER/TAKING CHEMO SHOT WEEKLY   • DDD (degenerative disc disease), cervical    • Neuropathy     LEGS AND ARMS   • PONV (postoperative nausea and vomiting)         Current Problems:   Active Hospital Problems    Diagnosis    • **Weakness    • Moderate Malnutrition (HCC)         Nutrition/Diet History         Narrative     Nutrition follow up. Pt is admitted d/t weakness. PMH of multiple myeloma. Patient meets criteria for moderate malnutrition based on muscle and fat loss.     Po intake has improved over the last few days with patient consuming % of meals and snacks. Continues to receive snacks of cottage cheese and peaches and peanut butter and crackers. No new nutrition recommendations at this time. Continue current interventions. Will CTM per protocol.     Anthropometrics        Current Height, Weight Height: 157.5 cm (62\")  Weight: 53.9 kg (118 lb 13.3 oz)   Current BMI Body mass index is 21.73 kg/m².       Weight Hx  Wt Readings from Last 30 Encounters:   04/20/23 1117 53.9 kg (118 lb 13.3 oz)   03/21/23 1640 57.7 kg (127 lb 3.3 oz)   03/15/23 1730 63.8 kg (140 lb 10.5 oz)   02/21/23 2138 53.5 kg (117 lb 15.1 oz)   02/21/23 1401 56.8 kg (125 lb 3.5 oz)   11/28/22 2241 65.4 kg (144 lb 2.9 oz)   11/28/22 1212 68.5 kg (151 lb 0.2 oz)   11/22/22 0936 68.5 kg (151 lb)   07/19/22 1325 68.5 kg (151 lb)   06/07/22 1409 68.5 kg (151 lb)   05/10/22 1251 68.5 kg (151 lb)   04/25/22 2115 68.5 kg (151 lb)   04/25/22 1217 66.1 kg (145 lb 11.6 oz)   04/22/22 1428 68.9 kg (152 lb)   03/29/22 1333 68.9 kg (152 lb)   03/17/22 0837 65.1 kg (143 lb 8.3 oz)   03/11/22 1328 68 kg (150 lb)   09/05/21 2227 65.1 kg (143 lb 8.3 oz)   09/05/21 2133 65.1 kg " (143 lb 8.3 oz)   09/05/21 1444 68 kg (149 lb 14.6 oz)   10/13/20 0000 61.2 kg (135 lb)   10/01/20 0000 61.2 kg (135 lb)   06/02/20 0000 63.5 kg (140 lb)   05/08/19 0000 84.4 kg (186 lb 2 oz)   04/03/19 0000 84.5 kg (186 lb 6 oz)   03/22/19 0000 84.5 kg (186 lb 4 oz)   03/20/19 0000 84.6 kg (186 lb 7 oz)   02/20/19 0000 80.3 kg (177 lb 1 oz)   02/13/19 0000 78.3 kg (172 lb 9 oz)   01/14/19 0000 90 kg (198 lb 8 oz)            Wt Change Observation Fluctuations 2' to volume status, currently on Lasix     Estimated/Assessed Needs       Energy Requirements 35-40 kcal/kg    EST Needs (kcal/day) 2518-9515       Protein Requirements 1.2-1.5 g/kg   EST Daily Needs (g/day) 65-81       Fluid Requirements 25 ml/kg    Estimated Needs (mL/day) 9722-1467     Labs/Medications         Pertinent Labs Reviewed.   Results from last 7 days   Lab Units 04/25/23 0448 04/24/23 0623 04/23/23 0355   SODIUM mmol/L 128* 129* 131*   POTASSIUM mmol/L 5.0 4.0 3.5   CHLORIDE mmol/L 86* 84* 87*   CO2 mmol/L 33.4* 32.8* 32.8*   BUN mg/dL 71* 41* 34*   CREATININE mg/dL 1.63* 1.13* 1.04*   CALCIUM mg/dL 9.1 10.0 10.4   BILIRUBIN mg/dL  --  0.8  --    ALK PHOS U/L  --  52  --    ALT (SGPT) U/L  --  15  --    AST (SGOT) U/L  --  35*  --    GLUCOSE mg/dL 137* 143* 104*     Results from last 7 days   Lab Units 04/25/23 0448 04/24/23  0623 04/23/23  0355 04/22/23  0827   MAGNESIUM mg/dL  --   --  1.9 1.4*   HEMOGLOBIN g/dL 9.5*   < > 10.2*  --    HEMATOCRIT % 28.6*   < > 30.4*  --     < > = values in this interval not displayed.     COVID19   Date Value Ref Range Status   02/21/2023 Not Detected Not Detected - Ref. Range Final     No results found for: HGBA1C      Pertinent Medications Reviewed.     Current Nutrition Orders & Evaluation of Intake       Oral Nutrition     Current PO Diet Diet: Regular/House Diet; Texture: Regular Texture (IDDSI 7); Fluid Consistency: Thin (IDDSI 0)   Supplement Orders Placed This Encounter      Snack: Peanut butter  crackers      Snack: Cottage cheese and peaches       Malnutrition Severity Assessment      Patient meets criteria for : Moderate (non-severe) Malnutrition         Nutrition Diagnosis         Nutrition Dx Problem 1 Moderate malnutrition related to increased nutrient needs due to catabolic disease as evidenced by multiple myeloma        Nutrition Intervention         Snacks of peanut butter crackers, cottage cheese and peaches     Medical Nutrition Therapy/Nutrition Education          Learner     Readiness N/A  Education not indicated at this time     Method     Response N/A  N/A     Monitor/Evaluation        Monitor I&O, PO intake, Supplement intake, Pertinent labs, Weight, POC/GOC       Nutrition Discharge Plan         To be determined       Electronically signed by:  Mehran Cardenas RD  04/27/23 09:30 EDT

## 2023-04-27 NOTE — PROGRESS NOTES
[facility]     Progress Note    Patient Name: Evangelina Sutton  : 1950  MRN: [id number]  Primary Care Physician:  Gilberto Coleman MD  Date of admission: 2023    Subjective   Subjective     Chief Complaint: Stable pain well controlled has had BMs waiting for rehab placement last radiation therapy to be given tomorrow patient with multiple myeloma with thoracic 9 vertebral column involvement    HPI: Patient receiving radiation therapy already on steroid    Review of Systems   All systems were reviewed and negative except for: Reviewed    Objective   Objective     Vitals:   Temp:  [97.3 °F (36.3 °C)-98.6 °F (37 °C)] 98.1 °F (36.7 °C)  Heart Rate:  [] 79  Resp:  [18] 18  BP: ()/(51-67) 104/63    Physical Exam    Constitutional: Awake, alert   Eyes: PERRLA, sclerae anicteric, no conjunctival injection   HENT: NCAT, mucous membranes moist   Neck: Supple, no thyromegaly, no lymphadenopathy, trachea midline   Respiratory: Clear to auscultation bilaterally, nonlabored respirations    Cardiovascular: RRR, no murmurs, rubs, or gallops, palpable pedal pulses bilaterally   Gastrointestinal: Positive bowel sounds, soft, nontender, nondistended   Musculoskeletal: No bilateral ankle edema, no clubbing or cyanosis to extremities   Psychiatric: Appropriate affect, cooperative   Neurologic: Oriented x 3, strength symmetric in all extremities, Cranial Nerves grossly intact to confrontation, speech clear   Skin: No rashes     Result Review    Result Review:  I have personally reviewed the results from the time of this admission to 2023 07:44 EDT and agree with these findings:  [x]  Laboratory  []  Microbiology  []  Radiology  []  EKG/Telemetry   []  Cardiology/Vascular   []  Pathology  []  Old records  []  Other:  Most notable findings include: Anemia    Assessment & Plan   Assessment / Plan     Brief Patient Summary:  Evangelina Sutton is a 72 y.o. female who patient making progress but still  weakness of the extremity    Active Hospital Problems:  Active Hospital Problems    Diagnosis    • **Weakness    • Moderate Malnutrition (HCC)        Plan:   Continue physical therapy radiation therapy    DVT prophylaxis:  Mechanical DVT prophylaxis orders are present.    CODE STATUS:   Level Of Support Discussed With: Patient  Code Status (Patient has no pulse and is not breathing): CPR (Attempt to Resuscitate)  Medical Interventions (Patient has pulse or is breathing): Full Support    Disposition:  I expect patient to be discharged radiation therapy to be completed tomorrow waiting for rehab place.    Electronically signed by Abbe Zamudio MD, 04/27/23, 7:44 AM EDT.      Part of this note may be an electronic transcription/translation of spoken language to printed text using the Dragon Dictation System.

## 2023-04-27 NOTE — PLAN OF CARE
Goal Outcome Evaluation:  Plan of Care Reviewed With: patient        Progress: improving  Outcome Evaluation: NO C/O PAIN, RESTED QUIETLY, VSS

## 2023-04-28 LAB
RAD ONC ARIA COURSE ID: NORMAL
RAD ONC ARIA COURSE INTENT: NORMAL
RAD ONC ARIA COURSE LAST TREATMENT DATE: NORMAL
RAD ONC ARIA COURSE START DATE: NORMAL
RAD ONC ARIA COURSE TREATMENT ELAPSED DAYS: 4
RAD ONC ARIA FIRST TREATMENT DATE: NORMAL
RAD ONC ARIA PLAN FRACTIONS TREATED TO DATE: 5
RAD ONC ARIA PLAN ID: NORMAL
RAD ONC ARIA PLAN PRESCRIBED DOSE PER FRACTION: 4 GY
RAD ONC ARIA PLAN PRIMARY REFERENCE POINT: NORMAL
RAD ONC ARIA PLAN TOTAL FRACTIONS PRESCRIBED: 5
RAD ONC ARIA PLAN TOTAL PRESCRIBED DOSE: 2000 CGY
RAD ONC ARIA REFERENCE POINT DOSAGE GIVEN TO DATE: 20 GY
RAD ONC ARIA REFERENCE POINT ID: NORMAL
RAD ONC ARIA REFERENCE POINT SESSION DOSAGE GIVEN: 4 GY

## 2023-04-28 PROCEDURE — 25010000002 DEXAMETHASONE PER 1 MG: Performed by: RADIOLOGY

## 2023-04-28 PROCEDURE — 77336 RADIATION PHYSICS CONSULT: CPT | Performed by: RADIOLOGY

## 2023-04-28 PROCEDURE — 97530 THERAPEUTIC ACTIVITIES: CPT

## 2023-04-28 PROCEDURE — 77412 RADIATION TX DELIVERY LVL 3: CPT | Performed by: RADIOLOGY

## 2023-04-28 PROCEDURE — 97110 THERAPEUTIC EXERCISES: CPT

## 2023-04-28 RX ADMIN — DEXAMETHASONE SODIUM PHOSPHATE 4 MG: 4 INJECTION INTRA-ARTICULAR; INTRALESIONAL; INTRAMUSCULAR; INTRAVENOUS; SOFT TISSUE at 22:40

## 2023-04-28 RX ADMIN — OXYCODONE HYDROCHLORIDE 5 MG: 5 TABLET ORAL at 07:50

## 2023-04-28 RX ADMIN — Medication 10 ML: at 09:09

## 2023-04-28 RX ADMIN — POLYETHYLENE GLYCOL 3350 17 G: 17 POWDER, FOR SOLUTION ORAL at 09:09

## 2023-04-28 RX ADMIN — MORPHINE SULFATE 100 MG: 100 TABLET, FILM COATED, EXTENDED RELEASE ORAL at 09:10

## 2023-04-28 RX ADMIN — Medication 10 ML: at 20:13

## 2023-04-28 RX ADMIN — Medication 10 ML: at 17:21

## 2023-04-28 RX ADMIN — PREGABALIN 100 MG: 100 CAPSULE ORAL at 09:09

## 2023-04-28 RX ADMIN — DEXAMETHASONE SODIUM PHOSPHATE 4 MG: 4 INJECTION INTRA-ARTICULAR; INTRALESIONAL; INTRAMUSCULAR; INTRAVENOUS; SOFT TISSUE at 04:17

## 2023-04-28 RX ADMIN — DEXAMETHASONE SODIUM PHOSPHATE 4 MG: 4 INJECTION INTRA-ARTICULAR; INTRALESIONAL; INTRAMUSCULAR; INTRAVENOUS; SOFT TISSUE at 17:21

## 2023-04-28 RX ADMIN — DEXAMETHASONE SODIUM PHOSPHATE 4 MG: 4 INJECTION INTRA-ARTICULAR; INTRALESIONAL; INTRAMUSCULAR; INTRAVENOUS; SOFT TISSUE at 09:13

## 2023-04-28 RX ADMIN — SPIRONOLACTONE 25 MG: 25 TABLET ORAL at 09:10

## 2023-04-28 RX ADMIN — OXYCODONE HYDROCHLORIDE 5 MG: 5 TABLET ORAL at 20:13

## 2023-04-28 NOTE — THERAPY TREATMENT NOTE
Acute Care - Physical Therapy Progress Note   [place]     Patient Name: Evangelina Sutton  : 1950  MRN: [id number]  Today's Date: 2023      Visit Dx:     ICD-10-CM ICD-9-CM   1. Weakness  R53.1 780.79   2. Hypokalemia  E87.6 276.8   3. Peripheral edema  R60.9 782.3   4. Fall, initial encounter  W19.XXXA E888.9   5. Injury of back, initial encounter  S39.92XA 959.19   6. Multiple myeloma, remission status unspecified  C90.00 203.00   7. Decreased activities of daily living (ADL)  Z78.9 V49.89   8. Difficulty walking  R26.2 719.7   9. 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 LEVEL   • CHOLECYSTECTOMY     • HYSTERECTOMY     • PORTACATH PLACEMENT N/A 3/17/2022    Procedure: INSERTION OF PORTACATH;  Surgeon: Cj Rossi MD;  Location: [facility] MAIN OR;  Service: General;  Laterality: N/A;   • TOTAL HIP ARTHROPLASTY Left 2022    Procedure: LEFT HIP ARTHROPLASTY ANTERIOR;  Surgeon: Sridhar Coto MD;  Location: [facility] MAIN OR;  Service: Orthopedics;  Laterality: Left;     PT Assessment (last 12 hours)     PT Evaluation and Treatment     Row  Name 04/28/23 1600          Physical Therapy Time and Intention    Subjective Information complains of;weakness;fatigue  -CS     Document Type therapy note (daily note)  -CS     Mode of Treatment individual therapy;physical therapy  -CS     Patient Effort adequate  -CS     Symptoms Noted During/After Treatment fatigue  -CS     Row Name 04/28/23 1600          Bed Mobility    Bed Mobility rolling left;rolling right  -CS     Rolling Left [place] (Bed Mobility) verbal cues;moderate assist (50% patient effort);1 person assist;nonverbal cues (demo/gesture)  -CS     Rolling Right [place] (Bed Mobility) verbal cues;nonverbal cues (demo/gesture);moderate assist (50% patient effort);1 person assist  -CS     Supine-Sit-Supine [place] (Bed Mobility) verbal cues;nonverbal cues (demo/gesture);maximum assist (25% patient effort);1 person assist  -CS     Assistive Device (Bed Mobility) bed rails;draw sheet;head of bed elevated  -CS     Row Name 04/28/23 1600          Hip (Therapeutic Exercise)    Hip AAROM (Therapeutic Exercise) bilateral;supine;10 repetitions;2 sets  hip abd/add, hip IR/ER, heel slides  -CS     Row Name 04/28/23 1600          Knee (Therapeutic Exercise)    Knee AAROM (Therapeutic Exercise) bilateral;sitting;supine;10 repetitions;2 sets  LAQ's, SLR's  -CS     Row Name 04/28/23 1600          Ankle (Therapeutic Exercise)    Ankle AAROM (Therapeutic Exercise) bilateral;dorsiflexion;plantarflexion;supine;10 repetitions;2 sets  -CS     Row Name 04/28/23 1600          Progress Summary (PT)    Progress Toward Functional Goals (PT) progress toward functional goals is fair  -CS           User Key  (r) = Recorded By, (t) = Taken By, (c) = Cosigned By    Initials Name Provider Type    CS Brien Vergara PTA Physical Therapist Assistant                Physical Therapy Education     Title: PT OT SLP Therapies (Done)     Topic: Physical Therapy (Done)     Point: Mobility training (Done)     Learning Progress  Summary           Patient Acceptance, E,TB, VU by  at 4/21/2023 0530    Acceptance, E,TB, VU by  at 4/20/2023 1323                   Point: Home exercise program (Done)     Learning Progress Summary           Patient Acceptance, E,TB, VU by  at 4/21/2023 0530                   Point: Body mechanics (Done)     Learning Progress Summary           Patient Acceptance, E,TB, VU by  at 4/21/2023 0530    Acceptance, E,TB, VU by  at 4/20/2023 1323                   Point: Precautions (Done)     Learning Progress Summary           Patient Acceptance, E,TB, VU by  at 4/21/2023 0530    Acceptance, E,TB, VU by  at 4/20/2023 1323                               User Key     Initials Effective Dates Name Provider Type Discipline     07/26/21 -  Marta April, RNA Registered Nurse Nurse     06/11/21 -  Hector Ortega, PT Physical Therapist PT              PT Recommendation and Plan     Progress Summary (PT)  Progress Toward Functional Goals (PT): progress toward functional goals is fair   Outcome Measures     Row Name 04/28/23 1600             How much help from another person do you currently need...    Turning from your back to your side while in flat bed without using bedrails? 3  -CS      Moving from lying on back to sitting on the side of a flat bed without bedrails? 2  -CS      Moving to and from a bed to a chair (including a wheelchair)? 2  -CS      Standing up from a chair using your arms (e.g., wheelchair, bedside chair)? 2  -CS      Climbing 3-5 steps with a railing? 1  -CS      To walk in hospital room? 1  -CS      AM-PAC 6 Clicks Score (PT) 11  -CS         Functional Assessment    Outcome Measure Options AM-PAC 6 Clicks Basic Mobility (PT)  -CS            User Key  (r) = Recorded By, (t) = Taken By, (c) = Cosigned By    Initials Name Provider Type    CS Brien Vergara PTA Physical Therapist Assistant                 Time Calculation:    PT Charges     Row Name 04/28/23 1622             Time  Calculation    Start Time 1033  -CS      PT Received On 04/28/23  -CS         Timed Charges    47145 - PT Therapeutic Exercise Minutes 16  -CS      15105 - PT Therapeutic Activity Minutes 9  -CS         Total Minutes    Timed Charges Total Minutes 25  -CS       Total Minutes 25  -CS            User Key  (r) = Recorded By, (t) = Taken By, (c) = Cosigned By    Initials Name Provider Type    CS Brien Vergara PTA Physical Therapist Assistant              Therapy Charges for Today     Code Description Service Date Service Provider Modifiers Qty    66728582037 HC PT THER PROC EA 15 MIN 4/28/2023 Brien Vergara PTA GP 1    87010948759 HC PT THERAPEUTIC ACT EA 15 MIN 4/28/2023 Brien Vergara PTA GP 1          PT G-Codes  Outcome Measure Options: AM-PAC 6 Clicks Basic Mobility (PT)  AM-PAC 6 Clicks Score (PT): 11  AM-PAC 6 Clicks Score (OT): 8    Brien Vergara PTA  4/28/2023

## 2023-04-28 NOTE — PLAN OF CARE
Goal Outcome Evaluation: radiation TX completed this shift. Pt requires on additional PRN dose for pain control. No acute change this shift. Continue plan of care.

## 2023-04-28 NOTE — PLAN OF CARE
Goal Outcome Evaluation:  Plan of Care Reviewed With: patient        Progress: improving  Outcome Evaluation: VSS. Patient rested comfortably in bed with eyes closed. C/o pain once, medicated with oxyIR prn per MAR. No other complaints or concerns. Will continue plan of care.

## 2023-04-28 NOTE — CONSULTS
Pt will have bed available at [place] on 4/29. Son has been provided an update. Son states if EMS is unable to transport, son will arrange transportation. MD has been provided an update and aware of bed being available.

## 2023-04-28 NOTE — PROGRESS NOTES
[facility]     Progress Note    Patient Name: Evangelina Sutton  : 1950  MRN: [id number]  Primary Care Physician:  Gilberto Coleman MD  Date of admission: 2023    Subjective   Subjective     Chief Complaint: Patient is in the last day of her radiation therapy she still has got significant weakness and process has been started to get her to the rehab fully we will be able to discharge her tomorrow BUN elevated partly because of the diuretics which were used because of edema and she also had steroids so that probably was contributing to it as well is feeling much better her pain is well controlled and she will require rehabilitation as an outpatient    HPI: Patient with multiple myeloma with T9 compression fracture as well as spinal cord compression    Review of Systems   All systems were reviewed and negative except for: Reviewed    Objective   Objective     Vitals:   Temp:  [97.7 °F (36.5 °C)-98.6 °F (37 °C)] 98.3 °F (36.8 °C)  Heart Rate:  [] 91  Resp:  [16-18] 16  BP: (103-136)/(47-96) 103/67    Physical Exam    Constitutional: Awake, alert   Eyes: PERRLA, sclerae anicteric, no conjunctival injection   HENT: NCAT, mucous membranes moist   Neck: Supple, no thyromegaly, no lymphadenopathy, trachea midline   Respiratory: Clear to auscultation bilaterally, nonlabored respirations    Cardiovascular: RRR, no murmurs, rubs, or gallops, palpable pedal pulses bilaterally   Gastrointestinal: Positive bowel sounds, soft, nontender, nondistended   Musculoskeletal: No bilateral ankle edema, no clubbing or cyanosis to extremities   Psychiatric: Appropriate affect, cooperative   Neurologic: Oriented x 3, strength symmetric in all extremities, Cranial Nerves grossly intact to confrontation, speech clear   Skin: No rashes   No change  Result Review    Result Review:  I have personally reviewed the results from the time of this admission to 2023 10:36 EDT and agree with these findings:  [x]  Laboratory  anemia elevated BUN/creatinine  []  Microbiology  []  Radiology  []  EKG/Telemetry   []  Cardiology/Vascular   []  Pathology  []  Old records  []  Other:  Most notable findings include: Elevated BUN/creatinine possibly because of diuretics as well as steroid    Assessment & Plan   Assessment / Plan     Brief Patient Summary:  Evangelina Sutton is a 72 y.o. female who patient with multiple myeloma and spinal cord compression radiation therapy has been completed    Active Hospital Problems:  Active Hospital Problems    Diagnosis    • **Weakness    • Moderate Malnutrition (HCC)        Plan:   We will discharge her to the rehab    DVT prophylaxis:  Mechanical DVT prophylaxis orders are present.    CODE STATUS:   Level Of Support Discussed With: Patient  Code Status (Patient has no pulse and is not breathing): CPR (Attempt to Resuscitate)  Medical Interventions (Patient has pulse or is breathing): Full Support    Disposition:  I expect patient to be discharged hopefully tomorrow if the rehab bed is available.    Electronically signed by Abbe Zamudio MD, 04/28/23, 10:36 AM EDT.      Part of this note may be an electronic transcription/translation of spoken language to printed text using the Dragon Dictation System.

## 2023-04-29 VITALS
HEART RATE: 93 BPM | HEIGHT: 62 IN | BODY MASS INDEX: 21.87 KG/M2 | RESPIRATION RATE: 18 BRPM | OXYGEN SATURATION: 98 % | TEMPERATURE: 98.5 F | SYSTOLIC BLOOD PRESSURE: 126 MMHG | DIASTOLIC BLOOD PRESSURE: 69 MMHG | WEIGHT: 118.83 LBS

## 2023-04-29 LAB
ANION GAP SERPL CALCULATED.3IONS-SCNC: 4.5 MMOL/L (ref 5–15)
ANION GAP SERPL CALCULATED.3IONS-SCNC: 7 MMOL/L (ref 5–15)
BASOPHILS # BLD AUTO: 0.01 10*3/MM3 (ref 0–0.2)
BASOPHILS NFR BLD AUTO: 0.2 % (ref 0–1.5)
BUN SERPL-MCNC: 81 MG/DL (ref 8–23)
BUN SERPL-MCNC: 81 MG/DL (ref 8–23)
BUN/CREAT SERPL: 90 (ref 7–25)
BUN/CREAT SERPL: 91 (ref 7–25)
CALCIUM SPEC-SCNC: 9 MG/DL (ref 8.6–10.5)
CALCIUM SPEC-SCNC: 9.2 MG/DL (ref 8.6–10.5)
CHLORIDE SERPL-SCNC: 103 MMOL/L (ref 98–107)
CHLORIDE SERPL-SCNC: 103 MMOL/L (ref 98–107)
CO2 SERPL-SCNC: 32 MMOL/L (ref 22–29)
CO2 SERPL-SCNC: 34.5 MMOL/L (ref 22–29)
CREAT SERPL-MCNC: 0.89 MG/DL (ref 0.57–1)
CREAT SERPL-MCNC: 0.9 MG/DL (ref 0.57–1)
DEPRECATED RDW RBC AUTO: 61.5 FL (ref 37–54)
EGFRCR SERPLBLD CKD-EPI 2021: 68.1 ML/MIN/1.73
EGFRCR SERPLBLD CKD-EPI 2021: 69 ML/MIN/1.73
EOSINOPHIL # BLD AUTO: 0 10*3/MM3 (ref 0–0.4)
EOSINOPHIL NFR BLD AUTO: 0 % (ref 0.3–6.2)
ERYTHROCYTE [DISTWIDTH] IN BLOOD BY AUTOMATED COUNT: 19.9 % (ref 12.3–15.4)
GLUCOSE SERPL-MCNC: 149 MG/DL (ref 65–99)
GLUCOSE SERPL-MCNC: 224 MG/DL (ref 65–99)
HCT VFR BLD AUTO: 30.5 % (ref 34–46.6)
HGB BLD-MCNC: 9.7 G/DL (ref 12–15.9)
IMM GRANULOCYTES # BLD AUTO: 0.04 10*3/MM3 (ref 0–0.05)
IMM GRANULOCYTES NFR BLD AUTO: 0.7 % (ref 0–0.5)
LYMPHOCYTES # BLD AUTO: 0.09 10*3/MM3 (ref 0.7–3.1)
LYMPHOCYTES NFR BLD AUTO: 1.7 % (ref 19.6–45.3)
MCH RBC QN AUTO: 27.6 PG (ref 26.6–33)
MCHC RBC AUTO-ENTMCNC: 31.8 G/DL (ref 31.5–35.7)
MCV RBC AUTO: 86.6 FL (ref 79–97)
MONOCYTES # BLD AUTO: 0.43 10*3/MM3 (ref 0.1–0.9)
MONOCYTES NFR BLD AUTO: 8 % (ref 5–12)
NEUTROPHILS NFR BLD AUTO: 4.79 10*3/MM3 (ref 1.7–7)
NEUTROPHILS NFR BLD AUTO: 89.4 % (ref 42.7–76)
NRBC BLD AUTO-RTO: 0 /100 WBC (ref 0–0.2)
PLATELET # BLD AUTO: 158 10*3/MM3 (ref 140–450)
PMV BLD AUTO: 10.2 FL (ref 6–12)
POTASSIUM SERPL-SCNC: 5.7 MMOL/L (ref 3.5–5.2)
POTASSIUM SERPL-SCNC: 6.1 MMOL/L (ref 3.5–5.2)
RBC # BLD AUTO: 3.52 10*6/MM3 (ref 3.77–5.28)
SODIUM SERPL-SCNC: 142 MMOL/L (ref 136–145)
SODIUM SERPL-SCNC: 142 MMOL/L (ref 136–145)
WBC NRBC COR # BLD: 5.36 10*3/MM3 (ref 3.4–10.8)

## 2023-04-29 PROCEDURE — 25010000002 HYDROMORPHONE 1 MG/ML SOLUTION: Performed by: INTERNAL MEDICINE

## 2023-04-29 PROCEDURE — 80048 BASIC METABOLIC PNL TOTAL CA: CPT | Performed by: INTERNAL MEDICINE

## 2023-04-29 PROCEDURE — 94799 UNLISTED PULMONARY SVC/PX: CPT

## 2023-04-29 PROCEDURE — 25010000002 DEXAMETHASONE PER 1 MG: Performed by: INTERNAL MEDICINE

## 2023-04-29 PROCEDURE — 85025 COMPLETE CBC W/AUTO DIFF WBC: CPT | Performed by: INTERNAL MEDICINE

## 2023-04-29 PROCEDURE — 25010000002 HEPARIN LOCK FLUSH PER 10 UNITS: Performed by: INTERNAL MEDICINE

## 2023-04-29 PROCEDURE — 25010000002 DEXAMETHASONE PER 1 MG: Performed by: RADIOLOGY

## 2023-04-29 RX ORDER — ONDANSETRON 2 MG/ML
4 INJECTION INTRAMUSCULAR; INTRAVENOUS EVERY 6 HOURS PRN
Qty: 30 ML | Refills: 3 | Status: SHIPPED | OUTPATIENT
Start: 2023-04-29

## 2023-04-29 RX ORDER — NALOXONE HYDROCHLORIDE 4 MG/.1ML
SPRAY NASAL
Qty: 2 EACH | Refills: 0 | Status: SHIPPED | OUTPATIENT
Start: 2023-04-29

## 2023-04-29 RX ORDER — DEXAMETHASONE SODIUM PHOSPHATE 4 MG/ML
4 INJECTION, SOLUTION INTRA-ARTICULAR; INTRALESIONAL; INTRAMUSCULAR; INTRAVENOUS; SOFT TISSUE 2 TIMES DAILY
Qty: 9 ML | Refills: 0 | Status: SHIPPED | OUTPATIENT
Start: 2023-04-29 | End: 2023-05-04

## 2023-04-29 RX ORDER — OXYCODONE HYDROCHLORIDE 5 MG/1
5 TABLET ORAL EVERY 6 HOURS PRN
Qty: 30 TABLET | Refills: 0 | Status: SHIPPED | OUTPATIENT
Start: 2023-04-29 | End: 2023-05-09

## 2023-04-29 RX ORDER — ACETAMINOPHEN 325 MG/1
650 TABLET ORAL EVERY 4 HOURS PRN
Qty: 30 TABLET | Refills: 3 | Status: SHIPPED | OUTPATIENT
Start: 2023-04-29

## 2023-04-29 RX ORDER — DEXAMETHASONE SODIUM PHOSPHATE 4 MG/ML
4 INJECTION, SOLUTION INTRA-ARTICULAR; INTRALESIONAL; INTRAMUSCULAR; INTRAVENOUS; SOFT TISSUE 2 TIMES DAILY
Status: DISCONTINUED | OUTPATIENT
Start: 2023-04-29 | End: 2023-04-29 | Stop reason: HOSPADM

## 2023-04-29 RX ORDER — CHOLECALCIFEROL (VITAMIN D3) 125 MCG
5 CAPSULE ORAL NIGHTLY PRN
Qty: 30 TABLET | Refills: 3 | Status: SHIPPED | OUTPATIENT
Start: 2023-04-29

## 2023-04-29 RX ORDER — MORPHINE SULFATE 100 MG/1
100 TABLET ORAL DAILY
Qty: 30 TABLET | Refills: 0 | Status: SHIPPED | OUTPATIENT
Start: 2023-04-30 | End: 2023-05-30

## 2023-04-29 RX ORDER — HEPARIN SODIUM (PORCINE) LOCK FLUSH IV SOLN 100 UNIT/ML 100 UNIT/ML
5 SOLUTION INTRAVENOUS AS NEEDED
Status: DISCONTINUED | OUTPATIENT
Start: 2023-04-29 | End: 2023-04-29 | Stop reason: HOSPADM

## 2023-04-29 RX ORDER — ALUMINA, MAGNESIA, AND SIMETHICONE 2400; 2400; 240 MG/30ML; MG/30ML; MG/30ML
15 SUSPENSION ORAL EVERY 6 HOURS PRN
Qty: 200 ML | Refills: 2 | Status: SHIPPED | OUTPATIENT
Start: 2023-04-29

## 2023-04-29 RX ADMIN — POLYETHYLENE GLYCOL 3350 17 G: 17 POWDER, FOR SOLUTION ORAL at 09:33

## 2023-04-29 RX ADMIN — MORPHINE SULFATE 100 MG: 100 TABLET, FILM COATED, EXTENDED RELEASE ORAL at 09:34

## 2023-04-29 RX ADMIN — Medication 10 ML: at 09:34

## 2023-04-29 RX ADMIN — Medication 500 UNITS: at 14:47

## 2023-04-29 RX ADMIN — DEXAMETHASONE SODIUM PHOSPHATE 4 MG: 4 INJECTION INTRA-ARTICULAR; INTRALESIONAL; INTRAMUSCULAR; INTRAVENOUS; SOFT TISSUE at 03:43

## 2023-04-29 RX ADMIN — PREGABALIN 100 MG: 100 CAPSULE ORAL at 09:34

## 2023-04-29 RX ADMIN — OXYCODONE HYDROCHLORIDE 5 MG: 5 TABLET ORAL at 15:07

## 2023-04-29 RX ADMIN — HYDROMORPHONE HYDROCHLORIDE 0.5 MG: 1 INJECTION, SOLUTION INTRAMUSCULAR; INTRAVENOUS; SUBCUTANEOUS at 03:06

## 2023-04-29 RX ADMIN — SODIUM ZIRCONIUM CYCLOSILICATE 10 G: 10 POWDER, FOR SUSPENSION ORAL at 07:40

## 2023-04-29 RX ADMIN — OXYCODONE HYDROCHLORIDE 5 MG: 5 TABLET ORAL at 07:42

## 2023-04-29 RX ADMIN — Medication 10 ML: at 14:47

## 2023-04-29 RX ADMIN — DEXAMETHASONE SODIUM PHOSPHATE 4 MG: 4 INJECTION INTRA-ARTICULAR; INTRALESIONAL; INTRAMUSCULAR; INTRAVENOUS; SOFT TISSUE at 09:33

## 2023-04-29 NOTE — PLAN OF CARE
Goal Outcome Evaluation: Patient with no acute events this shift.  Patient with pain complaints x2.  Patient with no other needs or concerns at this time.  Patient is currently resting well. Continue plan of care.

## 2023-04-29 NOTE — DISCHARGE SUMMARY
[facility]         DISCHARGE SUMMARY    Patient Name: Evangelina Sutton  : 1950  MRN: [id number]    Date of Admission: 2023  Date of Discharge: 2023  Primary Care Physician: Gilberto Coleman MD    Consults     Date and Time Order Name Status Description    2023  7:25 PM Inpatient Radiation Oncology Consult      2023  7:23 PM Inpatient Neurosurgery Consult Completed     2023  9:49 AM Inpatient Neurology Consult General      2023  4:24 PM General MD Inpatient Consult            Presenting Problem:   Hypokalemia [E87.6]  Peripheral edema [R60.9]  Weakness [R53.1]  Fall, initial encounter [W19.XXXA]  Injury of back, initial encounter [S39.92XA]  Multiple myeloma, remission status unspecified [C90.00]    Active and Resolved Hospital Problems:  Active Hospital Problems    Diagnosis POA   • **Weakness [R53.1] Yes   • Moderate Malnutrition (HCC) [E44.0] Yes      Resolved Hospital Problems   No resolved problems to display.         Hospital Course     Hospital Course:  Evangelina Sutton is a 72 y.o. female patient known to have multiple myeloma with extensive involvement of her spine had a sudden loss of her weakness from the lower extremities and was found to have myelomatous mass causing spinal cord compression at T9 patient therefore was referred to neurosurgery as well as radiation therapy and it was decided to give Decadron with radiation therapy patient has completed 5 days of radiation therapy has already been on steroids the dosage of the steroid as per radiation therapy recommendations patient still has got weakness will require physical therapy and is therefore being discharged to rehab patient did have elevated potassium which was probably because of spironolactone she was taking for previous hypokalemia and edema she was also given IV Lasix so combination of diuretics as well as steroids probably made her BUN/creatinine elevated potassium elevated she was  given Lokelma today we anticipate that now that we have stopped the Aldactone and the diuretics her BUN/creatinine and potassium should normalize she is asymptomatic feeling fine her pain medications have to be adjusted she has now been started on morphine sulfate 100 mg daily which she has been taking before she also needs Oxy IR 5 mg as needed for breakthrough pain she is being transferred to the rehab facility will be seen in 2 weeks in the office      DISCHARGE Follow Up Recommendations for labs and diagnostics: Patient with metastatic advanced multiple myeloma has been on treatment for more than 2 to 3 years now      Pertinent  and/or Most Recent Results     LAB RESULTS:      Lab 04/29/23  0557 04/25/23  0448 04/24/23  0623 04/23/23  0355   WBC 5.36 3.78 3.61 3.68   HEMOGLOBIN 9.7* 9.5* 10.7* 10.2*   HEMATOCRIT 30.5* 28.6* 32.2* 30.4*   PLATELETS 158 135* 127* 125*   NEUTROS ABS 4.79 3.11 2.95 2.69   IMMATURE GRANS (ABS) 0.04 0.02 0.01 0.01   LYMPHS ABS 0.09* 0.29* 0.50* 0.51*   MONOS ABS 0.43 0.36 0.15 0.45   EOS ABS 0.00 0.00 0.00 0.01   MCV 86.6 81.0 80.3 79.4         Lab 04/29/23  0557 04/25/23 0448 04/24/23  0623 04/23/23  0355   SODIUM 142 128* 129* 131*   POTASSIUM 6.1* 5.0 4.0 3.5   CHLORIDE 103 86* 84* 87*   CO2 34.5* 33.4* 32.8* 32.8*   ANION GAP 4.5* 8.6 12.2 11.2   BUN 81* 71* 41* 34*   CREATININE 0.90 1.63* 1.13* 1.04*   EGFR 68.1 33.4* 51.8* 57.2*   GLUCOSE 149* 137* 143* 104*   CALCIUM 9.2 9.1 10.0 10.4   MAGNESIUM  --   --   --  1.9         Lab 04/24/23 0623   TOTAL PROTEIN 9.4*   ALBUMIN 3.7   GLOBULIN 5.7   ALT (SGPT) 15   AST (SGOT) 35*   BILIRUBIN 0.8   ALK PHOS 52                     Brief Urine Lab Results  (Last result in the past 365 days)      Color   Clarity   Blood   Leuk Est   Nitrite   Protein   CREAT   Urine HCG        04/19/23 1418 Yellow   Clear   Negative   Negative   Negative   100 mg/dL (2+)               Microbiology Results (last 10 days)     ** No results found for the  last 240 hours. **          CT Abdomen Pelvis Without Contrast    Result Date: 4/21/2023  Impression:   CT scan of the abdomen and pelvis without IV contrast demonstrating previous cholecystectomy.  Pancolonic diverticulosis without diverticulitis.  Extensive lytic bony metastatic disease appears worsened in comparison to 4/5/2021.     BRO PERSAUD MD       Electronically Signed and Approved By: BRO PERSAUD MD on 4/21/2023 at 20:39             CT Lumbar Spine Without Contrast    Result Date: 4/19/2023  Impression:   1. No acute fracture or traumatic malalignment identified. 2. Diffuse lytic osseous lesions, compatible with known multiple myeloma.     JERRY JUAN MD       Electronically Signed and Approved By: JERRY JUAN MD on 4/19/2023 at 13:09             MRI Brain Without Contrast    Result Date: 4/23/2023  Impression:   1. Too numerous to count calvarial lesions compatible with given history of hematologic malignancy.  Several of the larger lesions appear to have increased in size since 3/22/2023.  No definite new lesions. 2. Mild chronic small vessel ischemic change. 3. No acute intracranial abnormality.      EULA GRIFFIN MD       Electronically Signed and Approved By: EULA GRIFFIN MD on 4/23/2023 at 17:33             MRI Cervical Spine Without Contrast    Result Date: 4/23/2023  Impression:   1. There are multiple new and multiple enlarging lesions scattered throughout the cervical and thoracic spine.  Comparison is made to cervical and thoracic MRI from 8/5/2020. 2. There is a large new lesion within the right-side of the T9 vertebral body extending into the right pedicle and articular process with a significant epidural soft tissue component that results in severe narrowing of the spinal canal and severe compression of the spinal cord at the T9 level. 3. Cervical and thoracic degenerative changes as discussed.     EULA GRIFFIN MD       Electronically Signed and Approved By: EULA  MD GABY on 4/23/2023 at 17:47             MRI Thoracic Spine Without Contrast    Result Date: 4/23/2023  Impression:   1. There are multiple new and multiple enlarging lesions scattered throughout the cervical and thoracic spine.  Comparison is made to cervical and thoracic MRI from 8/5/2020. 2. There is a large new lesion within the right-side of the T9 vertebral body extending into the right pedicle and articular process with a significant epidural soft tissue component that results in severe narrowing of the spinal canal and severe compression of the spinal cord at the T9 level. 3. Cervical and thoracic degenerative changes as discussed.     EULA GRIFFIN MD       Electronically Signed and Approved By: EULA GRIFFIN MD on 4/23/2023 at 17:47             MRI Lumbar Spine Without Contrast    Result Date: 4/19/2023  Impression:   1. No acute process identified. 2. Stable T2 hyperintense osseous lesions, compatible with known multiple myeloma.      JERRY JUAN MD       Electronically Signed and Approved By: JERRY JUAN MD on 4/19/2023 at 14:27             XR Chest 1 View    Result Date: 4/19/2023  Impression:   1. No acute cardiopulmonary process identified. 2. Diffuse osseous lytic lesions, compatible with known multiple myeloma.       JERRY JUAN MD       Electronically Signed and Approved By: JERRY JUAN MD on 4/19/2023 at 15:08             MRI Lumbar Spine With & Without Contrast    Result Date: 4/25/2023  Impression:  Diffuse myelomatous involvement of the lumbar spine is present with pathologic fractures at L2, L3, and L4, seen on prior recent studies, such as the on the 4/19/2023 study, and are not significantly changed, allowing for technique differences.    Please note that portions of this note were completed with a voice recognition program.  KRISSY AMES JR, MD       Electronically Signed and Approved By: KRISSY AMES JR, MD on 4/25/2023 at 4:48                        Results  for orders placed during the hospital encounter of 02/21/23    Adult Transthoracic Echo Complete w/ Color, Spectral and Contrast if necessary per protocol    Interpretation Summary  •  Left ventricular ejection fraction appears to be 41 - 45%.  •  Left ventricular diastolic function was normal.  •  Left atrial volume is moderately increased.    There were no apparent intracardiac masses, vegetations or thrombi.      Labs Pending at Discharge:        Discharge Details        Discharge Medications      New Medications      Instructions Start Date   acetaminophen 325 MG tablet  Commonly known as: TYLENOL   650 mg, Oral, Every 4 Hours PRN      aluminum-magnesium hydroxide-simethicone 400-400-40 MG/5ML suspension  Commonly known as: MAALOX MAX   15 mL, Oral, Every 6 Hours PRN      dexamethasone 4 MG/ML injection  Commonly known as: DECADRON   4 mg, Intravenous, 2 Times Daily      melatonin 5 MG tablet tablet   5 mg, Oral, Nightly PRN      Morphine 100 MG 12 hr tablet  Commonly known as: MS CONTIN   100 mg, Oral, Daily   Start Date: April 30, 2023     naloxone 4 MG/0.1ML nasal spray  Commonly known as: NARCAN   CALL 911. Do not prime. Spray into nostril upon signs of opioid overdose. May repeat in 2 to 3 minutes in opposite nostril if no or minimal breathing and responsiveness, then as needed (if doses are available) every 2 to 3 minutes.      ondansetron 2 mg/mL injection  Commonly known as: ZOFRAN   4 mg, Intravenous, Every 6 Hours PRN         Changes to Medications      Instructions Start Date   oxyCODONE 5 MG immediate release tablet  Commonly known as: ROXICODONE  What changed:   · medication strength  · how much to take  · reasons to take this   5 mg, Oral, Every 6 Hours PRN         Continue These Medications      Instructions Start Date   polyethylene glycol 17 g packet  Commonly known as: MIRALAX   17 g, Oral, Daily      pregabalin 100 MG capsule  Commonly known as: LYRICA   100 mg, Oral, Daily      vitamin D 1.25  MG (56772 UT) capsule capsule  Commonly known as: ERGOCALCIFEROL   50,000 Units, Oral, Weekly         Stop These Medications    spironolactone 25 MG tablet  Commonly known as: ALDACTONE            No Known Allergies      Discharge Disposition:  Skilled Nursing Facility (DC - External)    Diet:  Hospital:  Diet Order   Procedures   • Diet: Regular/House Diet; Texture: Regular Texture (IDDSI 7); Fluid Consistency: Thin (IDDSI 0)         Discharge Activity: As tolerated        CODE STATUS:  Code Status and Medical Interventions:   Ordered at: 04/19/23 1820     Level Of Support Discussed With:    Patient     Code Status (Patient has no pulse and is not breathing):    CPR (Attempt to Resuscitate)     Medical Interventions (Patient has pulse or is breathing):    Full Support         No future appointments.        Time spent on Discharge including face to face service: 45 minutes    Electronically signed by Abbe Zamudio MD, 04/29/23, 10:30 AM EDT.    Part of this note may be an electronic transcription/translation of spoken language to printed text using the Dragon Dictation System.

## 2023-05-01 ENCOUNTER — HOSPITAL ENCOUNTER (EMERGENCY)
Facility: HOSPITAL | Age: 73
Discharge: HOME | End: 2023-05-02
Attending: EMERGENCY MEDICINE | Admitting: EMERGENCY MEDICINE
Payer: MEDICARE

## 2023-05-01 DIAGNOSIS — K59.00 CONSTIPATION, UNSPECIFIED CONSTIPATION TYPE: Primary | ICD-10-CM

## 2023-05-01 DIAGNOSIS — C79.51 PAIN DUE TO MALIGNANT NEOPLASM METASTATIC TO BONE: ICD-10-CM

## 2023-05-01 DIAGNOSIS — G89.3 PAIN DUE TO MALIGNANT NEOPLASM METASTATIC TO BONE: ICD-10-CM

## 2023-05-01 LAB
ALBUMIN SERPL-MCNC: 3.1 G/DL (ref 3.5–5.2)
ALBUMIN/GLOB SERPL: 0.6 G/DL
ALP SERPL-CCNC: 62 U/L (ref 39–117)
ALT SERPL W P-5'-P-CCNC: 42 U/L (ref 1–33)
ANION GAP SERPL CALCULATED.3IONS-SCNC: 10.7 MMOL/L (ref 5–15)
AST SERPL-CCNC: 27 U/L (ref 1–32)
BACTERIA UR QL AUTO: ABNORMAL /HPF
BASOPHILS # BLD AUTO: 0 10*3/MM3 (ref 0–0.2)
BASOPHILS NFR BLD AUTO: 0 % (ref 0–1.5)
BILIRUB SERPL-MCNC: 0.4 MG/DL (ref 0–1.2)
BILIRUB UR QL STRIP: NEGATIVE
BUN SERPL-MCNC: 59 MG/DL (ref 8–23)
BUN/CREAT SERPL: 69.4 (ref 7–25)
CALCIUM SPEC-SCNC: 9.6 MG/DL (ref 8.6–10.5)
CHLORIDE SERPL-SCNC: 98 MMOL/L (ref 98–107)
CLARITY UR: CLEAR
CO2 SERPL-SCNC: 27.3 MMOL/L (ref 22–29)
COLOR UR: YELLOW
CREAT SERPL-MCNC: 0.85 MG/DL (ref 0.57–1)
D-LACTATE SERPL-SCNC: 1.1 MMOL/L (ref 0.5–2)
DEPRECATED RDW RBC AUTO: 57.9 FL (ref 37–54)
EGFRCR SERPLBLD CKD-EPI 2021: 72.9 ML/MIN/1.73
EOSINOPHIL # BLD AUTO: 0 10*3/MM3 (ref 0–0.4)
EOSINOPHIL NFR BLD AUTO: 0 % (ref 0.3–6.2)
ERYTHROCYTE [DISTWIDTH] IN BLOOD BY AUTOMATED COUNT: 19.8 % (ref 12.3–15.4)
GLOBULIN UR ELPH-MCNC: 5.4 GM/DL
GLUCOSE SERPL-MCNC: 146 MG/DL (ref 65–99)
GLUCOSE UR STRIP-MCNC: NEGATIVE MG/DL
HCT VFR BLD AUTO: 30.3 % (ref 34–46.6)
HGB BLD-MCNC: 9.8 G/DL (ref 12–15.9)
HGB UR QL STRIP.AUTO: NEGATIVE
HOLD SPECIMEN: NORMAL
HOLD SPECIMEN: NORMAL
HYALINE CASTS UR QL AUTO: ABNORMAL /LPF
IMM GRANULOCYTES # BLD AUTO: 0.04 10*3/MM3 (ref 0–0.05)
IMM GRANULOCYTES NFR BLD AUTO: 1 % (ref 0–0.5)
KETONES UR QL STRIP: NEGATIVE
LEUKOCYTE ESTERASE UR QL STRIP.AUTO: NEGATIVE
LIPASE SERPL-CCNC: 11 U/L (ref 13–60)
LYMPHOCYTES # BLD AUTO: 0.17 10*3/MM3 (ref 0.7–3.1)
LYMPHOCYTES NFR BLD AUTO: 4.2 % (ref 19.6–45.3)
MCH RBC QN AUTO: 27 PG (ref 26.6–33)
MCHC RBC AUTO-ENTMCNC: 32.3 G/DL (ref 31.5–35.7)
MCV RBC AUTO: 83.5 FL (ref 79–97)
MONOCYTES # BLD AUTO: 0.34 10*3/MM3 (ref 0.1–0.9)
MONOCYTES NFR BLD AUTO: 8.4 % (ref 5–12)
NEUTROPHILS NFR BLD AUTO: 3.49 10*3/MM3 (ref 1.7–7)
NEUTROPHILS NFR BLD AUTO: 86.4 % (ref 42.7–76)
NITRITE UR QL STRIP: NEGATIVE
NRBC BLD AUTO-RTO: 0 /100 WBC (ref 0–0.2)
PH UR STRIP.AUTO: 7 [PH] (ref 5–8)
PLATELET # BLD AUTO: 163 10*3/MM3 (ref 140–450)
PMV BLD AUTO: 10.4 FL (ref 6–12)
POTASSIUM SERPL-SCNC: 4.6 MMOL/L (ref 3.5–5.2)
PROT SERPL-MCNC: 8.5 G/DL (ref 6–8.5)
PROT UR QL STRIP: ABNORMAL
RBC # BLD AUTO: 3.63 10*6/MM3 (ref 3.77–5.28)
RBC # UR STRIP: ABNORMAL /HPF
REF LAB TEST METHOD: ABNORMAL
SODIUM SERPL-SCNC: 136 MMOL/L (ref 136–145)
SP GR UR STRIP: 1.02 (ref 1–1.03)
SQUAMOUS #/AREA URNS HPF: ABNORMAL /HPF
UROBILINOGEN UR QL STRIP: ABNORMAL
WBC # UR STRIP: ABNORMAL /HPF
WBC NRBC COR # BLD: 4.04 10*3/MM3 (ref 3.4–10.8)
WHOLE BLOOD HOLD COAG: NORMAL
WHOLE BLOOD HOLD SPECIMEN: NORMAL

## 2023-05-01 PROCEDURE — 99284 EMERGENCY DEPT VISIT MOD MDM: CPT

## 2023-05-01 PROCEDURE — 83690 ASSAY OF LIPASE: CPT

## 2023-05-01 PROCEDURE — 85025 COMPLETE CBC W/AUTO DIFF WBC: CPT

## 2023-05-01 PROCEDURE — 36415 COLL VENOUS BLD VENIPUNCTURE: CPT

## 2023-05-01 PROCEDURE — 81001 URINALYSIS AUTO W/SCOPE: CPT

## 2023-05-01 PROCEDURE — 83605 ASSAY OF LACTIC ACID: CPT

## 2023-05-01 PROCEDURE — 80053 COMPREHEN METABOLIC PANEL: CPT

## 2023-05-01 RX ORDER — SODIUM CHLORIDE 0.9 % (FLUSH) 0.9 %
10 SYRINGE (ML) INJECTION AS NEEDED
Status: DISCONTINUED | OUTPATIENT
Start: 2023-05-01 | End: 2023-05-02 | Stop reason: HOSPADM

## 2023-05-02 ENCOUNTER — APPOINTMENT (OUTPATIENT)
Dept: CT IMAGING | Facility: HOSPITAL | Age: 73
End: 2023-05-02
Payer: MEDICARE

## 2023-05-02 VITALS
WEIGHT: 117.5 LBS | TEMPERATURE: 97.8 F | BODY MASS INDEX: 19.58 KG/M2 | HEART RATE: 85 BPM | DIASTOLIC BLOOD PRESSURE: 76 MMHG | OXYGEN SATURATION: 97 % | HEIGHT: 65 IN | RESPIRATION RATE: 14 BRPM | SYSTOLIC BLOOD PRESSURE: 117 MMHG

## 2023-05-02 PROCEDURE — 25010000002 HEPARIN LOCK FLUSH PER 10 UNITS: Performed by: EMERGENCY MEDICINE

## 2023-05-02 PROCEDURE — 96374 THER/PROPH/DIAG INJ IV PUSH: CPT

## 2023-05-02 PROCEDURE — 74177 CT ABD & PELVIS W/CONTRAST: CPT

## 2023-05-02 PROCEDURE — 25510000001 IOPAMIDOL PER 1 ML: Performed by: EMERGENCY MEDICINE

## 2023-05-02 PROCEDURE — 96376 TX/PRO/DX INJ SAME DRUG ADON: CPT

## 2023-05-02 PROCEDURE — 25010000002 HYDROMORPHONE 1 MG/ML SOLUTION: Performed by: EMERGENCY MEDICINE

## 2023-05-02 RX ORDER — HEPARIN SODIUM (PORCINE) LOCK FLUSH IV SOLN 100 UNIT/ML 100 UNIT/ML
500 SOLUTION INTRAVENOUS ONCE
Status: COMPLETED | OUTPATIENT
Start: 2023-05-02 | End: 2023-05-02

## 2023-05-02 RX ADMIN — SODIUM CHLORIDE 1000 ML: 9 INJECTION, SOLUTION INTRAVENOUS at 00:43

## 2023-05-02 RX ADMIN — IOPAMIDOL 100 ML: 755 INJECTION, SOLUTION INTRAVENOUS at 00:31

## 2023-05-02 RX ADMIN — HYDROMORPHONE HYDROCHLORIDE 0.5 MG: 1 INJECTION, SOLUTION INTRAMUSCULAR; INTRAVENOUS; SUBCUTANEOUS at 00:44

## 2023-05-02 RX ADMIN — HEPARIN SODIUM (PORCINE) LOCK FLUSH IV SOLN 100 UNIT/ML 500 UNITS: 100 SOLUTION at 05:47

## 2023-05-02 RX ADMIN — HYDROMORPHONE HYDROCHLORIDE 1 MG: 1 INJECTION, SOLUTION INTRAMUSCULAR; INTRAVENOUS; SUBCUTANEOUS at 04:23

## 2023-05-02 NOTE — DISCHARGE INSTRUCTIONS
Please give pain medication as scheduled.  Please give as needed medication every 6 hours if indicated.  Please give MiraLAX twice a day.  
Skin normal color for race, warm, dry and intact. No evidence of rash.

## 2023-05-02 NOTE — ED NOTES
Pt comes in ems from [facility] for pain all over. Pt has been diagnosed with stage 3 bone cancer. Nursing home reported to ems that she also had a large cyst on her lower back that is causing her pain.

## 2023-05-02 NOTE — ED PROVIDER NOTES
Time: 11:27 PM EDT  Date of encounter:  5/1/2023  Independent Historian/Clinical History and Information was obtained by:   Patient  Chief Complaint: Abdominal Pain    History is limited by: N/A    History of Present Illness:    Patient is a 72 y.o. year old female who presents to the emergency department for evaluation of worsening abdominal pain. Pt report history of multiple myeloma with osseous metastatic lesions with pathological fractures. Patient recently finished her last radiation treatment. Pt states that she is at [facility] for rehab, and began expereicing worsening pain. Pt also reports a cyst in her back that causes her pain. Pt denies cough, SOB, and chest pain.         History provided by:  Patient   used: No        Patient Care Team  Primary Care Provider: Gilberto Coleman MD    Past Medical History:     No Known Allergies  Past Medical History:   Diagnosis Date   • Cancer     BONE CANCER/TAKING CHEMO SHOT WEEKLY   • DDD (degenerative disc disease), cervical    • Neuropathy     LEGS AND ARMS   • PONV (postoperative nausea and vomiting)      Past Surgical History:   Procedure Laterality Date   • BACK SURGERY      DISCECTOMY W/FUSION, NECK ? LEVEL   • CHOLECYSTECTOMY     • HYSTERECTOMY     • PORTACATH PLACEMENT N/A 3/17/2022    Procedure: INSERTION OF PORTACATH;  Surgeon: Cj Rossi MD;  Location: [facility] MAIN OR;  Service: General;  Laterality: N/A;   • TOTAL HIP ARTHROPLASTY Left 4/25/2022    Procedure: LEFT HIP ARTHROPLASTY ANTERIOR;  Surgeon: Sridhar Coto MD;  Location: [facility] MAIN OR;  Service: Orthopedics;  Laterality: Left;     Family History   Problem Relation Age of Onset   • Malig Hyperthermia Neg Hx        Home Medications:  Prior to Admission medications    Medication Sig Start Date End Date Taking? Authorizing Provider   acetaminophen (TYLENOL) 325 MG tablet Take 2 tablets by mouth Every 4 (Four) Hours As Needed for Mild Pain. 4/29/23   Abbe Zamudio,  MD   aluminum-magnesium hydroxide-simethicone (MAALOX MAX) 400-400-40 MG/5ML suspension Take 15 mL by mouth Every 6 (Six) Hours As Needed for Heartburn. 23   Abbe Zamudio MD   dexamethasone (DECADRON) 4 MG/ML injection Infuse 1 mL into a venous catheter 2 (Two) Times a Day for 9 doses. 23  Abbe Zamudio MD   melatonin 5 MG tablet tablet Take 1 tablet by mouth At Night As Needed (sleep). 23   Abbe Zamudio MD   Morphine (MS CONTIN) 100 MG 12 hr tablet Take 1 tablet by mouth Daily for 30 days. 23  Abbe Zamudio MD   naloxone (NARCAN) 4 MG/0.1ML nasal spray CALL 911. Do not prime. Spray into nostril upon signs of opioid overdose. May repeat in 2 to 3 minutes in opposite nostril if no or minimal breathing and responsiveness, then as needed (if doses are available) every 2 to 3 minutes. 23   Abbe Zamudio MD   ondansetron (ZOFRAN) 2 mg/mL injection Infuse 2 mL into a venous catheter Every 6 (Six) Hours As Needed for Nausea or Vomiting. 23   Abbe Zamudio MD   oxyCODONE (ROXICODONE) 5 MG immediate release tablet Take 1 tablet by mouth Every 6 (Six) Hours As Needed for Severe Pain (breakthrough pain) for up to 10 days. 23  Abbe Zamudio MD   polyethylene glycol (MIRALAX) 17 g packet Take 17 g by mouth Daily. 3/25/23   Abbe Zamudio MD   pregabalin (LYRICA) 100 MG capsule Take 1 capsule by mouth Daily. 3/24/23   Abbe Zamudio MD   vitamin D (ERGOCALCIFEROL) 1.25 MG (37984 UT) capsule capsule Take 1 capsule by mouth 1 (One) Time Per Week.    Provider, MD Heather        Social History:   Social History     Tobacco Use   • Smoking status: Former     Packs/day: 1.00     Types: Cigarettes     Quit date:      Years since quittin.3   • Smokeless tobacco: Never   Vaping Use   • Vaping Use: Never used   Substance Use Topics   • Alcohol use: Never   • Drug use: Yes     Frequency: 20.0 times per week     Types: Morphine, Oxycodone  "        Review of Systems:  Review of Systems   Constitutional: Negative for chills and fever.   HENT: Negative for congestion, ear pain and sore throat.    Eyes: Negative for pain.   Respiratory: Negative for cough, chest tightness and shortness of breath.    Cardiovascular: Negative for chest pain.   Gastrointestinal: Positive for abdominal pain. Negative for diarrhea, nausea and vomiting.   Genitourinary: Negative for flank pain and hematuria.   Musculoskeletal: Positive for arthralgias and myalgias. Negative for joint swelling.   Skin: Negative for pallor.   Neurological: Negative for seizures and headaches.   All other systems reviewed and are negative.       Physical Exam:  /76   Pulse 85   Temp 97.8 °F (36.6 °C) (Oral)   Resp 14   Ht 165.1 cm (65\")   Wt 53.3 kg (117 lb 8.1 oz)   SpO2 97%   BMI 19.55 kg/m²     Physical Exam  Vitals and nursing note reviewed.   Constitutional:       General: She is not in acute distress.     Appearance: Normal appearance. She is not toxic-appearing.   HENT:      Head: Normocephalic and atraumatic.      Nose: Nose normal.      Mouth/Throat:      Mouth: Mucous membranes are moist.   Eyes:      General: No scleral icterus.     Extraocular Movements: Extraocular movements intact.      Conjunctiva/sclera: Conjunctivae normal.      Pupils: Pupils are equal, round, and reactive to light.   Cardiovascular:      Rate and Rhythm: Normal rate and regular rhythm.      Pulses: Normal pulses.      Heart sounds: Normal heart sounds.   Pulmonary:      Effort: Pulmonary effort is normal. No respiratory distress.      Breath sounds: Normal breath sounds.   Abdominal:      General: Abdomen is flat. There is no distension.      Palpations: Abdomen is soft.      Tenderness: There is generalized abdominal tenderness.   Musculoskeletal:         General: Normal range of motion.      Cervical back: Normal range of motion and neck supple.   Skin:     General: Skin is warm and dry.      " Capillary Refill: Capillary refill takes less than 2 seconds.   Neurological:      General: No focal deficit present.      Mental Status: She is alert and oriented to person, place, and time. Mental status is at baseline.   Psychiatric:         Mood and Affect: Mood normal.         Behavior: Behavior normal.                  Procedures:  Procedures      Medical Decision Making:      Comorbidities that affect care:    Cancer, DDD    External Notes reviewed:    Hospital Discharge Summary: Patient was recently admitted on 4/19/2023 for hypokalemia, peripheral edema, weakness, fall, back injury and multiple myeloma with osseous metastasis and pathological fractures.      The following orders were placed and all results were independently analyzed by me:  Orders Placed This Encounter   Procedures   • CT Abdomen Pelvis With Contrast   • McDowell Draw   • Comprehensive Metabolic Panel   • Lipase   • Urinalysis With Microscopic If Indicated (No Culture) - Urine, Clean Catch   • Lactic Acid, Plasma   • CBC Auto Differential   • Urinalysis, Microscopic Only - Urine, Clean Catch   • NPO Diet NPO Type: Strict NPO   • Undress & Gown   • Inpatient Hospitalist Consult   • IP General Consult (Use specialty-specific consult if known)   • Insert Peripheral IV   • CBC & Differential   • Green Top (Gel)   • Lavender Top   • Gold Top - SST   • Light Blue Top       Medications Given in the Emergency Department:  Medications   sodium chloride 0.9 % flush 10 mL (has no administration in time range)   HYDROmorphone (DILAUDID) injection 0.5 mg (0.5 mg Intravenous Given 5/2/23 0044)   sodium chloride 0.9 % bolus 1,000 mL (0 mL Intravenous Stopped 5/2/23 0322)   iopamidol (ISOVUE-370) 76 % injection 100 mL (100 mL Intravenous Given 5/2/23 0031)   molasses enema (300 mL Rectal Given 5/2/23 0322)   HYDROmorphone (DILAUDID) injection 1 mg (1 mg Intravenous Given 5/2/23 0423)        ED Course:         Labs:    Lab Results (last 24 hours)      Procedure Component Value Units Date/Time    CBC & Differential [[id number]]  (Abnormal) Collected: 05/01/23 2245    Specimen: Blood Updated: 05/01/23 2254    Narrative:      The following orders were created for panel order CBC & Differential.  Procedure                               Abnormality         Status                     ---------                               -----------         ------                     CBC Auto Differential[[id number]]        Abnormal            Final result                 Please view results for these tests on the individual orders.    Comprehensive Metabolic Panel [[id number]]  (Abnormal) Collected: 05/01/23 2245    Specimen: Blood Updated: 05/01/23 2314     Glucose 146 mg/dL      BUN 59 mg/dL      Creatinine 0.85 mg/dL      Sodium 136 mmol/L      Potassium 4.6 mmol/L      Chloride 98 mmol/L      CO2 27.3 mmol/L      Calcium 9.6 mg/dL      Total Protein 8.5 g/dL      Albumin 3.1 g/dL      ALT (SGPT) 42 U/L      AST (SGOT) 27 U/L      Alkaline Phosphatase 62 U/L      Total Bilirubin 0.4 mg/dL      Globulin 5.4 gm/dL      A/G Ratio 0.6 g/dL      BUN/Creatinine Ratio 69.4     Anion Gap 10.7 mmol/L      eGFR 72.9 mL/min/1.73     Narrative:      GFR Normal >60  Chronic Kidney Disease <60  Kidney Failure <15    The GFR formula is only valid for adults with stable renal function between ages 18 and 70.    Lipase [[id number]]  (Abnormal) Collected: 05/01/23 2245    Specimen: Blood Updated: 05/01/23 2314     Lipase 11 U/L     Lactic Acid, Plasma [[id number]]  (Normal) Collected: 05/01/23 2245    Specimen: Blood Updated: 05/01/23 2314     Lactate 1.1 mmol/L     CBC Auto Differential [[id number]]  (Abnormal) Collected: 05/01/23 2245    Specimen: Blood Updated: 05/01/23 2254     WBC 4.04 10*3/mm3      RBC 3.63 10*6/mm3      Hemoglobin 9.8 g/dL      Hematocrit 30.3 %      MCV 83.5 fL      MCH 27.0 pg      MCHC 32.3 g/dL      RDW 19.8 %      RDW-SD 57.9 fl      MPV 10.4 fL      Platelets 163 10*3/mm3       Neutrophil % 86.4 %      Lymphocyte % 4.2 %      Monocyte % 8.4 %      Eosinophil % 0.0 %      Basophil % 0.0 %      Immature Grans % 1.0 %      Neutrophils, Absolute 3.49 10*3/mm3      Lymphocytes, Absolute 0.17 10*3/mm3      Monocytes, Absolute 0.34 10*3/mm3      Eosinophils, Absolute 0.00 10*3/mm3      Basophils, Absolute 0.00 10*3/mm3      Immature Grans, Absolute 0.04 10*3/mm3      nRBC 0.0 /100 WBC     Urinalysis With Microscopic If Indicated (No Culture) - Urine, Clean Catch [[id number]]  (Abnormal) Collected: 05/01/23 2303    Specimen: Urine, Clean Catch Updated: 05/01/23 2312     Color, UA Yellow     Appearance, UA Clear     pH, UA 7.0     Specific Gravity, UA 1.020     Glucose, UA Negative     Ketones, UA Negative     Bilirubin, UA Negative     Blood, UA Negative     Protein, UA 30 mg/dL (1+)     Leuk Esterase, UA Negative     Nitrite, UA Negative     Urobilinogen, UA 1.0 E.U./dL    Urinalysis, Microscopic Only - Urine, Clean Catch [[id number]]  (Abnormal) Collected: 05/01/23 2303    Specimen: Urine, Clean Catch Updated: 05/01/23 2312     RBC, UA 6-12 /HPF      WBC, UA 0-2 /HPF      Bacteria, UA None Seen /HPF      Squamous Epithelial Cells, UA 0-2 /HPF      Hyaline Casts, UA 0-2 /LPF      Methodology Automated Microscopy           Imaging:    CT Abdomen Pelvis With Contrast    Result Date: 5/2/2023  PROCEDURE: CT ABDOMEN PELVIS W CONTRAST  COMPARISONS: 4/21/2023; 4/5/2021.  INDICATIONS: 72-year-old female w/ h/o right-sided abdomen pain; h/o multiple myeloma  TECHNIQUE: After obtaining the patient's consent, 593 CT images were created with non-ionic intravenous contrast material.  No oral contrast agent was administered for the study.  PROTOCOL:   Standard CT imaging protocol performed.    RADIATION:   Total DLP: 583.9 mGy*cm.   Automated exposure control was utilized to minimize radiation dose. CONTRAST: 70 mL Isovue 370 I.V.  FINDINGS: There has been an interval increase in the stool burden since the  4/21/2023 CT study with a very large amount of formed stool now present within the left colon.  The maximum transverse diameter of the rectum is approximately 9.1 cm.  The maximum AP (anteroposterior) diameter of the rectum is approximately 7 cm.  Fecal stasis as with constipation is possible.  Fecal impaction cannot be excluded.  No definite stercoral ulceration.  No pneumoperitoneum or pneumatosis.  No portal or mesenteric venous gas is seen to suggest ischemic colo-proctitis.  No definite acute colitis or diverticulitis.  The appendix is seen on image 70 of series 201 and is without acute abnormality.  No definite perirectal or pericolonic fluid collections are seen to suggest abscess.  Again extensive lytic osseous lesions are seen, which are consistent with the given history of multiple myeloma.  Multiple pathologic fractures are identified, including (but not necessarily limited to) vertebral fractures, bilateral rib fractures, left pelvic fractures, and possible right-sided pelvic fractures.  No hydronephrosis or obstructive uropathy is suggested.  No definite nephrolithiasis or ureterolithiasis.  There are renal cysts bilaterally, which measure about 3.6 cm or less in axial diameter.  The patient has undergone cholecystectomy and hysterectomy.  There is suspected compensatory dilatation of biliary tree without choledocholithiasis by CT, as seen on prior exams.  No acute pancreatitis is appreciated.  There is a left hip prosthesis in place with associated streak artifact.  The partially imaged lung bases are clear of acute infiltrate.  There is a partially visualized right-sided internal jugular (IJ) central venous line in place with its tip near or at the cavoatrial junction, such as seen on image 4 of series 301, seen on prior exams.  There may be mild subsegmental atelectasis in the lung bases.       Interval increase in the stool burden is noted since 4/21/2023.  There may be constipation.  Fecal  impaction is possible.  No definite pneumoperitoneum or pneumatosis.  No perirectal or pericolonic fluid collections are seen to suggest abscess.  Otherwise, probably little interval change is seen since the prior 4/21/2023 CT study, allowing for differences in the technique.  Please see above comments for further detail.     Please note that portions of this note were completed with a voice recognition program.  KRISSY AMES JR, MD       Electronically Signed and Approved By: KRISSY AMES JR, MD on 5/02/2023 at 1:05                  Differential Diagnosis and Discussion:    Abdominal Pain: Based on the patient's signs and symptoms, I considered abdominal aortic aneurysm, small bowel obstruction, pancreatitis, acute cholecystitis, acute appendecitis, peptic ulcer disease, gastritis, colitis, endocrine disorders, irritable bowel syndrome and other differential diagnosis an etiology of the patient's abdominal pain.    All labs were reviewed and interpreted by me.  CT scan radiology impression was interpreted by me.    MDM  Number of Diagnoses or Management Options  Constipation, unspecified constipation type  Intractable pain  Diagnosis management comments: Patient presented to the emergency department with abdominal pain.  Patient is afebrile nontoxic-appearing.  Vital signs are stable.  Patient has a history of multiple myeloma with multiple metastatic lesions and pathological fractures.  Patient presented due to uncontrolled pain.  Labs were done and showed no significant abnormality.  CT showed large stool burden as well as extensive lytic osseous lesions.  Patient was given pain medication as well as an enema.  On reevaluation still reports severe pain she was given additional pain medication. I discussed patient with her oncologist who states that her pain has been really well controlled.  I discussed this with patient.  She states she has not been receiving her pain medication.  We contacted nursing  facility to emphasize importance of getting her pain medication as well as her MiraLAX.  Patient to be discharged back to nursing facility.  Discussed return precautions, discharge instructions and answered all her questions.       Amount and/or Complexity of Data Reviewed  Clinical lab tests: reviewed  Tests in the radiology section of CPT®: reviewed  Review and summarize past medical records: yes  Independent visualization of images, tracings, or specimens: yes    Risk of Complications, Morbidity, and/or Mortality  Presenting problems: moderate  Management options: moderate             Patient Care Considerations:    CHEST X-RAY: I considered ordering a chest x-ray however no respiratory symptoms at this time      Consultants/Shared Management Plan:    Hospitalist: I have discussed the case with Dr Forbes who agrees to accept the patient for admission.    Social Determinants of Health:    Patient is a nursing home/assisted living resident and has reliable access to care.      Disposition and Care Coordination:    Discharged: I considered escalation of care by admitting this patient for observation, however the patient has improved and is suitable and  stable for discharge.        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        Documentation assistance provided by Sterling Jcakman acting as scribe for Martha Tipton MD. Information recorded by the scribe was done at my direction and has been verified and validated by me.        Sterling Jackman  05/02/23 0008       Martha Tipton MD  05/02/23 0421       Martha Tipton MD  05/02/23 [id number]

## 2023-05-04 NOTE — PROGRESS NOTES
"Enter Query Response Below      Query Response: Acute kidney injury             If applicable, please update the problem list.   Patient: Evangelina Sutton        : 1950  Account: [id number]           Admit Date: 2023        How to Respond to this query:       a. Click New Note     b. Answer query within the yellow box.                c. Update the Problem List, if applicable.      If you have any questions about this query contact me at: [email or web address]    Dr. Zamudio,     73 yo female admitted 23 for \"weakness\" per History and Physical. Also noted is history of \"multiple myeloma is being admitted because of massive edema she has low albumin levels.\"    Laboratory values reported as follows: Creatinine (23) 0.73, (23)-1.13, (23)-1.63, and (23)-0.90.  Treatment included IV Lasix -23 and monitoring of labs.    After study, can patient's condition be further specified as    Acute kidney injury   Abnormal laboratory values of no clinical significance  Other (please specify)________  Unable to further specify    By submitting this query, we are merely seeking further clarification of documentation to accurately reflect all conditions that you are monitoring, evaluating, treating or that extend the hospitalization or utilize additional resources of care. Please utilize your independent clinical judgment when addressing the question(s) above.     This query and your response, once completed, will be entered into the legal medical record.    Sincerely,  Romelia STREET RN CCDS  System Director Clinical Documentation Integrity Program     "

## 2023-05-10 LAB — QT INTERVAL: 336 MS

## 2024-10-31 NOTE — PLAN OF CARE
Goal Outcome Evaluation:              Outcome Evaluation: Direct admit this shift. AOX4. 2units of PRBC ordered. Per blood bank, blood will have to be sent to core lab for further antibody testing. Blood estimated to be ready by tomorrow. Continuous fluids running as ordered. Up to bsc with x1 assist. VSS. No c/o pain. Wound pics taken and in chart.   EKG - see results section for interpretation EKG - see results section for interpretation/Xray Image(s) - see wet read section for interpretation/CT Scan images

## (undated) DEVICE — ZIPPERED TOGA, PEEL-AWAY 3X LARGE: Brand: FLYTE, SURGICOOL

## (undated) DEVICE — 3M™ IOBAN™ 2 ANTIMICROBIAL INCISE DRAPE 6651EZ: Brand: IOBAN™ 2

## (undated) DEVICE — BIPOLAR SEALER 23-112-1 AQM 6.0: Brand: AQUAMANTYS™

## (undated) DEVICE — NON-WOVEN ADHESIVE WOUND DRESSING: Brand: PRIMAPORE ADHESIVE DRESSING 10*8CM

## (undated) DEVICE — SUT MNCRYL 4/0 PS2 18 IN

## (undated) DEVICE — STRYKER PERFORMANCE SERIES SAGITTAL BLADE: Brand: STRYKER PERFORMANCE SERIES

## (undated) DEVICE — VASCULAR ACCESS-LF: Brand: MEDLINE INDUSTRIES, INC.

## (undated) DEVICE — SOL IRR NACL 0.9PCT BT 1000ML

## (undated) DEVICE — ELECTRD BLD EDGE COAT 3IN

## (undated) DEVICE — ANTIBACTERIAL VIOLET BRAIDED (POLYGLACTIN 910), SYNTHETIC ABSORBABLE SURGICAL SUTURE: Brand: COATED VICRYL

## (undated) DEVICE — PENCL E/S SMOKEEVAC W/TELESCP CANN

## (undated) DEVICE — CVR LEG BOOTLEG F/R NOSKID 33IN

## (undated) DEVICE — GAUZE,SPONGE,4"X4",16PLY,STRL,LF,10/TRAY: Brand: MEDLINE

## (undated) DEVICE — GLV SURG SENSICARE PI LF PF 7.0

## (undated) DEVICE — SEAMLESS VIRAL BARRIER ADHESIVE PATCH, LATEX-FREE, 1.5"W X 4.5"L ON 3"W X 5"L, STERILE, ULTRASOUND PROBE COVER, INDIVIDUALLY WRAPPED: Brand: SHEATHES3D

## (undated) DEVICE — Device: Brand: COVER, PERINEAL POST, 12 PK

## (undated) DEVICE — GLV SURG SENSICARE PI PF LF 7 GRN STRL

## (undated) DEVICE — MAT FLR ABS W/BLU/LINER 56X72IN WHT

## (undated) DEVICE — APPL CHLORAPREP HI/LITE 26ML ORNG

## (undated) DEVICE — SUT VIC 2/0 CT1 36IN

## (undated) DEVICE — KT PT POSITION SUPINE HANA/PROFX TABL

## (undated) DEVICE — PULLOVER TOGA, 2X LARGE: Brand: FLYTE, SURGICOOL

## (undated) DEVICE — GLV SURG BIOGEL LTX PF 7 1/2

## (undated) DEVICE — DRSNG SURG AQUACEL AG 9X25CM

## (undated) DEVICE — NON-WOVEN ADHESIVE WOUND DRESSING: Brand: PRIMAPORE ADHESIVE WOUND DRSG 7.2*5CM

## (undated) DEVICE — INTENDED FOR TISSUE SEPARATION, AND OTHER PROCEDURES THAT REQUIRE A SHARP SURGICAL BLADE TO PUNCTURE OR CUT.: Brand: BARD-PARKER ® CARBON RIB-BACK BLADES

## (undated) DEVICE — PROXIMATE RH ROTATING HEAD SKIN STAPLERS (35 WIDE) CONTAINS 35 STAINLESS STEEL STAPLES: Brand: PROXIMATE

## (undated) DEVICE — SLV SCD KN/LEN ADJ EXPRSS BLENDED MD 1P/U

## (undated) DEVICE — ADHS LIQ MASTISOL 2/3ML

## (undated) DEVICE — GLV SURG SENSICARE SLT PF LF 8.5 STRL

## (undated) DEVICE — 450 ML BOTTLE OF 0.05% CHLORHEXIDINE GLUCONATE IN 99.95% STERILE WATER FOR IRRIGATION, USP AND APPLICATOR.: Brand: IRRISEPT ANTIMICROBIAL WOUND LAVAGE

## (undated) DEVICE — TOWEL,OR,DSP,ST,BLUE,STD,4/PK,20PK/CS: Brand: MEDLINE

## (undated) DEVICE — MEDI-VAC NON-CONDUCTIVE SUCTION TUBING: Brand: CARDINAL HEALTH

## (undated) DEVICE — Device: Brand: POWER-FLO®

## (undated) DEVICE — TOTAL ANTERIOR HIP-LF: Brand: MEDLINE INDUSTRIES, INC.

## (undated) DEVICE — GLV SURG SENSICARE SLT PF LF 6 STRL

## (undated) DEVICE — GLV SURG BIOGEL LTX PF 8 1/2

## (undated) DEVICE — DECANTER: Brand: UNBRANDED

## (undated) DEVICE — ANTIBACTERIAL VIOLET BRAIDED (POLYGLACTIN 910), SYNTHETIC ABSORBABLE SUTURE: Brand: COATED VICRYL